# Patient Record
Sex: FEMALE | Race: WHITE | NOT HISPANIC OR LATINO | Employment: UNEMPLOYED
[De-identification: names, ages, dates, MRNs, and addresses within clinical notes are randomized per-mention and may not be internally consistent; named-entity substitution may affect disease eponyms.]

---

## 2017-01-06 ENCOUNTER — RECORDS - HEALTHEAST (OUTPATIENT)
Dept: ADMINISTRATIVE | Facility: OTHER | Age: 42
End: 2017-01-06

## 2017-01-06 ENCOUNTER — OFFICE VISIT - HEALTHEAST (OUTPATIENT)
Dept: VASCULAR SURGERY | Facility: CLINIC | Age: 42
End: 2017-01-06

## 2017-01-06 DIAGNOSIS — E11.8 TYPE 2 DIABETES MELLITUS WITH COMPLICATION, WITHOUT LONG-TERM CURRENT USE OF INSULIN (H): ICD-10-CM

## 2017-01-06 DIAGNOSIS — E66.9 OBESITY (BMI 30-39.9): ICD-10-CM

## 2017-01-06 DIAGNOSIS — G62.9 PERIPHERAL POLYNEUROPATHY: ICD-10-CM

## 2017-01-06 DIAGNOSIS — I87.303 VENOUS HYPERTENSION, CHRONIC, BILATERAL: ICD-10-CM

## 2017-01-06 DIAGNOSIS — L97.911 LEG ULCER, RIGHT, LIMITED TO BREAKDOWN OF SKIN (H): ICD-10-CM

## 2017-01-06 DIAGNOSIS — Z72.0 TOBACCO ABUSE: ICD-10-CM

## 2017-01-13 ENCOUNTER — RECORDS - HEALTHEAST (OUTPATIENT)
Dept: ADMINISTRATIVE | Facility: OTHER | Age: 42
End: 2017-01-13

## 2017-01-13 ENCOUNTER — OFFICE VISIT - HEALTHEAST (OUTPATIENT)
Dept: VASCULAR SURGERY | Facility: CLINIC | Age: 42
End: 2017-01-13

## 2017-01-13 DIAGNOSIS — L97.911 LEG ULCER, RIGHT, LIMITED TO BREAKDOWN OF SKIN (H): ICD-10-CM

## 2017-01-13 DIAGNOSIS — G62.9 PERIPHERAL POLYNEUROPATHY: ICD-10-CM

## 2017-01-13 DIAGNOSIS — E66.9 OBESITY (BMI 30-39.9): ICD-10-CM

## 2017-01-13 DIAGNOSIS — E11.8 TYPE 2 DIABETES MELLITUS WITH COMPLICATION, WITHOUT LONG-TERM CURRENT USE OF INSULIN (H): ICD-10-CM

## 2017-01-20 ENCOUNTER — OFFICE VISIT - HEALTHEAST (OUTPATIENT)
Dept: VASCULAR SURGERY | Facility: CLINIC | Age: 42
End: 2017-01-20

## 2017-01-20 DIAGNOSIS — I87.303 VENOUS HYPERTENSION, CHRONIC, BILATERAL: ICD-10-CM

## 2017-01-20 DIAGNOSIS — L08.9 LOCAL INFECTION OF WOUND: ICD-10-CM

## 2017-01-20 DIAGNOSIS — E66.9 OBESITY (BMI 30-39.9): ICD-10-CM

## 2017-01-20 DIAGNOSIS — L97.911 LEG ULCER, RIGHT, LIMITED TO BREAKDOWN OF SKIN (H): ICD-10-CM

## 2017-01-20 DIAGNOSIS — T14.8XXA LOCAL INFECTION OF WOUND: ICD-10-CM

## 2017-01-20 DIAGNOSIS — G62.9 PERIPHERAL POLYNEUROPATHY: ICD-10-CM

## 2017-01-20 DIAGNOSIS — E11.8 TYPE 2 DIABETES MELLITUS WITH COMPLICATION, WITHOUT LONG-TERM CURRENT USE OF INSULIN (H): ICD-10-CM

## 2017-01-23 ENCOUNTER — COMMUNICATION - HEALTHEAST (OUTPATIENT)
Dept: VASCULAR SURGERY | Facility: CLINIC | Age: 42
End: 2017-01-23

## 2017-01-31 ENCOUNTER — OFFICE VISIT - HEALTHEAST (OUTPATIENT)
Dept: VASCULAR SURGERY | Facility: CLINIC | Age: 42
End: 2017-01-31

## 2017-01-31 DIAGNOSIS — E66.9 OBESITY (BMI 30-39.9): ICD-10-CM

## 2017-01-31 DIAGNOSIS — L97.911 LEG ULCER, RIGHT, LIMITED TO BREAKDOWN OF SKIN (H): ICD-10-CM

## 2017-01-31 DIAGNOSIS — I87.303 VENOUS HYPERTENSION, CHRONIC, BILATERAL: ICD-10-CM

## 2017-01-31 DIAGNOSIS — G62.9 PERIPHERAL POLYNEUROPATHY: ICD-10-CM

## 2017-01-31 DIAGNOSIS — T14.8XXA LOCAL INFECTION OF WOUND: ICD-10-CM

## 2017-01-31 DIAGNOSIS — L08.9 LOCAL INFECTION OF WOUND: ICD-10-CM

## 2017-01-31 DIAGNOSIS — E11.8 TYPE 2 DIABETES MELLITUS WITH COMPLICATION, WITHOUT LONG-TERM CURRENT USE OF INSULIN (H): ICD-10-CM

## 2017-02-09 ENCOUNTER — OFFICE VISIT - HEALTHEAST (OUTPATIENT)
Dept: VASCULAR SURGERY | Facility: CLINIC | Age: 42
End: 2017-02-09

## 2017-02-09 DIAGNOSIS — I87.303 VENOUS HYPERTENSION, CHRONIC, BILATERAL: ICD-10-CM

## 2017-02-09 DIAGNOSIS — Z72.0 TOBACCO ABUSE: ICD-10-CM

## 2017-02-09 DIAGNOSIS — L97.911 LEG ULCER, RIGHT, LIMITED TO BREAKDOWN OF SKIN (H): ICD-10-CM

## 2017-02-09 DIAGNOSIS — G62.9 PERIPHERAL POLYNEUROPATHY: ICD-10-CM

## 2017-02-09 DIAGNOSIS — E66.9 OBESITY (BMI 30-39.9): ICD-10-CM

## 2017-02-09 DIAGNOSIS — E11.8 TYPE 2 DIABETES MELLITUS WITH COMPLICATION, WITHOUT LONG-TERM CURRENT USE OF INSULIN (H): ICD-10-CM

## 2017-03-01 ENCOUNTER — OFFICE VISIT - HEALTHEAST (OUTPATIENT)
Dept: VASCULAR SURGERY | Facility: CLINIC | Age: 42
End: 2017-03-01

## 2017-03-01 DIAGNOSIS — G62.9 PERIPHERAL POLYNEUROPATHY: ICD-10-CM

## 2017-03-01 DIAGNOSIS — L97.911 LEG ULCER, RIGHT, LIMITED TO BREAKDOWN OF SKIN (H): ICD-10-CM

## 2017-03-01 DIAGNOSIS — Z72.0 TOBACCO ABUSE: ICD-10-CM

## 2017-03-01 DIAGNOSIS — I87.303 VENOUS HYPERTENSION, CHRONIC, BILATERAL: ICD-10-CM

## 2017-03-01 DIAGNOSIS — E11.8 TYPE 2 DIABETES MELLITUS WITH COMPLICATION, WITHOUT LONG-TERM CURRENT USE OF INSULIN (H): ICD-10-CM

## 2017-03-01 DIAGNOSIS — E66.9 OBESITY (BMI 30-39.9): ICD-10-CM

## 2017-03-06 ENCOUNTER — COMMUNICATION - HEALTHEAST (OUTPATIENT)
Dept: FAMILY MEDICINE | Facility: CLINIC | Age: 42
End: 2017-03-06

## 2017-03-06 ENCOUNTER — OFFICE VISIT - HEALTHEAST (OUTPATIENT)
Dept: FAMILY MEDICINE | Facility: CLINIC | Age: 42
End: 2017-03-06

## 2017-03-06 DIAGNOSIS — I10 ESSENTIAL HYPERTENSION WITH GOAL BLOOD PRESSURE LESS THAN 130/80: ICD-10-CM

## 2017-03-06 DIAGNOSIS — J45.20 INTERMITTENT ASTHMA: ICD-10-CM

## 2017-03-06 DIAGNOSIS — G62.9 PERIPHERAL POLYNEUROPATHY: ICD-10-CM

## 2017-03-06 DIAGNOSIS — F32.9 MAJOR DEPRESSION: ICD-10-CM

## 2017-03-06 DIAGNOSIS — R05.9 COUGH: ICD-10-CM

## 2017-03-06 DIAGNOSIS — F41.9 ANXIETY: ICD-10-CM

## 2017-03-06 DIAGNOSIS — E87.6 HYPOKALEMIA: ICD-10-CM

## 2017-03-06 DIAGNOSIS — E11.8 TYPE 2 DIABETES MELLITUS WITH COMPLICATION, WITHOUT LONG-TERM CURRENT USE OF INSULIN (H): ICD-10-CM

## 2017-03-06 LAB
HBA1C MFR BLD: 13.1 % (ref 3.5–6)
LDLC SERPL CALC-MCNC: 41 MG/DL

## 2017-03-08 ENCOUNTER — COMMUNICATION - HEALTHEAST (OUTPATIENT)
Dept: FAMILY MEDICINE | Facility: CLINIC | Age: 42
End: 2017-03-08

## 2017-03-09 ENCOUNTER — COMMUNICATION - HEALTHEAST (OUTPATIENT)
Dept: MEDSURG UNIT | Facility: HOSPITAL | Age: 42
End: 2017-03-09

## 2017-03-09 DIAGNOSIS — I10 ESSENTIAL HYPERTENSION WITH GOAL BLOOD PRESSURE LESS THAN 130/80: ICD-10-CM

## 2017-03-15 ENCOUNTER — OFFICE VISIT - HEALTHEAST (OUTPATIENT)
Dept: VASCULAR SURGERY | Facility: CLINIC | Age: 42
End: 2017-03-15

## 2017-03-15 DIAGNOSIS — L97.911 LEG ULCER, RIGHT, LIMITED TO BREAKDOWN OF SKIN (H): ICD-10-CM

## 2017-03-15 DIAGNOSIS — G62.9 PERIPHERAL POLYNEUROPATHY: ICD-10-CM

## 2017-03-15 DIAGNOSIS — E11.8 TYPE 2 DIABETES MELLITUS WITH COMPLICATION, WITHOUT LONG-TERM CURRENT USE OF INSULIN (H): ICD-10-CM

## 2017-04-28 ENCOUNTER — OFFICE VISIT - HEALTHEAST (OUTPATIENT)
Dept: VASCULAR SURGERY | Facility: CLINIC | Age: 42
End: 2017-04-28

## 2017-04-28 DIAGNOSIS — E66.9 OBESITY (BMI 30-39.9): ICD-10-CM

## 2017-04-28 DIAGNOSIS — Z72.0 TOBACCO ABUSE: ICD-10-CM

## 2017-04-28 DIAGNOSIS — I89.0 ACQUIRED LYMPHEDEMA OF LOWER EXTREMITY: ICD-10-CM

## 2017-04-28 DIAGNOSIS — L97.911 LEG ULCER, RIGHT, LIMITED TO BREAKDOWN OF SKIN (H): ICD-10-CM

## 2017-04-28 DIAGNOSIS — I87.303 VENOUS HYPERTENSION, CHRONIC, BILATERAL: ICD-10-CM

## 2017-04-28 DIAGNOSIS — E11.8 TYPE 2 DIABETES MELLITUS WITH COMPLICATION, WITHOUT LONG-TERM CURRENT USE OF INSULIN (H): ICD-10-CM

## 2017-04-28 DIAGNOSIS — L97.921 LEG ULCER, LEFT, LIMITED TO BREAKDOWN OF SKIN (H): ICD-10-CM

## 2017-04-28 DIAGNOSIS — G62.9 PERIPHERAL POLYNEUROPATHY: ICD-10-CM

## 2017-05-17 ENCOUNTER — OFFICE VISIT - HEALTHEAST (OUTPATIENT)
Dept: VASCULAR SURGERY | Facility: CLINIC | Age: 42
End: 2017-05-17

## 2017-05-17 DIAGNOSIS — E11.8 TYPE 2 DIABETES MELLITUS WITH COMPLICATION, WITHOUT LONG-TERM CURRENT USE OF INSULIN (H): ICD-10-CM

## 2017-05-17 DIAGNOSIS — E66.9 OBESITY: ICD-10-CM

## 2017-05-17 DIAGNOSIS — Z72.0 TOBACCO ABUSE: ICD-10-CM

## 2017-05-17 DIAGNOSIS — G62.9 PERIPHERAL POLYNEUROPATHY: ICD-10-CM

## 2017-05-17 DIAGNOSIS — E66.9 OBESITY (BMI 30-39.9): ICD-10-CM

## 2017-05-17 DIAGNOSIS — I89.0 ACQUIRED LYMPHEDEMA OF LOWER EXTREMITY: ICD-10-CM

## 2017-05-17 DIAGNOSIS — L97.911 LEG ULCER, RIGHT, LIMITED TO BREAKDOWN OF SKIN (H): ICD-10-CM

## 2017-05-18 ENCOUNTER — COMMUNICATION - HEALTHEAST (OUTPATIENT)
Dept: SURGERY | Facility: CLINIC | Age: 42
End: 2017-05-18

## 2017-05-24 ENCOUNTER — OFFICE VISIT - HEALTHEAST (OUTPATIENT)
Dept: VASCULAR SURGERY | Facility: CLINIC | Age: 42
End: 2017-05-24

## 2017-05-24 DIAGNOSIS — E11.8 TYPE 2 DIABETES MELLITUS WITH COMPLICATION, WITHOUT LONG-TERM CURRENT USE OF INSULIN (H): ICD-10-CM

## 2017-05-24 DIAGNOSIS — G62.9 PERIPHERAL POLYNEUROPATHY: ICD-10-CM

## 2017-05-24 DIAGNOSIS — L97.911 LEG ULCER, RIGHT, LIMITED TO BREAKDOWN OF SKIN (H): ICD-10-CM

## 2017-05-24 DIAGNOSIS — I89.0 ACQUIRED LYMPHEDEMA OF LOWER EXTREMITY: ICD-10-CM

## 2017-05-24 DIAGNOSIS — Z72.0 TOBACCO ABUSE: ICD-10-CM

## 2017-05-24 DIAGNOSIS — E66.9 OBESITY (BMI 30-39.9): ICD-10-CM

## 2017-06-15 ENCOUNTER — OFFICE VISIT - HEALTHEAST (OUTPATIENT)
Dept: VASCULAR SURGERY | Facility: CLINIC | Age: 42
End: 2017-06-15

## 2017-06-15 DIAGNOSIS — E11.8 TYPE 2 DIABETES MELLITUS WITH COMPLICATION, WITHOUT LONG-TERM CURRENT USE OF INSULIN (H): ICD-10-CM

## 2017-06-15 DIAGNOSIS — Z72.0 TOBACCO ABUSE: ICD-10-CM

## 2017-06-15 DIAGNOSIS — E66.9 OBESITY (BMI 30-39.9): ICD-10-CM

## 2017-06-15 DIAGNOSIS — L97.912 LEG ULCER, RIGHT, WITH FAT LAYER EXPOSED (H): ICD-10-CM

## 2017-06-15 DIAGNOSIS — G62.9 PERIPHERAL POLYNEUROPATHY: ICD-10-CM

## 2017-06-15 DIAGNOSIS — I89.0 ACQUIRED LYMPHEDEMA OF LOWER EXTREMITY: ICD-10-CM

## 2017-06-22 ENCOUNTER — OFFICE VISIT - HEALTHEAST (OUTPATIENT)
Dept: VASCULAR SURGERY | Facility: CLINIC | Age: 42
End: 2017-06-22

## 2017-06-22 DIAGNOSIS — Z72.0 TOBACCO ABUSE: ICD-10-CM

## 2017-06-22 DIAGNOSIS — I89.0 ACQUIRED LYMPHEDEMA OF LOWER EXTREMITY: ICD-10-CM

## 2017-06-22 DIAGNOSIS — E66.9 OBESITY (BMI 30-39.9): ICD-10-CM

## 2017-06-22 DIAGNOSIS — G62.9 PERIPHERAL POLYNEUROPATHY: ICD-10-CM

## 2017-06-22 DIAGNOSIS — E11.8 TYPE 2 DIABETES MELLITUS WITH COMPLICATION, WITHOUT LONG-TERM CURRENT USE OF INSULIN (H): ICD-10-CM

## 2017-06-22 DIAGNOSIS — L97.911 LEG ULCER, RIGHT, LIMITED TO BREAKDOWN OF SKIN (H): ICD-10-CM

## 2017-06-29 ENCOUNTER — OFFICE VISIT - HEALTHEAST (OUTPATIENT)
Dept: VASCULAR SURGERY | Facility: CLINIC | Age: 42
End: 2017-06-29

## 2017-06-29 DIAGNOSIS — G62.9 PERIPHERAL POLYNEUROPATHY: ICD-10-CM

## 2017-06-29 DIAGNOSIS — L97.911 LEG ULCER, RIGHT, LIMITED TO BREAKDOWN OF SKIN (H): ICD-10-CM

## 2017-06-29 DIAGNOSIS — I89.0 ACQUIRED LYMPHEDEMA OF LOWER EXTREMITY: ICD-10-CM

## 2017-06-29 DIAGNOSIS — Z72.0 TOBACCO ABUSE: ICD-10-CM

## 2017-06-29 DIAGNOSIS — E11.8 TYPE 2 DIABETES MELLITUS WITH COMPLICATION, WITHOUT LONG-TERM CURRENT USE OF INSULIN (H): ICD-10-CM

## 2017-07-05 ENCOUNTER — COMMUNICATION - HEALTHEAST (OUTPATIENT)
Dept: SURGERY | Facility: CLINIC | Age: 42
End: 2017-07-05

## 2017-07-05 ENCOUNTER — COMMUNICATION - HEALTHEAST (OUTPATIENT)
Dept: FAMILY MEDICINE | Facility: CLINIC | Age: 42
End: 2017-07-05

## 2017-07-07 ENCOUNTER — AMBULATORY - HEALTHEAST (OUTPATIENT)
Dept: VASCULAR SURGERY | Facility: CLINIC | Age: 42
End: 2017-07-07

## 2017-08-03 ENCOUNTER — OFFICE VISIT - HEALTHEAST (OUTPATIENT)
Dept: VASCULAR SURGERY | Facility: CLINIC | Age: 42
End: 2017-08-03

## 2017-08-03 DIAGNOSIS — E66.9 OBESITY (BMI 30-39.9): ICD-10-CM

## 2017-08-03 DIAGNOSIS — F41.9 ANXIETY: ICD-10-CM

## 2017-08-03 DIAGNOSIS — I87.303 VENOUS HYPERTENSION, CHRONIC, BILATERAL: ICD-10-CM

## 2017-08-03 DIAGNOSIS — L97.912 LEG ULCER, RIGHT, WITH FAT LAYER EXPOSED (H): ICD-10-CM

## 2017-08-03 DIAGNOSIS — E11.8 TYPE 2 DIABETES MELLITUS WITH COMPLICATION, WITHOUT LONG-TERM CURRENT USE OF INSULIN (H): ICD-10-CM

## 2017-08-03 DIAGNOSIS — I89.0 ACQUIRED LYMPHEDEMA OF LOWER EXTREMITY: ICD-10-CM

## 2017-08-03 DIAGNOSIS — Z72.0 TOBACCO ABUSE: ICD-10-CM

## 2017-08-03 DIAGNOSIS — G62.9 PERIPHERAL POLYNEUROPATHY: ICD-10-CM

## 2017-08-11 ENCOUNTER — OFFICE VISIT - HEALTHEAST (OUTPATIENT)
Dept: VASCULAR SURGERY | Facility: CLINIC | Age: 42
End: 2017-08-11

## 2017-08-11 DIAGNOSIS — I89.0 ACQUIRED LYMPHEDEMA OF LOWER EXTREMITY: ICD-10-CM

## 2017-08-11 DIAGNOSIS — L97.911 LEG ULCER, RIGHT, LIMITED TO BREAKDOWN OF SKIN (H): ICD-10-CM

## 2017-08-11 DIAGNOSIS — G62.9 PERIPHERAL POLYNEUROPATHY: ICD-10-CM

## 2017-08-11 DIAGNOSIS — E66.9 OBESITY (BMI 30-39.9): ICD-10-CM

## 2017-08-11 DIAGNOSIS — Z72.0 TOBACCO ABUSE: ICD-10-CM

## 2017-08-11 DIAGNOSIS — E11.8 TYPE 2 DIABETES MELLITUS WITH COMPLICATION, WITHOUT LONG-TERM CURRENT USE OF INSULIN (H): ICD-10-CM

## 2017-08-14 ENCOUNTER — COMMUNICATION - HEALTHEAST (OUTPATIENT)
Dept: FAMILY MEDICINE | Facility: CLINIC | Age: 42
End: 2017-08-14

## 2017-08-14 DIAGNOSIS — F41.9 ANXIETY: ICD-10-CM

## 2017-08-14 DIAGNOSIS — F32.9 MAJOR DEPRESSION: ICD-10-CM

## 2017-08-15 ENCOUNTER — AMBULATORY - HEALTHEAST (OUTPATIENT)
Dept: VASCULAR SURGERY | Facility: CLINIC | Age: 42
End: 2017-08-15

## 2017-08-15 DIAGNOSIS — L97.911 LEG ULCER, RIGHT, LIMITED TO BREAKDOWN OF SKIN (H): ICD-10-CM

## 2017-08-24 ENCOUNTER — OFFICE VISIT - HEALTHEAST (OUTPATIENT)
Dept: VASCULAR SURGERY | Facility: CLINIC | Age: 42
End: 2017-08-24

## 2017-08-24 DIAGNOSIS — L97.911 LEG ULCER, RIGHT, LIMITED TO BREAKDOWN OF SKIN (H): ICD-10-CM

## 2017-08-24 DIAGNOSIS — G62.9 PERIPHERAL POLYNEUROPATHY: ICD-10-CM

## 2017-08-24 DIAGNOSIS — L97.921 LEG ULCER, LEFT, LIMITED TO BREAKDOWN OF SKIN (H): ICD-10-CM

## 2017-08-24 DIAGNOSIS — E66.9 OBESITY (BMI 30-39.9): ICD-10-CM

## 2017-08-24 DIAGNOSIS — Z72.0 TOBACCO ABUSE: ICD-10-CM

## 2017-08-24 DIAGNOSIS — E11.8 TYPE 2 DIABETES MELLITUS WITH COMPLICATION, WITHOUT LONG-TERM CURRENT USE OF INSULIN (H): ICD-10-CM

## 2017-08-24 DIAGNOSIS — I89.0 ACQUIRED LYMPHEDEMA OF LOWER EXTREMITY: ICD-10-CM

## 2017-09-14 ENCOUNTER — COMMUNICATION - HEALTHEAST (OUTPATIENT)
Dept: FAMILY MEDICINE | Facility: CLINIC | Age: 42
End: 2017-09-14

## 2017-09-14 ENCOUNTER — OFFICE VISIT - HEALTHEAST (OUTPATIENT)
Dept: VASCULAR SURGERY | Facility: CLINIC | Age: 42
End: 2017-09-14

## 2017-09-14 DIAGNOSIS — G62.9 PERIPHERAL POLYNEUROPATHY: ICD-10-CM

## 2017-09-14 DIAGNOSIS — I89.0 ACQUIRED LYMPHEDEMA OF LOWER EXTREMITY: ICD-10-CM

## 2017-09-14 DIAGNOSIS — E11.8 TYPE 2 DIABETES MELLITUS WITH COMPLICATION, WITHOUT LONG-TERM CURRENT USE OF INSULIN (H): ICD-10-CM

## 2017-09-14 DIAGNOSIS — E66.9 OBESITY (BMI 30-39.9): ICD-10-CM

## 2017-09-14 DIAGNOSIS — L97.912 LEG ULCER, RIGHT, WITH FAT LAYER EXPOSED (H): ICD-10-CM

## 2017-09-14 DIAGNOSIS — L97.921 LEG ULCER, LEFT, LIMITED TO BREAKDOWN OF SKIN (H): ICD-10-CM

## 2017-09-14 DIAGNOSIS — Z72.0 TOBACCO ABUSE: ICD-10-CM

## 2017-09-16 ENCOUNTER — COMMUNICATION - HEALTHEAST (OUTPATIENT)
Dept: FAMILY MEDICINE | Facility: CLINIC | Age: 42
End: 2017-09-16

## 2017-09-21 ENCOUNTER — COMMUNICATION - HEALTHEAST (OUTPATIENT)
Dept: VASCULAR SURGERY | Facility: CLINIC | Age: 42
End: 2017-09-21

## 2017-09-28 ENCOUNTER — OFFICE VISIT - HEALTHEAST (OUTPATIENT)
Dept: VASCULAR SURGERY | Facility: CLINIC | Age: 42
End: 2017-09-28

## 2017-09-28 DIAGNOSIS — G62.9 PERIPHERAL POLYNEUROPATHY: ICD-10-CM

## 2017-09-28 DIAGNOSIS — L97.921 LEG ULCER, LEFT, LIMITED TO BREAKDOWN OF SKIN (H): ICD-10-CM

## 2017-09-28 DIAGNOSIS — E66.9 OBESITY (BMI 30-39.9): ICD-10-CM

## 2017-09-28 DIAGNOSIS — E11.8 TYPE 2 DIABETES MELLITUS WITH COMPLICATION, WITHOUT LONG-TERM CURRENT USE OF INSULIN (H): ICD-10-CM

## 2017-09-28 DIAGNOSIS — L97.911 LEG ULCER, RIGHT, LIMITED TO BREAKDOWN OF SKIN (H): ICD-10-CM

## 2017-09-28 DIAGNOSIS — I87.2 CHRONIC VENOUS INSUFFICIENCY: ICD-10-CM

## 2017-09-28 DIAGNOSIS — I89.0 ACQUIRED LYMPHEDEMA OF LOWER EXTREMITY: ICD-10-CM

## 2017-09-28 DIAGNOSIS — Z72.0 TOBACCO ABUSE: ICD-10-CM

## 2017-10-12 ENCOUNTER — COMMUNICATION - HEALTHEAST (OUTPATIENT)
Dept: FAMILY MEDICINE | Facility: CLINIC | Age: 42
End: 2017-10-12

## 2017-11-09 ENCOUNTER — COMMUNICATION - HEALTHEAST (OUTPATIENT)
Dept: FAMILY MEDICINE | Facility: CLINIC | Age: 42
End: 2017-11-09

## 2017-11-20 ENCOUNTER — COMMUNICATION - HEALTHEAST (OUTPATIENT)
Dept: FAMILY MEDICINE | Facility: CLINIC | Age: 42
End: 2017-11-20

## 2017-12-05 ENCOUNTER — RECORDS - HEALTHEAST (OUTPATIENT)
Dept: ADMINISTRATIVE | Facility: OTHER | Age: 42
End: 2017-12-05

## 2017-12-09 ENCOUNTER — COMMUNICATION - HEALTHEAST (OUTPATIENT)
Dept: FAMILY MEDICINE | Facility: CLINIC | Age: 42
End: 2017-12-09

## 2017-12-09 DIAGNOSIS — E11.8 TYPE 2 DIABETES MELLITUS WITH COMPLICATION, WITHOUT LONG-TERM CURRENT USE OF INSULIN (H): ICD-10-CM

## 2017-12-12 ENCOUNTER — COMMUNICATION - HEALTHEAST (OUTPATIENT)
Dept: FAMILY MEDICINE | Facility: CLINIC | Age: 42
End: 2017-12-12

## 2017-12-21 ENCOUNTER — COMMUNICATION - HEALTHEAST (OUTPATIENT)
Dept: FAMILY MEDICINE | Facility: CLINIC | Age: 42
End: 2017-12-21

## 2017-12-21 ENCOUNTER — RECORDS - HEALTHEAST (OUTPATIENT)
Dept: ADMINISTRATIVE | Facility: OTHER | Age: 42
End: 2017-12-21

## 2017-12-24 ENCOUNTER — RECORDS - HEALTHEAST (OUTPATIENT)
Dept: ADMINISTRATIVE | Facility: OTHER | Age: 42
End: 2017-12-24

## 2017-12-26 ENCOUNTER — COMMUNICATION - HEALTHEAST (OUTPATIENT)
Dept: FAMILY MEDICINE | Facility: CLINIC | Age: 42
End: 2017-12-26

## 2018-01-25 ENCOUNTER — COMMUNICATION - HEALTHEAST (OUTPATIENT)
Dept: FAMILY MEDICINE | Facility: CLINIC | Age: 43
End: 2018-01-25

## 2018-01-25 DIAGNOSIS — E11.8 TYPE 2 DIABETES MELLITUS WITH COMPLICATION, WITHOUT LONG-TERM CURRENT USE OF INSULIN (H): ICD-10-CM

## 2018-02-28 ENCOUNTER — COMMUNICATION - HEALTHEAST (OUTPATIENT)
Dept: FAMILY MEDICINE | Facility: CLINIC | Age: 43
End: 2018-02-28

## 2018-12-21 ENCOUNTER — COMMUNICATION - HEALTHEAST (OUTPATIENT)
Dept: MEDSURG UNIT | Facility: HOSPITAL | Age: 43
End: 2018-12-21

## 2018-12-21 ENCOUNTER — COMMUNICATION - HEALTHEAST (OUTPATIENT)
Dept: FAMILY MEDICINE | Facility: CLINIC | Age: 43
End: 2018-12-21

## 2018-12-21 DIAGNOSIS — E11.8 TYPE 2 DIABETES MELLITUS WITH COMPLICATION, WITHOUT LONG-TERM CURRENT USE OF INSULIN (H): ICD-10-CM

## 2018-12-21 DIAGNOSIS — F32.9 MAJOR DEPRESSION: ICD-10-CM

## 2018-12-21 DIAGNOSIS — F41.9 ANXIETY: ICD-10-CM

## 2018-12-21 DIAGNOSIS — I10 ESSENTIAL HYPERTENSION WITH GOAL BLOOD PRESSURE LESS THAN 130/80: ICD-10-CM

## 2018-12-24 ENCOUNTER — COMMUNICATION - HEALTHEAST (OUTPATIENT)
Dept: FAMILY MEDICINE | Facility: CLINIC | Age: 43
End: 2018-12-24

## 2018-12-24 DIAGNOSIS — G62.9 PERIPHERAL POLYNEUROPATHY: ICD-10-CM

## 2018-12-28 ENCOUNTER — COMMUNICATION - HEALTHEAST (OUTPATIENT)
Dept: FAMILY MEDICINE | Facility: CLINIC | Age: 43
End: 2018-12-28

## 2019-04-04 ENCOUNTER — COMMUNICATION - HEALTHEAST (OUTPATIENT)
Dept: FAMILY MEDICINE | Facility: CLINIC | Age: 44
End: 2019-04-04

## 2019-04-04 DIAGNOSIS — E11.65 UNCONTROLLED TYPE 2 DIABETES MELLITUS WITH HYPERGLYCEMIA (H): ICD-10-CM

## 2019-04-04 DIAGNOSIS — E11.8 TYPE 2 DIABETES MELLITUS WITH COMPLICATION, WITHOUT LONG-TERM CURRENT USE OF INSULIN (H): ICD-10-CM

## 2019-04-26 ENCOUNTER — COMMUNICATION - HEALTHEAST (OUTPATIENT)
Dept: SCHEDULING | Facility: CLINIC | Age: 44
End: 2019-04-26

## 2019-04-29 ENCOUNTER — COMMUNICATION - HEALTHEAST (OUTPATIENT)
Dept: FAMILY MEDICINE | Facility: CLINIC | Age: 44
End: 2019-04-29

## 2019-04-29 DIAGNOSIS — E11.65 UNCONTROLLED TYPE 2 DIABETES MELLITUS WITH HYPERGLYCEMIA (H): ICD-10-CM

## 2019-06-14 ENCOUNTER — AMBULATORY - HEALTHEAST (OUTPATIENT)
Dept: FAMILY MEDICINE | Facility: CLINIC | Age: 44
End: 2019-06-14

## 2019-06-14 DIAGNOSIS — E11.65 UNCONTROLLED TYPE 2 DIABETES MELLITUS WITH HYPERGLYCEMIA (H): ICD-10-CM

## 2019-06-18 ENCOUNTER — COMMUNICATION - HEALTHEAST (OUTPATIENT)
Dept: NURSING | Facility: CLINIC | Age: 44
End: 2019-06-18

## 2019-06-20 ENCOUNTER — COMMUNICATION - HEALTHEAST (OUTPATIENT)
Dept: FAMILY MEDICINE | Facility: CLINIC | Age: 44
End: 2019-06-20

## 2019-06-28 ENCOUNTER — COMMUNICATION - HEALTHEAST (OUTPATIENT)
Dept: NURSING | Facility: CLINIC | Age: 44
End: 2019-06-28

## 2019-07-02 ENCOUNTER — COMMUNICATION - HEALTHEAST (OUTPATIENT)
Dept: NURSING | Facility: CLINIC | Age: 44
End: 2019-07-02

## 2019-07-18 ENCOUNTER — COMMUNICATION - HEALTHEAST (OUTPATIENT)
Dept: FAMILY MEDICINE | Facility: CLINIC | Age: 44
End: 2019-07-18

## 2019-07-18 DIAGNOSIS — E11.8 TYPE 2 DIABETES MELLITUS WITH COMPLICATION, WITHOUT LONG-TERM CURRENT USE OF INSULIN (H): ICD-10-CM

## 2019-07-26 ENCOUNTER — COMMUNICATION - HEALTHEAST (OUTPATIENT)
Dept: NURSING | Facility: CLINIC | Age: 44
End: 2019-07-26

## 2019-07-29 ENCOUNTER — COMMUNICATION - HEALTHEAST (OUTPATIENT)
Dept: NURSING | Facility: CLINIC | Age: 44
End: 2019-07-29

## 2019-11-23 ENCOUNTER — HOSPITAL ENCOUNTER (INPATIENT)
Facility: CLINIC | Age: 44
LOS: 12 days | Discharge: NURSING, REHAB OR LONG-TERM CARE | End: 2019-12-05
Attending: INTERNAL MEDICINE | Admitting: INTERNAL MEDICINE
Payer: COMMERCIAL

## 2019-11-23 ENCOUNTER — APPOINTMENT (OUTPATIENT)
Dept: GENERAL RADIOLOGY | Facility: CLINIC | Age: 44
End: 2019-11-23
Attending: INTERNAL MEDICINE
Payer: COMMERCIAL

## 2019-11-23 DIAGNOSIS — E11.65 UNCONTROLLED TYPE 2 DIABETES MELLITUS WITH HYPERGLYCEMIA (H): ICD-10-CM

## 2019-11-23 DIAGNOSIS — N83.8 OVARIAN MASS: Primary | ICD-10-CM

## 2019-11-23 PROBLEM — A41.9 SEPSIS (H): Status: ACTIVE | Noted: 2019-11-23

## 2019-11-23 PROBLEM — R65.20 SEVERE SEPSIS WITH ACUTE ORGAN DYSFUNCTION (H): Status: ACTIVE | Noted: 2019-11-23

## 2019-11-23 LAB
ALBUMIN SERPL-MCNC: 1.8 G/DL (ref 3.4–5)
ALP SERPL-CCNC: 64 U/L (ref 40–150)
ALT SERPL W P-5'-P-CCNC: 11 U/L (ref 0–50)
AMMONIA PLAS-SCNC: 28 UMOL/L (ref 10–50)
ANION GAP SERPL CALCULATED.3IONS-SCNC: 7 MMOL/L (ref 3–14)
ANION GAP SERPL CALCULATED.3IONS-SCNC: 8 MMOL/L (ref 3–14)
APTT PPP: 34 SEC (ref 22–37)
AST SERPL W P-5'-P-CCNC: 15 U/L (ref 0–45)
B-HCG SERPL-ACNC: <1 IU/L (ref 0–5)
BASOPHILS # BLD AUTO: 0 10E9/L (ref 0–0.2)
BASOPHILS NFR BLD AUTO: 0.2 %
BILIRUB SERPL-MCNC: 0.7 MG/DL (ref 0.2–1.3)
BUN SERPL-MCNC: 24 MG/DL (ref 7–30)
BUN SERPL-MCNC: 28 MG/DL (ref 7–30)
CALCIUM SERPL-MCNC: 7.6 MG/DL (ref 8.5–10.1)
CALCIUM SERPL-MCNC: 7.6 MG/DL (ref 8.5–10.1)
CANCER AG125 SERPL-ACNC: 850 U/ML (ref 0–30)
CEA SERPL-MCNC: 4 UG/L (ref 0–2.5)
CHLORIDE SERPL-SCNC: 96 MMOL/L (ref 94–109)
CHLORIDE SERPL-SCNC: 99 MMOL/L (ref 94–109)
CO2 SERPL-SCNC: 25 MMOL/L (ref 20–32)
CO2 SERPL-SCNC: 25 MMOL/L (ref 20–32)
CREAT SERPL-MCNC: 1.18 MG/DL (ref 0.52–1.04)
CREAT SERPL-MCNC: 1.26 MG/DL (ref 0.52–1.04)
CRP SERPL-MCNC: 180 MG/L (ref 0–8)
DIFFERENTIAL METHOD BLD: ABNORMAL
EOSINOPHIL # BLD AUTO: 0 10E9/L (ref 0–0.7)
EOSINOPHIL NFR BLD AUTO: 0.1 %
ERYTHROCYTE [DISTWIDTH] IN BLOOD BY AUTOMATED COUNT: 14.4 % (ref 10–15)
GFR SERPL CREATININE-BSD FRML MDRD: 52 ML/MIN/{1.73_M2}
GFR SERPL CREATININE-BSD FRML MDRD: 56 ML/MIN/{1.73_M2}
GLUCOSE BLDC GLUCOMTR-MCNC: 109 MG/DL (ref 70–99)
GLUCOSE BLDC GLUCOMTR-MCNC: 123 MG/DL (ref 70–99)
GLUCOSE BLDC GLUCOMTR-MCNC: 164 MG/DL (ref 70–99)
GLUCOSE BLDC GLUCOMTR-MCNC: 175 MG/DL (ref 70–99)
GLUCOSE BLDC GLUCOMTR-MCNC: 343 MG/DL (ref 70–99)
GLUCOSE BLDC GLUCOMTR-MCNC: 418 MG/DL (ref 70–99)
GLUCOSE BLDC GLUCOMTR-MCNC: 447 MG/DL (ref 70–99)
GLUCOSE SERPL-MCNC: 174 MG/DL (ref 70–99)
GLUCOSE SERPL-MCNC: 476 MG/DL (ref 70–99)
HBA1C MFR BLD: 15 % (ref 0–5.6)
HCT VFR BLD AUTO: 32.3 % (ref 35–47)
HGB BLD-MCNC: 9.9 G/DL (ref 11.7–15.7)
IMM GRANULOCYTES # BLD: 0.1 10E9/L (ref 0–0.4)
IMM GRANULOCYTES NFR BLD: 0.6 %
INR PPP: 1.59 (ref 0.86–1.14)
INTERPRETATION ECG - MUSE: NORMAL
LACTATE BLD-SCNC: 1.5 MMOL/L (ref 0.7–2)
LACTATE BLD-SCNC: 1.8 MMOL/L (ref 0.7–2)
LACTATE BLD-SCNC: 2.3 MMOL/L (ref 0.7–2)
LYMPHOCYTES # BLD AUTO: 1.5 10E9/L (ref 0.8–5.3)
LYMPHOCYTES NFR BLD AUTO: 8.3 %
MAGNESIUM SERPL-MCNC: 1.7 MG/DL (ref 1.6–2.3)
MCH RBC QN AUTO: 26.5 PG (ref 26.5–33)
MCHC RBC AUTO-ENTMCNC: 30.7 G/DL (ref 31.5–36.5)
MCV RBC AUTO: 87 FL (ref 78–100)
MONOCYTES # BLD AUTO: 0.8 10E9/L (ref 0–1.3)
MONOCYTES NFR BLD AUTO: 4.5 %
MRSA DNA SPEC QL NAA+PROBE: NEGATIVE
NEUTROPHILS # BLD AUTO: 15.7 10E9/L (ref 1.6–8.3)
NEUTROPHILS NFR BLD AUTO: 86.3 %
NRBC # BLD AUTO: 0 10*3/UL
NRBC BLD AUTO-RTO: 0 /100
PLATELET # BLD AUTO: 325 10E9/L (ref 150–450)
POTASSIUM SERPL-SCNC: 3.9 MMOL/L (ref 3.4–5.3)
POTASSIUM SERPL-SCNC: 3.9 MMOL/L (ref 3.4–5.3)
PROCALCITONIN SERPL-MCNC: 127.51 NG/ML
PROT SERPL-MCNC: 6 G/DL (ref 6.8–8.8)
RBC # BLD AUTO: 3.73 10E12/L (ref 3.8–5.2)
SODIUM SERPL-SCNC: 128 MMOL/L (ref 133–144)
SODIUM SERPL-SCNC: 132 MMOL/L (ref 133–144)
SPECIMEN SOURCE: NORMAL
T PALLIDUM AB SER QL: NONREACTIVE
TROPONIN I SERPL-MCNC: <0.015 UG/L (ref 0–0.04)
TSH SERPL DL<=0.005 MIU/L-ACNC: 1.8 MU/L (ref 0.4–4)
WBC # BLD AUTO: 18.2 10E9/L (ref 4–11)

## 2019-11-23 PROCEDURE — 84484 ASSAY OF TROPONIN QUANT: CPT | Performed by: STUDENT IN AN ORGANIZED HEALTH CARE EDUCATION/TRAINING PROGRAM

## 2019-11-23 PROCEDURE — 80048 BASIC METABOLIC PNL TOTAL CA: CPT | Performed by: STUDENT IN AN ORGANIZED HEALTH CARE EDUCATION/TRAINING PROGRAM

## 2019-11-23 PROCEDURE — 93010 ELECTROCARDIOGRAM REPORT: CPT | Performed by: INTERNAL MEDICINE

## 2019-11-23 PROCEDURE — 25000128 H RX IP 250 OP 636: Performed by: INTERNAL MEDICINE

## 2019-11-23 PROCEDURE — 85025 COMPLETE CBC W/AUTO DIFF WBC: CPT | Performed by: STUDENT IN AN ORGANIZED HEALTH CARE EDUCATION/TRAINING PROGRAM

## 2019-11-23 PROCEDURE — 87640 STAPH A DNA AMP PROBE: CPT | Performed by: STUDENT IN AN ORGANIZED HEALTH CARE EDUCATION/TRAINING PROGRAM

## 2019-11-23 PROCEDURE — 36415 COLL VENOUS BLD VENIPUNCTURE: CPT | Performed by: STUDENT IN AN ORGANIZED HEALTH CARE EDUCATION/TRAINING PROGRAM

## 2019-11-23 PROCEDURE — 25800030 ZZH RX IP 258 OP 636: Performed by: STUDENT IN AN ORGANIZED HEALTH CARE EDUCATION/TRAINING PROGRAM

## 2019-11-23 PROCEDURE — 82140 ASSAY OF AMMONIA: CPT | Performed by: STUDENT IN AN ORGANIZED HEALTH CARE EDUCATION/TRAINING PROGRAM

## 2019-11-23 PROCEDURE — 25000128 H RX IP 250 OP 636: Performed by: STUDENT IN AN ORGANIZED HEALTH CARE EDUCATION/TRAINING PROGRAM

## 2019-11-23 PROCEDURE — 36592 COLLECT BLOOD FROM PICC: CPT | Performed by: STUDENT IN AN ORGANIZED HEALTH CARE EDUCATION/TRAINING PROGRAM

## 2019-11-23 PROCEDURE — 86780 TREPONEMA PALLIDUM: CPT | Performed by: STUDENT IN AN ORGANIZED HEALTH CARE EDUCATION/TRAINING PROGRAM

## 2019-11-23 PROCEDURE — 86140 C-REACTIVE PROTEIN: CPT | Performed by: STUDENT IN AN ORGANIZED HEALTH CARE EDUCATION/TRAINING PROGRAM

## 2019-11-23 PROCEDURE — 87641 MR-STAPH DNA AMP PROBE: CPT | Performed by: STUDENT IN AN ORGANIZED HEALTH CARE EDUCATION/TRAINING PROGRAM

## 2019-11-23 PROCEDURE — 83735 ASSAY OF MAGNESIUM: CPT | Performed by: STUDENT IN AN ORGANIZED HEALTH CARE EDUCATION/TRAINING PROGRAM

## 2019-11-23 PROCEDURE — 71045 X-RAY EXAM CHEST 1 VIEW: CPT

## 2019-11-23 PROCEDURE — 86304 IMMUNOASSAY TUMOR CA 125: CPT | Performed by: STUDENT IN AN ORGANIZED HEALTH CARE EDUCATION/TRAINING PROGRAM

## 2019-11-23 PROCEDURE — 25800030 ZZH RX IP 258 OP 636: Performed by: INTERNAL MEDICINE

## 2019-11-23 PROCEDURE — 25000131 ZZH RX MED GY IP 250 OP 636 PS 637: Performed by: STUDENT IN AN ORGANIZED HEALTH CARE EDUCATION/TRAINING PROGRAM

## 2019-11-23 PROCEDURE — 83605 ASSAY OF LACTIC ACID: CPT | Performed by: STUDENT IN AN ORGANIZED HEALTH CARE EDUCATION/TRAINING PROGRAM

## 2019-11-23 PROCEDURE — 87389 HIV-1 AG W/HIV-1&-2 AB AG IA: CPT | Performed by: STUDENT IN AN ORGANIZED HEALTH CARE EDUCATION/TRAINING PROGRAM

## 2019-11-23 PROCEDURE — 84145 PROCALCITONIN (PCT): CPT | Performed by: STUDENT IN AN ORGANIZED HEALTH CARE EDUCATION/TRAINING PROGRAM

## 2019-11-23 PROCEDURE — 83605 ASSAY OF LACTIC ACID: CPT

## 2019-11-23 PROCEDURE — 25000132 ZZH RX MED GY IP 250 OP 250 PS 637: Performed by: STUDENT IN AN ORGANIZED HEALTH CARE EDUCATION/TRAINING PROGRAM

## 2019-11-23 PROCEDURE — 83036 HEMOGLOBIN GLYCOSYLATED A1C: CPT | Performed by: STUDENT IN AN ORGANIZED HEALTH CARE EDUCATION/TRAINING PROGRAM

## 2019-11-23 PROCEDURE — 82010 KETONE BODYS QUAN: CPT | Performed by: STUDENT IN AN ORGANIZED HEALTH CARE EDUCATION/TRAINING PROGRAM

## 2019-11-23 PROCEDURE — 00000146 ZZHCL STATISTIC GLUCOSE BY METER IP

## 2019-11-23 PROCEDURE — 84443 ASSAY THYROID STIM HORMONE: CPT | Performed by: STUDENT IN AN ORGANIZED HEALTH CARE EDUCATION/TRAINING PROGRAM

## 2019-11-23 PROCEDURE — 84702 CHORIONIC GONADOTROPIN TEST: CPT | Performed by: STUDENT IN AN ORGANIZED HEALTH CARE EDUCATION/TRAINING PROGRAM

## 2019-11-23 PROCEDURE — 85610 PROTHROMBIN TIME: CPT | Performed by: STUDENT IN AN ORGANIZED HEALTH CARE EDUCATION/TRAINING PROGRAM

## 2019-11-23 PROCEDURE — 87040 BLOOD CULTURE FOR BACTERIA: CPT | Performed by: STUDENT IN AN ORGANIZED HEALTH CARE EDUCATION/TRAINING PROGRAM

## 2019-11-23 PROCEDURE — 80053 COMPREHEN METABOLIC PANEL: CPT | Performed by: STUDENT IN AN ORGANIZED HEALTH CARE EDUCATION/TRAINING PROGRAM

## 2019-11-23 PROCEDURE — 12000004 ZZH R&B IMCU UMMC

## 2019-11-23 PROCEDURE — 99223 1ST HOSP IP/OBS HIGH 75: CPT | Mod: AI | Performed by: INTERNAL MEDICINE

## 2019-11-23 PROCEDURE — 99221 1ST HOSP IP/OBS SF/LOW 40: CPT | Mod: GC | Performed by: OBSTETRICS & GYNECOLOGY

## 2019-11-23 PROCEDURE — 85730 THROMBOPLASTIN TIME PARTIAL: CPT | Performed by: STUDENT IN AN ORGANIZED HEALTH CARE EDUCATION/TRAINING PROGRAM

## 2019-11-23 PROCEDURE — 82378 CARCINOEMBRYONIC ANTIGEN: CPT | Performed by: STUDENT IN AN ORGANIZED HEALTH CARE EDUCATION/TRAINING PROGRAM

## 2019-11-23 RX ORDER — HEPARIN SODIUM,PORCINE 10 UNIT/ML
5-10 VIAL (ML) INTRAVENOUS EVERY 24 HOURS
Status: DISCONTINUED | OUTPATIENT
Start: 2019-11-23 | End: 2019-12-02

## 2019-11-23 RX ORDER — ONDANSETRON 2 MG/ML
4 INJECTION INTRAMUSCULAR; INTRAVENOUS EVERY 6 HOURS PRN
Status: DISCONTINUED | OUTPATIENT
Start: 2019-11-23 | End: 2019-12-05 | Stop reason: HOSPADM

## 2019-11-23 RX ORDER — NALOXONE HYDROCHLORIDE 0.4 MG/ML
.1-.4 INJECTION, SOLUTION INTRAMUSCULAR; INTRAVENOUS; SUBCUTANEOUS
Status: DISCONTINUED | OUTPATIENT
Start: 2019-11-23 | End: 2019-11-27

## 2019-11-23 RX ORDER — VENLAFAXINE HYDROCHLORIDE 75 MG/1
225 CAPSULE, EXTENDED RELEASE ORAL DAILY
Status: ON HOLD | COMMUNITY
Start: 2013-02-11 | End: 2019-11-25

## 2019-11-23 RX ORDER — ALBUTEROL SULFATE 0.83 MG/ML
2.5 SOLUTION RESPIRATORY (INHALATION) EVERY 4 HOURS PRN
Status: DISCONTINUED | OUTPATIENT
Start: 2019-11-23 | End: 2019-12-05 | Stop reason: HOSPADM

## 2019-11-23 RX ORDER — LIDOCAINE 40 MG/G
CREAM TOPICAL
Status: DISCONTINUED | OUTPATIENT
Start: 2019-11-23 | End: 2019-12-05 | Stop reason: HOSPADM

## 2019-11-23 RX ORDER — ONDANSETRON 4 MG/1
4 TABLET, ORALLY DISINTEGRATING ORAL EVERY 6 HOURS PRN
Status: DISCONTINUED | OUTPATIENT
Start: 2019-11-23 | End: 2019-12-05 | Stop reason: HOSPADM

## 2019-11-23 RX ORDER — SODIUM CHLORIDE 9 MG/ML
INJECTION, SOLUTION INTRAVENOUS CONTINUOUS
Status: DISCONTINUED | OUTPATIENT
Start: 2019-11-23 | End: 2019-11-23

## 2019-11-23 RX ORDER — OXYCODONE AND ACETAMINOPHEN 5; 325 MG/1; MG/1
1-2 TABLET ORAL EVERY 4 HOURS PRN
Status: ON HOLD | COMMUNITY
Start: 2016-03-06 | End: 2019-11-25

## 2019-11-23 RX ORDER — ALBUTEROL SULFATE 90 UG/1
2 AEROSOL, METERED RESPIRATORY (INHALATION) PRN
COMMUNITY
Start: 2012-02-06

## 2019-11-23 RX ORDER — MEROPENEM 1 G/1
1 INJECTION, POWDER, FOR SOLUTION INTRAVENOUS EVERY 8 HOURS
Status: DISCONTINUED | OUTPATIENT
Start: 2019-11-23 | End: 2019-11-23 | Stop reason: ALTCHOICE

## 2019-11-23 RX ORDER — ACETAMINOPHEN 325 MG/1
650 TABLET ORAL EVERY 4 HOURS PRN
Status: DISCONTINUED | OUTPATIENT
Start: 2019-11-23 | End: 2019-12-05 | Stop reason: HOSPADM

## 2019-11-23 RX ORDER — DEXTROSE MONOHYDRATE 25 G/50ML
25-50 INJECTION, SOLUTION INTRAVENOUS
Status: DISCONTINUED | OUTPATIENT
Start: 2019-11-23 | End: 2019-12-05 | Stop reason: HOSPADM

## 2019-11-23 RX ORDER — AMOXICILLIN 250 MG
2 CAPSULE ORAL 2 TIMES DAILY
Status: DISCONTINUED | OUTPATIENT
Start: 2019-11-23 | End: 2019-12-05 | Stop reason: HOSPADM

## 2019-11-23 RX ORDER — HYDROMORPHONE HYDROCHLORIDE 1 MG/ML
.3-.5 INJECTION, SOLUTION INTRAMUSCULAR; INTRAVENOUS; SUBCUTANEOUS
Status: DISCONTINUED | OUTPATIENT
Start: 2019-11-23 | End: 2019-11-24

## 2019-11-23 RX ORDER — SUMATRIPTAN 25 MG/1
25-75 TABLET, FILM COATED ORAL EVERY 24 HOURS
Status: ON HOLD | COMMUNITY
Start: 2012-06-07 | End: 2019-11-25

## 2019-11-23 RX ORDER — NAPROXEN 500 MG/1
500 TABLET ORAL 2 TIMES DAILY PRN
Status: ON HOLD | COMMUNITY
Start: 2013-07-25 | End: 2019-11-25

## 2019-11-23 RX ORDER — GABAPENTIN 100 MG/1
300 CAPSULE ORAL 3 TIMES DAILY
Status: ON HOLD | COMMUNITY
Start: 2012-05-23 | End: 2019-12-04

## 2019-11-23 RX ORDER — ONDANSETRON 8 MG/1
8 TABLET, ORALLY DISINTEGRATING ORAL EVERY 8 HOURS PRN
Status: ON HOLD | COMMUNITY
Start: 2012-05-23 | End: 2019-11-25

## 2019-11-23 RX ORDER — HEPARIN SODIUM,PORCINE 10 UNIT/ML
5-10 VIAL (ML) INTRAVENOUS
Status: DISCONTINUED | OUTPATIENT
Start: 2019-11-23 | End: 2019-12-05 | Stop reason: HOSPADM

## 2019-11-23 RX ORDER — PIPERACILLIN SODIUM, TAZOBACTAM SODIUM 4; .5 G/20ML; G/20ML
4.5 INJECTION, POWDER, LYOPHILIZED, FOR SOLUTION INTRAVENOUS EVERY 6 HOURS
Status: DISCONTINUED | OUTPATIENT
Start: 2019-11-23 | End: 2019-11-24

## 2019-11-23 RX ORDER — AMOXICILLIN 250 MG
1 CAPSULE ORAL PRN
Status: ON HOLD | COMMUNITY
Start: 2012-02-06 | End: 2019-11-25

## 2019-11-23 RX ORDER — HYDROMORPHONE HCL/0.9% NACL/PF 0.2MG/0.2
0.2 SYRINGE (ML) INTRAVENOUS
Status: DISCONTINUED | OUTPATIENT
Start: 2019-11-23 | End: 2019-11-23

## 2019-11-23 RX ORDER — OXYCODONE HYDROCHLORIDE 5 MG/1
5-10 TABLET ORAL
Status: DISCONTINUED | OUTPATIENT
Start: 2019-11-23 | End: 2019-11-26

## 2019-11-23 RX ORDER — HYDROXYCHLOROQUINE SULFATE 200 MG/1
200 TABLET, FILM COATED ORAL 2 TIMES DAILY
Status: ON HOLD | COMMUNITY
Start: 2012-04-03 | End: 2019-11-25

## 2019-11-23 RX ORDER — AMOXICILLIN 250 MG
1 CAPSULE ORAL 2 TIMES DAILY
Status: DISCONTINUED | OUTPATIENT
Start: 2019-11-23 | End: 2019-12-05 | Stop reason: HOSPADM

## 2019-11-23 RX ORDER — NICOTINE POLACRILEX 4 MG
15-30 LOZENGE BUCCAL
Status: DISCONTINUED | OUTPATIENT
Start: 2019-11-23 | End: 2019-12-05 | Stop reason: HOSPADM

## 2019-11-23 RX ADMIN — PIPERACILLIN SODIUM AND TAZOBACTAM SODIUM 4.5 G: 4; .5 INJECTION, POWDER, LYOPHILIZED, FOR SOLUTION INTRAVENOUS at 15:17

## 2019-11-23 RX ADMIN — INSULIN ASPART 21 UNITS: 100 INJECTION, SOLUTION INTRAVENOUS; SUBCUTANEOUS at 08:45

## 2019-11-23 RX ADMIN — ACETAMINOPHEN 650 MG: 325 TABLET, FILM COATED ORAL at 11:22

## 2019-11-23 RX ADMIN — VANCOMYCIN HYDROCHLORIDE 2000 MG: 10 INJECTION, POWDER, LYOPHILIZED, FOR SOLUTION INTRAVENOUS at 09:16

## 2019-11-23 RX ADMIN — Medication 5 ML: at 23:18

## 2019-11-23 RX ADMIN — INSULIN ASPART 22 UNITS: 100 INJECTION, SOLUTION INTRAVENOUS; SUBCUTANEOUS at 06:54

## 2019-11-23 RX ADMIN — PIPERACILLIN SODIUM AND TAZOBACTAM SODIUM 4.5 G: 4; .5 INJECTION, POWDER, LYOPHILIZED, FOR SOLUTION INTRAVENOUS at 22:06

## 2019-11-23 RX ADMIN — INSULIN ASPART 4 UNITS: 100 INJECTION, SOLUTION INTRAVENOUS; SUBCUTANEOUS at 11:39

## 2019-11-23 RX ADMIN — OXYCODONE HYDROCHLORIDE 5 MG: 5 TABLET ORAL at 11:22

## 2019-11-23 RX ADMIN — OXYCODONE HYDROCHLORIDE 10 MG: 5 TABLET ORAL at 17:12

## 2019-11-23 RX ADMIN — SODIUM CHLORIDE: 9 INJECTION, SOLUTION INTRAVENOUS at 03:30

## 2019-11-23 RX ADMIN — HYDROMORPHONE HYDROCHLORIDE 0.3 MG: 1 INJECTION, SOLUTION INTRAMUSCULAR; INTRAVENOUS; SUBCUTANEOUS at 22:20

## 2019-11-23 RX ADMIN — SENNOSIDES AND DOCUSATE SODIUM 2 TABLET: 8.6; 5 TABLET ORAL at 22:07

## 2019-11-23 RX ADMIN — SODIUM CHLORIDE: 9 INJECTION, SOLUTION INTRAVENOUS at 11:39

## 2019-11-23 RX ADMIN — VANCOMYCIN HYDROCHLORIDE 2000 MG: 10 INJECTION, POWDER, LYOPHILIZED, FOR SOLUTION INTRAVENOUS at 22:42

## 2019-11-23 RX ADMIN — PIPERACILLIN SODIUM AND TAZOBACTAM SODIUM 4.5 G: 4; .5 INJECTION, POWDER, LYOPHILIZED, FOR SOLUTION INTRAVENOUS at 08:36

## 2019-11-23 RX ADMIN — INSULIN GLARGINE 25 UNITS: 100 INJECTION, SOLUTION SUBCUTANEOUS at 22:13

## 2019-11-23 ASSESSMENT — ACTIVITIES OF DAILY LIVING (ADL)
WHICH_OF_THE_ABOVE_FUNCTIONAL_RISKS_HAD_A_RECENT_ONSET_OR_CHANGE?: COGNITION;FALL HISTORY
BATHING: 3-->ASSISTIVE EQUIPMENT AND PERSON
COGNITION: 2 - DIFFICULTY WITH ORGANIZING THOUGHTS
RETIRED_COMMUNICATION: 2-->DIFFICULTY UNDERSTANDING (NOT RELATED TO LANGUAGE BARRIER)
ADLS_ACUITY_SCORE: 33
DRESS: 2-->ASSISTIVE PERSON
FALL_HISTORY_WITHIN_LAST_SIX_MONTHS: YES
TRANSFERRING: 4-->COMPLETELY DEPENDENT
ADLS_ACUITY_SCORE: 33
AMBULATION: 4-->COMPLETELY DEPENDENT
TOILETING: 3-->ASSISTIVE EQUIPMENT AND PERSON
ADLS_ACUITY_SCORE: 33
RETIRED_EATING: 2-->ASSISTIVE PERSON
ADLS_ACUITY_SCORE: 19
ADLS_ACUITY_SCORE: 23
NUMBER_OF_TIMES_PATIENT_HAS_FALLEN_WITHIN_LAST_SIX_MONTHS: 1
SWALLOWING: 2-->DIFFICULTY SWALLOWING FOODS

## 2019-11-23 ASSESSMENT — PAIN DESCRIPTION - DESCRIPTORS
DESCRIPTORS: ACHING
DESCRIPTORS: SHARP
DESCRIPTORS: ACHING;SHARP
DESCRIPTORS: ACHING

## 2019-11-23 ASSESSMENT — MIFFLIN-ST. JEOR
SCORE: 1694.75
SCORE: 1694.75

## 2019-11-23 NOTE — PROGRESS NOTES
Gynecology Oncology Progress Note  11/24/19    HD# 2 sepsis, leukocytosis    Disease: Right adnexal mass      24 hour events:   - Improved hyperglycemia  - Lactic acidosis resolved  - Continued altered mental status  - Required straight cath x1  - Febrile 100.8     Subjective: Patient unable to undergo interview this morning. Reports abdominal pain before going back to sleep mid-sentence.    Objective:   Vitals:    11/23/19 0559 11/23/19 0809 11/23/19 1103 11/23/19 1527   BP:  114/77 132/76 107/59   BP Location:  Left arm Left arm Left arm   Pulse:       Resp:  22 22 20   Temp:  98.8  F (37.1  C) 99.9  F (37.7  C) 98.9  F (37.2  C)   TempSrc:  Oral Oral Axillary   SpO2:  96% 99% 97%   Weight: 102.3 kg (225 lb 8.5 oz)      Height:           General: Snoring, NAD  CV: RRR, no m/r/g  Resp: CTAB, no wheezes  Abdomen:  firm, distended, palpable mass filling the abdomen  Extremities: Brown skin discoloration bilateral ankle and distal legs, 2+ edema bilaterally    I/Os  (Yesterday // Since Midnight)  PO 1440 cc // 474 cc  IVF 3039cc // 130cc  Urine 1450cc // 200cc    Lines/Drains:     New labs/imaging-  : 850  CEA 4.0  UDS pending      Assessment: 44 year old admitted to the medicine team with sepsis and a leukocytosis. Gyn oncology was consulted due to vaginal bleeding and imaging concerning for ovarian malignancy    Active Problem list:  Right adnexal mass  Altered mental status  Sepsis  Leukocytosis   Hyperglycemia   Hyponatremia  Acute kidney injury  Polysubstance abuse   Vaginal bleeding    Plan:      #Right adnexal mass:  - CT imaging at OSH showed showed [id number] cm right adnexal mass with ascites and omental caking present which is concerning for ovarian malignancy.  - Unfortunately, the patient is a very poor surgical candidate at the present time. In addition to her current acute condition, she also has extremely poorly controlled diabetes and an albumin of 1.8 putting her at a very high risk for surgical  complications. Pending the patient's clinical status, consider discussion with IR regarding core needle biopsy of extra-ovarian metastases (if confirmed to be present on [facility] overread. I would consider contacting IR as soon as the patient is medically stable for sedated biopsy as their schedule can be quite tight. We are happy to discuss biopsy with the IR team directly if desired, please let our team know if that is preferred.    - Given the patient's age consider obtaining additional tumor markers for non-epithelial ovarian malignancies including  and CEA, , AFP, LDH, Inhibin-B  - When able, we will obtain a more thorough history and discuss options.   - Would also consider SBP on the differential given patient's abdominal tenderness, altered mental status, and history of PSA        #Vaginal bleeding: differential includes menstrual bleeding, benign, pre-malignant, and malignant conditions.  - CT AP at OSH showed fluid in endometrial canal. Will obtain a more thorough menstrual history from the patient when able.   - Hgb is normal and reassuring.   - Amount of vaginal bleeding is similar to menses, no acute intervention necessary.   - endometrial pathology could be related to or separate from her adnexal mass. It is less likely to be related based upon imaging; however, transvaginal ultrasound to better evaluate the endometrium with endometrial sampling may be indicated.     Gyn Oncology will follow along.      Discussed with Sara Acevedo MD Fellow       Komal Schumacher MD  Obstetrics & Gynecology, PGY-2  11/24/2019 7:23 AM    Pager [phone]      Gyn Onc Attending Note  I had a lengthy discussion with the patient's mother, sister, and brother about her current situation. They are very well informed at this point and able to teach back all key points of her current clinical situation. She has been changed to Rocephin, Flagyl to improve coverage in the event of intra-abdominal source of  infection. We reviewed her imaging and concern for malignancy. She is tentatively planned for IR biopsy tomorrow to attempt to obtain a tissue diagnosis. We discussed role of biopsy versus surgery and reviewed her current contra-indications to surgery (hgbA1c 15, active infection, albumin 1.5); however, we did discuss that pending clinical course sometimes surgical intervention may be warranted if infected mass is suspected. At this point we will continue plan as above.     I also discussed with her family her baseline functional level. They state she has a lifelong struggle with anxiety and depression which contribute to her lack of medical care and drug use. At baseline she does live independently and is able to perform essential ADLs independently; however, they say due to a combination of motivational effort she does need a lot of assistance with instrumental ADLs. Her mother drives for her and aids in grocery shopping and other care. The family does feel that she is currently close to her baseline mental status.     At this point we will try to obtain tissue diagnosis which will help determine a plan of care. Of note, the patient and her family live in [place] and would like to follow-up in [facility] if possible for their Gyn Onc care.    I spent >35 minutes at the bedside in conversation with the patient and her family regarding her current clinical status and plan of care.     David Mayorga MD    Gynecologic Oncology

## 2019-11-23 NOTE — PROGRESS NOTES
Brief Progress Note    Pt admitted this morning, please see H&P for full details. Purpose of this note is to document changes that occurred in the interval.     -Pt seen by OBGYN, will obtain imaging records from OSH   -CEA and  ordered (both are elevated)   -lactic acid now normal 1.8   -Crt is elevated at 1.18, stop vancomycin if crt increases in the AM   -MRSA swab ordered   -glucose was elevated at ~400 on admission. Latest glucose check was 123. Continue pt on lantus 25 units at bedtime. Sliding scale switched from very high to medium.   -of note, pt has been altered and/or hysterical since admission. Several attempts from several staff members have been made to elicit more information from patient, but have been unsuccessful. Will try again in the AM. Per OB/GYN notes, they will try again as well and continue to follow.     Pt discussed with Dr. Lara. Please see his addendum to this note and/or the H&P for further changes to the plan.     Ambar Martinez MD   Internal Medicine, PGY-1   P:[phone]

## 2019-11-23 NOTE — PROGRESS NOTES
Admission          11/23/2019  2:11 AM  -----------------------------------------------------------  Reason for admission:  Spectic Shock/abdomenial mass  Primary team notified of pt arrival.  Admitted from:[facility]   Via: stretcher  Accompanied by: EMS  Belongings: Placed in closet; valuables sent home with family  Admission Profile: complete  Teaching: orientation to unit and call light- call light within reach, call don't fall, use of console, meal times, when to call for the RN, and enforced importance of safety   Access: PIV  Telemetry:Placed on pt  Ht./Wt.: complete  2 RN Skin Assessment Completed By: Hector MEJIA RNs  Pt status:  Pt is vitals stable.    Temp:  [98.8  F (37.1  C)-99  F (37.2  C)] 98.8  F (37.1  C)  Pulse:  [91-93] 93  Heart Rate:  [98] 98  Resp:  [22-26] 22  BP: ()/(52-77) 114/77  SpO2:  [93 %-96 %] 96 %     Neuro: A&Ox1 to self only.  Cardiac: SR90's. VSS.   Respiratory: Sating 94%>  on 2 LPM via NC.  GI/: Adequate urine output into nelson. Abdo sounds hypoactive.  Abdo very tender.  Diet/appetite: NPO diet  Activity:  Pt is on bedrest with 2 assist to Turn and reposition.  Pain: At acceptable level on current regimen.   Skin: No new deficits noted.  LDA's: Nelson in place from OSH, and PICC line also OSH--PICC line still needs to be verified.    Plan: Continue with POC. Notify primary team with changes.

## 2019-11-23 NOTE — PROGRESS NOTES
[facility]  Transfer Triage Note    Date of call: 11/23/19  Time of call: 2:12 AM    Reason for Transfer:Further diagnostic work up, management, and consultation for specialized care  Diagnosis: Sepsis, confusion, noted to have abdominal mass on CT concerning for ovarian ca.    Outside Records: Not available  Additional records requested to be faxed to [phone].    Stability of Patient: Patient is at risk for instability, however patient requires urgent transfer and does not meet ICU criteria     Expected Time of Arrival for Transfer: 0-8 hours    Recommendations for Management and Stabilization: Not needed    Additional Comments 44F in [facility] ED. Presented with confusion, sepsis-like picture w WBC 17K, BP 70s w LA 4 initially. After 3L bolus, vanc/zosyn BP 98/68 and LA downtrended to 2.4. [facility] ED had contacted our MICU and MICU attending felt she did not meet ICU criteria but best served in IMC. Hx of HTN and hyperlipidemia. Abdominal CT w new mass concerning for ovarian ca. Glucose in 500s but not in DKA  IMC requested.    Bettye Phillips MD

## 2019-11-23 NOTE — PROGRESS NOTES
-------------------CRITICAL LAB VALUE-------------------    Lab Value:Procalcitonin 127.51  Time of notification: 5:58 AM  MD notified:Paged Maroon Cross cover with result.  Patient status:Pt is sleepy right now.  Temp:  [98.9  F (37.2  C)-99  F (37.2  C)] 98.9  F (37.2  C)  Pulse:  [91-93] 93  Resp:  [22-26] 22  BP: (98-99)/(52-57) 99/57  SpO2:  [93 %] 93 %

## 2019-11-23 NOTE — CONSULTS
Gynecology Oncology Consult    Chela Love MRN# [id number]   Age: 44 year old YOB: 1975     Date of Admission:  11/23/2019             Chief Complaint:   Abdominal pain, vaginal bleeding, imaging concerning for ovarian malignancy          History of Present Illness:   This patient is a 44 year old female who is transfer from [facility] here with sepsis, leukocytosis, abdominal pain, vaginal bleeding and imaging concerning for ovarian malignancy. Her medical history is pertinent for T2DM, HTN, HLD. Per the patient's family, she also has a history of IV methamphetamine use, THC use, tobacco use, alcohol use and possible PCOS; however, at this time we don't have records to verify this history. The patient was not cooperative with the interview and kept stating she wanted something to drink and she wanted to go to sleep.     Per chart review she presented to the [facility] ED with abdominal pain and confusion. She was found to be in septic shock with a leukocytosis. WBC 17.3, LA 4.0. Afebrile. She was given Zosyn and Vancomycin.     Arrangements were made to transfer her due to lack of ICU beds at [facility]. She was transferred to [facility] due to availability of Gyn Oncology services.     She was agreeable to a physical exam and a pelvic exam. JER Brown was present.             Cancer Treatment History:   None         OBGYN History:   Unable to obtain because patient would not cooperate with the interview         Past Medical History:   HTN, HLD, T2DM, Obesity          Past Surgical History:   [place] teeth         Social History:   Per primary team after interview with family: Tobacco use, methamphetamine use, THC, alcohol  Social History     Tobacco Use     Smoking status: Not on file   Substance Use Topics     Alcohol use: Not on file            Family History:     Unable to obtain          Allergies:     Allergies not on file         Medications:     Current Facility-Administered  Medications   Medication     acetaminophen (TYLENOL) tablet 650 mg     glucose gel 15-30 g    Or     dextrose 50 % injection 25-50 mL    Or     glucagon injection 1 mg     heparin lock flush 10 UNIT/ML injection 5-10 mL     heparin lock flush 10 UNIT/ML injection 5-10 mL     HYDROmorphone (DILAUDID) injection 0.2 mg     insulin aspart (NovoLOG) inj (RAPID ACTING)     lidocaine (LMX4) cream     lidocaine (LMX4) cream     lidocaine 1 % 0.1-1 mL     lidocaine 1 % 0.1-1 mL     melatonin tablet 1 mg     meropenem (MERREM) 1 g vial to attach to  mL bag     naloxone (NARCAN) injection 0.1-0.4 mg     ondansetron (ZOFRAN-ODT) ODT tab 4 mg    Or     ondansetron (ZOFRAN) injection 4 mg     oxyCODONE (ROXICODONE) tablet 5-10 mg     senna-docusate (SENOKOT-S/PERICOLACE) 8.6-50 MG per tablet 1 tablet    Or     senna-docusate (SENOKOT-S/PERICOLACE) 8.6-50 MG per tablet 2 tablet     sodium chloride (PF) 0.9% PF flush 10-20 mL     sodium chloride (PF) 0.9% PF flush 3 mL     sodium chloride (PF) 0.9% PF flush 3 mL     sodium chloride 0.9% infusion            Review of Systems:   Unable to obtaine         Physical Exam:     Vitals:    11/23/19 0224   BP: 98/52   BP Location: Left arm   Pulse: 91   Resp: 26   Temp: 99  F (37.2  C)   TempSrc: Oral   SpO2: 93%     General: Resting comfortably, no acute distress  CV: RRR, well perfused  Resp: Clear to ascultation  Abdomen: firm, distended, palpable mass filling the abdomen,   : Patient consented to the exam and RN Kevin was present. Exam was performed at admission due to concerns from primary team about vaginal bleeding. Normal external female genitalia. Small amount of blood on the perineum and vulva. Unable to appreciate adnexal mass from pelvic portion of exam. Small amount of blood on the glove after the exam  Extremities: Brown skin discoloration bilateral ankle and distal legs, 2+ edema bilaterally         Labs and Imaging:     Results for orders placed or performed during  the hospital encounter of 11/23/19 (from the past 24 hour(s))   Glucose by meter   Result Value Ref Range    Glucose 447 (H) 70 - 99 mg/dL   EKG 12-lead, tracing only   Result Value Ref Range    Interpretation ECG Click View Image link to view waveform and result    Blood culture   Result Value Ref Range    Specimen Description Blood Left Arm     Special Requests Aerobic and anaerobic bottles received     Culture Micro PENDING    Comprehensive metabolic panel   Result Value Ref Range    Sodium 128 (L) 133 - 144 mmol/L    Potassium 3.9 3.4 - 5.3 mmol/L    Chloride 96 94 - 109 mmol/L    Carbon Dioxide 25 20 - 32 mmol/L    Anion Gap 7 3 - 14 mmol/L    Glucose 476 (H) 70 - 99 mg/dL    Urea Nitrogen 24 7 - 30 mg/dL    Creatinine 1.18 (H) 0.52 - 1.04 mg/dL    GFR Estimate 56 (L) >60 mL/min/[1.73_m2]    GFR Estimate If Black 65 >60 mL/min/[1.73_m2]    Calcium 7.6 (L) 8.5 - 10.1 mg/dL    Bilirubin Total 0.7 0.2 - 1.3 mg/dL    Albumin 1.8 (L) 3.4 - 5.0 g/dL    Protein Total 6.0 (L) 6.8 - 8.8 g/dL    Alkaline Phosphatase 64 40 - 150 U/L    ALT 11 0 - 50 U/L    AST 15 0 - 45 U/L   CBC with platelets differential   Result Value Ref Range    WBC 18.2 (H) 4.0 - 11.0 10e9/L    RBC Count 3.73 (L) 3.8 - 5.2 10e12/L    Hemoglobin 9.9 (L) 11.7 - 15.7 g/dL    Hematocrit 32.3 (L) 35.0 - 47.0 %    MCV 87 78 - 100 fl    MCH 26.5 26.5 - 33.0 pg    MCHC 30.7 (L) 31.5 - 36.5 g/dL    RDW 14.4 10.0 - 15.0 %    Platelet Count 325 150 - 450 10e9/L    Diff Method Automated Method     % Neutrophils 86.3 %    % Lymphocytes 8.3 %    % Monocytes 4.5 %    % Eosinophils 0.1 %    % Basophils 0.2 %    % Immature Granulocytes 0.6 %    Nucleated RBCs 0 0 /100    Absolute Neutrophil 15.7 (H) 1.6 - 8.3 10e9/L    Absolute Lymphocytes 1.5 0.8 - 5.3 10e9/L    Absolute Monocytes 0.8 0.0 - 1.3 10e9/L    Absolute Eosinophils 0.0 0.0 - 0.7 10e9/L    Absolute Basophils 0.0 0.0 - 0.2 10e9/L    Abs Immature Granulocytes 0.1 0 - 0.4 10e9/L    Absolute Nucleated RBC 0.0     CRP inflammation   Result Value Ref Range    CRP Inflammation 180.0 (H) 0.0 - 8.0 mg/L   Lactic acid whole blood   Result Value Ref Range    Lactic Acid 2.3 (H) 0.7 - 2.0 mmol/L   HCG quantitative pregnancy   Result Value Ref Range    HCG Quantitative Serum <1 0 - 5 IU/L   Troponin I   Result Value Ref Range    Troponin I ES <0.015 0.000 - 0.045 ug/L             Assessment and Plan:     Assessment: Chela Love is a 44 year old admitted to the medicine team with sepsis and a leukocytosis. Gyn oncology was consulted due to vaginal bleeding and imaging concerning for ovarian malignancy.     Active Problem List:  Right adnexal mass  Altered mental status  Sepsis  Leukocytosis   Hyperglycemia   Hyponatremia  Acute kidney injury  Polysubstance abuse   Vaginal bleeding      Recommendations:  #Right adnexal mass:  - CT imaging at OSH showed showed [id number] cm right adnexal mass with ascites and omental caking present which is concerning for ovarian malignancy.  - Unfortunately, the patient is a very poor surgical candidate at the present time. In addition to her current acute condition, she also has extremely poorly controlled diabetes and an albumin of 1.8 putting her at a very high risk for surgical complications. Pending the patient's clinical status, consider discussion with IR regarding core needle biopsy of extra-ovarian metastases (if confirmed to be present on [facility] overread. I would consider contacting IR as soon as the patient is medically stable for sedated biopsy as their schedule can be quite tight. We are happy to discuss biopsy with the IR team directly if desired, please let our team know if that is preferred.    - Given the patient's age consider obtaining additional tumor markers for non-epithelial ovarian malignancies including  and CEA, , AFP, LDH, Inhibin-B  - When able, we will obtain a more thorough history and discuss options.       #Vaginal bleeding: differential includes menstrual  "bleeding, benign, pre-malignant, and malignant conditions.  - CT AP at OSH showed fluid in endometrial canal. Will obtain a more thorough menstrual history from the patient when able.   - Hgb is normal and reassuring.   - Amount of vaginal bleeding is similar to menses, no acute intervention necessary.   - endometrial pathology could be related to or separate from her adnexal mass. It is less likely to be related based upon imaging; however, transvaginal ultrasound to better evaluate the endometrium with endometrial sampling may be indicated.    Gyn Oncology will follow along.     Discussed with Sara Acevedo MD Fellow and MD Aida Velázquez MD PhD  Ob/Gyn PGY-4  11/23/2019 4:52 AM     I saw the patient the morning of 11/23/19 on day of admission. The patient continues to fall asleep during interview and is only intermittently able to provide a history. She is able to say she has noted decreased appetite the last month as well as diffuse abdominal discomfort. Reports normal bowel function. She cannot provide any further history at this time. She is very tearful and perseverates on an experience from last night whereby she says individuals were making multiple comments about her bleeding and having to clean it up which she perceived as them saying she was \"filthy and nasty\". She is unable to further answer questions. Thus, we will defer further discussion until a later time when her medical condition has improved. Her imaging is concerning for an ovarian malignancy. I have edited the note above to reflect our recommendations. We will follow along.    David Mayorga MD    Gynecologic Oncology   "

## 2019-11-23 NOTE — LETTER
Transition Communication Hand-off for Care Transitions to Next Level of Care Provider    Name: Chela Love  : 1975  MRN #: [id number]  Primary Care Provider: [facility]     Primary Clinic: [address] [place] [id number]     Reason for Hospitalization:  SEPSIS  Sepsis (H)  Adnexal mass  Admit Date/Time: 2019  2:11 AM  Discharge Date: 19  Payor Source: Payor: BLUE PLUS / Plan: BLUE PLUS ADVANTAGE MA / Product Type: HMO          Reason for Communication Hand-off Referral: Difficulty understanding plan of care  Avoidable readmission within 30 days    Discharge Plan: [facility]  [address] [place]  [address]  P: [phone]  F: [phone]     Discharge Needs Assessment:  Needs      Most Recent Value   Anticipated Changes Related to Illness  none   Equipment Currently Used at Home  none        Future Appointments   Date Time Provider Department Center   2019 12:45 PM Jan Concepcion MD [facility]     Any outstanding tests or procedures:        Referrals     Future Labs/Procedures    ENDOCRINOLOGY ADULT REFERRAL     Comments:    Your provider has referred you to: [facility]: Endocrinology and Diabetes Clinic [facility] [phone]   [email or web address]      Please be aware that coverage of these services is subject to the terms and limitations of your health insurance plan.  Call member services at your health plan with any benefit or coverage questions.      Please bring the following to your appointment:    >>   Any x-rays, CTs or MRIs which have been performed.  Contact the facility where they were done to arrange for  prior to your scheduled appointment.    >>   List of current medications   >>   This referral request   >>   Any documents/labs given to you for this referral    Occupational Therapy Adult Consult     Comments:    Evaluate and treat as clinically  indicated.    Reason:  weakness    Physical Therapy Adult Consult     Comments:    Evaluate and treat as clinically indicated.    Reason:  weakness          HAIDER Floyd  7D Hematology/Oncology Social Worker  Phone: [phone]  Pager: [phone]  [email or web address]

## 2019-11-23 NOTE — H&P
[facility], [place]    History and Physical - [facility] Night [facility]        Date of Admission:  11/23/2019    Assessment & Plan   Chela Love is a 44 year old female with a history of HTN, HLD, drug abuse (methamphetamine, cannabis), and abnormal menses, who presents as a transfer from [facility] for management of septic shock and large ovarian mass, 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     #Septic shock  #Acute encephalopathy   #Acute kidney injury   #Lactic acidosis  Presented to OSH with malaise, stomach pain, nausea/vomiting and concern for hyperglycemia. Exam notable for tachycardia, tachypnea, and hypotension (sBPs in the 70s). Labs otable for leukocytosis (WBC 18.2/ANC 15.7), anemia (Hgb 9.9) elevated lactate (LA ~4), Glu >600. Creatinine elevated to 1.71 from ~1.0.  Trop 0.03. Bili 1.4/0.6; LFTs otherwise normal. CXR w/o ischemic changes. CXR clear. Urine preg negative. UA w/o signs of infection. VBG without acidosis/retention. CT head negative for bleed. Encephalopathy could be 2/2 ketoacidosis, toxic metabolic, septic shock. Concern for infection of unclear etiology: source could be blood, infected mass, biliary. Has a hx of recurrent LE cellulitis though no evidence of infection on skin exam. Lactic acidosis likely 2/2 ketoacidosis/starvation ketosis.   - Fluids: intravascular depletion 2/2 emesis, decreased PO.   > S/p 4 NS boluses, with good effect, BP ~110/50s.    > NS IVMF @125 ml/hr   - Antibiotics:    > S/p vanc and zosyn at OSH   > Continue zosyn Q6H    > Vancomycin (pharmacy to co-manage dosing)  - Abdominal Pain/Nausea   > Tylenol 650mg Q4H PRN   > Oxycodone 5-10mg Q3H PRN    > Dilaudid 0.2mg IV Q2H PRN   > Zofran 4mg Q6H PRN   > PTA: Percocet 5-325mg, 1-2 tabs Q4H PRN, naproxen 500mg BID  - Additional workup:    > Lactate > 1.8 on recheck    > , procal 127    > ECG to assess QTC and rhythm > NSR, QTc 477   >  Troponin > normal   > TSH> normal    > Ammonia level 28    > CXR > PICC line in good position   > Repeat HCG (negative at OSH) > negative   > Check A1C and beta-hydroxybuterate level > see below   > Repeat blood cultures > NGTD   > UA and urine culture    > Drugs of abuse screen plus alcohol    > HIV, GC/Chlamydia, and treponema screen  - Nursing/monitoring:   > Gluc Q4H   > Trend Lactate until normal   > PICC line cares   > Contact Isolation (hx of MRSA in 2012)   > Fall precautions    > Low threshold to repeat CT Abd/Pelvis    > Low threshold to transfer to MICU if hypotensive/worsening abdominal pain  - Gynecology/Oncology Consult, appreciate recs   - Consider ID consult  - Need Care Everywhere access to [facility] records   - Hold PTA venlafaxine 225mg daily and Gabapentin 200mg BID in setting of encephalopathy (pt may not be taking, unable to do med rec)    #Vaginal bleeding  #Large right adnexal mass associated with ascites and mesenteric omental caking, concern for malignancy  #Hx of abnormal periods  CT imaging at OSH showed showed [id number] cm right adnexal mass with ascites and omental caking present which is concerning for ovarian malignancy. CT also showed fluid in endometrial canal. Bleeding could be menses. Hgb is 9.9, stable, and reassuring. Amount of vaginal bleeding is similar to menses.  - Gyn Oncology Consult, appreciate recs  - Obtain a more thorough history from the patient when able.   - Trend CBC  - Please push images to PACS so they can be reviewed by gyn/onc  - CEA and  added per d/w Gyn Onc     #CINDY  #Hyponatremia  Creatinine was 1.7 at OSH, improved 1.18. Likely 2/2 intravascular depletion.   - NS IVMF as above    #Polysubstance abuse  - Obtain UDS and ethanol level     #HTN  - Hold home antihypertensives in setting of septic shock  - Hydrochlorothiazide 25mg daily - HOLD    #Uncontrolled T2DM with hyperglycemia  Glucose >600 and beta-hydroxybutyrate 0.39 at OSH. UA >1000 glucose.  Gluc now improved.   - Hgb A1C 15 (very high)   - Beta-hydroxybutyrate level pending  - Gluc Q4H  - High dose insulin sliding scale  - Lantus 25 units QPM   - Hold metformin 1000mg BID in the setting of lactic acidosis     #Constipation  - Pericolase 2 tabs daily BID    #Deconditioning  - PT, eval and treat  - OT, eval and treat       Diet: NPO for Medical/Clinical Reasons Except for: Meds    Fluids: IVMF, see above   DVT Prophylaxis: Pneumatic Compression Devices and Ambulate every shift  Zamora Catheter: in place, indication:    Code Status: Full Code      Disposition Plan   Expected discharge: 2 - 3 days, recommended to prior living arrangement once adequate pain management/ tolerating PO medications, antibiotic plan established and SIRS/Sepsis treated.  Entered: Romulo Segal MD 11/23/2019, 6:07 AM       Patient will be formally staffed in the morning.     Romulo Segal MD  Morning Night Service  [facility], [place]  Pager: [phone]  Please see sticky note for cross cover information  ______________________________________________________________________    Chief Complaint   Septic shock, abdominal pain    Unable to obtain a history from the patient due to confusion  History obtained from chart review and patient's sister Ann Marie and brother Pipe.     History of Present Illness   Presented with confusion, sepsis-like picture w WBC 17K, BP 70s w LA 4 initially. After 3L bolus, vanc/zosyn BP 98/68 and LA downtrended to 2.4. [facility] ED had contacted our MICU and MICU attending felt she did not meet ICU criteria but best served in [facility]. Hx of HTN and hyperlipidemia. Abdominal CT w new mass concerning for ovarian ca. Glucose in 500s but not in DKA. Patient transferred here for further workup.     Pt c/o diffuse abdominal pain; pt lethargic and intermittently responsive on exam. Able to make needs known. Responds to pain when abdomen palpated.     Social/family history below obtained from  sister and brother who were at bedside.     Review of Systems    The 10 point Review of Systems is negative other than noted in the HPI or here.     Past Medical History      Patient Active Problem List   Diagnosis     Sepsis (H)     Arteritis (H)     Asthma     Depression, recurrent (H)     Diabetes mellitus type II, uncontrolled (H)     Encounter for long-term (current) use of insulin (H)     Hypertension     Insomnia     Lower extremity ulceration (H)     Methicillin resistant Staphylococcus aureus infection     Migraine headache     Morbid obesity (H)     Vasculopathy       Past Surgical History   I have reviewed this patient's surgical history and updated it with pertinent information if needed.  No past surgical history on file.     Social History   I have reviewed this patient's social history and updated it with pertinent information if needed. Chela Love      Single  Has no kids  Lives by herself  Mom, sister Ann Marie and brother Pipe are her primary support system   Hx of methamphetamines and cannabis, active user per sister  Hx of alcohol and cigarette use, per sister; unclear if using currently     Family History   I have reviewed this patient's family history and updated it with pertinent information if needed.   No family history on file.     No family history of cancer.   Mom and brother have T2DM  Grandmother has HD. Father  of heart disease at age 52.    Prior to Admission Medications   Prior to Admission Medications   Prescriptions Last Dose Informant Patient Reported? Taking?   SUMAtriptan (IMITREX) 25 MG tablet   Yes No   Sig: Take 25-75 mg by mouth every 24 hours   albuterol (PROAIR HFA/PROVENTIL HFA/VENTOLIN HFA) 108 (90 Base) MCG/ACT inhaler   Yes No   Sig: Inhale 2 puffs into the lungs as needed   gabapentin (NEURONTIN) 100 MG capsule   Yes No   Sig: Take 200 mg by mouth   hydroxychloroquine (PLAQUENIL) 200 MG tablet   Yes No   Sig: Take 200 mg by mouth 2 times daily   insulin  aspart (NOVOLOG VIAL) 100 UNITS/ML vial   Yes No   Sig: Inject 30 Units Subcutaneous 3 times daily (before meals)   insulin glargine (LANTUS SOLOSTAR) 100 UNIT/ML pen   Yes No   Sig: Inject 25 Units Subcutaneous every evening   metFORMIN (GLUCOPHAGE) 1000 MG tablet   Yes No   Sig: Take 1,000 mg by mouth 2 times daily (with meals)   naproxen (NAPROSYN) 500 MG tablet   Yes No   Sig: Take 500 mg by mouth 2 times daily as needed   ondansetron (ZOFRAN-ODT) 8 MG ODT tab   Yes No   Sig: Take 8 mg by mouth every 8 hours as needed   oxyCODONE-acetaminophen (PERCOCET) 5-325 MG tablet   Yes No   Sig: Take 1-2 tablets by mouth every 4 hours as needed   senna-docusate (SENOKOT-S/PERICOLACE) 8.6-50 MG tablet   Yes No   Sig: Take 1 tablet by mouth as needed   venlafaxine (EFFEXOR-XR) 75 MG 24 hr capsule   Yes No   Sig: Take 225 mg by mouth daily      Facility-Administered Medications: None     Allergies   Allergies   Allergen Reactions     Bee Venom Other (See Comments)     swelling     Selenicereus Grandiflorus Rash       Physical Exam   Vital Signs: Temp: 98.9  F (37.2  C) Temp src: Oral BP: 99/57 Pulse: 93   Resp: 22 SpO2: 93 % O2 Device: Nasal cannula Oxygen Delivery: 2 LPM  Weight: 225 lbs 8.49 oz    General Appearance: WDWN, NAD, ill appearing, uncomfortable, moaning  Eyes: sclera clear, EOMI, PERRL  HEENT: NCAT, nose w/o discharge, nose with burn, ears nl, MMM, neck supple, unable to assess for JVD d/t obesity   Respiratory: nl effort, no s/w/c/r/r  Cardiovascular: RRR, nl s1+s2, no m/r/g, distal pulses intact, 2+ cap refill, 3+ pitting edema  GI: +BS, obese, soft, diffuse tenderness to light palpation in all four quadrants, unable to assess for masses d/t pain, +  bulging flanks, + flank dullness, no fluid wave, did not assess for shifting dullness.  Lymph/Hematologic: no adenopathy  Genitourinary: indwelling nelson in place with cloudy/purulent/bloody discharge, blood on thighs and on vaginal labia, unable to palpate  adnexa d/t obesity   Skin: lower extremity skin hyperpigmentation/changes consistent with chronic venous stasis/past infection, no evidence of erythema/active infection   Musculoskeletal: nl bulk/tone, no joint/muscle pain  Neurologic: AOx3, no focal deficits  Psychiatric: unable to assess d/t to pain/lethargy, able to make needs known     Data   Data reviewed today: I reviewed all medications, new labs and imaging results over the last 24 hours. I personally reviewed the EKG tracing showing NSR, QTc 477. .    Recent Labs   Lab 11/23/19  0525 11/23/19  0352   WBC  --  18.2*   HGB  --  9.9*   MCV  --  87   PLT  --  325   INR 1.59*  --    NA  --  128*   POTASSIUM  --  3.9   CHLORIDE  --  96   CO2  --  25   BUN  --  24   CR  --  1.18*   ANIONGAP  --  7   MARILIN  --  7.6*   GLC  --  476*   ALBUMIN  --  1.8*   PROTTOTAL  --  6.0*   BILITOTAL  --  0.7   ALKPHOS  --  64   ALT  --  11   AST  --  15   TROPI  --  <0.015     No results found for this or any previous visit (from the past 24 hour(s)).

## 2019-11-23 NOTE — PHARMACY-VANCOMYCIN DOSING SERVICE
Pharmacy Vancomycin Initial Note  Date of Service 2019  Patient's  1975  44 year old, female    Indication: Sepsis    Current estimated CrCl = Estimated Creatinine Clearance: 74.2 mL/min (A) (based on SCr of 1.18 mg/dL (H)).    Creatinine for last 3 days  2019:  3:52 AM Creatinine 1.18 mg/dL    Recent Vancomycin Level(s) for last 3 days  No results found for requested labs within last 72 hours.      Vancomycin IV Administrations (past 72 hours)      No vancomycin orders with administrations in past 72 hours.                Nephrotoxins and other renal medications (From now, onward)    Start     Dose/Rate Route Frequency Ordered Stop    19 0630  piperacillin-tazobactam (ZOSYN) 4.5 g vial to attach to  mL bag      4.5 g  over 30 Minutes Intravenous EVERY 6 HOURS 19 0627            Contrast Orders - past 72 hours (72h ago, onward)    None        Zosyn 3.375 g  given x1 at OSH @ 2038 on   Vanco  1750 mg given x1 at OSH @ 2152 on       Plan:  1.  Continue vancomycin  2000 mg IV q12h. Starting a little early to keep the trough a bit higher.   2.  Goal Trough Level: 15-20 mg/L   3.  Pharmacy will check trough levels as appropriate in 1-3 Days.    4. Serum creatinine levels will be ordered daily for the first week of therapy and at least twice weekly for subsequent weeks.    5. [place] method utilized to dose vancomycin therapy: Method 2    Uriel Albrecht [facility]

## 2019-11-24 ENCOUNTER — APPOINTMENT (OUTPATIENT)
Dept: CARDIOLOGY | Facility: CLINIC | Age: 44
End: 2019-11-24
Attending: STUDENT IN AN ORGANIZED HEALTH CARE EDUCATION/TRAINING PROGRAM
Payer: COMMERCIAL

## 2019-11-24 LAB
ALBUMIN FLD-MCNC: 1.5 G/DL
ALBUMIN SERPL-MCNC: 1.5 G/DL (ref 3.4–5)
ALBUMIN SERPL-MCNC: 1.6 G/DL (ref 3.4–5)
ALBUMIN UR-MCNC: 30 MG/DL
ALP SERPL-CCNC: 99 U/L (ref 40–150)
ALT SERPL W P-5'-P-CCNC: 17 U/L (ref 0–50)
AMORPH CRY #/AREA URNS HPF: ABNORMAL /HPF
ANION GAP SERPL CALCULATED.3IONS-SCNC: 10 MMOL/L (ref 3–14)
APPEARANCE FLD: NORMAL
APPEARANCE UR: ABNORMAL
AST SERPL W P-5'-P-CCNC: 60 U/L (ref 0–45)
B-OH-BUTYR SERPL-MCNC: 0.5 MG/DL (ref 0–3)
BASE DEFICIT BLDV-SCNC: 1.7 MMOL/L
BILIRUB DIRECT SERPL-MCNC: 0.5 MG/DL (ref 0–0.2)
BILIRUB SERPL-MCNC: 1.1 MG/DL (ref 0.2–1.3)
BILIRUB UR QL STRIP: NEGATIVE
BUN SERPL-MCNC: 32 MG/DL (ref 7–30)
CA-I BLD-MCNC: 4.1 MG/DL (ref 4.4–5.2)
CALCIUM SERPL-MCNC: 7.7 MG/DL (ref 8.5–10.1)
CHLORIDE SERPL-SCNC: 98 MMOL/L (ref 94–109)
CO2 SERPL-SCNC: 23 MMOL/L (ref 20–32)
COLOR FLD: YELLOW
COLOR UR AUTO: YELLOW
CREAT SERPL-MCNC: 1.6 MG/DL (ref 0.52–1.04)
ERYTHROCYTE [DISTWIDTH] IN BLOOD BY AUTOMATED COUNT: 14.6 % (ref 10–15)
GFR SERPL CREATININE-BSD FRML MDRD: 39 ML/MIN/{1.73_M2}
GLUCOSE BLDC GLUCOMTR-MCNC: 191 MG/DL (ref 70–99)
GLUCOSE BLDC GLUCOMTR-MCNC: 218 MG/DL (ref 70–99)
GLUCOSE BLDC GLUCOMTR-MCNC: 232 MG/DL (ref 70–99)
GLUCOSE BLDC GLUCOMTR-MCNC: 236 MG/DL (ref 70–99)
GLUCOSE BLDC GLUCOMTR-MCNC: 282 MG/DL (ref 70–99)
GLUCOSE SERPL-MCNC: 226 MG/DL (ref 70–99)
GLUCOSE UR STRIP-MCNC: 30 MG/DL
HCO3 BLDV-SCNC: 24 MMOL/L (ref 21–28)
HCT VFR BLD AUTO: 30.8 % (ref 35–47)
HGB BLD-MCNC: 9.2 G/DL (ref 11.7–15.7)
HGB UR QL STRIP: ABNORMAL
HIV 1+2 AB+HIV1 P24 AG SERPL QL IA: NONREACTIVE
KETONES UR STRIP-MCNC: 5 MG/DL
LACTATE BLD-SCNC: 1.2 MMOL/L (ref 0.7–2)
LDH FLD L TO P-CCNC: 2216 U/L
LEUKOCYTE ESTERASE UR QL STRIP: NEGATIVE
LYMPHOCYTES NFR FLD MANUAL: 2 %
MAGNESIUM SERPL-MCNC: 2.2 MG/DL (ref 1.6–2.3)
MCH RBC QN AUTO: 26.4 PG (ref 26.5–33)
MCHC RBC AUTO-ENTMCNC: 29.9 G/DL (ref 31.5–36.5)
MCV RBC AUTO: 88 FL (ref 78–100)
MONOS+MACROS NFR FLD MANUAL: 2 %
NEUTS BAND NFR FLD MANUAL: 96 %
NITRATE UR QL: NEGATIVE
O2/TOTAL GAS SETTING VFR VENT: ABNORMAL %
PCO2 BLDV: 47 MM HG (ref 40–50)
PH BLDV: 7.33 PH (ref 7.32–7.43)
PH UR STRIP: 5.5 PH (ref 5–7)
PLATELET # BLD AUTO: 285 10E9/L (ref 150–450)
PLATELET # BLD AUTO: 290 10E9/L (ref 150–450)
PO2 BLDV: 67 MM HG (ref 25–47)
POTASSIUM SERPL-SCNC: 4.1 MMOL/L (ref 3.4–5.3)
POTASSIUM SERPL-SCNC: 4.2 MMOL/L (ref 3.4–5.3)
PROT FLD-MCNC: 4.4 G/DL
PROT SERPL-MCNC: 5.9 G/DL (ref 6.8–8.8)
RBC # BLD AUTO: 3.49 10E12/L (ref 3.8–5.2)
RBC #/AREA URNS AUTO: <1 /HPF (ref 0–2)
SODIUM SERPL-SCNC: 131 MMOL/L (ref 133–144)
SOURCE: ABNORMAL
SP GR UR STRIP: 1.02 (ref 1–1.03)
SPECIMEN SOURCE FLD: NORMAL
SQUAMOUS #/AREA URNS AUTO: 1 /HPF (ref 0–1)
URATE CRY #/AREA URNS HPF: ABNORMAL /HPF
UROBILINOGEN UR STRIP-MCNC: NORMAL MG/DL (ref 0–2)
VANCOMYCIN SERPL-MCNC: 36 MG/L
WBC # BLD AUTO: 29.2 10E9/L (ref 4–11)
WBC # FLD AUTO: 5275 /UL
WBC #/AREA URNS AUTO: 3 /HPF (ref 0–5)

## 2019-11-24 PROCEDURE — 88112 CYTOPATH CELL ENHANCE TECH: CPT | Performed by: STUDENT IN AN ORGANIZED HEALTH CARE EDUCATION/TRAINING PROGRAM

## 2019-11-24 PROCEDURE — 80076 HEPATIC FUNCTION PANEL: CPT | Performed by: INTERNAL MEDICINE

## 2019-11-24 PROCEDURE — 36592 COLLECT BLOOD FROM PICC: CPT | Performed by: STUDENT IN AN ORGANIZED HEALTH CARE EDUCATION/TRAINING PROGRAM

## 2019-11-24 PROCEDURE — 82330 ASSAY OF CALCIUM: CPT | Performed by: INTERNAL MEDICINE

## 2019-11-24 PROCEDURE — 36415 COLL VENOUS BLD VENIPUNCTURE: CPT | Performed by: STUDENT IN AN ORGANIZED HEALTH CARE EDUCATION/TRAINING PROGRAM

## 2019-11-24 PROCEDURE — 00000155 ZZHCL STATISTIC H-CELL BLOCK W/STAIN: Performed by: STUDENT IN AN ORGANIZED HEALTH CARE EDUCATION/TRAINING PROGRAM

## 2019-11-24 PROCEDURE — 87075 CULTR BACTERIA EXCEPT BLOOD: CPT | Performed by: STUDENT IN AN ORGANIZED HEALTH CARE EDUCATION/TRAINING PROGRAM

## 2019-11-24 PROCEDURE — 93308 TTE F-UP OR LMTD: CPT

## 2019-11-24 PROCEDURE — 87591 N.GONORRHOEAE DNA AMP PROB: CPT | Performed by: STUDENT IN AN ORGANIZED HEALTH CARE EDUCATION/TRAINING PROGRAM

## 2019-11-24 PROCEDURE — 80202 ASSAY OF VANCOMYCIN: CPT | Performed by: INTERNAL MEDICINE

## 2019-11-24 PROCEDURE — 83615 LACTATE (LD) (LDH) ENZYME: CPT | Performed by: STUDENT IN AN ORGANIZED HEALTH CARE EDUCATION/TRAINING PROGRAM

## 2019-11-24 PROCEDURE — 99232 SBSQ HOSP IP/OBS MODERATE 35: CPT | Mod: GC | Performed by: OBSTETRICS & GYNECOLOGY

## 2019-11-24 PROCEDURE — 36592 COLLECT BLOOD FROM PICC: CPT | Performed by: INTERNAL MEDICINE

## 2019-11-24 PROCEDURE — 87040 BLOOD CULTURE FOR BACTERIA: CPT | Performed by: STUDENT IN AN ORGANIZED HEALTH CARE EDUCATION/TRAINING PROGRAM

## 2019-11-24 PROCEDURE — 80307 DRUG TEST PRSMV CHEM ANLYZR: CPT | Performed by: INTERNAL MEDICINE

## 2019-11-24 PROCEDURE — 25000128 H RX IP 250 OP 636: Performed by: INTERNAL MEDICINE

## 2019-11-24 PROCEDURE — 25000128 H RX IP 250 OP 636: Performed by: STUDENT IN AN ORGANIZED HEALTH CARE EDUCATION/TRAINING PROGRAM

## 2019-11-24 PROCEDURE — 85027 COMPLETE CBC AUTOMATED: CPT | Performed by: INTERNAL MEDICINE

## 2019-11-24 PROCEDURE — 25000132 ZZH RX MED GY IP 250 OP 250 PS 637: Performed by: STUDENT IN AN ORGANIZED HEALTH CARE EDUCATION/TRAINING PROGRAM

## 2019-11-24 PROCEDURE — 99233 SBSQ HOSP IP/OBS HIGH 50: CPT | Mod: GC | Performed by: INTERNAL MEDICINE

## 2019-11-24 PROCEDURE — 87491 CHLMYD TRACH DNA AMP PROBE: CPT | Performed by: STUDENT IN AN ORGANIZED HEALTH CARE EDUCATION/TRAINING PROGRAM

## 2019-11-24 PROCEDURE — 82042 OTHER SOURCE ALBUMIN QUAN EA: CPT | Performed by: STUDENT IN AN ORGANIZED HEALTH CARE EDUCATION/TRAINING PROGRAM

## 2019-11-24 PROCEDURE — 87070 CULTURE OTHR SPECIMN AEROBIC: CPT | Performed by: STUDENT IN AN ORGANIZED HEALTH CARE EDUCATION/TRAINING PROGRAM

## 2019-11-24 PROCEDURE — 89051 BODY FLUID CELL COUNT: CPT | Performed by: STUDENT IN AN ORGANIZED HEALTH CARE EDUCATION/TRAINING PROGRAM

## 2019-11-24 PROCEDURE — 00000102 ZZHCL STATISTIC CYTO WRIGHT STAIN TC: Performed by: STUDENT IN AN ORGANIZED HEALTH CARE EDUCATION/TRAINING PROGRAM

## 2019-11-24 PROCEDURE — 0W9G3ZZ DRAINAGE OF PERITONEAL CAVITY, PERCUTANEOUS APPROACH: ICD-10-PCS | Performed by: PEDIATRICS

## 2019-11-24 PROCEDURE — 49083 ABD PARACENTESIS W/IMAGING: CPT | Performed by: PEDIATRICS

## 2019-11-24 PROCEDURE — 93325 DOPPLER ECHO COLOR FLOW MAPG: CPT | Mod: 26 | Performed by: INTERNAL MEDICINE

## 2019-11-24 PROCEDURE — 25000125 ZZHC RX 250: Performed by: STUDENT IN AN ORGANIZED HEALTH CARE EDUCATION/TRAINING PROGRAM

## 2019-11-24 PROCEDURE — 82040 ASSAY OF SERUM ALBUMIN: CPT | Performed by: STUDENT IN AN ORGANIZED HEALTH CARE EDUCATION/TRAINING PROGRAM

## 2019-11-24 PROCEDURE — 84132 ASSAY OF SERUM POTASSIUM: CPT | Performed by: STUDENT IN AN ORGANIZED HEALTH CARE EDUCATION/TRAINING PROGRAM

## 2019-11-24 PROCEDURE — 80365 DRUG SCREENING OXYCODONE: CPT | Performed by: INTERNAL MEDICINE

## 2019-11-24 PROCEDURE — 80048 BASIC METABOLIC PNL TOTAL CA: CPT | Performed by: INTERNAL MEDICINE

## 2019-11-24 PROCEDURE — 87086 URINE CULTURE/COLONY COUNT: CPT | Performed by: STUDENT IN AN ORGANIZED HEALTH CARE EDUCATION/TRAINING PROGRAM

## 2019-11-24 PROCEDURE — 83605 ASSAY OF LACTIC ACID: CPT | Performed by: INTERNAL MEDICINE

## 2019-11-24 PROCEDURE — 87205 SMEAR GRAM STAIN: CPT | Performed by: STUDENT IN AN ORGANIZED HEALTH CARE EDUCATION/TRAINING PROGRAM

## 2019-11-24 PROCEDURE — 82803 BLOOD GASES ANY COMBINATION: CPT | Performed by: INTERNAL MEDICINE

## 2019-11-24 PROCEDURE — 12000004 ZZH R&B IMCU UMMC

## 2019-11-24 PROCEDURE — 85049 AUTOMATED PLATELET COUNT: CPT | Performed by: STUDENT IN AN ORGANIZED HEALTH CARE EDUCATION/TRAINING PROGRAM

## 2019-11-24 PROCEDURE — 87015 SPECIMEN INFECT AGNT CONCNTJ: CPT | Performed by: STUDENT IN AN ORGANIZED HEALTH CARE EDUCATION/TRAINING PROGRAM

## 2019-11-24 PROCEDURE — 81001 URINALYSIS AUTO W/SCOPE: CPT | Performed by: STUDENT IN AN ORGANIZED HEALTH CARE EDUCATION/TRAINING PROGRAM

## 2019-11-24 PROCEDURE — 80361 OPIATES 1 OR MORE: CPT | Performed by: INTERNAL MEDICINE

## 2019-11-24 PROCEDURE — 84157 ASSAY OF PROTEIN OTHER: CPT | Performed by: STUDENT IN AN ORGANIZED HEALTH CARE EDUCATION/TRAINING PROGRAM

## 2019-11-24 PROCEDURE — 87106 FUNGI IDENTIFICATION YEAST: CPT | Performed by: STUDENT IN AN ORGANIZED HEALTH CARE EDUCATION/TRAINING PROGRAM

## 2019-11-24 PROCEDURE — 40000802 ZZH SITE CHECK

## 2019-11-24 PROCEDURE — 83735 ASSAY OF MAGNESIUM: CPT | Performed by: INTERNAL MEDICINE

## 2019-11-24 PROCEDURE — 93308 TTE F-UP OR LMTD: CPT | Mod: 26 | Performed by: INTERNAL MEDICINE

## 2019-11-24 PROCEDURE — 93321 DOPPLER ECHO F-UP/LMTD STD: CPT | Mod: 26 | Performed by: INTERNAL MEDICINE

## 2019-11-24 PROCEDURE — 93010 ELECTROCARDIOGRAM REPORT: CPT | Performed by: INTERNAL MEDICINE

## 2019-11-24 PROCEDURE — 00000146 ZZHCL STATISTIC GLUCOSE BY METER IP

## 2019-11-24 PROCEDURE — 88305 TISSUE EXAM BY PATHOLOGIST: CPT | Performed by: STUDENT IN AN ORGANIZED HEALTH CARE EDUCATION/TRAINING PROGRAM

## 2019-11-24 RX ORDER — MAGNESIUM SULFATE HEPTAHYDRATE 40 MG/ML
4 INJECTION, SOLUTION INTRAVENOUS EVERY 4 HOURS PRN
Status: DISCONTINUED | OUTPATIENT
Start: 2019-11-23 | End: 2019-11-24

## 2019-11-24 RX ORDER — HEPARIN SODIUM 5000 [USP'U]/.5ML
5000 INJECTION, SOLUTION INTRAVENOUS; SUBCUTANEOUS EVERY 12 HOURS
Status: DISCONTINUED | OUTPATIENT
Start: 2019-11-24 | End: 2019-11-25

## 2019-11-24 RX ORDER — FLUCONAZOLE 50 MG/1
50 TABLET ORAL DAILY
Status: DISCONTINUED | OUTPATIENT
Start: 2019-11-24 | End: 2019-11-24

## 2019-11-24 RX ORDER — GABAPENTIN 300 MG/1
300 CAPSULE ORAL 2 TIMES DAILY PRN
Status: DISCONTINUED | OUTPATIENT
Start: 2019-11-24 | End: 2019-11-24

## 2019-11-24 RX ORDER — HYDROXYZINE HYDROCHLORIDE 25 MG/1
50 TABLET, FILM COATED ORAL EVERY 6 HOURS PRN
Status: DISCONTINUED | OUTPATIENT
Start: 2019-11-24 | End: 2019-11-26

## 2019-11-24 RX ORDER — CEFTRIAXONE 2 G/1
2 INJECTION, POWDER, FOR SOLUTION INTRAMUSCULAR; INTRAVENOUS EVERY 24 HOURS
Status: DISCONTINUED | OUTPATIENT
Start: 2019-11-24 | End: 2019-11-27

## 2019-11-24 RX ORDER — POTASSIUM CHLORIDE 1.5 G/1.58G
20 POWDER, FOR SOLUTION ORAL ONCE
Status: COMPLETED | OUTPATIENT
Start: 2019-11-24 | End: 2019-11-24

## 2019-11-24 RX ORDER — GABAPENTIN 300 MG/1
300 CAPSULE ORAL 2 TIMES DAILY
Status: DISCONTINUED | OUTPATIENT
Start: 2019-11-24 | End: 2019-11-26

## 2019-11-24 RX ORDER — FLUCONAZOLE 150 MG/1
150 TABLET ORAL ONCE
Status: COMPLETED | OUTPATIENT
Start: 2019-11-24 | End: 2019-11-24

## 2019-11-24 RX ORDER — CALCIUM CARBONATE 500 MG/1
1000 TABLET, CHEWABLE ORAL ONCE
Status: COMPLETED | OUTPATIENT
Start: 2019-11-24 | End: 2019-11-24

## 2019-11-24 RX ORDER — BISACODYL 5 MG
5 TABLET, DELAYED RELEASE (ENTERIC COATED) ORAL DAILY PRN
Status: DISCONTINUED | OUTPATIENT
Start: 2019-11-24 | End: 2019-12-05 | Stop reason: HOSPADM

## 2019-11-24 RX ORDER — HYDROXYZINE HYDROCHLORIDE 25 MG/1
25 TABLET, FILM COATED ORAL EVERY 6 HOURS PRN
Status: DISCONTINUED | OUTPATIENT
Start: 2019-11-24 | End: 2019-11-26

## 2019-11-24 RX ORDER — BISACODYL 10 MG
10 SUPPOSITORY, RECTAL RECTAL DAILY PRN
Status: DISCONTINUED | OUTPATIENT
Start: 2019-11-24 | End: 2019-12-05 | Stop reason: HOSPADM

## 2019-11-24 RX ADMIN — OXYCODONE HYDROCHLORIDE 5 MG: 5 TABLET ORAL at 05:35

## 2019-11-24 RX ADMIN — PIPERACILLIN SODIUM AND TAZOBACTAM SODIUM 4.5 G: 4; .5 INJECTION, POWDER, LYOPHILIZED, FOR SOLUTION INTRAVENOUS at 05:42

## 2019-11-24 RX ADMIN — POTASSIUM CHLORIDE 20 MEQ: 1.5 POWDER, FOR SOLUTION ORAL at 01:28

## 2019-11-24 RX ADMIN — FLUCONAZOLE 150 MG: 150 TABLET ORAL at 05:35

## 2019-11-24 RX ADMIN — METRONIDAZOLE 500 MG: 500 INJECTION, SOLUTION INTRAVENOUS at 13:05

## 2019-11-24 RX ADMIN — ACETAMINOPHEN 650 MG: 325 TABLET, FILM COATED ORAL at 08:31

## 2019-11-24 RX ADMIN — ONDANSETRON 4 MG: 4 TABLET, ORALLY DISINTEGRATING ORAL at 22:17

## 2019-11-24 RX ADMIN — HEPARIN SODIUM 5000 UNITS: 5000 INJECTION, SOLUTION INTRAVENOUS; SUBCUTANEOUS at 13:32

## 2019-11-24 RX ADMIN — METRONIDAZOLE 500 MG: 500 INJECTION, SOLUTION INTRAVENOUS at 21:49

## 2019-11-24 RX ADMIN — GABAPENTIN 300 MG: 300 CAPSULE ORAL at 22:26

## 2019-11-24 RX ADMIN — CALCIUM CARBONATE (ANTACID) CHEW TAB 500 MG 1000 MG: 500 CHEW TAB at 01:29

## 2019-11-24 RX ADMIN — HYDROMORPHONE HYDROCHLORIDE 0.5 MG: 1 INJECTION, SOLUTION INTRAMUSCULAR; INTRAVENOUS; SUBCUTANEOUS at 06:15

## 2019-11-24 RX ADMIN — BISACODYL 5 MG: 5 TABLET, COATED ORAL at 11:46

## 2019-11-24 RX ADMIN — CEFTRIAXONE SODIUM 2 G: 2 INJECTION, POWDER, FOR SOLUTION INTRAMUSCULAR; INTRAVENOUS at 13:26

## 2019-11-24 RX ADMIN — OXYCODONE HYDROCHLORIDE 5 MG: 5 TABLET ORAL at 01:29

## 2019-11-24 RX ADMIN — PIPERACILLIN SODIUM AND TAZOBACTAM SODIUM 4.5 G: 4; .5 INJECTION, POWDER, LYOPHILIZED, FOR SOLUTION INTRAVENOUS at 10:03

## 2019-11-24 RX ADMIN — Medication 2 G: at 02:10

## 2019-11-24 RX ADMIN — INSULIN GLARGINE 25 UNITS: 100 INJECTION, SOLUTION SUBCUTANEOUS at 21:50

## 2019-11-24 RX ADMIN — SENNOSIDES AND DOCUSATE SODIUM 2 TABLET: 8.6; 5 TABLET ORAL at 08:23

## 2019-11-24 RX ADMIN — SENNOSIDES AND DOCUSATE SODIUM 2 TABLET: 8.6; 5 TABLET ORAL at 21:49

## 2019-11-24 RX ADMIN — OXYCODONE HYDROCHLORIDE 5 MG: 5 TABLET ORAL at 13:36

## 2019-11-24 RX ADMIN — OXYCODONE HYDROCHLORIDE 5 MG: 5 TABLET ORAL at 21:49

## 2019-11-24 RX ADMIN — ACETAMINOPHEN 650 MG: 325 TABLET, FILM COATED ORAL at 21:49

## 2019-11-24 ASSESSMENT — ACTIVITIES OF DAILY LIVING (ADL)
ADLS_ACUITY_SCORE: 31
ADLS_ACUITY_SCORE: 30
ADLS_ACUITY_SCORE: 33
ADLS_ACUITY_SCORE: 30
ADLS_ACUITY_SCORE: 31
ADLS_ACUITY_SCORE: 31

## 2019-11-24 ASSESSMENT — MIFFLIN-ST. JEOR: SCORE: 1748.75

## 2019-11-24 ASSESSMENT — PAIN DESCRIPTION - DESCRIPTORS
DESCRIPTORS: DISCOMFORT;PRESSURE
DESCRIPTORS: ACHING;DISCOMFORT
DESCRIPTORS: ACHING;SHARP
DESCRIPTORS: PRESSURE

## 2019-11-24 NOTE — PLAN OF CARE
"Neuro: Lethargic, will fall asleep mid sentence. Disoriented to time and situation. Intermittently tearful.   Cardiac: SR-Stach, HR 's. -130's. Tmax 99.9, PRN tylenol given x1.   Respiratory: Sating >92% 1-3 lpm via NC. Clear/diminished.   GI/: Nelson removed. Due to void post nelson removal. Bloody vaginal discharge.   Diet/appetite: Regular diet, poor appetite.   Activity:  Reposition q2hrs.   Pain: PRN oxycodone for abd pain.   Skin: Sacral mepilex in place for prevention. Scabbing/bruising.    LDA's: R PICC, TKO. R PIV, SL.     Plan: Urine still needs to be collected. Continue q4hr BG checks. Continue with POC. Notify primary team with changes.  /59 (BP Location: Left arm)   Pulse 93   Temp 98.9  F (37.2  C) (Axillary)   Resp 20   Ht 1.676 m (5' 6\")   Wt 102.3 kg (225 lb 8.5 oz)   SpO2 97%   BMI 36.40 kg/m        "

## 2019-11-24 NOTE — PROGRESS NOTES
General acute hospital, [place]    Progress Note - Kristel 1 Service        Date of Admission:  11/23/2019    Assessment & Plan   Chela Love is a 44 year old female with a history of HTN, HLD, drug abuse (methamphetamine, cannabis), and abnormal menses, who presents as a transfer from [facility] for management of septic shock and large ovarian mass, 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    Changes today:  - gynecology following, recommended cefotaxime for improved peritoneal penetration, per pharmacy not available in [place] any more, so switched to ceftriaxone  - discontinued zosyn, added flagyl  - discontinued vancomycin  - diagnostic paracentesis with cytology pending, 500 ml yellow cloudy fluid removed  - TTE to evaluate endocarditis  - discontinued dilaudid IV given somnolence, switched to gabapentin 300 mg BID PRN  - awaiting CT A/P read from OSH  - heparin subcutaneous for DVT ppx      # SIRS, possible severe sepsis  # H/o IVDU  Presented to OSH with malaise, stomach pain, nausea/vomiting and concern for hyperglycemia. Exam notable for tachycardia, tachypnea, and hypotension (sBPs in the 70s). Labs otable for leukocytosis (WBC 18.2/ANC 15.7), anemia (Hgb 9.9) elevated lactate (LA ~4), Glu >600. Creatinine elevated to 1.71 from ~1.0.  Trop 0.03. Bili 1.4/0.6; LFTs otherwise normal. CXR w/o ischemic changes. CXR clear. Urine preg negative. UA w/o signs of infection. VBG without acidosis/retention. CT head negative for bleed. Encephalopathy could be 2/2 ketoacidosis, toxic metabolic, septic shock. Concern for infection of unclear etiology: source could be blood, infected mass, biliary. Has a hx of recurrent LE cellulitis though no evidence of infection on skin exam. Lactic acidosis likely 2/2 ketoacidosis/starvation ketosis. Blood culture NGTD, leukocytosis up to 29 on 11/24  - S/p 4 NS boluses, with good effect, BP ~110/50s.   - S/p vanc and zosyn at  OSH (11/23-24)  - gynecology-oncology following, recommended cefotaxime for improved peritoneal penetration, per pharmacy not available in [place] any more, so switched to ceftriaxone  - discontinued zosyn, added flagyl  - discontinued vancomycin  - TTE to evaluate endocarditis  - diagnostic paracentesis with cytology pending, 500 ml yellow cloudy fluid removed  - pain: Tylenol 650mg Q4H PRN, Oxycodone 5-10mg Q3H PRN  - discontinued dilaudid IV given somnolence, switched to gabapentin 300 mg BID PRN    # Large right adnexal mass associated with ascites and mesenteric omental caking, concern for malignancy  # Vaginal bleeding, resolved  # Hx of abnormal periods  CT imaging at OSH showed showed [id number] cm right adnexal mass with ascites and omental caking present which is concerning for ovarian malignancy. CT also showed fluid in endometrial canal. Bleeding though to be due to menses per gyn/onc. Hgb is 9.9, stable, and reassuring. Amount of vaginal bleeding is similar to menses. CEA and CA elevated consistent with primary ovarian cancer  - awaiting CT A/P read from OSH, already requested push of images through PACS from [facility]    # Toxic/metabolic encephalopathy   In setting of severe sepsis, gets worse with opioids. CT head at OSH was negative.  - monitor mentation closely     # CINDY  # Lactic acidosis  # Hyponatremia  Creatinine was 1.7 at OSH, improved 1.18. Likely 2/2 intravascular depletion.   - trend     # Polysubstance abuse  - Obtain UDS and ethanol level      #HTN  - Hold home antihypertensives in setting of septic shock  - Hydrochlorothiazide 25mg daily - HOLD     # Uncontrolled T2DM with hyperglycemia  Glucose >600 and beta-hydroxybutyrate 0.39 at OSH. UA >1000 glucose. Gluc now improved.   - Hgb A1C 15 (very high)   - Beta-hydroxybutyrate level pending  - Gluc Q4H  - medium dose insulin sliding scale  - Lantus 25 units QPM   - Hold metformin 1000mg BID in the setting of lactic acidosis       #Constipation  - Pericolase 2 tabs daily BID     #Deconditioning  - PT, eval and treat  - OT, eval and treat     Diet: Regular Diet Adult    Fluids: PO intake  Lines: PIV  DVT Prophylaxis: Heparin SQ  Zamora Catheter: not present  Code Status: Full Code      Disposition Plan   Expected discharge: 4 - 7 days, recommended to prior living arrangement once renal function improved and SIRS/Sepsis treated.  Entered: Reji Wilson MD 11/24/2019, 7:09 AM       The patient's care was discussed with the Attending Physician, Dr. Farhan Lara.    Reji Wilson MD  [facility] 1 Service  [facility] [place]  Pager: [phone]  Please see sticky note for cross cover information  ______________________________________________________________________    Interval History   RN notes reviewed, pt is quite tearful this morning doesn't know what's going on. Tmax of up to 100.8 overnight. Explained to her again that she may have ovarian cancer and we are trying to figure out if she has an infection. She notes ongoing abdominal pain but better controlled with IV medication. She has been tolerating PO. Mother will be by later today.    Data reviewed today: I reviewed all medications, new labs and imaging results over the last 24 hours. I personally reviewed no images or EKG's today.    Physical Exam   Vital Signs: Temp: 98.4  F (36.9  C) Temp src: Oral BP: 119/58 Pulse: 111 Heart Rate: 98 Resp: 26 SpO2: 99 % O2 Device: Nasal cannula Oxygen Delivery: 1 LPM  Weight: 237 lbs 6.97 oz  General Appearance: Somnolent, but arousable to voice  Respiratory: CTAB, no accessory muscle use.  Cardiovascular: RRR, S1 S2+. No MGR.  GI: BS+, soft, tender throughout all quadrants, ND.  Skin: No jaundice or bruises. B/l LE with dusky appearance otherwise WWP.  Other: Labile affect.    Data   Recent Labs   Lab 11/24/19  0727 11/24/19  0253 11/23/19  2321 11/23/19  0508 11/23/19  0352   WBC 29.2*  --   --   --  18.2*   HGB  9.2*  --   --   --  9.9*   MCV 88  --   --   --  87     --   --   --  325   INR  --   --   --  1.59*  --    *  --  132*  --  128*   POTASSIUM 4.2 4.1 3.9  --  3.9   CHLORIDE 98  --  99  --  96   CO2 23  --  25  --  25   BUN 32*  --  28  --  24   CR 1.60*  --  1.26*  --  1.18*   ANIONGAP 10  --  8  --  7   MARILIN 7.7*  --  7.6*  --  7.6*   *  --  174*  --  476*   ALBUMIN 1.6*  --   --   --  1.8*   PROTTOTAL 5.9*  --   --   --  6.0*   BILITOTAL 1.1  --   --   --  0.7   ALKPHOS 99  --   --   --  64   ALT 17  --   --   --  11   AST 60*  --   --   --  15   TROPI  --   --   --   --  <0.015

## 2019-11-24 NOTE — PLAN OF CARE
PT / 6B - PT orders received. Per chart review and discussion with RN - pt not medically appropriate for therapy this date. PT eval rescheduled.

## 2019-11-24 NOTE — PROVIDER NOTIFICATION
Kristel Pham updated Pt still has peaked T waves, K= 3.9. Pt also quite anxious times crying out and moaning she cant breath when ekg tech walked into room. Oxygen at 1L NC and 98% spo2.  Labile and drifts off to sleep, able to redirect anxiousness. Has not urinated since 04pm after nelson taken out. Bladder scanned for 75ml. Straight cathed using sterile procedure and received 200ml. Pure wick placed. Sample sent to lab. Pt has a lot of labial yeast, white spots. Cleaned with wipes and dried. Urine samples sent to lab. Also text paged md that pt has new numbness in left fingers only, not in hands or arm that she has had since beginning of shift, continue with plan of care.

## 2019-11-24 NOTE — PROCEDURES
[facility], [place]    Paracentesis  Date/Time: 11/24/2019 11:49 AM  Performed by: Arthur Antonio MD  Authorized by: Arthur Antonio MD     PRE-PROCEDURE DETAILS  Initial or subsequent exam: initial  Procedure purpose: diagnostic and therapeutic  Indications: abdominal discomfort secondary to ascites and new onset ascites      UNIVERSAL PROTOCOL   Site Marked: Yes  Prior Images Obtained and Reviewed:  Yes  Required items: Required blood products, implants, devices and special equipment available    Patient identity confirmed:  Verbally with patient, arm band, provided demographic data and hospital-assigned identification number  NA - No sedation, light sedation, or local anesthesia  Confirmation Checklist:  Patient's identity using two indicators, relevant allergies, procedure was appropriate and matched the consent or emergent situation and correct equipment/implants were available  Time out: Immediately prior to the procedure a time out was called    Preparation: Patient was prepped and draped in usual sterile fashion       ANESTHESIA    Anesthesia: Local infiltration  Local Anesthetic:  Lidocaine 1% without epinephrine  Anesthetic Total (mL):  9      SEDATION    Patient Sedated: No    POST PROCEDURE DETAILS  Needle gauge: 22  Ultrasound guidance: yes  Puncture site: left lower quadrant  Fluid removed: 500(ml)  Fluid appearance: cloudy and serous  Dressing: glue, bandage.      PROCEDURE   Patient Tolerance:  Patient tolerated the procedure well with no immediate complications  Describe Procedure: 5fr straight catheter used  Length of time physician/provider present for 1:1 monitoring during sedation: 0      Arthur Antonio MD

## 2019-11-24 NOTE — PROVIDER NOTIFICATION
Dr. Cruz notified pt having increased abdominal pressure and belching, also c/o bilateral hand and finger numbness and left leg numbness; cold, 1+pulse. Warm blankets placed. Md called to come to bedside. Oxycodone was given po. Pt still complaining of discomfort; and moaning. MD helped reposition. Abdominal area is distended and taut more than previous, bilateral legs appear slightly more jean this am. Pt given dilaudid when MD present due to pressure, last known bm not known. Suppository ordered. Labs ordered. Gyn/Onc MD also updated and wants to be called when pt is more lucid to give consent for tissue sampling. PT is more somnolent know after dilaudid. Bed alarm on.

## 2019-11-24 NOTE — PLAN OF CARE
S/B: Intermittent lethargy and confusion but awakening more this am with increased abdominal pressure and bilateral hand and left foot numbness which is new. See previous notes. Pt given oxycodone and dilaudid with relief. No nausea, belching present. Pt drinking soda's to help her burp. BG still elevated this am in high 190's. Stool softeners given and suppository ordered.     A: A&Ox3-4. Lethergy but more awake toward morning. VSS. SR-ST with runs of Vtach while turning on left side, electrolytes drawn and replacements given of potassium, mag and calcium, recheck this am. Tmax 100.8. Tolerating liquids. EKG done and wnl. Increased anxiety with the more staff in her room, needs lots of reassurance and emotional. Dr. Cruz did ask pt that if mother questions why she is still needing to be here and what is going on that it is OKAY to discuss with her mom, plan of care. Call GYN/ONC when pt is more awake for consent.     I/O this shift:  In: 20 [I.V.:20]  Out: -     Temp:  [98.4  F (36.9  C)-100.8  F (38.2  C)] 98.4  F (36.9  C)  Pulse:  [103-111] 111  Heart Rate:  [] 98  Resp:  [18-26] 26  BP: ()/(58-78) 119/58  SpO2:  [96 %-99 %] 99 %     R: Pain reevaluation, stool softeners, suppository. Continue with POC. Notify primary team with changes.

## 2019-11-24 NOTE — PHARMACY-VANCOMYCIN DOSING SERVICE
Pharmacy Vancomycin Note  Date of Service 2019  Patient's  1975   44 year old, female,  kg    Indication: Sepsis  Goal Trough Level: 15-20 mg/L  Day of Therapy: 3  Current Vancomycin regimen:  2000 mg IV q12h (18.5 mg/kg)    Current estimated CrCl = Estimated Creatinine Clearance: 56.3 mL/min (A) (based on SCr of 1.6 mg/dL (H)).    Creatinine for last 3 days  2019:  3:52 AM Creatinine 1.18 mg/dL; 11:21 PM Creatinine 1.26 mg/dL  2019:  7:27 AM Creatinine 1.60 mg/dL    Recent Vancomycin Levels (past 3 days)  2019:  7:27 AM Vancomycin Level 36.0 mg/L - 8.75 hr trough    Vancomycin IV Administrations (past 72 hours)                   vancomycin (VANCOCIN) 2,000 mg in sodium chloride 0.9 % 500 mL intermittent infusion (mg) 2,000 mg New Bag 19 2242     2,000 mg New Bag  0916                Nephrotoxins and other renal medications (From now, onward)    Start     Dose/Rate Route Frequency Ordered Stop    19 0846  vancomycin place hayes - receiving intermittent dosing      1 each Intravenous SEE ADMIN INSTRUCTIONS 19 0846      19 0630  piperacillin-tazobactam (ZOSYN) 4.5 g vial to attach to  mL bag      4.5 g  over 30 Minutes Intravenous EVERY 6 HOURS 19 0627               Contrast Orders - past 72 hours (72h ago, onward)    None          Interpretation of levels and current regimen:  Trough level is  Supratherapeutic. Timing of level relative to last dose is less than ideal, but pt is clearly not clearing drug.    Has serum creatinine changed > 50% in last 72 hours: Yes - SCr 1.2 -> 1.3 -> 1.6 over past 48 hrs    Urine output:  diminished urine output - Zamora removed  @1600, only 200 cc UOP thru 0200=0.19 mL/kg/hr    Renal Function: Worsening    Plan:  1.  Change to intermittent vancomycin dosing based on drug levels.  2.  Pharmacy will check trough levels as appropriate in 1-3 Days.    3.  Serum creatinine levels will be ordered daily  for the first week of therapy and at least twice weekly for subsequent weeks.      Neal Howard, PharmD, BCPS      ADDENDUM  11/24/19  9:14 AM  Vancomycin discontinued by primary team. A vancomycin level will still be evaluated 11/25 with AM labs to assess drug clearance. Pharmacy will continue to follow, but no further vancomycin dosing unless re-consulted.    Neal Howard, PioD, BCPS

## 2019-11-24 NOTE — PROVIDER NOTIFICATION
Nicole CUADRA [phone] notified that pt turned onto left side having some pain, dilaudid IV given and moaning in pain, telemetry looked like ST with pvc's and runs of Vtach bursts up to 8 beats at a time over a couple minutes, resolved. Difficult to assess if due to turning or actually Vtach. BMP ordered, pt drinking a lot and has not urinated since nelson out at 430pm. Pt intermittently alert, drinking 4 pops now since 7pm and eating a sandwhich., continue with plan of care.

## 2019-11-25 ENCOUNTER — APPOINTMENT (OUTPATIENT)
Dept: ULTRASOUND IMAGING | Facility: CLINIC | Age: 44
End: 2019-11-25
Attending: INTERNAL MEDICINE
Payer: COMMERCIAL

## 2019-11-25 ENCOUNTER — APPOINTMENT (OUTPATIENT)
Dept: GENERAL RADIOLOGY | Facility: CLINIC | Age: 44
End: 2019-11-25
Attending: INTERNAL MEDICINE
Payer: COMMERCIAL

## 2019-11-25 ENCOUNTER — APPOINTMENT (OUTPATIENT)
Dept: ULTRASOUND IMAGING | Facility: CLINIC | Age: 44
End: 2019-11-25
Attending: STUDENT IN AN ORGANIZED HEALTH CARE EDUCATION/TRAINING PROGRAM
Payer: COMMERCIAL

## 2019-11-25 LAB
AFP SERPL-MCNC: <1.5 UG/L (ref 0–8)
ALBUMIN SERPL-MCNC: 1.5 G/DL (ref 3.4–5)
ALP SERPL-CCNC: 136 U/L (ref 40–150)
ALT SERPL W P-5'-P-CCNC: 17 U/L (ref 0–50)
ANION GAP SERPL CALCULATED.3IONS-SCNC: 12 MMOL/L (ref 3–14)
AST SERPL W P-5'-P-CCNC: 38 U/L (ref 0–45)
BACTERIA SPEC CULT: ABNORMAL
BASOPHILS # BLD AUTO: 0 10E9/L (ref 0–0.2)
BASOPHILS NFR BLD AUTO: 0.1 %
BILIRUB SERPL-MCNC: 0.6 MG/DL (ref 0.2–1.3)
BUN SERPL-MCNC: 45 MG/DL (ref 7–30)
C TRACH DNA SPEC QL NAA+PROBE: NEGATIVE
CALCIUM SERPL-MCNC: 8.1 MG/DL (ref 8.5–10.1)
CHLORIDE SERPL-SCNC: 96 MMOL/L (ref 94–109)
CHLORIDE UR-SCNC: 13 MMOL/L
CO2 SERPL-SCNC: 20 MMOL/L (ref 20–32)
CREAT SERPL-MCNC: 2.33 MG/DL (ref 0.52–1.04)
CREAT UR-MCNC: 146 MG/DL
DIFFERENTIAL METHOD BLD: ABNORMAL
EOSINOPHIL # BLD AUTO: 0 10E9/L (ref 0–0.7)
EOSINOPHIL NFR BLD AUTO: 0.2 %
ERYTHROCYTE [DISTWIDTH] IN BLOOD BY AUTOMATED COUNT: 14.6 % (ref 10–15)
FRACT EXCRET NA UR+SERPL-RTO: 0.2 %
GFR SERPL CREATININE-BSD FRML MDRD: 25 ML/MIN/{1.73_M2}
GLUCOSE BLDC GLUCOMTR-MCNC: 145 MG/DL (ref 70–99)
GLUCOSE BLDC GLUCOMTR-MCNC: 164 MG/DL (ref 70–99)
GLUCOSE BLDC GLUCOMTR-MCNC: 181 MG/DL (ref 70–99)
GLUCOSE BLDC GLUCOMTR-MCNC: 182 MG/DL (ref 70–99)
GLUCOSE BLDC GLUCOMTR-MCNC: 187 MG/DL (ref 70–99)
GLUCOSE SERPL-MCNC: 218 MG/DL (ref 70–99)
GRAM STN SPEC: ABNORMAL
HCG-TM SERPL-ACNC: <3 IU/L
HCT VFR BLD AUTO: 27.6 % (ref 35–47)
HGB BLD-MCNC: 8.5 G/DL (ref 11.7–15.7)
IMM GRANULOCYTES # BLD: 0.5 10E9/L (ref 0–0.4)
IMM GRANULOCYTES NFR BLD: 2.1 %
INR PPP: 1.55 (ref 0.86–1.14)
INTERPRETATION ECG - MUSE: NORMAL
LDH SERPL L TO P-CCNC: 520 U/L (ref 81–234)
LYMPHOCYTES # BLD AUTO: 1.1 10E9/L (ref 0.8–5.3)
LYMPHOCYTES NFR BLD AUTO: 4.6 %
Lab: ABNORMAL
MCH RBC QN AUTO: 26.7 PG (ref 26.5–33)
MCHC RBC AUTO-ENTMCNC: 30.8 G/DL (ref 31.5–36.5)
MCV RBC AUTO: 87 FL (ref 78–100)
MONOCYTES # BLD AUTO: 1.1 10E9/L (ref 0–1.3)
MONOCYTES NFR BLD AUTO: 4.4 %
N GONORRHOEA DNA SPEC QL NAA+PROBE: NEGATIVE
NEUTROPHILS # BLD AUTO: 21.8 10E9/L (ref 1.6–8.3)
NEUTROPHILS NFR BLD AUTO: 88.6 %
NRBC # BLD AUTO: 0 10*3/UL
NRBC BLD AUTO-RTO: 0 /100
OSMOLALITY UR: 329 MMOL/KG (ref 100–1200)
PLATELET # BLD AUTO: 262 10E9/L (ref 150–450)
POTASSIUM SERPL-SCNC: 4.3 MMOL/L (ref 3.4–5.3)
PROT SERPL-MCNC: 6.2 G/DL (ref 6.8–8.8)
RBC # BLD AUTO: 3.18 10E12/L (ref 3.8–5.2)
SODIUM SERPL-SCNC: 128 MMOL/L (ref 133–144)
SODIUM UR-SCNC: 13 MMOL/L
SPECIMEN SOURCE: ABNORMAL
SPECIMEN SOURCE: ABNORMAL
SPECIMEN SOURCE: NORMAL
SPECIMEN SOURCE: NORMAL
VANCOMYCIN SERPL-MCNC: 23.8 MG/L
WBC # BLD AUTO: 24.6 10E9/L (ref 4–11)

## 2019-11-25 PROCEDURE — 12000004 ZZH R&B IMCU UMMC

## 2019-11-25 PROCEDURE — 80202 ASSAY OF VANCOMYCIN: CPT | Performed by: INTERNAL MEDICINE

## 2019-11-25 PROCEDURE — 40000135 CT OUTSIDE READ

## 2019-11-25 PROCEDURE — 25000128 H RX IP 250 OP 636: Performed by: STUDENT IN AN ORGANIZED HEALTH CARE EDUCATION/TRAINING PROGRAM

## 2019-11-25 PROCEDURE — P9047 ALBUMIN (HUMAN), 25%, 50ML: HCPCS | Performed by: STUDENT IN AN ORGANIZED HEALTH CARE EDUCATION/TRAINING PROGRAM

## 2019-11-25 PROCEDURE — 82436 ASSAY OF URINE CHLORIDE: CPT | Performed by: STUDENT IN AN ORGANIZED HEALTH CARE EDUCATION/TRAINING PROGRAM

## 2019-11-25 PROCEDURE — 40000135 CT MHEALTH OVERREAD

## 2019-11-25 PROCEDURE — 82570 ASSAY OF URINE CREATININE: CPT | Performed by: STUDENT IN AN ORGANIZED HEALTH CARE EDUCATION/TRAINING PROGRAM

## 2019-11-25 PROCEDURE — 86301 IMMUNOASSAY TUMOR CA 19-9: CPT | Performed by: NURSE PRACTITIONER

## 2019-11-25 PROCEDURE — 83935 ASSAY OF URINE OSMOLALITY: CPT | Performed by: STUDENT IN AN ORGANIZED HEALTH CARE EDUCATION/TRAINING PROGRAM

## 2019-11-25 PROCEDURE — 25800030 ZZH RX IP 258 OP 636: Performed by: STUDENT IN AN ORGANIZED HEALTH CARE EDUCATION/TRAINING PROGRAM

## 2019-11-25 PROCEDURE — 99232 SBSQ HOSP IP/OBS MODERATE 35: CPT | Mod: GC | Performed by: OBSTETRICS & GYNECOLOGY

## 2019-11-25 PROCEDURE — 83615 LACTATE (LD) (LDH) ENZYME: CPT | Performed by: INTERNAL MEDICINE

## 2019-11-25 PROCEDURE — 99233 SBSQ HOSP IP/OBS HIGH 50: CPT | Mod: GC | Performed by: INTERNAL MEDICINE

## 2019-11-25 PROCEDURE — 25000132 ZZH RX MED GY IP 250 OP 250 PS 637: Performed by: STUDENT IN AN ORGANIZED HEALTH CARE EDUCATION/TRAINING PROGRAM

## 2019-11-25 PROCEDURE — 76770 US EXAM ABDO BACK WALL COMP: CPT

## 2019-11-25 PROCEDURE — 36592 COLLECT BLOOD FROM PICC: CPT | Performed by: INTERNAL MEDICINE

## 2019-11-25 PROCEDURE — 00000146 ZZHCL STATISTIC GLUCOSE BY METER IP

## 2019-11-25 PROCEDURE — 84300 ASSAY OF URINE SODIUM: CPT | Performed by: STUDENT IN AN ORGANIZED HEALTH CARE EDUCATION/TRAINING PROGRAM

## 2019-11-25 PROCEDURE — 84702 CHORIONIC GONADOTROPIN TEST: CPT | Performed by: NURSE PRACTITIONER

## 2019-11-25 PROCEDURE — 25000128 H RX IP 250 OP 636: Performed by: INTERNAL MEDICINE

## 2019-11-25 PROCEDURE — 83520 IMMUNOASSAY QUANT NOS NONAB: CPT | Performed by: NURSE PRACTITIONER

## 2019-11-25 PROCEDURE — 93970 EXTREMITY STUDY: CPT

## 2019-11-25 PROCEDURE — 83615 LACTATE (LD) (LDH) ENZYME: CPT | Performed by: NURSE PRACTITIONER

## 2019-11-25 PROCEDURE — 36592 COLLECT BLOOD FROM PICC: CPT | Performed by: NURSE PRACTITIONER

## 2019-11-25 PROCEDURE — 85025 COMPLETE CBC W/AUTO DIFF WBC: CPT | Performed by: INTERNAL MEDICINE

## 2019-11-25 PROCEDURE — 80053 COMPREHEN METABOLIC PANEL: CPT | Performed by: INTERNAL MEDICINE

## 2019-11-25 PROCEDURE — 85610 PROTHROMBIN TIME: CPT | Performed by: INTERNAL MEDICINE

## 2019-11-25 PROCEDURE — 82105 ALPHA-FETOPROTEIN SERUM: CPT | Performed by: NURSE PRACTITIONER

## 2019-11-25 RX ORDER — SODIUM CHLORIDE, SODIUM LACTATE, POTASSIUM CHLORIDE, CALCIUM CHLORIDE 600; 310; 30; 20 MG/100ML; MG/100ML; MG/100ML; MG/100ML
INJECTION, SOLUTION INTRAVENOUS CONTINUOUS
Status: ACTIVE | OUTPATIENT
Start: 2019-11-25 | End: 2019-11-25

## 2019-11-25 RX ORDER — ALBUMIN (HUMAN) 12.5 G/50ML
12.5 SOLUTION INTRAVENOUS ONCE
Status: COMPLETED | OUTPATIENT
Start: 2019-11-25 | End: 2019-11-25

## 2019-11-25 RX ORDER — ALBUMIN (HUMAN) 12.5 G/50ML
25 SOLUTION INTRAVENOUS ONCE
Status: COMPLETED | OUTPATIENT
Start: 2019-11-25 | End: 2019-11-25

## 2019-11-25 RX ORDER — HEPARIN SODIUM 5000 [USP'U]/.5ML
5000 INJECTION, SOLUTION INTRAVENOUS; SUBCUTANEOUS EVERY 12 HOURS
Status: DISCONTINUED | OUTPATIENT
Start: 2019-11-25 | End: 2019-11-30

## 2019-11-25 RX ADMIN — INSULIN GLARGINE 25 UNITS: 100 INJECTION, SOLUTION SUBCUTANEOUS at 19:50

## 2019-11-25 RX ADMIN — GABAPENTIN 300 MG: 300 CAPSULE ORAL at 19:50

## 2019-11-25 RX ADMIN — METRONIDAZOLE 500 MG: 500 INJECTION, SOLUTION INTRAVENOUS at 08:19

## 2019-11-25 RX ADMIN — HYDROXYZINE HYDROCHLORIDE 25 MG: 25 TABLET, FILM COATED ORAL at 05:22

## 2019-11-25 RX ADMIN — METRONIDAZOLE 500 MG: 500 INJECTION, SOLUTION INTRAVENOUS at 16:21

## 2019-11-25 RX ADMIN — METRONIDAZOLE 500 MG: 500 INJECTION, SOLUTION INTRAVENOUS at 04:39

## 2019-11-25 RX ADMIN — Medication 5 ML: at 06:11

## 2019-11-25 RX ADMIN — OXYCODONE HYDROCHLORIDE 5 MG: 5 TABLET ORAL at 08:19

## 2019-11-25 RX ADMIN — METRONIDAZOLE 500 MG: 500 INJECTION, SOLUTION INTRAVENOUS at 21:46

## 2019-11-25 RX ADMIN — OXYCODONE HYDROCHLORIDE 5 MG: 5 TABLET ORAL at 05:22

## 2019-11-25 RX ADMIN — ACETAMINOPHEN 650 MG: 325 TABLET, FILM COATED ORAL at 20:01

## 2019-11-25 RX ADMIN — ALBUMIN HUMAN 12.5 G: 0.25 SOLUTION INTRAVENOUS at 17:44

## 2019-11-25 RX ADMIN — OXYCODONE HYDROCHLORIDE 5 MG: 5 TABLET ORAL at 10:22

## 2019-11-25 RX ADMIN — ACETAMINOPHEN 650 MG: 325 TABLET, FILM COATED ORAL at 08:19

## 2019-11-25 RX ADMIN — SENNOSIDES AND DOCUSATE SODIUM 2 TABLET: 8.6; 5 TABLET ORAL at 08:19

## 2019-11-25 RX ADMIN — SODIUM CHLORIDE, POTASSIUM CHLORIDE, SODIUM LACTATE AND CALCIUM CHLORIDE: 600; 310; 30; 20 INJECTION, SOLUTION INTRAVENOUS at 12:08

## 2019-11-25 RX ADMIN — BISACODYL 10 MG: 10 SUPPOSITORY RECTAL at 04:41

## 2019-11-25 RX ADMIN — ALBUMIN HUMAN 25 G: 0.25 SOLUTION INTRAVENOUS at 20:37

## 2019-11-25 RX ADMIN — CEFTRIAXONE SODIUM 2 G: 2 INJECTION, POWDER, FOR SOLUTION INTRAMUSCULAR; INTRAVENOUS at 12:08

## 2019-11-25 RX ADMIN — SENNOSIDES AND DOCUSATE SODIUM 2 TABLET: 8.6; 5 TABLET ORAL at 19:50

## 2019-11-25 RX ADMIN — HEPARIN SODIUM 5000 UNITS: 5000 INJECTION, SOLUTION INTRAVENOUS; SUBCUTANEOUS at 16:27

## 2019-11-25 RX ADMIN — OXYCODONE HYDROCHLORIDE 10 MG: 5 TABLET ORAL at 23:37

## 2019-11-25 RX ADMIN — GABAPENTIN 300 MG: 300 CAPSULE ORAL at 08:19

## 2019-11-25 ASSESSMENT — ACTIVITIES OF DAILY LIVING (ADL)
ADLS_ACUITY_SCORE: 34
ADLS_ACUITY_SCORE: 31
ADLS_ACUITY_SCORE: 34
ADLS_ACUITY_SCORE: 31
ADLS_ACUITY_SCORE: 34
ADLS_ACUITY_SCORE: 31

## 2019-11-25 ASSESSMENT — PAIN DESCRIPTION - DESCRIPTORS: DESCRIPTORS: DISCOMFORT;CRUSHING

## 2019-11-25 NOTE — PROVIDER NOTIFICATION
Notified Dr. Martinez:    Pt's UO was 40cc over 7hours.  -renal ultrasound ordered and LR MIV @125.

## 2019-11-25 NOTE — PROGRESS NOTES
"CLINICAL NUTRITION SERVICES - ASSESSMENT NOTE     Nutrition Prescription    RECOMMENDATIONS FOR MDs/PROVIDERS TO ORDER:  Advance diet order as medically able, recommend consistent CHO     Malnutrition Status:    Non-severe malnutrition in the context of acute on chronic illness     Recommendations already ordered by Registered Dietitian (RD):  None at this time    Future/Additional Recommendations:  When diet advances, recommend starting Boost Glucose Control supplement    Ability to provide further education on diabetes management when more medically appropriate   Strongly recommend patient follow up with outpatient dietitian/diabetes educator      REASON FOR ASSESSMENT  Chela Love is a/an 44 year old female assessed by the dietitian for Admission Nutrition Risk Screen for new/uncontrolled diabetes    CLINICAL HISTORY   HTN, HLD, drug abuse (methamphetamine, cannabis), and abnormal menses, who presents as a transfer from [facility] for management of septic shock and large ovarian mass, 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     NUTRITION HISTORY  Patient, mother and family in room. Reported that her appetite has been poor for the past week due to malaise but when she feels better she usually eats well. Mother ensures she has groceries at home and reported when she discharges from this admission she will be staying with her. Patient stated she went to [facility] clinic for diabetes but did not follow up- asked what hemoglobin A1C was today. She has no food allergies, no intolerances. Denied chewing/swallowing difficulties and no issues with constipation/diarrhea.     CURRENT NUTRITION ORDERS  Diet: NPO  Intake/Tolerance: N/A    LABS  Hemoglobin A1C 15 (11/23/19)    MEDICATIONS  Calcium Carbonate  Novolog   Lantus   Dulcolax     ANTHROPOMETRICS  Height: 167.6 cm (5' 6\")  Most Recent Weight: 107.7 kg (237 lb 7 oz)    IBW: 59.1 kg  BMI: Obesity Grade II BMI 35-39.9  Weight " History:   Wt Readings from Last 15 Encounters:   11/24/19 107.7 kg (237 lb 7 oz)       Dosing Weight: 71 kg (ajdusted based on weight of 107.7 kg and IBW)    ASSESSED NUTRITION NEEDS  Estimated Energy Needs: 7335-6764 kcals/day (20 - 25 kcals/kg)  Justification: Obese  Estimated Protein Needs:  grams protein/day (1.2 - 1.5 grams of pro/kg)  Justification: Obesity guidelines  Estimated Fluid Needs: 4233-9717 mL/day (25 - 30 mL/kg)   Justification: Maintenance    PHYSICAL FINDINGS  See malnutrition section below    MALNUTRITION  % Intake: < 75% for > 7 days (non-severe)  % Weight Loss: None noted  Subcutaneous Fat Loss: None observed  Muscle Loss: None observed, assessment limited to position and legthargy  Fluid Accumulation/Edema: Mild  Malnutrition Diagnosis: Non-severe malnutrition in the context of acute on chronic illness     NUTRITION DIAGNOSIS  Inadequate oral intake related to decreased appetite and limited ability to consume adequate nutrition as evidenced by report of poor appetite and NPO diet order     INTERVENTIONS  Implementation  Nutrition Education: RD role in care   Collaboration with other providers     Goals  Diet adv v nutrition support within 2-3 days.     Monitoring/Evaluation  Progress toward goals will be monitored and evaluated per protocol.    Lissa Vega RD, LD  6B pager: [phone]

## 2019-11-25 NOTE — PROVIDER NOTIFICATION
Pt repositioned having increased abdominal pressure when turning to right side, pt c/o shortness of breath again, oxygen sop2 remains at 98%. Pt very anxious and upset. Nauseated, spitting in emesis bag. Oxycodone was given 10min prior for increased abdominal pain with tylenol. Pt comforted, turned, repositioned with pillows, hot pad ordered. Zofran given. Then pt asking for cheeseburger 2 min later. Labile emotions. MD will order atarax, asked to have gabapentin changed to scheduled and now as writer writing pt is falling asleep with menu in had, even though RN explained cafeteria is closed. Offered sandwhich but asked to wait until stomach feels better and to slow down on liquids pt had 900cc in 45min of fluids, wanting to continual drink. Will given meds and monitor.

## 2019-11-25 NOTE — PLAN OF CARE
"Neuro: Continued to be lethargic this am, but became more awake and alert this afternoon and conversed with family and nursing staff.   Cardiac: SR-Stach, HR 's. SBP 's.   Respiratory: Sating >96% on 1-2 lpm via NC. Denies SOB.   GI/: No UO-straight cath for 325 ml. No BM, team aware.   Diet/appetite: Regular diet, ate 75% of dinner. NPO at midnight.   Activity:  Assist of 2 with repositioning.   Pain: Oxycodone x1 for abd. Pain.   Skin: No new deficits noted.  LDA's: R PICC, TKO. R PIV.     Plan: Abd US, paracentesis, and echo done today. Blood cultures drawn. Plan for IR tomorrow for biopsy.  Family at bedside this afternoon and was supportive/helping with cares. Continue with POC. Notify primary team with changes.  BP 93/53 (BP Location: Left arm)   Pulse 111   Temp 98.9  F (37.2  C) (Axillary)   Resp 18   Ht 1.676 m (5' 6\")   Wt 107.7 kg (237 lb 7 oz)   SpO2 98%   BMI 38.32 kg/m        "

## 2019-11-25 NOTE — PROVIDER NOTIFICATION
Notified Dr. Martinez:    1. Pt is intermittently lethargic, then startles awake and is crying out in uncontrolled pain while awake. Center/epigastric region of abdomen is very firm, all quadrants tender.  -no additional pain medications ordered.   2.UO was 300cc over 12hrs last night, this morning barely any UO in nelson collection   -continue to monitor  3.Unable to complete med rec at this time, please enter pharmacy consult.  4.Sodium on labs 128, should we trend this lab today  - will do daily cmp's at this time

## 2019-11-25 NOTE — CONSULTS
Health Psychology                  Clinic    Department of Medicine  Debby Mesa, PhD, LP [phone]                          [facility]  [facility] Monster Alvarez, PhD, LP [phone]                  3rd Floor  [place] Mail [address]   Eric Merino, PhD, ABPP, LP [phone]     [facility], [address],  Raquel Randhawa,  PhD, LP [phone]            [place] [address] Dominga Olivares, PhD, LP [phone]     Lissa Miles, PhD [phone]     Inpatient Health Psychology Consultation      Date of service:  11/25/19    Ms. Love was referred for Health Psychology services by [place] [address] Medicine Team to support patient with coping with health issues and emotion regulation.  Attempted to meet with Ms. Love x2 but she was out of her room undergoing a procedure.    Plan: Health Psychology to follow-up by Wednesday, 11/27/19

## 2019-11-25 NOTE — PROGRESS NOTES
SPIRITUAL HEALTH SERVICES  [facility] 6B  ON-CALL VISIT     REFERRAL SOURCE: I did visit the patient this morning per Epic consult order. Pt has been visited this morning by belinda Del Rosario.     I tried to inform the pt about the unit Female  are sick today but will come and visit her tomorrow. However, pt was so busy with the unit staff and my second visit attempts she was sleepy. I didn't disturb her sleep but I let her sleep.      PLAN: The unit  will follow up the pt tomorrow.     Israel Mg M.Div. (Alem), M.Th., D.Min., [facility]  Staff   Pager [phone]

## 2019-11-25 NOTE — PHARMACY-VANCOMYCIN DOSING SERVICE
Pharmacy Vancomycin Note  Date of Service 2019  Patient's  1975   44 year old, female    Indication: Sepsis  Goal Trough Level: 15-20 mg/L  Day of Therapy: 4  Current Vancomycin regimen:  None, previous dosing 2000mg every 12 hours    Current estimated CrCl = Estimated Creatinine Clearance: 38.7 mL/min (A) (based on SCr of 2.33 mg/dL (H)).    Creatinine for last 3 days  2019:  3:52 AM Creatinine 1.18 mg/dL; 11:21 PM Creatinine 1.26 mg/dL  2019:  7:27 AM Creatinine 1.60 mg/dL  2019:  6:14 AM Creatinine 2.33 mg/dL    Recent Vancomycin Levels (past 3 days)  2019:  7:27 AM Vancomycin Level 36.0 mg/L  2019:  6:14 AM Vancomycin Level 23.8 mg/L    Vancomycin IV Administrations (past 72 hours)                   vancomycin (VANCOCIN) 2,000 mg in sodium chloride 0.9 % 500 mL intermittent infusion (mg) 2,000 mg New Bag 19 2242     2,000 mg New Bag  0916                Nephrotoxins and other renal medications (From now, onward)    None             Contrast Orders - past 72 hours (72h ago, onward)    None          Interpretation of levels and current regimen:  Trough level is  Supratherapeutic    Has serum creatinine changed > 50% in last 72 hours: Yes    Urine output:  diminished urine output    Renal Function: Worsening     Vd=75.4 L  Charles=0.019/hr  T1/2= 36.5hr    Plan:  1.  Vancomycin discontinued , level drawn to assess clearance of vancomycin given CINDY.  2.  Pharmacy will check trough levels as appropriate in N/A.    3. Serum creatinine levels will be ordered N/A.      Hill Gamble RPH on 2019 at 8:28 AM.

## 2019-11-25 NOTE — PLAN OF CARE
Temp:  [98  F (36.7  C)-99.3  F (37.4  C)] 98.8  F (37.1  C)  Heart Rate:  [78-98] 96  Resp:  [16-18] 18  BP: ()/(53-84) 110/72  SpO2:  [96 %-98 %] 98 %  Shift 1823-7688  Pt's mother visited at bedside throughout the afternoon.     Neuro: Varies between lethargic and anxious/frustrated. When pt is lethargic, she arouses to voice. When awake, she is either tearful and endorsing hopelessness, or is anxious and frustrated with staff. Unchanged numbness to BLE.  Cardiac: SR. /72.  Respiratory:  2L NC. Some dyspnea when repositioning in bed.   GI/: oliguric, 40cc UO over 7hrs per nelson. Suppository given early in the morning, pt used bedpan with no results. Scheduled senna given. Small amount of bloody drainage per rectum (Md notified).  Diet/appetite: NPO pending potential IR procedure, regular diet resumed in afternoon.   MIV LR @125.  Activity: repositioned Q2 in bed. PT deferred to afternoon due to pain this morning.   Pain: pain to mid/epigastric abdomen. Oxycodone given Q3 and tylenol Q4. Pt states her pain is not under control and frequently called out in pain, pt was able to sleep intermittently between cares.   Skin: BLE jean with +2 edema. Elevated on pillows, doppler positive for pulses.     Pt will have renal U/S and BLE U/S  Continue with POC. Notify primary team with changes.

## 2019-11-25 NOTE — PLAN OF CARE
PT 6B: cancel - pt not medically appropriate for therapy this date per report in AM. Will reschedule.

## 2019-11-25 NOTE — PROGRESS NOTES
[facility] [place]    Progress Note - Kristel 1 Service        Date of Admission:  11/23/2019    Assessment & Plan   Chela Love is a 44 year old female with a history of HTN, HLD, drug abuse (methamphetamine, cannabis), and abnormal menses, who presents as a transfer from [facility] for management of septic shock and large ovarian mass, 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 Pt remains admitted for further workup for possible ovarian cancer.     Changes today:  -pt's ascitic fluid grew few gram variable bacilli and several WBCs. Pt on CTX 2g IV Q24H and metronidazole 500mg IV q6H   -pt was scheduled to receive biopsy of omentum today, but per IR, omentum is too vascular and biopsy is not ideal   -TTE shows no endocarditis and EF 60-65%   -pt having low urine output; renal ultrasound ordered, pt started on continuous LR   -pt has nelson in place   -health psychology consult placed as pt often has difficulty coping emotionally        # SIRS, possible severe sepsis  # Spontaneous Bacterial Peritonitis   # Malignant Ascites   # H/o IVDU  Presented to OSH with malaise, stomach pain, nausea/vomiting and concern for hyperglycemia. Exam notable for tachycardia, tachypnea, and hypotension (sBPs in the 70s). Labs notable for leukocytosis (WBC 18.2/ANC 15.7), anemia (Hgb 9.9) elevated lactate (LA ~4), Glu >600. Creatinine elevated to 1.71 from ~1.0.  Trop 0.03. Bili 1.4/0.6; LFTs otherwise normal. CXR w/o ischemic changes. CXR clear. Urine preg negative. UA w/o signs of infection. VBG without acidosis/retention. CT head negative for bleed. Encephalopathy could be 2/2 ketoacidosis, toxic metabolic, septic shock. Concern for infection of unclear etiology: source could be blood, infected mass, biliary. Has a hx of recurrent LE cellulitis though no evidence of infection on skin exam. Lactic acidosis likely 2/2 ketoacidosis/starvation ketosis. Blood  culture NGTD, leukocytosis up to 29 on 11/24. S/p vanc and zosyn at OSH (11/23-24)  11/25: ascitic fluid grew gram variable bacilli, pt on CTX and metronidazole. TTE negative for vegetations. EF 60-65%   - diagnostic paracentesis with cytology pending  -Pain management:   Tylenol 650 mg PO Q4H PRN  Oxycodone 5-10mg Q3H PRN  Gabapentin 300 mg PO BID PRN    # Large right adnexal mass associated with ascites and mesenteric omental caking, concern for malignancy  # Vaginal bleeding, resolved  # Hx of abnormal periods  CT imaging at OSH showed [id number] cm right adnexal mass with ascites and omental caking present which is concerning for ovarian malignancy. CT also showed fluid in endometrial canal. Bleeding thought to be due to menses per gyn/onc. Hgb is 9.9, stable, and reassuring. Amount of vaginal bleeding is similar to menses. CEA and CA elevated consistent with primary ovarian cancer.   11/25: CT AP from OSH read, significant for very vascular omentum. Per IR, not ideal for biopsy. Per Gyn/onc pt is also a poor surgical candidate   - touch bases with Gyn/onc about plan since pt can not get biopsy     # Toxic/metabolic encephalopathy   In setting of severe sepsis, gets worse with opioids. CT head at OSH was negative.  - monitor mentation closely  - psych consult placed      # CINDY  # Hyponatremia  Creatinine was 1.7 at OSH, improved 1.18. Likely 2/2 intravascular depletion. Now crt is 2.33, concern for some form of obstruction, possibly from ovarian mass. Renal ultrasound ordered to evaluate for hydronephrosis. Pt started on continuous lactated ringers. Pt remains hyponatremic. There is some literature that associates hyponatremia with ovarian malignancies. Will touch bases with OBGYN to see if this is a possible explanation. Continue further workup.   -CTM   - f/u renal ultrasound      # Polysubstance abuse  11/25: pt denies IVDU   - Obtain UDS and ethanol level      #HTN  - Hold home antihypertensives in setting of  septic shock  - Hydrochlorothiazide 25mg daily - HOLD     # Uncontrolled T2DM with hyperglycemia  Glucose >600 and beta-hydroxybutyrate 0.39 at OSH. UA >1000 glucose. Gluc now improved.   - Hgb A1C 15 (very high)   - Beta-hydroxybutyrate level normal   - Gluc Q4H  - medium dose insulin sliding scale  - Lantus 25 units QPM   - Hold metformin 1000mg BID in the setting of lactic acidosis      #Constipation  - Pericolase 2 tabs daily BID     #Deconditioning  - PT, eval and treat  - OT, eval and treat     Diet: regular diet   Fluids: PO intake  Lines: PIV  DVT Prophylaxis: Heparin subcutaneous, 5000 units Q12H   Zamora Catheter: in place, indication: Retention, /GI/GYN Pelvic Procedure;Retention;Strict 1-2 Hour I&O  Code Status: Full Code      Disposition Plan   Expected discharge: 4 - 7 days, recommended to prior living arrangement once renal function improved and SIRS/Sepsis treated. And plan for possible cervical cancer in place.   Entered: Ambar Martinez MD 11/25/2019, 12:33 PM       The patient's care was discussed with the Attending Physician, Dr. Farhan Lara. Please see his addendum for any changes to the plan.     Ambar Martinez MD   Internal Medicine, PGY - 1  P: [phone]  ______________________________________________________________________    Interval History   Nurses notes reviewed. Pt was resting peacefully at first. During discussion she states she can't breathe and thinks she is going to die. O2 at 98% on 2L NC.       Physical Exam   Vital Signs: Temp: 98.8  F (37.1  C) Temp src: Oral BP: 110/72   Heart Rate: 96 Resp: 18 SpO2: 98 % O2 Device: Nasal cannula Oxygen Delivery: 2 LPM  Weight: 237 lbs 6.97 oz  General Appearance: Hysterical, well appearing, in no acute distress   Respiratory: CTAB, no accessory muscle use.  Cardiovascular: RRR, S1 S2+. No MGR.  GI: BS+, soft, tender throughout all quadrants, ND, no rebound tenderness, no guarding   Skin: No jaundice or bruises. B/l LE with  dusky appearance consistent with stasis dermatitis   Other: Labile affect.    Data   Recent Labs   Lab 11/25/19  0614 11/24/19  1331 11/24/19  1324 11/24/19  0727 11/24/19  0253 11/23/19  2321 11/23/19  0525 11/23/19  0352   WBC 24.6*  --   --  29.2*  --   --   --  18.2*   HGB 8.5*  --   --  9.2*  --   --   --  9.9*   MCV 87  --   --  88  --   --   --  87    290  --  285  --   --   --  325   INR 1.55*  --   --   --   --   --  1.59*  --    *  --   --  131*  --  132*  --  128*   POTASSIUM 4.3  --   --  4.2 4.1 3.9  --  3.9   CHLORIDE 96  --   --  98  --  99  --  96   CO2 20  --   --  23  --  25  --  25   BUN 45*  --   --  32*  --  28  --  24   CR 2.33*  --   --  1.60*  --  1.26*  --  1.18*   ANIONGAP 12  --   --  10  --  8  --  7   MARILIN 8.1*  --   --  7.7*  --  7.6*  --  7.6*   *  --   --  226*  --  174*  --  476*   ALBUMIN 1.5*  --  1.5* 1.6*  --   --   --  1.8*   PROTTOTAL 6.2*  --   --  5.9*  --   --   --  6.0*   BILITOTAL 0.6  --   --  1.1  --   --   --  0.7   ALKPHOS 136  --   --  99  --   --   --  64   ALT 17  --   --  17  --   --   --  11   AST 38  --   --  60*  --   --   --  15   TROPI  --   --   --   --   --   --   --  <0.015

## 2019-11-25 NOTE — PLAN OF CARE
"Neuro: A&Ox4. Lethergic intermittently, but had 2 episodes of sobbing and crying stating \" Im not doing this for attention\", C/o increased pain and nausea, zofran for nausea with relief. Oxycodone and vistaril given this am. \"Pt wanting some social support, Dog therapy,  ordered female only. Pt admittedly upset that she feels she is going to Die and that she has been a burden on her family.\"  Cardiac: SR-ST, VSS, Afebrile    Respiratory: Sating 98% on RA.  GI/: Unable to void since nelson removed on Friday, straight cathing with minimal uop; cloudy evan. No bm suppository placed.   Diet/appetite: Tolerating regular, now npo did have sips with pills at 0140 and 05am. Eating well during the day ate 100%, after nausea asked for a cheeseburger  Activity:  Assist of 2  Pain: Oxycodone given x 2 and Vistaril for anxiety. .   Skin: Irvin BLE 1+ bilateral numbness in all extremities intermittently.   LDA's:Nelson placed this am at 0530 for retention. Ran water, placed on bed pan and given time pt still unable to void. Called md for nelson order.     Plan: Tissue biopsy of tumor in IR today. Continue with POC. Notify primary team with changes.    "

## 2019-11-25 NOTE — CONSULTS
Interventional  Radiology consulted  for a Omental caking biopsy.  Patient presented with right ovarian mass and omental caking   Reviewed out side imaging 11/22/2019 CT demonstrated hypervascular omental caking.  Very high risk biopsy and low yeild due to the hypervascularity through out omental caking.   Consider surgical biopsy of mass  IR defers biopsy at this time  Discussed with Dr. Ricardo and Dr. Tino Tee IR [facility]  [phone] Call pager  [phone] pager

## 2019-11-25 NOTE — PROGRESS NOTES
Gynecology Oncology Progress Note  11/24/19    HD#3 sepsis, leukocytosis    Disease: Right adnexal mass    24 hour events:   - Paracentesis- removed 500 ml cloudy yellow fluid  - TTE: No significant valvular abnormalities, no vegetations, EF 60-65%  - D/C'd Zosyn, Transitioned to Ceftriaxone + Flagyl  - Febrile, T last 11/24 0916 100.4 F  - Urinary retention- nelson replaced this AM  - Low urine output- 20 ml/hr over last 24 horus    Subjective: Patient intermittently sleeping during interview. Reports abdominal pain, just received oxycodone and vistaril. Has been NPO since midnight, previously tolerating regular diet and is currently hungry. Patient unsure of vaginal bleeding, RN reports light red/brown discharge. Patients states menses are very irregular and reports heavy bleeding when she does have menses.     Objective:   Vitals:    11/24/19 0916 11/24/19 1133 11/24/19 1528 11/24/19 1918   BP:  101/63 118/69 93/53   BP Location:  Left arm Left arm Left arm   Pulse:       Resp:  18 18 18   Temp: 100.4  F (38  C) 99.8  F (37.7  C) 98.2  F (36.8  C) 98.9  F (37.2  C)   TempSrc: Axillary Oral Oral Axillary   SpO2:  99% 96% 98%   Weight:       Height:         General: Snoring, NAD  CV: RRR, no m/r/g  Resp: CTAB, no wheezes  Abdomen:  firm, distended, palpable mass filling the abdomen  Extremities: Brown skin discoloration bilateral ankle and distal legs, 2+ edema bilaterally    I/Os  (Yesterday // Since Midnight)  PO 2496 cc // 50 cc  IVF 350cc // 0cc  Urine 525cc // 300cc    Assessment: 44 year old admitted to the medicine team with sepsis and a leukocytosis. Gyn oncology was consulted due to vaginal bleeding and imaging concerning for ovarian malignancy    Active Problem list:  Right adnexal mass  Altered mental status  Sepsis  Leukocytosis   Hyperglycemia   Hyponatremia  Acute kidney injury  Polysubstance abuse   Vaginal bleeding    Plan:      #Right adnexal mass  - CT imaging at OSH showed showed [id number] cm right  adnexal mass with ascites and omental caking present which is concerning for ovarian malignancy.  - Currently poor surgical candidate (Hgb A1C 15%, active infection, albumin 1.5).   - Recommend IR biopsy of extra-ovarian metastases for possible tissue diagnosis as soon as patient is medically stable for sedated biopsy. We are happy to discuss biopsy with the IR team directly if desired, please let our team know if that is preferred. Tentative plan for IR biopsy today.    - Elevated tumor markers-  850. CEA 4.0. Given the patient's age for non-epithelial ovarian malignancies including , AFP, LDH, and Inhibin-B, we will place orders.  - Consider spontaneous bacterial peritonitis on the differential given patient's abdominal tenderness, altered mental status, and history of PSA. S/p paracentesis on 11/24, cytology pending.     #Vaginal bleeding  - differential includes menstrual bleeding, benign, pre-malignant, and malignant conditions.  - CT AP at OSH showed fluid in endometrial canal. Will obtain a more thorough menstrual history from the patient when able.   - Hgb is normal and reassuring.   - Amount of vaginal bleeding is similar to menses, no acute intervention necessary.   - endometrial pathology could be related to or separate from her adnexal mass. It is less likely to be related based upon imaging; however, transvaginal ultrasound to better evaluate the endometrium with endometrial sampling may be indicated.    Dispo: Pending clinical course. Patient and family live in [place] and desire to follow-up there for Gyn/Onc care if possible.     Gyn Oncology will follow along.      Annabelle Campbell MD  OB/GYN PGY-2  11/25/19 6:09 AM   Pager [phone]       ICristóbal MD personally examined and evaluated this patient on 11/25/19.  I discussed the patient with the resident and care team, and agree with the assessment and plan of care as documented in the residents note above.    I  personally reviewed vital signs, laboratory values and imaging results.      Rosana James MD  Gynecologic Oncology  [facility]

## 2019-11-25 NOTE — PROVIDER NOTIFICATION
Kristel 1 Md called due to pt needing to be bladder scanned and unable to visualize anything in her bladder. Pt due to straight cath, called Md for nelson. Pt still unable to void after getting on bedpan. 16FR nelson placed with sterile procedure.

## 2019-11-26 ENCOUNTER — APPOINTMENT (OUTPATIENT)
Dept: NUCLEAR MEDICINE | Facility: CLINIC | Age: 44
End: 2019-11-26
Attending: INTERNAL MEDICINE
Payer: COMMERCIAL

## 2019-11-26 ENCOUNTER — APPOINTMENT (OUTPATIENT)
Dept: GENERAL RADIOLOGY | Facility: CLINIC | Age: 44
End: 2019-11-26
Attending: STUDENT IN AN ORGANIZED HEALTH CARE EDUCATION/TRAINING PROGRAM
Payer: COMMERCIAL

## 2019-11-26 ENCOUNTER — APPOINTMENT (OUTPATIENT)
Dept: CT IMAGING | Facility: CLINIC | Age: 44
End: 2019-11-26
Attending: STUDENT IN AN ORGANIZED HEALTH CARE EDUCATION/TRAINING PROGRAM
Payer: COMMERCIAL

## 2019-11-26 LAB
ALBUMIN SERPL-MCNC: 1.8 G/DL (ref 3.4–5)
ALBUMIN UR-MCNC: 100 MG/DL
ALP SERPL-CCNC: 325 U/L (ref 40–150)
ALT SERPL W P-5'-P-CCNC: 15 U/L (ref 0–50)
ANION GAP SERPL CALCULATED.3IONS-SCNC: 10 MMOL/L (ref 3–14)
ANION GAP SERPL CALCULATED.3IONS-SCNC: 13 MMOL/L (ref 3–14)
ANION GAP SERPL CALCULATED.3IONS-SCNC: 13 MMOL/L (ref 3–14)
APPEARANCE UR: ABNORMAL
AST SERPL W P-5'-P-CCNC: 31 U/L (ref 0–45)
BASE DEFICIT BLDA-SCNC: 5.4 MMOL/L
BASOPHILS # BLD AUTO: 0.1 10E9/L (ref 0–0.2)
BASOPHILS NFR BLD AUTO: 0.3 %
BILIRUB SERPL-MCNC: 0.4 MG/DL (ref 0.2–1.3)
BILIRUB UR QL STRIP: NEGATIVE
BUN SERPL-MCNC: 56 MG/DL (ref 7–30)
BUN SERPL-MCNC: 60 MG/DL (ref 7–30)
BUN SERPL-MCNC: 64 MG/DL (ref 7–30)
CALCIUM SERPL-MCNC: 8 MG/DL (ref 8.5–10.1)
CALCIUM SERPL-MCNC: 8.1 MG/DL (ref 8.5–10.1)
CALCIUM SERPL-MCNC: 8.1 MG/DL (ref 8.5–10.1)
CANCER AG19-9 SERPL-ACNC: 203 U/ML (ref 0–37)
CHLORIDE SERPL-SCNC: 97 MMOL/L (ref 94–109)
CHLORIDE SERPL-SCNC: 97 MMOL/L (ref 94–109)
CHLORIDE SERPL-SCNC: 99 MMOL/L (ref 94–109)
CO2 SERPL-SCNC: 20 MMOL/L (ref 20–32)
CO2 SERPL-SCNC: 20 MMOL/L (ref 20–32)
CO2 SERPL-SCNC: 22 MMOL/L (ref 20–32)
COLOR UR AUTO: YELLOW
COPATH REPORT: NORMAL
CREAT SERPL-MCNC: 3.22 MG/DL (ref 0.52–1.04)
CREAT SERPL-MCNC: 3.58 MG/DL (ref 0.52–1.04)
CREAT SERPL-MCNC: 3.76 MG/DL (ref 0.52–1.04)
DIFFERENTIAL METHOD BLD: ABNORMAL
EOSINOPHIL # BLD AUTO: 0.1 10E9/L (ref 0–0.7)
EOSINOPHIL NFR BLD AUTO: 0.3 %
EOSINOPHIL SPEC QL WRIGHT STN: NORMAL
ERYTHROCYTE [DISTWIDTH] IN BLOOD BY AUTOMATED COUNT: 14.8 % (ref 10–15)
ERYTHROCYTE [DISTWIDTH] IN BLOOD BY AUTOMATED COUNT: 14.9 % (ref 10–15)
GFR SERPL CREATININE-BSD FRML MDRD: 14 ML/MIN/{1.73_M2}
GFR SERPL CREATININE-BSD FRML MDRD: 15 ML/MIN/{1.73_M2}
GFR SERPL CREATININE-BSD FRML MDRD: 17 ML/MIN/{1.73_M2}
GLUCOSE BLDC GLUCOMTR-MCNC: 225 MG/DL (ref 70–99)
GLUCOSE BLDC GLUCOMTR-MCNC: 247 MG/DL (ref 70–99)
GLUCOSE BLDC GLUCOMTR-MCNC: 267 MG/DL (ref 70–99)
GLUCOSE BLDC GLUCOMTR-MCNC: 270 MG/DL (ref 70–99)
GLUCOSE BLDC GLUCOMTR-MCNC: 281 MG/DL (ref 70–99)
GLUCOSE BLDC GLUCOMTR-MCNC: 336 MG/DL (ref 70–99)
GLUCOSE SERPL-MCNC: 247 MG/DL (ref 70–99)
GLUCOSE SERPL-MCNC: 258 MG/DL (ref 70–99)
GLUCOSE SERPL-MCNC: 287 MG/DL (ref 70–99)
GLUCOSE UR STRIP-MCNC: 300 MG/DL
HCO3 BLD-SCNC: 20 MMOL/L (ref 21–28)
HCT VFR BLD AUTO: 27 % (ref 35–47)
HCT VFR BLD AUTO: 28.2 % (ref 35–47)
HGB BLD-MCNC: 8 G/DL (ref 11.7–15.7)
HGB BLD-MCNC: 8.4 G/DL (ref 11.7–15.7)
HGB UR QL STRIP: ABNORMAL
IMM GRANULOCYTES # BLD: 0.3 10E9/L (ref 0–0.4)
IMM GRANULOCYTES NFR BLD: 1.4 %
KETONES UR STRIP-MCNC: NEGATIVE MG/DL
LACTATE BLD-SCNC: 1.8 MMOL/L (ref 0.7–2)
LACTATE BLD-SCNC: 2.3 MMOL/L (ref 0.7–2)
LACTATE BLD-SCNC: 2.4 MMOL/L (ref 0.7–2)
LEUKOCYTE ESTERASE UR QL STRIP: ABNORMAL
LYMPHOCYTES # BLD AUTO: 1.3 10E9/L (ref 0.8–5.3)
LYMPHOCYTES NFR BLD AUTO: 6 %
MCH RBC QN AUTO: 26.4 PG (ref 26.5–33)
MCH RBC QN AUTO: 26.6 PG (ref 26.5–33)
MCHC RBC AUTO-ENTMCNC: 29.6 G/DL (ref 31.5–36.5)
MCHC RBC AUTO-ENTMCNC: 29.8 G/DL (ref 31.5–36.5)
MCV RBC AUTO: 89 FL (ref 78–100)
MCV RBC AUTO: 89 FL (ref 78–100)
MONOCYTES # BLD AUTO: 1.2 10E9/L (ref 0–1.3)
MONOCYTES NFR BLD AUTO: 5.7 %
MUCOUS THREADS #/AREA URNS LPF: PRESENT /LPF
NEUTROPHILS # BLD AUTO: 18.3 10E9/L (ref 1.6–8.3)
NEUTROPHILS NFR BLD AUTO: 86.3 %
NITRATE UR QL: NEGATIVE
NRBC # BLD AUTO: 0 10*3/UL
NRBC BLD AUTO-RTO: 0 /100
NT-PROBNP SERPL-MCNC: 483 PG/ML (ref 0–450)
O2/TOTAL GAS SETTING VFR VENT: ABNORMAL %
PCO2 BLD: 38 MM HG (ref 35–45)
PH BLD: 7.33 PH (ref 7.35–7.45)
PH UR STRIP: 5.5 PH (ref 5–7)
PHOSPHATE SERPL-MCNC: 5.6 MG/DL (ref 2.5–4.5)
PLATELET # BLD AUTO: 222 10E9/L (ref 150–450)
PLATELET # BLD AUTO: 251 10E9/L (ref 150–450)
PO2 BLD: 96 MM HG (ref 80–105)
POTASSIUM SERPL-SCNC: 4.2 MMOL/L (ref 3.4–5.3)
POTASSIUM SERPL-SCNC: 4.2 MMOL/L (ref 3.4–5.3)
POTASSIUM SERPL-SCNC: 4.3 MMOL/L (ref 3.4–5.3)
PROCALCITONIN SERPL-MCNC: 83.67 NG/ML
PROT SERPL-MCNC: 6.4 G/DL (ref 6.8–8.8)
RBC # BLD AUTO: 3.03 10E12/L (ref 3.8–5.2)
RBC # BLD AUTO: 3.16 10E12/L (ref 3.8–5.2)
RBC #/AREA URNS AUTO: 24 /HPF (ref 0–2)
SODIUM SERPL-SCNC: 130 MMOL/L (ref 133–144)
SODIUM SERPL-SCNC: 130 MMOL/L (ref 133–144)
SODIUM SERPL-SCNC: 131 MMOL/L (ref 133–144)
SOURCE: ABNORMAL
SP GR UR STRIP: 1.02 (ref 1–1.03)
SPECIMEN SOURCE: NORMAL
SQUAMOUS #/AREA URNS AUTO: 2 /HPF (ref 0–1)
TRANS CELLS #/AREA URNS HPF: <1 /HPF (ref 0–1)
UROBILINOGEN UR STRIP-MCNC: NORMAL MG/DL (ref 0–2)
WBC # BLD AUTO: 19.5 10E9/L (ref 4–11)
WBC # BLD AUTO: 21.2 10E9/L (ref 4–11)
WBC #/AREA URNS AUTO: 32 /HPF (ref 0–5)
YEAST #/AREA URNS HPF: ABNORMAL /HPF

## 2019-11-26 PROCEDURE — 94640 AIRWAY INHALATION TREATMENT: CPT

## 2019-11-26 PROCEDURE — 84100 ASSAY OF PHOSPHORUS: CPT | Performed by: NURSE PRACTITIONER

## 2019-11-26 PROCEDURE — 83880 ASSAY OF NATRIURETIC PEPTIDE: CPT | Performed by: STUDENT IN AN ORGANIZED HEALTH CARE EDUCATION/TRAINING PROGRAM

## 2019-11-26 PROCEDURE — 25000132 ZZH RX MED GY IP 250 OP 250 PS 637: Performed by: STUDENT IN AN ORGANIZED HEALTH CARE EDUCATION/TRAINING PROGRAM

## 2019-11-26 PROCEDURE — 12000004 ZZH R&B IMCU UMMC

## 2019-11-26 PROCEDURE — 81001 URINALYSIS AUTO W/SCOPE: CPT | Performed by: STUDENT IN AN ORGANIZED HEALTH CARE EDUCATION/TRAINING PROGRAM

## 2019-11-26 PROCEDURE — 25000128 H RX IP 250 OP 636: Performed by: STUDENT IN AN ORGANIZED HEALTH CARE EDUCATION/TRAINING PROGRAM

## 2019-11-26 PROCEDURE — 94640 AIRWAY INHALATION TREATMENT: CPT | Mod: 76

## 2019-11-26 PROCEDURE — 36415 COLL VENOUS BLD VENIPUNCTURE: CPT | Performed by: STUDENT IN AN ORGANIZED HEALTH CARE EDUCATION/TRAINING PROGRAM

## 2019-11-26 PROCEDURE — 80048 BASIC METABOLIC PNL TOTAL CA: CPT

## 2019-11-26 PROCEDURE — 99233 SBSQ HOSP IP/OBS HIGH 50: CPT | Mod: GC | Performed by: OBSTETRICS & GYNECOLOGY

## 2019-11-26 PROCEDURE — 40000558 ZZH STATISTIC CVC DRESSING CHANGE

## 2019-11-26 PROCEDURE — 83605 ASSAY OF LACTIC ACID: CPT | Performed by: INTERNAL MEDICINE

## 2019-11-26 PROCEDURE — 25000128 H RX IP 250 OP 636: Performed by: INTERNAL MEDICINE

## 2019-11-26 PROCEDURE — 99233 SBSQ HOSP IP/OBS HIGH 50: CPT | Mod: GC | Performed by: INTERNAL MEDICINE

## 2019-11-26 PROCEDURE — 82803 BLOOD GASES ANY COMBINATION: CPT | Performed by: OBSTETRICS & GYNECOLOGY

## 2019-11-26 PROCEDURE — 25000128 H RX IP 250 OP 636

## 2019-11-26 PROCEDURE — 74176 CT ABD & PELVIS W/O CONTRAST: CPT

## 2019-11-26 PROCEDURE — 36600 WITHDRAWAL OF ARTERIAL BLOOD: CPT

## 2019-11-26 PROCEDURE — 36592 COLLECT BLOOD FROM PICC: CPT

## 2019-11-26 PROCEDURE — 27210210 NM LUNG SCAN VENTILATION AND PERFUSION

## 2019-11-26 PROCEDURE — 83605 ASSAY OF LACTIC ACID: CPT | Performed by: OBSTETRICS & GYNECOLOGY

## 2019-11-26 PROCEDURE — 85025 COMPLETE CBC W/AUTO DIFF WBC: CPT | Performed by: NURSE PRACTITIONER

## 2019-11-26 PROCEDURE — 83605 ASSAY OF LACTIC ACID: CPT

## 2019-11-26 PROCEDURE — 80053 COMPREHEN METABOLIC PANEL: CPT | Performed by: NURSE PRACTITIONER

## 2019-11-26 PROCEDURE — 25000125 ZZHC RX 250: Performed by: STUDENT IN AN ORGANIZED HEALTH CARE EDUCATION/TRAINING PROGRAM

## 2019-11-26 PROCEDURE — 84145 PROCALCITONIN (PCT): CPT | Performed by: STUDENT IN AN ORGANIZED HEALTH CARE EDUCATION/TRAINING PROGRAM

## 2019-11-26 PROCEDURE — 89190 NASAL SMEAR FOR EOSINOPHILS: CPT | Performed by: STUDENT IN AN ORGANIZED HEALTH CARE EDUCATION/TRAINING PROGRAM

## 2019-11-26 PROCEDURE — 71045 X-RAY EXAM CHEST 1 VIEW: CPT

## 2019-11-26 PROCEDURE — 87086 URINE CULTURE/COLONY COUNT: CPT | Performed by: INTERNAL MEDICINE

## 2019-11-26 PROCEDURE — 85027 COMPLETE CBC AUTOMATED: CPT | Performed by: STUDENT IN AN ORGANIZED HEALTH CARE EDUCATION/TRAINING PROGRAM

## 2019-11-26 PROCEDURE — 87040 BLOOD CULTURE FOR BACTERIA: CPT | Performed by: STUDENT IN AN ORGANIZED HEALTH CARE EDUCATION/TRAINING PROGRAM

## 2019-11-26 PROCEDURE — 80048 BASIC METABOLIC PNL TOTAL CA: CPT | Performed by: STUDENT IN AN ORGANIZED HEALTH CARE EDUCATION/TRAINING PROGRAM

## 2019-11-26 PROCEDURE — 40000802 ZZH SITE CHECK

## 2019-11-26 PROCEDURE — 36592 COLLECT BLOOD FROM PICC: CPT | Performed by: STUDENT IN AN ORGANIZED HEALTH CARE EDUCATION/TRAINING PROGRAM

## 2019-11-26 PROCEDURE — 00000146 ZZHCL STATISTIC GLUCOSE BY METER IP

## 2019-11-26 RX ORDER — HYDROMORPHONE HCL/0.9% NACL/PF 0.2MG/0.2
0.2 SYRINGE (ML) INTRAVENOUS
Status: DISCONTINUED | OUTPATIENT
Start: 2019-11-26 | End: 2019-11-28

## 2019-11-26 RX ORDER — CALCIUM CARBONATE 500 MG/1
500 TABLET, CHEWABLE ORAL DAILY PRN
Status: DISCONTINUED | OUTPATIENT
Start: 2019-11-26 | End: 2019-11-28

## 2019-11-26 RX ORDER — GABAPENTIN 300 MG/1
300 CAPSULE ORAL DAILY
Status: DISCONTINUED | OUTPATIENT
Start: 2019-11-27 | End: 2019-11-26

## 2019-11-26 RX ORDER — CEFAZOLIN SODIUM 1 G/3ML
1 INJECTION, POWDER, FOR SOLUTION INTRAMUSCULAR; INTRAVENOUS SEE ADMIN INSTRUCTIONS
Status: CANCELLED | OUTPATIENT
Start: 2019-11-26

## 2019-11-26 RX ORDER — CEFAZOLIN SODIUM 2 G/100ML
2 INJECTION, SOLUTION INTRAVENOUS
Status: CANCELLED | OUTPATIENT
Start: 2019-11-26

## 2019-11-26 RX ORDER — FUROSEMIDE 10 MG/ML
40 INJECTION INTRAMUSCULAR; INTRAVENOUS ONCE
Status: COMPLETED | OUTPATIENT
Start: 2019-11-26 | End: 2019-11-26

## 2019-11-26 RX ADMIN — ALBUTEROL SULFATE 2.5 MG: 2.5 SOLUTION RESPIRATORY (INHALATION) at 08:06

## 2019-11-26 RX ADMIN — METRONIDAZOLE 500 MG: 500 INJECTION, SOLUTION INTRAVENOUS at 20:42

## 2019-11-26 RX ADMIN — GABAPENTIN 300 MG: 300 CAPSULE ORAL at 07:52

## 2019-11-26 RX ADMIN — SODIUM CHLORIDE, PRESERVATIVE FREE 5 ML: 5 INJECTION INTRAVENOUS at 03:49

## 2019-11-26 RX ADMIN — ACETAMINOPHEN 650 MG: 325 TABLET, FILM COATED ORAL at 16:41

## 2019-11-26 RX ADMIN — ACETAMINOPHEN 650 MG: 325 TABLET, FILM COATED ORAL at 20:40

## 2019-11-26 RX ADMIN — CEFTRIAXONE SODIUM 2 G: 2 INJECTION, POWDER, FOR SOLUTION INTRAMUSCULAR; INTRAVENOUS at 11:43

## 2019-11-26 RX ADMIN — METRONIDAZOLE 500 MG: 500 INJECTION, SOLUTION INTRAVENOUS at 16:21

## 2019-11-26 RX ADMIN — SENNOSIDES AND DOCUSATE SODIUM 1 TABLET: 8.6; 5 TABLET ORAL at 20:41

## 2019-11-26 RX ADMIN — OXYCODONE HYDROCHLORIDE 10 MG: 5 TABLET ORAL at 07:06

## 2019-11-26 RX ADMIN — METRONIDAZOLE 500 MG: 500 INJECTION, SOLUTION INTRAVENOUS at 08:00

## 2019-11-26 RX ADMIN — INSULIN GLARGINE 25 UNITS: 100 INJECTION, SOLUTION SUBCUTANEOUS at 20:48

## 2019-11-26 RX ADMIN — ALBUTEROL SULFATE 2.5 MG: 2.5 SOLUTION RESPIRATORY (INHALATION) at 12:07

## 2019-11-26 RX ADMIN — SENNOSIDES AND DOCUSATE SODIUM 2 TABLET: 8.6; 5 TABLET ORAL at 07:52

## 2019-11-26 RX ADMIN — FUROSEMIDE 40 MG: 10 INJECTION, SOLUTION INTRAVENOUS at 16:41

## 2019-11-26 RX ADMIN — Medication 1 MG: at 20:41

## 2019-11-26 RX ADMIN — METRONIDAZOLE 500 MG: 500 INJECTION, SOLUTION INTRAVENOUS at 02:48

## 2019-11-26 RX ADMIN — HEPARIN SODIUM 5000 UNITS: 5000 INJECTION, SOLUTION INTRAVENOUS; SUBCUTANEOUS at 03:49

## 2019-11-26 RX ADMIN — HEPARIN SODIUM 5000 UNITS: 5000 INJECTION, SOLUTION INTRAVENOUS; SUBCUTANEOUS at 16:21

## 2019-11-26 RX ADMIN — Medication 0.2 MG: at 21:41

## 2019-11-26 RX ADMIN — HYDROXYZINE HYDROCHLORIDE 50 MG: 25 TABLET, FILM COATED ORAL at 08:47

## 2019-11-26 RX ADMIN — ONDANSETRON 4 MG: 2 INJECTION INTRAMUSCULAR; INTRAVENOUS at 13:01

## 2019-11-26 RX ADMIN — ONDANSETRON 4 MG: 2 INJECTION INTRAMUSCULAR; INTRAVENOUS at 07:06

## 2019-11-26 ASSESSMENT — ACTIVITIES OF DAILY LIVING (ADL)
ADLS_ACUITY_SCORE: 32
ADLS_ACUITY_SCORE: 35
ADLS_ACUITY_SCORE: 34
ADLS_ACUITY_SCORE: 35
ADLS_ACUITY_SCORE: 36
ADLS_ACUITY_SCORE: 34

## 2019-11-26 ASSESSMENT — PAIN DESCRIPTION - DESCRIPTORS
DESCRIPTORS: ACHING;CONSTANT;PRESSURE
DESCRIPTORS: ACHING;SHARP
DESCRIPTORS: ACHING;CONSTANT

## 2019-11-26 ASSESSMENT — MIFFLIN-ST. JEOR: SCORE: 1740.75

## 2019-11-26 NOTE — PROGRESS NOTES
Gynecology Oncology Progress Note  11/24/19    HD#4 sepsis, leukocytosis    Disease: Right adnexal mass    24 hour events:   - Worsening Cr to 2.33  - Renal US  - Psychology Consult    Subjective: Patient again intermittently sleeping during interview. Awakes to reports abdominal pain and returns to sleeping.     Objective:   Vitals:    11/25/19 2336 11/26/19 0226 11/26/19 0251 11/26/19 0300   BP: 101/67   138/81   BP Location: Left arm   Left arm   Pulse:       Resp: 16 22   Temp: 98.4  F (36.9  C)   99  F (37.2  C)   TempSrc: Oral   Axillary   SpO2: 96%  99% 98%   Weight:  106.9 kg (235 lb 10.8 oz)     Height:         General: Snoring, NAD  CV: RRR, no m/r/g  Resp: CTAB, no wheezes  Abdomen:  firm, distended, palpable mass filling the abdomen  Extremities: Brown skin discoloration bilateral ankle and distal legs, 2+ edema bilaterally    I/Os  (Yesterday // Since Midnight)   cc // 0cc  IVF 1423cc // 0cc  Urine 430cc // 125cc    Assessment: 44 year old admitted to the medicine team with sepsis and a leukocytosis. Gyn oncology was consulted due to vaginal bleeding and imaging concerning for ovarian malignancy    Active Problem list:  Right adnexal mass  Altered mental status  Sepsis  Leukocytosis   Hyperglycemia   Hyponatremia  Acute kidney injury  Polysubstance abuse   Vaginal bleeding    Plan:    #Right adnexal mass  - CT imaging at OSH showed showed [id number] cm right adnexal mass with ascites and omental caking present which is concerning for ovarian malignancy.  - Currently poor surgical candidate (Hgb A1C 15%, active infection, albumin 1.5).   - Recommend IR biopsy of extra-ovarian metastases for possible tissue however IR consulted and reports biopsy would be high risk due to hypervascularity through omental caking. Will have further discussion regarding possible biopsy today.  - Elevated tumor markers-  850. CEA 4.0,  203, , AFP and Hcg wnl. Inhibin B-pending  - S/p paracentesis on  11/24, cytology pending.     #Vaginal bleeding  - differential includes menstrual bleeding, benign, pre-malignant, and malignant conditions.  - CT AP at OSH showed fluid in endometrial canal. Will obtain a more thorough menstrual history from the patient when able.   - Hgb is normal and reassuring.   - Amount of vaginal bleeding is similar to menses, no acute intervention necessary.   - endometrial pathology could be related to or separate from her adnexal mass. It is less likely to be related based upon imaging; however, transvaginal ultrasound to better evaluate the endometrium with endometrial sampling may be indicated.    Dispo: Pending clinical course. Patient and family live in [place] and desire to follow-up there for Gyn/Onc care if possible.     Gyn Oncology will follow along.      Annabelle Campbell MD  OB/GYN PGY-2  11/26/19 5:54 AM    I, Cristóbal James MD personally examined and evaluated this patient on 11/26/19.  I discussed the patient with the resident and care team, and agree with the assessment and plan of care as documented in the residents note above.    I personally reviewed vital signs, laboratory values and imaging results.    Patient continues to have worsening renal function and increasing BG.  Concern for possible intra-abdominal source, however on previous CT no obvious abscess.  Will plan repeat CT A/P today to assess for abscess.  Await cytology results and would strongly recommend IR consider biopsy to further characterize mass.    Rosana James MD  Gynecologic Oncology  [facility]

## 2019-11-26 NOTE — PROVIDER NOTIFICATION
-------------------CRITICAL LAB VALUE-------------------    Lab Value: 83.67 procal  Time of notification: 2:46 PM  MD notified: Dr. Vergara  Patient status:  Temp:  [97.8  F (36.6  C)-99.9  F (37.7  C)] 99.5  F (37.5  C)  Pulse:  [105-110] 105  Heart Rate:  [] 100  Resp:  [14-22] 15  BP: (101-138)/(55-81) 112/72  SpO2:  [94 %-100 %] 99 %  Orders received: no new orders at this time. procal is lower than previous critical

## 2019-11-26 NOTE — CONSULTS
Nephrology Initial Consult  November 26, 2019      Chela Love MRN:[id number] YOB: 1975  Date of Admission:11/23/2019  Primary care provider: [facility], [facility]  Requesting physician: Christopher Vergara, *    ASSESSMENT AND RECOMMENDATIONS:   Chela Love is a 44 year old female with a history of poor-controlled T2DM, HTN, HLD, substance abuse (methamphetamine, cannabis), who presents as a transfer from [facility] for management of severe sepsis (unknown source) and large ovarian mass with ascites and peritoneal carcinomatosis concerning for ovarian malignancy. Also has worsening CINDY during hospitalization.    Oliguric CINDY (ATN)  Hypervolemic hyponatremia  Based on sharp rising pattern of Cr, her CINDY is most likely secondary to ATN. This could be multifactorial in etiology; systemic infection, past episode of prerenal CINDY at OSH that improved with hydration, exposure to contrast agent. Kidney US (11/25/19) which showed no evidence of obstruction/hydronephrosis goes against post-renal CINDY. Pre-renal CINDY is also unlikely at this time as patient has net positive ~7L total since admission without improvement in Cr. Discussed with family that dialysis could be needed in ~24-48 h depending of further trajectory.  Appears hypervolemic on evaluation. Given deterioration of respiratory status and origuria, would recommend trial of diuretic. Has normal LV and RV function on Echo (11/24/19).  - No acute indication for dialysis today but will monitor BMP closely (q12h)  - Recommend trial of 40 mg IV Lasix, will assess responses with UOP and Cr  - Trend BMP. I/O. Daily weight.  - Adjust dose of medication based on CrCl. Avoid nephrotoxic agents if possible.    Recommendations were communicated to primary team via discussion and note.    Seen and discussed with Dr. Anna Browne.    Brenton Hutton MD   [phone]    REASON FOR CONSULT: CINDY    HISTORY OF PRESENT ILLNESS:  Admitting  provider and nursing notes reviewed  Chela Love is a 44 year old female with a history of poor-controlled T2DM, HTN, HLD, substance abuse (methamphetamine, cannabis), who presents as a transfer from [facility] for management of severe sepsis (unknown source) and large ovarian mass with ascites and peritoneal carcinomatosis concerning for ovarian malignancy. Also has worsening CINDY during hospitalization.    She was admitted with severe sepsis of unclear source, started on Vancomycin+Zosyn on 11/23 which were switched to Ceftriaxone and Metronidazole on 11/24. Also found to have large ovarian mass with peritoneal carcinomatosis on CT abdomen/pelvis (with contrast) on 11/22.  Developed CINDY with rising Cr from baseline at ~1-1.1 (07/2019) to 1.7 at OSH which later improved to 1.18 after IVF hydration, now with Cr at 3.22 and decreased urine output.    Patient was sleep and fell asleep many times during interview. Intermittently forgetful and confused. States that she feels fine but tired. No new complain.    PAST MEDICAL HISTORY:  Reviewed with patient on 11/26/2019     Past Medical History:   Diagnosis Date     Anxiety      Asthma      Bilateral lower extremity edema      Depression      HTN (hypertension)      Insomnia      Migraine      Obesity      Substance abuse (H)     Methamphetamine use     Type 2 diabetes mellitus (H)      Vasculopathy        No past surgical history on file.     MEDICATIONS:  PTA Meds  Prior to Admission medications    Medication Sig Last Dose Taking? Auth Provider   gabapentin (NEURONTIN) 100 MG capsule Take 300 mg by mouth 3 times daily  Past Week at Unknown time Yes Reported, Patient   insulin glargine (LANTUS SOLOSTAR) 100 UNIT/ML pen Inject 25 Units Subcutaneous every evening Past Month at Unknown time Yes Reported, Patient   metFORMIN (GLUCOPHAGE) 1000 MG tablet Take 1,000 mg by mouth 2 times daily (with meals) Past Week at Unknown time Yes Reported, Patient   albuterol (PROAIR  HFA/PROVENTIL HFA/VENTOLIN HFA) 108 (90 Base) MCG/ACT inhaler Inhale 2 puffs into the lungs as needed More than a month at Unknown time  Reported, Patient      Current Meds    cefTRIAXone  2 g Intravenous Q24H     gabapentin  300 mg Oral BID     heparin ANTICOAGULANT  5,000 Units Subcutaneous Q12H     heparin lock flush  5-10 mL Intracatheter Q24H     insulin aspart  1-7 Units Subcutaneous TID AC     insulin aspart  1-5 Units Subcutaneous At Bedtime     insulin glargine  25 Units Subcutaneous QPM     iohexol  500 mL Oral Once     metroNIDAZOLE  500 mg Intravenous Q6H     senna-docusate  1 tablet Oral BID    Or     senna-docusate  2 tablet Oral BID     sodium chloride (PF)  3 mL Intracatheter Q8H     ALLERGIES:    Allergies   Allergen Reactions     Bee Venom Other (See Comments)     swelling     Selenicereus Grandiflorus Rash       REVIEW OF SYSTEMS:  A comprehensive of systems was negative except as noted above.    SOCIAL HISTORY:   Social History     Socioeconomic History     Marital status: Single     Spouse name: Not on file     Number of children: Not on file     Years of education: Not on file     Highest education level: Not on file   Occupational History     Not on file   Social Needs     Financial resource strain: Not on file     Food insecurity:     Worry: Not on file     Inability: Not on file     Transportation needs:     Medical: Not on file     Non-medical: Not on file   Tobacco Use     Smoking status: Not on file   Substance and Sexual Activity     Alcohol use: Not on file     Drug use: Not on file     Sexual activity: Not on file   Lifestyle     Physical activity:     Days per week: Not on file     Minutes per session: Not on file     Stress: Not on file   Relationships     Social connections:     Talks on phone: Not on file     Gets together: Not on file     Attends [employer] service: Not on file     Active member of club or organization: Not on file     Attends meetings of clubs or organizations:  "Not on file     Relationship status: Not on file     Intimate partner violence:     Fear of current or ex partner: Not on file     Emotionally abused: Not on file     Physically abused: Not on file     Forced sexual activity: Not on file   Other Topics Concern     Not on file   Social History Narrative     Not on file     FAMILY MEDICAL HISTORY:   No family history on file.    PHYSICAL EXAM:   Temp  Av.1  F (37.3  C)  Min: 97.8  F (36.6  C)  Max: 100.8  F (38.2  C)      Pulse  Av  Min: 91  Max: 111 Resp  Av.8  Min: 14  Max: 26  SpO2  Av.1 %  Min: 93 %  Max: 100 %       /67 (BP Location: Left arm)   Pulse 110   Temp 99.8  F (37.7  C) (Oral)   Resp 19   Ht 1.676 m (5' 6\")   Wt 106.9 kg (235 lb 10.8 oz)   SpO2 94%   BMI 38.04 kg/m     Date 19 0700 - 19 0659   Shift 4287-1483 6803-2459 9109-9590 24 Hour Total   INTAKE   P.O. 240   240   I.V. 100   100   Shift Total(mL/kg) 340(3.18)   340(3.18)   OUTPUT   Urine 90   90   Shift Total(mL/kg) 90(0.84)   90(0.84)   Weight (kg) 106.9 106.9 106.9 106.9      Admit Weight: 102.3 kg (225 lb 8.5 oz)     Constitutional: drowsy (arousable), cooperative, appears tired  Eyes: pupils equal, round and reactive to light, extra ocular muscles intact, sclera clear, conjunctiva normal  Respiratory: mild increased work of breathing, no crackles, diffuse end-expiratory wheezing  Cardiovascular: Normal apical impulse, regular rate and rhythm, normal S1 and S2, no S3 or S4, and no murmur noted.  GI: normal bowel sounds, soft, generalized mild tender (no guarding or rebound)  Genitounirinary: nelson catheter in place  Skin: normal skin color, texture, turgor  Musculoskeletal: 2+ lower extremity pitting edema present  Neurologic: drowsy, intermittently confused. A&Ox3. No focal neurological deficit.    LABS:   CMP  Recent Labs   Lab 19  0439 19  0614 19  1324 19  0727 19  0253 19  2321 19  0352   * 128*  " --  131*  --  132* 128*   POTASSIUM 4.2 4.3  --  4.2 4.1 3.9 3.9   CHLORIDE 97 96  --  98  --  99 96   CO2 20 20  --  23  --  25 25   ANIONGAP 13 12  --  10  --  8 7   * 218*  --  226*  --  174* 476*   BUN 56* 45*  --  32*  --  28 24   CR 3.22* 2.33*  --  1.60*  --  1.26* 1.18*   GFRESTIMATED 17* 25*  --  39*  --  52* 56*   GFRESTBLACK 19* 29*  --  45*  --  60* 65   MARILIN 8.1* 8.1*  --  7.7*  --  7.6* 7.6*   MAG  --   --   --  2.2  --  1.7  --    PROTTOTAL  --  6.2*  --  5.9*  --   --  6.0*   ALBUMIN  --  1.5* 1.5* 1.6*  --   --  1.8*   BILITOTAL  --  0.6  --  1.1  --   --  0.7   ALKPHOS  --  136  --  99  --   --  64   AST  --  38  --  60*  --   --  15   ALT  --  17  --  17  --   --  11     CBC  Recent Labs   Lab 11/26/19  0439 11/25/19  0614 11/24/19  1331 11/24/19  0727 11/23/19  0352   HGB 8.0* 8.5*  --  9.2* 9.9*   WBC 19.5* 24.6*  --  29.2* 18.2*   RBC 3.03* 3.18*  --  3.49* 3.73*   HCT 27.0* 27.6*  --  30.8* 32.3*   MCV 89 87  --  88 87   MCH 26.4* 26.7  --  26.4* 26.5   MCHC 29.6* 30.8*  --  29.9* 30.7*   RDW 14.8 14.6  --  14.6 14.4    262 290 285 325     INR  Recent Labs   Lab 11/25/19  0614 11/23/19  0525   INR 1.55* 1.59*   PTT  --  34     ABG  Recent Labs   Lab 11/24/19  0727   O2PER 2L      URINE STUDIES  Recent Labs   Lab Test 11/24/19  0200   COLOR Yellow   APPEARANCE Cloudy   URINEGLC 30*   URINEBILI Negative   URINEKETONE 5*   SG 1.020   UBLD Trace*   URINEPH 5.5   PROTEIN 30*   NITRITE Negative   LEUKEST Negative   RBCU <1   WBCU 3     No lab results found.  PTH  No lab results found.  IRON STUDIES  No lab results found.    IMAGING:  CT abdomen/pelvis without contrast (11/26/19);  1. Large mass in the anterior abdomen and pelvis is not significantly changed from the prior exam. Peritoneal carcinomatosis. Favor that mass is ovarian in origin as the right gonadal vessels appear to be  leading to this mass. The mass is inseparable from the uterus, although it does not appear to represent  leiomyosarcoma based on imaging findings and presence of extensive peritoneal carcinomatosis.  2. Somewhat heterogenous appearance of the endometrium and uterus is not as appreciated on this noncontrast exam.  3. Increased anasarca.   4. Mild splenomegaly.  5. Slitlike configuration of the IVC.  6. Sclerotic lesion within the proximal right femur. This could potentially represent bony infarction.    VQ scan (11/26/19);  Pulmonary emboli is not present.    Renal US (11/25/19);  Right kidney: Measures 13.6 cm in length. Parenchyma is of normal thickness and echogenicity. No focal mass. No hydronephrosis.  Left kidney: Measures 12.9 cm in length. Parenchyma is of normal thickness and echogenicity. No focal mass. No hydronephrosis.    Echocardiogram (11/24/19);  No significant valvular abnormalities were noted. No vegetation identified.  Left ventricular function, chamber size, wall motion, and wall thickness are normal.The EF is 60-65%.  Right ventricular function, chamber size, wall motion, and thickness are normal.  IVC diameter <2.1 cm collapsing >50% with sniff suggests a normal RA pressure of 3 mmHg.  No pericardial effusion is present.    Brenton Hutton MD    I was present with the resident during the history and exam.  I discussed the case with the resident and agree with the findings as documented in the assessment and plan.  Anna Browne MD   of Medicine  Department of Nephrology  [facility]

## 2019-11-26 NOTE — PROVIDER NOTIFICATION
-------------------CRITICAL LAB VALUE-------------------    Lab Value: 2.3 lactic  Time of notification: 12:52 PM  MD notified: Dr. Vergara  Patient status:  Temp:  [97.8  F (36.6  C)-99.9  F (37.7  C)] 99.9  F (37.7  C)  Pulse:  [105-110] 105  Heart Rate:  [] 104  Resp:  [14-22] 22  BP: (101-138)/(55-81) 136/72  SpO2:  [94 %-100 %] 96 %  Orders received:     Pt was assessed at bedside due to change in respiratory status.  Pt has accessory muscle use, mouth breathing, stating she can't breathe.   Chest xray, BG, and lactic ordered.

## 2019-11-26 NOTE — PROVIDER NOTIFICATION
-------------------CRITICAL LAB VALUE-------------------    Lab Value: 336 blood sugar  Time of notification: 11:45 am  MD notified: Dr. Wilson  Patient status:  Temp:  [97.8  F (36.6  C)-99.9  F (37.7  C)] 99.9  F (37.7  C)  Pulse:  [105-110] 105  Heart Rate:  [] 104  Resp:  [14-22] 22  BP: (101-138)/(55-81) 136/72  SpO2:  [94 %-100 %] 96 %  Orders received: no new orders at this time, will administer sliding scale insulin per orders

## 2019-11-26 NOTE — PLAN OF CARE
Temp:  [97.8  F (36.6  C)-99.9  F (37.7  C)] 99.9  F (37.7  C)  Pulse:  [105-110] 105  Heart Rate:  [] 104  Resp:  [14-22] 22  BP: (101-138)/(55-81) 136/72  SpO2:  [94 %-100 %] 96 %  Shift [id number]  Pt's mother and family friend at beside throughout the afternoon. Family expressed concerns to physician team about pt's worsening condition and wondering if oncology surgery could happen sooner.   Pt transported to abdominal CT and then VQ scan with nurse transport at end of shift.     Neuro: oriented x4, pt lethargic at end of shift. Baseline numbness in all extremities.   Cardiac: ST -110. /72.  Respiratory:  increased work of breathing at end of shift. Increased from 2L NC to 3L NC, new accessory muscle use, frequently stating she can't breathe. RR18-22.  ABG completed.  GI/: last bm 11/25. Oliguric UO per nelson. 90-100cc Q4Hr. Nephrology consulted.  Diet/appetite: ate 75% of regular diet breakfast, NPO for lunch due to abdominal CT.   BG with sliding scale insulin, sliding scale increased to high coverage starting at 1600 dose. Team will restart lantus 11/27, does not want lantus dose today.   Activity: repositioned Q2. It is more difficult for pt to breathe when she is sitting upright due to pressure from abdomen, held off on getting pt up to chair today. Heavy assist of 2. PT/OT consulted.  Pain: abdominal pain, oxycodone utilized.   Skin: no new deficits noted.       Continue with POC. Notify primary team with changes.

## 2019-11-26 NOTE — PLAN OF CARE
PT 6B: Cancel; per RN and chart review, pt not appropriate for therapy d/t respiratory status and lethargy/fatigue. Will reschedule and continue to follow.

## 2019-11-26 NOTE — PROVIDER NOTIFICATION
1632:  Circulator nurse JER Andres in room as patient was requesting pain medications.  RN stated prior to given medications patient had a 5 sec apenic spell, not responding, and had a blank stare.  Oxygen sats maintained during episode. HR in the 90s as have been baseline.    Oxycodone was not given.    1632:  Paged Ambar Martinez MD to notify of episode.  Ordered for CAPNO and will come examine patient.    1649:  Primary team rounding in room.  Monitor urine output and resp status after lasix is given.  Primary team also assessed and adjusted medications that could affect resp status in addition to decreased kidney function.    MD Jai stated that this time to still give additional 5 units of Novolog that was ordered prior to CT of abdomen    Will continue to monitor closely.

## 2019-11-26 NOTE — PROGRESS NOTES
[facility], [place]    Progress Note - Kristel 1 Service        Date of Admission:  11/23/2019    Assessment & Plan   Chela Love is a 44 year old female with a history of HTN, HLD, drug abuse (methamphetamine, cannabis), and abnormal menses, who presents as a transfer from [facility] for management of septic shock and large ovarian mass concerning for ovarian malignancy also with ascites and peritoneal carcinomatosis. Pt remains admitted for further workup for possible ovarian cancer, declining respiratory status, and CINDY with a creatinine bump from 2.33 to 3.22.     Changes today:  -attempted to touch bases with GynOnc about possible omental biopsy. Per IR, omentum is too vascular for biopsy. Per GynOnc, pt is not a great surgical candidate given current status. GynOnc resident was in a procedure when paged, he will call back and provide an update.   -overnight, pt's crt has increased from 2.33 to 3.22. Her crt was 1.18 on admission. At first, suspected to be pre-renal and pt was provided fluids which did not help resolve her CINDY. Pt's urine output is decreased, so then some form of post-renal obstruction was suspected especially as pt has ovarian mass that could be obstructive. Renal ultrasound ordered yesterday and showed no hydronephrosis bilaterally, as a result post-renal obstruction unlikely. Pt received contrast from OSH for CT AP, now presenting with increasing crt, this could possibly be contrast induced CINDY. Nephrology consulted today to weigh in. Repeat UA ordered.   -Pt's respiratory status worsened today (reports difficulty breathing and is tachycardic). Pt still sats >95% on 4L, but sometimes drops to approx 94%. Chest xray ordered which looks benign, although formal read is pending. VQ scan ordered to rule out PE in this high risk pt with several risk factors (obesity, active infection, active malignancy, sedentary etc).      # SIRS, possible severe sepsis  #  Spontaneous Bacterial Peritonitis   # Malignant Ascites   # H/o IVDU  Presented to OSH with malaise, stomach pain, nausea/vomiting and concern for hyperglycemia. Exam notable for tachycardia, tachypnea, and hypotension (sBPs in the 70s). Labs notable for leukocytosis (WBC 18.2/ANC 15.7), anemia (Hgb 9.9) elevated lactate (LA ~4), Glu >600. Creatinine elevated to 1.71 from ~1.0.  Trop 0.03. Bili 1.4/0.6; LFTs otherwise normal. CXR w/o ischemic changes. CXR clear. Urine preg negative. UA w/o signs of infection. VBG without acidosis/retention. CT head negative for bleed. Encephalopathy could be 2/2 ketoacidosis, toxic metabolic, septic shock. Concern for infection of unclear etiology: source could be blood, infected mass, biliary. Has a hx of recurrent LE cellulitis though no evidence of infection on skin exam. Lactic acidosis likely 2/2 ketoacidosis/starvation ketosis. Blood culture NGTD, leukocytosis up to 29 on 11/24. S/p vanc and zosyn at OSH (11/23-24)  11/25: ascitic fluid grew gram variable bacilli, pt on CTX and metronidazole (started on 11/24, end date: 11/31/19). TTE negative for vegetations. EF 60-65%   -diagnostic paracentesis with cytology pending  -Pain management:   Tylenol 650 mg PO Q4H PRN  Oxycodone 5-10mg Q3H PRN  Gabapentin 300 mg PO BID PRN    #Dypsnea c/f pulmonary embolism   Pt complains of difficulty breathing with some audible wheezing. O2 sats around 95% with 4L NC. Pt a bit tachypneic and is tachycardic. Concern for pulmonary embolus in this pt as she is high risk with several risk factors (obesity, active infection, active malignancy, sedentary etc). Venous bilateral ultrasound ordered yesterday which showed no evidence of DVT.   -chest x-ray ordered   -VQ scan ordered     # CINDY  # Hyponatremia  Creatinine was 1.7 at OSH, improved 1.18. Now crt is 3.22.  At first, suspected to be pre-renal and pt was provided fluids which did not help resolve her CINDY. Pt's urine output is decreased, so then  some form of post-renal obstruction was suspected especially as pt has ovarian mass that could be obstructive. Renal ultrasound ordered yesterday and showed no hydronephrosis bilaterally, as a result post-renal obstruction unlikely. Pt received contrast from OSH for CT AP, now presenting with increasing crt, this could possibly be contrast induced CINDY. In addition, hyponatremia most likely due to paraneoplastic syndrome from ovarian cancer.   -Repeat UA ordered  -Nephrology consulted today to weigh in      # Large right adnexal mass associated with ascites and mesenteric omental caking, concern for malignancy  # Vaginal bleeding, resolved  # Hx of abnormal periods  CT imaging at OSH showed [id number] cm right adnexal mass with ascites and omental caking present which is concerning for ovarian malignancy. CT also showed fluid in endometrial canal. Bleeding thought to be due to menses per gyn/onc. Hgb remains relatively stable. Amount of vaginal bleeding is similar to menses. CEA and CA elevated consistent with primary ovarian cancer.   11/25: CT AP from OSH read significant for very vascular omentum. Per IR, not ideal for biopsy. Per Gyn/onc pt is also a poor surgical candidate   - touch bases with Gyn/onc about plan since pt can not get biopsy   - gyn onc requested CT abd without contrast which was ordered     # Toxic/metabolic encephalopathy   In setting of severe sepsis, gets worse with opioids. CT head at OSH was negative. Pt tends to be hysterical and appears to have poor coping mechanisms. Psych consult placed yesterday, but they were unable to communicate with patient. They will attempt to have a discussion with the patient again today.   - monitor mentation closely  - psych on board      # Polysubstance abuse  11/25: pt denies IVDU; 11/26: correction - pt denies active IVDU, reports that last IVDU was three weeks ago   - Obtain UDS and ethanol level      #HTN  - Hold home antihypertensives in setting of septic  shock  - Hydrochlorothiazide 25mg daily - HOLD     # Uncontrolled T2DM with hyperglycemia  Glucose >600 and beta-hydroxybutyrate 0.39 at OSH. UA >1000 glucose. Gluc now improved. Glucose this morning was 247. Will add on 10 Units lantus in the AM.   - Hgb A1C 15 (very high)   - Beta-hydroxybutyrate level normal   - Gluc Q4H  - medium dose insulin sliding scale  - Lantus 25 units QPM , adding 10 U in the AM   - Hold metformin 1000mg BID in the setting of lactic acidosis      #Constipation  - Pericolase 2 tabs daily BID     #Deconditioning  - PT, eval and treat  - OT, eval and treat     Diet: regular diet   Fluids: PO intake  Lines: PIV  DVT Prophylaxis: Heparin subcutaneous, 5000 units Q12H   Zamora Catheter: in place, indication: Retention, /GI/GYN Pelvic Procedure;Retention;Strict 1-2 Hour I&O  Code Status: Full Code      Disposition Plan   Expected discharge: 4 - 7 days, recommended to prior living arrangement once renal function improved and SIRS/Sepsis treated. And plan for possible ovarian cancer in place.   Entered: Ambar Martinez MD 11/26/2019, 9:23 AM       This patient's care was discussed with Dr. Vergara. Please see his addendum for any changes to the plan.     Ambar Martinez MD   Internal Medicine, PGY - 1  P: [phone]  ______________________________________________________________________    Interval History   Pt and her mom are present. Mom wanted update on plan and was provided with an update. Both mom and daughter state they are grateful for the care they received. Both are anxious about possible ovarian cancer, worsening kidney, and pt's difficulty breathing.       Physical Exam   Vital Signs: Temp: 99.8  F (37.7  C) Temp src: Oral BP: 130/67 Pulse: 110 Heart Rate: 108 Resp: 19 SpO2: 94 % O2 Device: Nasal cannula Oxygen Delivery: 1 LPM  Weight: 235 lbs 10.75 oz  General Appearance: Hysterical, well appearing, in no acute distress   Respiratory: Mostly clear to auscultation. Some  wheezes, but mostly from pt outwardly wheezing (through mouth vs in the lungs themselves)   Cardiovascular: RRR, S1 S2+. No MGR.  GI: BS+, soft, tender throughout all quadrants, ND, no rebound tenderness, no guarding   Skin: No jaundice or bruises. B/l LE with dusky appearance consistent with stasis dermatitis   Other: Labile affect.    Data   Recent Labs   Lab 11/26/19  0439 11/25/19  0614 11/24/19  1331 11/24/19  1324 11/24/19  0727  11/23/19  0525 11/23/19  0352   WBC 19.5* 24.6*  --   --  29.2*  --   --  18.2*   HGB 8.0* 8.5*  --   --  9.2*  --   --  9.9*   MCV 89 87  --   --  88  --   --  87    262 290  --  285  --   --  325   INR  --  1.55*  --   --   --   --  1.59*  --    * 128*  --   --  131*   < >  --  128*   POTASSIUM 4.2 4.3  --   --  4.2   < >  --  3.9   CHLORIDE 97 96  --   --  98   < >  --  96   CO2 20 20  --   --  23   < >  --  25   BUN 56* 45*  --   --  32*   < >  --  24   CR 3.22* 2.33*  --   --  1.60*   < >  --  1.18*   ANIONGAP 13 12  --   --  10   < >  --  7   MARILIN 8.1* 8.1*  --   --  7.7*   < >  --  7.6*   * 218*  --   --  226*   < >  --  476*   ALBUMIN  --  1.5*  --  1.5* 1.6*  --   --  1.8*   PROTTOTAL  --  6.2*  --   --  5.9*  --   --  6.0*   BILITOTAL  --  0.6  --   --  1.1  --   --  0.7   ALKPHOS  --  136  --   --  99  --   --  64   ALT  --  17  --   --  17  --   --  11   AST  --  38  --   --  60*  --   --  15   TROPI  --   --   --   --   --   --   --  <0.015    < > = values in this interval not displayed.

## 2019-11-26 NOTE — PLAN OF CARE
Neuro: A&Ox4. Very lethargic. Arouses to voice and stimuli. Baseline numbness in lower extremities.     Cardiac: SR. VSS. Afebrile.    Respiratory: Sating >95% on 1L NC.  GI/: Minimal urine output via nelson catheter. Urine cloudy and evan in appearance. The service has been made aware. Renal US was completed today. LR maintenance now dc'd. 1 small, loose/mucous, brown BM. Mixed with vaginal discharge, thick and bloody in appearance.   Diet/appetite: Tolerating regular diet. Fair appetite.   Activity:  Assist of 2 with gait belt, pivot to bedside commode. Turn and repo in bed as tolerated.   Pain: Pain in mid-epigastric abdominal region. Tylenol given x1.   Skin: BLE jean/blotchy with +2 edema. Doppler positive for pedal pulses.   LDA's:   PICC triple lumen- int. abx and TKO  PIV x1 SL   Nelson     Plan: Continue with POC. Notify primary team with changes.

## 2019-11-26 NOTE — PHARMACY-ADMISSION MEDICATION HISTORY
Admission medication history interview status for the 11/23/2019 admission is complete. See Epic admission navigator for allergy information, pharmacy, prior to admission medications and immunization status.     Medication history interview sources:  Patient and family    Changes made to PTA medication list (reason)  Added: none  Deleted: none  Changed: none    Additional medication history information (including reliability of information, actions taken by pharmacist):  - Patient and family were able to provide home medications and had verified how she is taking them and when she took the last dose for each medication  - Was unable to verify medication allergies due to patient being in discomfort and unable to tolerate extended conversation.           Prior to Admission medications    Medication Sig Last Dose Taking? Auth Provider   gabapentin (NEURONTIN) 100 MG capsule Take 300 mg by mouth 3 times daily  Past Week at Unknown time Yes Reported, Patient   insulin glargine (LANTUS SOLOSTAR) 100 UNIT/ML pen Inject 25 Units Subcutaneous every evening Past Month at Unknown time Yes Reported, Patient   metFORMIN (GLUCOPHAGE) 1000 MG tablet Take 1,000 mg by mouth 2 times daily (with meals) Past Week at Unknown time Yes Reported, Patient   albuterol (PROAIR HFA/PROVENTIL HFA/VENTOLIN HFA) 108 (90 Base) MCG/ACT inhaler Inhale 2 puffs into the lungs as needed More than a month at Unknown time  Reported, Patient         Medication history completed by:   Christian Nova  PharmD Candidate   Nov 26th 2019

## 2019-11-26 NOTE — PLAN OF CARE
"/81 (BP Location: Left arm)   Pulse 111   Temp 99  F (37.2  C) (Axillary)   Resp 22   Ht 1.676 m (5' 6\")   Wt 106.9 kg (235 lb 10.8 oz)   SpO2 98%   BMI 38.04 kg/m      Neuro: A&Ox4. Increased somnolence.  Cardiac: SR. VSS. Slightly febrile at 99. Sepsis protocol triggered. Lactate pending.    Respiratory: Sating 92+ 3L. Increased to 3L from 1L when patient desatted into 80s. CTA on initial assessment. Increased expiratory wheezes occurring throughout PM.   GI/: Decreased urine output. 125cc throughout shift. Mds aware of decreased urine output. No maintenance fluids at this time.    Diet/appetite: Tolerating reg diet. Eating well. BG checks q4. 180s-225.   Activity:  Assist of 2+ pivot to BSC  Pain: At acceptable level on current regimen. Oxy 10mg given once overnight.  Skin: No new deficits noted. Declined nelson cares overnight.   LDA's: Triple lumen PICC hep locked. PIV saline locked. Nelson with standard drainage bag and leg securement, needed for retention.     Plan: Continue with POC. Notify primary team with changes.Plan for KARLOS sleeve on Friday.     "

## 2019-11-27 ENCOUNTER — ANESTHESIA (OUTPATIENT)
Dept: SURGERY | Facility: CLINIC | Age: 44
End: 2019-11-27
Payer: COMMERCIAL

## 2019-11-27 ENCOUNTER — SURGERY (OUTPATIENT)
Age: 44
End: 2019-11-27
Payer: COMMERCIAL

## 2019-11-27 ENCOUNTER — ANESTHESIA EVENT (OUTPATIENT)
Dept: SURGERY | Facility: CLINIC | Age: 44
End: 2019-11-27
Payer: COMMERCIAL

## 2019-11-27 PROBLEM — N94.89 ADNEXAL MASS: Status: ACTIVE | Noted: 2019-11-27

## 2019-11-27 LAB
ALBUMIN SERPL-MCNC: 1.4 G/DL (ref 3.4–5)
ALP SERPL-CCNC: 271 U/L (ref 40–150)
ALT SERPL W P-5'-P-CCNC: 11 U/L (ref 0–50)
ANION GAP SERPL CALCULATED.3IONS-SCNC: 10 MMOL/L (ref 3–14)
ANION GAP SERPL CALCULATED.3IONS-SCNC: 11 MMOL/L (ref 3–14)
ANION GAP SERPL CALCULATED.3IONS-SCNC: 11 MMOL/L (ref 3–14)
APTT PPP: 35 SEC (ref 22–37)
APTT PPP: 35 SEC (ref 22–37)
APTT PPP: 36 SEC (ref 22–37)
AST SERPL W P-5'-P-CCNC: 23 U/L (ref 0–45)
BACTERIA SPEC CULT: NO GROWTH
BASE DEFICIT BLDA-SCNC: 5.6 MMOL/L
BASE DEFICIT BLDA-SCNC: 6.8 MMOL/L
BASE DEFICIT BLDA-SCNC: 7.1 MMOL/L
BASE DEFICIT BLDA-SCNC: 7.6 MMOL/L
BASOPHILS # BLD AUTO: 0.1 10E9/L (ref 0–0.2)
BASOPHILS NFR BLD AUTO: 0.3 %
BILIRUB DIRECT SERPL-MCNC: 0.4 MG/DL (ref 0–0.2)
BILIRUB SERPL-MCNC: 0.7 MG/DL (ref 0.2–1.3)
BLD PROD TYP BPU: NORMAL
BLD UNIT ID BPU: 0
BLOOD PRODUCT CODE: NORMAL
BPU ID: NORMAL
BUN SERPL-MCNC: 67 MG/DL (ref 7–30)
BUN SERPL-MCNC: 68 MG/DL (ref 7–30)
BUN SERPL-MCNC: 76 MG/DL (ref 7–30)
CA-I BLD-MCNC: 4.3 MG/DL (ref 4.4–5.2)
CA-I BLD-MCNC: 4.6 MG/DL (ref 4.4–5.2)
CALCIUM SERPL-MCNC: 7.6 MG/DL (ref 8.5–10.1)
CALCIUM SERPL-MCNC: 8 MG/DL (ref 8.5–10.1)
CALCIUM SERPL-MCNC: 8.3 MG/DL (ref 8.5–10.1)
CHLORIDE SERPL-SCNC: 100 MMOL/L (ref 94–109)
CHLORIDE SERPL-SCNC: 105 MMOL/L (ref 94–109)
CHLORIDE SERPL-SCNC: 107 MMOL/L (ref 94–109)
CO2 SERPL-SCNC: 19 MMOL/L (ref 20–32)
CO2 SERPL-SCNC: 20 MMOL/L (ref 20–32)
CO2 SERPL-SCNC: 22 MMOL/L (ref 20–32)
CREAT SERPL-MCNC: 3.72 MG/DL (ref 0.52–1.04)
CREAT SERPL-MCNC: 3.77 MG/DL (ref 0.52–1.04)
CREAT SERPL-MCNC: 3.92 MG/DL (ref 0.52–1.04)
DIFFERENTIAL METHOD BLD: ABNORMAL
EOSINOPHIL # BLD AUTO: 0.1 10E9/L (ref 0–0.7)
EOSINOPHIL NFR BLD AUTO: 0.6 %
ERYTHROCYTE [DISTWIDTH] IN BLOOD BY AUTOMATED COUNT: 14.5 % (ref 10–15)
ERYTHROCYTE [DISTWIDTH] IN BLOOD BY AUTOMATED COUNT: 14.7 % (ref 10–15)
ERYTHROCYTE [DISTWIDTH] IN BLOOD BY AUTOMATED COUNT: 15.3 % (ref 10–15)
FIBRINOGEN PPP-MCNC: 607 MG/DL (ref 200–420)
FIBRINOGEN PPP-MCNC: 778 MG/DL (ref 200–420)
GFR SERPL CREATININE-BSD FRML MDRD: 13 ML/MIN/{1.73_M2}
GFR SERPL CREATININE-BSD FRML MDRD: 14 ML/MIN/{1.73_M2}
GFR SERPL CREATININE-BSD FRML MDRD: 14 ML/MIN/{1.73_M2}
GLUCOSE BLD-MCNC: 144 MG/DL (ref 70–99)
GLUCOSE BLD-MCNC: 180 MG/DL (ref 70–99)
GLUCOSE BLDC GLUCOMTR-MCNC: 156 MG/DL (ref 70–99)
GLUCOSE BLDC GLUCOMTR-MCNC: 172 MG/DL (ref 70–99)
GLUCOSE BLDC GLUCOMTR-MCNC: 189 MG/DL (ref 70–99)
GLUCOSE BLDC GLUCOMTR-MCNC: 192 MG/DL (ref 70–99)
GLUCOSE BLDC GLUCOMTR-MCNC: 220 MG/DL (ref 70–99)
GLUCOSE BLDC GLUCOMTR-MCNC: 231 MG/DL (ref 70–99)
GLUCOSE BLDC GLUCOMTR-MCNC: 246 MG/DL (ref 70–99)
GLUCOSE SERPL-MCNC: 188 MG/DL (ref 70–99)
GLUCOSE SERPL-MCNC: 215 MG/DL (ref 70–99)
GLUCOSE SERPL-MCNC: 243 MG/DL (ref 70–99)
HCG SERPL QL: NEGATIVE
HCO3 BLD-SCNC: 19 MMOL/L (ref 21–28)
HCO3 BLD-SCNC: 19 MMOL/L (ref 21–28)
HCO3 BLD-SCNC: 20 MMOL/L (ref 21–28)
HCO3 BLD-SCNC: 20 MMOL/L (ref 21–28)
HCT VFR BLD AUTO: 25.4 % (ref 35–47)
HCT VFR BLD AUTO: 27.3 % (ref 35–47)
HCT VFR BLD AUTO: 28.1 % (ref 35–47)
HGB BLD-MCNC: 8.1 G/DL (ref 11.7–15.7)
HGB BLD-MCNC: 8.3 G/DL (ref 11.7–15.7)
HGB BLD-MCNC: 8.4 G/DL (ref 11.7–15.7)
HGB BLD-MCNC: 9 G/DL (ref 11.7–15.7)
HGB BLD-MCNC: 9.1 G/DL (ref 11.7–15.7)
IMM GRANULOCYTES # BLD: 0.5 10E9/L (ref 0–0.4)
IMM GRANULOCYTES NFR BLD: 2.5 %
INHIBIN B SERPL-MCNC: <10 PG/ML
INR PPP: 1.75 (ref 0.86–1.14)
INR PPP: 1.76 (ref 0.86–1.14)
INR PPP: 1.81 (ref 0.86–1.14)
INR PPP: 1.83 (ref 0.86–1.14)
LACTATE BLD-SCNC: 0.9 MMOL/L (ref 0.7–2)
LACTATE BLD-SCNC: 2 MMOL/L (ref 0.7–2)
LACTATE BLD-SCNC: 2.4 MMOL/L (ref 0.7–2)
LYMPHOCYTES # BLD AUTO: 1.4 10E9/L (ref 0.8–5.3)
LYMPHOCYTES NFR BLD AUTO: 7.4 %
Lab: NORMAL
MCH RBC QN AUTO: 26.8 PG (ref 26.5–33)
MCH RBC QN AUTO: 27.7 PG (ref 26.5–33)
MCH RBC QN AUTO: 27.8 PG (ref 26.5–33)
MCHC RBC AUTO-ENTMCNC: 30.4 G/DL (ref 31.5–36.5)
MCHC RBC AUTO-ENTMCNC: 31.9 G/DL (ref 31.5–36.5)
MCHC RBC AUTO-ENTMCNC: 32 G/DL (ref 31.5–36.5)
MCV RBC AUTO: 87 FL (ref 78–100)
MCV RBC AUTO: 87 FL (ref 78–100)
MCV RBC AUTO: 88 FL (ref 78–100)
MONOCYTES # BLD AUTO: 1.4 10E9/L (ref 0–1.3)
MONOCYTES NFR BLD AUTO: 7 %
MRSA DNA SPEC QL NAA+PROBE: NEGATIVE
NEUTROPHILS # BLD AUTO: 16 10E9/L (ref 1.6–8.3)
NEUTROPHILS NFR BLD AUTO: 82.2 %
NRBC # BLD AUTO: 0 10*3/UL
NRBC BLD AUTO-RTO: 0 /100
NUM BPU REQUESTED: 2
O2/TOTAL GAS SETTING VFR VENT: 100 %
O2/TOTAL GAS SETTING VFR VENT: 64 %
O2/TOTAL GAS SETTING VFR VENT: ABNORMAL %
O2/TOTAL GAS SETTING VFR VENT: ABNORMAL %
OXYHGB MFR BLD: 96 % (ref 92–100)
PCO2 BLD: 38 MM HG (ref 35–45)
PCO2 BLD: 41 MM HG (ref 35–45)
PCO2 BLD: 41 MM HG (ref 35–45)
PCO2 BLD: 42 MM HG (ref 35–45)
PH BLD: 7.27 PH (ref 7.35–7.45)
PH BLD: 7.27 PH (ref 7.35–7.45)
PH BLD: 7.28 PH (ref 7.35–7.45)
PH BLD: 7.33 PH (ref 7.35–7.45)
PLATELET # BLD AUTO: 200 10E9/L (ref 150–450)
PLATELET # BLD AUTO: 241 10E9/L (ref 150–450)
PLATELET # BLD AUTO: 247 10E9/L (ref 150–450)
PO2 BLD: 107 MM HG (ref 80–105)
PO2 BLD: 115 MM HG (ref 80–105)
PO2 BLD: 167 MM HG (ref 80–105)
PO2 BLD: 311 MM HG (ref 80–105)
POTASSIUM BLD-SCNC: 4.1 MMOL/L (ref 3.4–5.3)
POTASSIUM BLD-SCNC: 4.6 MMOL/L (ref 3.4–5.3)
POTASSIUM SERPL-SCNC: 4.2 MMOL/L (ref 3.4–5.3)
POTASSIUM SERPL-SCNC: 4.4 MMOL/L (ref 3.4–5.3)
POTASSIUM SERPL-SCNC: 4.7 MMOL/L (ref 3.4–5.3)
PROT SERPL-MCNC: 5 G/DL (ref 6.8–8.8)
RBC # BLD AUTO: 2.92 10E12/L (ref 3.8–5.2)
RBC # BLD AUTO: 3.1 10E12/L (ref 3.8–5.2)
RBC # BLD AUTO: 3.24 10E12/L (ref 3.8–5.2)
SODIUM BLD-SCNC: 131 MMOL/L (ref 133–144)
SODIUM BLD-SCNC: 132 MMOL/L (ref 133–144)
SODIUM SERPL-SCNC: 133 MMOL/L (ref 133–144)
SODIUM SERPL-SCNC: 135 MMOL/L (ref 133–144)
SODIUM SERPL-SCNC: 137 MMOL/L (ref 133–144)
SPECIMEN SOURCE: NORMAL
SPECIMEN SOURCE: NORMAL
TRANSFUSION STATUS PATIENT QL: NORMAL
WBC # BLD AUTO: 17.5 10E9/L (ref 4–11)
WBC # BLD AUTO: 19.5 10E9/L (ref 4–11)
WBC # BLD AUTO: 24.4 10E9/L (ref 4–11)

## 2019-11-27 PROCEDURE — 40000170 ZZH STATISTIC PRE-PROCEDURE ASSESSMENT II: Performed by: OBSTETRICS & GYNECOLOGY

## 2019-11-27 PROCEDURE — 82803 BLOOD GASES ANY COMBINATION: CPT | Performed by: ANESTHESIOLOGY

## 2019-11-27 PROCEDURE — 85610 PROTHROMBIN TIME: CPT | Performed by: INTERNAL MEDICINE

## 2019-11-27 PROCEDURE — 85730 THROMBOPLASTIN TIME PARTIAL: CPT

## 2019-11-27 PROCEDURE — 88342 IMHCHEM/IMCYTCHM 1ST ANTB: CPT | Performed by: OBSTETRICS & GYNECOLOGY

## 2019-11-27 PROCEDURE — 40000275 ZZH STATISTIC RCP TIME EA 10 MIN

## 2019-11-27 PROCEDURE — 86901 BLOOD TYPING SEROLOGIC RH(D): CPT

## 2019-11-27 PROCEDURE — 25000125 ZZHC RX 250: Performed by: NURSE ANESTHETIST, CERTIFIED REGISTERED

## 2019-11-27 PROCEDURE — 36000064 ZZH SURGERY LEVEL 4 EA 15 ADDTL MIN - UMMC: Performed by: OBSTETRICS & GYNECOLOGY

## 2019-11-27 PROCEDURE — 85730 THROMBOPLASTIN TIME PARTIAL: CPT | Performed by: STUDENT IN AN ORGANIZED HEALTH CARE EDUCATION/TRAINING PROGRAM

## 2019-11-27 PROCEDURE — 25000128 H RX IP 250 OP 636: Performed by: STUDENT IN AN ORGANIZED HEALTH CARE EDUCATION/TRAINING PROGRAM

## 2019-11-27 PROCEDURE — 25800030 ZZH RX IP 258 OP 636: Performed by: NURSE ANESTHETIST, CERTIFIED REGISTERED

## 2019-11-27 PROCEDURE — 85610 PROTHROMBIN TIME: CPT | Performed by: OBSTETRICS & GYNECOLOGY

## 2019-11-27 PROCEDURE — 71000014 ZZH RECOVERY PHASE 1 LEVEL 2 FIRST HR: Performed by: OBSTETRICS & GYNECOLOGY

## 2019-11-27 PROCEDURE — 83605 ASSAY OF LACTIC ACID: CPT | Performed by: OBSTETRICS & GYNECOLOGY

## 2019-11-27 PROCEDURE — 88331 PATH CONSLTJ SURG 1 BLK 1SPC: CPT | Performed by: OBSTETRICS & GYNECOLOGY

## 2019-11-27 PROCEDURE — 82947 ASSAY GLUCOSE BLOOD QUANT: CPT | Performed by: ANESTHESIOLOGY

## 2019-11-27 PROCEDURE — 86923 COMPATIBILITY TEST ELECTRIC: CPT

## 2019-11-27 PROCEDURE — 85027 COMPLETE CBC AUTOMATED: CPT | Performed by: OBSTETRICS & GYNECOLOGY

## 2019-11-27 PROCEDURE — 0UT70ZZ RESECTION OF BILATERAL FALLOPIAN TUBES, OPEN APPROACH: ICD-10-PCS | Performed by: OBSTETRICS & GYNECOLOGY

## 2019-11-27 PROCEDURE — 36592 COLLECT BLOOD FROM PICC: CPT

## 2019-11-27 PROCEDURE — 80076 HEPATIC FUNCTION PANEL: CPT | Performed by: STUDENT IN AN ORGANIZED HEALTH CARE EDUCATION/TRAINING PROGRAM

## 2019-11-27 PROCEDURE — 87640 STAPH A DNA AMP PROBE: CPT | Performed by: STUDENT IN AN ORGANIZED HEALTH CARE EDUCATION/TRAINING PROGRAM

## 2019-11-27 PROCEDURE — 88341 IMHCHEM/IMCYTCHM EA ADD ANTB: CPT | Performed by: OBSTETRICS & GYNECOLOGY

## 2019-11-27 PROCEDURE — 00000146 ZZHCL STATISTIC GLUCOSE BY METER IP

## 2019-11-27 PROCEDURE — 82803 BLOOD GASES ANY COMBINATION: CPT | Performed by: STUDENT IN AN ORGANIZED HEALTH CARE EDUCATION/TRAINING PROGRAM

## 2019-11-27 PROCEDURE — 3E1M38Z IRRIGATION OF PERITONEAL CAVITY USING IRRIGATING SUBSTANCE, PERCUTANEOUS APPROACH: ICD-10-PCS | Performed by: OBSTETRICS & GYNECOLOGY

## 2019-11-27 PROCEDURE — 85610 PROTHROMBIN TIME: CPT

## 2019-11-27 PROCEDURE — P9016 RBC LEUKOCYTES REDUCED: HCPCS

## 2019-11-27 PROCEDURE — 99233 SBSQ HOSP IP/OBS HIGH 50: CPT | Mod: 57 | Performed by: OBSTETRICS & GYNECOLOGY

## 2019-11-27 PROCEDURE — 83605 ASSAY OF LACTIC ACID: CPT | Performed by: ANESTHESIOLOGY

## 2019-11-27 PROCEDURE — 82330 ASSAY OF CALCIUM: CPT | Performed by: ANESTHESIOLOGY

## 2019-11-27 PROCEDURE — 25000128 H RX IP 250 OP 636: Performed by: NURSE ANESTHETIST, CERTIFIED REGISTERED

## 2019-11-27 PROCEDURE — 85025 COMPLETE CBC W/AUTO DIFF WBC: CPT

## 2019-11-27 PROCEDURE — 25000566 ZZH SEVOFLURANE, EA 15 MIN: Performed by: OBSTETRICS & GYNECOLOGY

## 2019-11-27 PROCEDURE — 88311 DECALCIFY TISSUE: CPT | Performed by: OBSTETRICS & GYNECOLOGY

## 2019-11-27 PROCEDURE — 85384 FIBRINOGEN ACTIVITY: CPT | Performed by: INTERNAL MEDICINE

## 2019-11-27 PROCEDURE — 86850 RBC ANTIBODY SCREEN: CPT

## 2019-11-27 PROCEDURE — 0DBU0ZZ EXCISION OF OMENTUM, OPEN APPROACH: ICD-10-PCS | Performed by: OBSTETRICS & GYNECOLOGY

## 2019-11-27 PROCEDURE — 80048 BASIC METABOLIC PNL TOTAL CA: CPT | Performed by: OBSTETRICS & GYNECOLOGY

## 2019-11-27 PROCEDURE — 85610 PROTHROMBIN TIME: CPT | Performed by: STUDENT IN AN ORGANIZED HEALTH CARE EDUCATION/TRAINING PROGRAM

## 2019-11-27 PROCEDURE — 58150 TOTAL HYSTERECTOMY: CPT | Performed by: OBSTETRICS & GYNECOLOGY

## 2019-11-27 PROCEDURE — 84295 ASSAY OF SERUM SODIUM: CPT | Performed by: ANESTHESIOLOGY

## 2019-11-27 PROCEDURE — 85027 COMPLETE CBC AUTOMATED: CPT | Performed by: STUDENT IN AN ORGANIZED HEALTH CARE EDUCATION/TRAINING PROGRAM

## 2019-11-27 PROCEDURE — 40000344 ZZHCL STATISTIC THAWING COMPONENT: Performed by: INTERNAL MEDICINE

## 2019-11-27 PROCEDURE — 88360 TUMOR IMMUNOHISTOCHEM/MANUAL: CPT | Performed by: OBSTETRICS & GYNECOLOGY

## 2019-11-27 PROCEDURE — 82805 BLOOD GASES W/O2 SATURATION: CPT | Performed by: STUDENT IN AN ORGANIZED HEALTH CARE EDUCATION/TRAINING PROGRAM

## 2019-11-27 PROCEDURE — P9059 PLASMA, FRZ BETWEEN 8-24HOUR: HCPCS | Performed by: INTERNAL MEDICINE

## 2019-11-27 PROCEDURE — 20000004 ZZH R&B ICU UMMC

## 2019-11-27 PROCEDURE — 85384 FIBRINOGEN ACTIVITY: CPT

## 2019-11-27 PROCEDURE — P9041 ALBUMIN (HUMAN),5%, 50ML: HCPCS | Performed by: OBSTETRICS & GYNECOLOGY

## 2019-11-27 PROCEDURE — 40000014 ZZH STATISTIC ARTERIAL MONITORING DAILY

## 2019-11-27 PROCEDURE — 25000128 H RX IP 250 OP 636: Performed by: OBSTETRICS & GYNECOLOGY

## 2019-11-27 PROCEDURE — 0UT20ZZ RESECTION OF BILATERAL OVARIES, OPEN APPROACH: ICD-10-PCS | Performed by: OBSTETRICS & GYNECOLOGY

## 2019-11-27 PROCEDURE — 88307 TISSUE EXAM BY PATHOLOGIST: CPT | Performed by: OBSTETRICS & GYNECOLOGY

## 2019-11-27 PROCEDURE — 80048 BASIC METABOLIC PNL TOTAL CA: CPT | Performed by: STUDENT IN AN ORGANIZED HEALTH CARE EDUCATION/TRAINING PROGRAM

## 2019-11-27 PROCEDURE — 27210794 ZZH OR GENERAL SUPPLY STERILE: Performed by: OBSTETRICS & GYNECOLOGY

## 2019-11-27 PROCEDURE — 84132 ASSAY OF SERUM POTASSIUM: CPT | Performed by: ANESTHESIOLOGY

## 2019-11-27 PROCEDURE — 25000125 ZZHC RX 250: Performed by: STUDENT IN AN ORGANIZED HEALTH CARE EDUCATION/TRAINING PROGRAM

## 2019-11-27 PROCEDURE — 25800030 ZZH RX IP 258 OP 636: Performed by: OBSTETRICS & GYNECOLOGY

## 2019-11-27 PROCEDURE — 87641 MR-STAPH DNA AMP PROBE: CPT | Performed by: STUDENT IN AN ORGANIZED HEALTH CARE EDUCATION/TRAINING PROGRAM

## 2019-11-27 PROCEDURE — 25000128 H RX IP 250 OP 636: Performed by: PHYSICIAN ASSISTANT

## 2019-11-27 PROCEDURE — 80048 BASIC METABOLIC PNL TOTAL CA: CPT

## 2019-11-27 PROCEDURE — 3E043XZ INTRODUCTION OF VASOPRESSOR INTO CENTRAL VEIN, PERCUTANEOUS APPROACH: ICD-10-PCS | Performed by: OBSTETRICS & GYNECOLOGY

## 2019-11-27 PROCEDURE — 86900 BLOOD TYPING SEROLOGIC ABO: CPT

## 2019-11-27 PROCEDURE — 37000009 ZZH ANESTHESIA TECHNICAL FEE, EACH ADDTL 15 MIN: Performed by: OBSTETRICS & GYNECOLOGY

## 2019-11-27 PROCEDURE — 36000062 ZZH SURGERY LEVEL 4 1ST 30 MIN - UMMC: Performed by: OBSTETRICS & GYNECOLOGY

## 2019-11-27 PROCEDURE — 84703 CHORIONIC GONADOTROPIN ASSAY: CPT

## 2019-11-27 PROCEDURE — 85730 THROMBOPLASTIN TIME PARTIAL: CPT | Performed by: INTERNAL MEDICINE

## 2019-11-27 PROCEDURE — 99291 CRITICAL CARE FIRST HOUR: CPT | Mod: 24 | Performed by: OBSTETRICS & GYNECOLOGY

## 2019-11-27 PROCEDURE — 0UT90ZZ RESECTION OF UTERUS, OPEN APPROACH: ICD-10-PCS | Performed by: OBSTETRICS & GYNECOLOGY

## 2019-11-27 PROCEDURE — 25000128 H RX IP 250 OP 636: Performed by: ANESTHESIOLOGY

## 2019-11-27 PROCEDURE — 71000015 ZZH RECOVERY PHASE 1 LEVEL 2 EA ADDTL HR: Performed by: OBSTETRICS & GYNECOLOGY

## 2019-11-27 PROCEDURE — 37000008 ZZH ANESTHESIA TECHNICAL FEE, 1ST 30 MIN: Performed by: OBSTETRICS & GYNECOLOGY

## 2019-11-27 PROCEDURE — 88309 TISSUE EXAM BY PATHOLOGIST: CPT | Performed by: OBSTETRICS & GYNECOLOGY

## 2019-11-27 RX ORDER — HYDROMORPHONE HYDROCHLORIDE 1 MG/ML
.3-.5 INJECTION, SOLUTION INTRAMUSCULAR; INTRAVENOUS; SUBCUTANEOUS
Status: DISCONTINUED | OUTPATIENT
Start: 2019-11-27 | End: 2019-11-30

## 2019-11-27 RX ORDER — SODIUM CHLORIDE, SODIUM LACTATE, POTASSIUM CHLORIDE, CALCIUM CHLORIDE 600; 310; 30; 20 MG/100ML; MG/100ML; MG/100ML; MG/100ML
INJECTION, SOLUTION INTRAVENOUS CONTINUOUS PRN
Status: DISCONTINUED | OUTPATIENT
Start: 2019-11-27 | End: 2019-11-27

## 2019-11-27 RX ORDER — NALOXONE HYDROCHLORIDE 0.4 MG/ML
.1-.4 INJECTION, SOLUTION INTRAMUSCULAR; INTRAVENOUS; SUBCUTANEOUS
Status: DISCONTINUED | OUTPATIENT
Start: 2019-11-27 | End: 2019-11-27

## 2019-11-27 RX ORDER — NICOTINE POLACRILEX 4 MG
15-30 LOZENGE BUCCAL
Status: DISCONTINUED | OUTPATIENT
Start: 2019-11-27 | End: 2019-11-27

## 2019-11-27 RX ORDER — NALOXONE HYDROCHLORIDE 0.4 MG/ML
.1-.4 INJECTION, SOLUTION INTRAMUSCULAR; INTRAVENOUS; SUBCUTANEOUS
Status: DISCONTINUED | OUTPATIENT
Start: 2019-11-27 | End: 2019-12-05 | Stop reason: HOSPADM

## 2019-11-27 RX ORDER — ONDANSETRON 2 MG/ML
4 INJECTION INTRAMUSCULAR; INTRAVENOUS EVERY 6 HOURS PRN
Status: DISCONTINUED | OUTPATIENT
Start: 2019-11-27 | End: 2019-11-27

## 2019-11-27 RX ORDER — DEXMEDETOMIDINE HYDROCHLORIDE 4 UG/ML
0.2-1.2 INJECTION, SOLUTION INTRAVENOUS CONTINUOUS
Status: DISCONTINUED | OUTPATIENT
Start: 2019-11-27 | End: 2019-11-27

## 2019-11-27 RX ORDER — ACETAMINOPHEN 325 MG/1
975 TABLET ORAL 3 TIMES DAILY
Status: DISCONTINUED | OUTPATIENT
Start: 2019-11-27 | End: 2019-12-03

## 2019-11-27 RX ORDER — DEXAMETHASONE SODIUM PHOSPHATE 4 MG/ML
INJECTION, SOLUTION INTRA-ARTICULAR; INTRALESIONAL; INTRAMUSCULAR; INTRAVENOUS; SOFT TISSUE PRN
Status: DISCONTINUED | OUTPATIENT
Start: 2019-11-27 | End: 2019-11-27

## 2019-11-27 RX ORDER — CEFAZOLIN SODIUM 1 G/3ML
1 INJECTION, POWDER, FOR SOLUTION INTRAMUSCULAR; INTRAVENOUS SEE ADMIN INSTRUCTIONS
Status: DISCONTINUED | OUTPATIENT
Start: 2019-11-27 | End: 2019-11-27

## 2019-11-27 RX ORDER — SODIUM CHLORIDE 9 MG/ML
INJECTION, SOLUTION INTRAVENOUS CONTINUOUS PRN
Status: DISCONTINUED | OUTPATIENT
Start: 2019-11-27 | End: 2019-11-27

## 2019-11-27 RX ORDER — ALBUMIN, HUMAN INJ 5% 5 %
25 SOLUTION INTRAVENOUS ONCE
Status: COMPLETED | OUTPATIENT
Start: 2019-11-27 | End: 2019-11-27

## 2019-11-27 RX ORDER — CEFAZOLIN SODIUM 2 G/100ML
2 INJECTION, SOLUTION INTRAVENOUS
Status: DISCONTINUED | OUTPATIENT
Start: 2019-11-27 | End: 2019-11-27

## 2019-11-27 RX ORDER — PIPERACILLIN SODIUM, TAZOBACTAM SODIUM 2; .25 G/10ML; G/10ML
2.25 INJECTION, POWDER, LYOPHILIZED, FOR SOLUTION INTRAVENOUS EVERY 6 HOURS SCHEDULED
Status: DISCONTINUED | OUTPATIENT
Start: 2019-11-27 | End: 2019-12-01

## 2019-11-27 RX ORDER — HYDROMORPHONE HYDROCHLORIDE 1 MG/ML
.3-.5 INJECTION, SOLUTION INTRAMUSCULAR; INTRAVENOUS; SUBCUTANEOUS EVERY 5 MIN PRN
Status: DISCONTINUED | OUTPATIENT
Start: 2019-11-27 | End: 2019-11-27 | Stop reason: HOSPADM

## 2019-11-27 RX ORDER — ONDANSETRON 2 MG/ML
4 INJECTION INTRAMUSCULAR; INTRAVENOUS EVERY 30 MIN PRN
Status: DISCONTINUED | OUTPATIENT
Start: 2019-11-27 | End: 2019-11-27 | Stop reason: HOSPADM

## 2019-11-27 RX ORDER — PROCHLORPERAZINE MALEATE 5 MG
10 TABLET ORAL EVERY 6 HOURS PRN
Status: DISCONTINUED | OUTPATIENT
Start: 2019-11-27 | End: 2019-12-05 | Stop reason: HOSPADM

## 2019-11-27 RX ORDER — FENTANYL CITRATE 50 UG/ML
INJECTION, SOLUTION INTRAMUSCULAR; INTRAVENOUS PRN
Status: DISCONTINUED | OUTPATIENT
Start: 2019-11-27 | End: 2019-11-27

## 2019-11-27 RX ORDER — DEXTROSE MONOHYDRATE 25 G/50ML
25-50 INJECTION, SOLUTION INTRAVENOUS
Status: DISCONTINUED | OUTPATIENT
Start: 2019-11-27 | End: 2019-11-27

## 2019-11-27 RX ORDER — CALCIUM CHLORIDE 100 MG/ML
INJECTION INTRAVENOUS; INTRAVENTRICULAR PRN
Status: DISCONTINUED | OUTPATIENT
Start: 2019-11-27 | End: 2019-11-27

## 2019-11-27 RX ORDER — SODIUM CHLORIDE, SODIUM LACTATE, POTASSIUM CHLORIDE, CALCIUM CHLORIDE 600; 310; 30; 20 MG/100ML; MG/100ML; MG/100ML; MG/100ML
INJECTION, SOLUTION INTRAVENOUS CONTINUOUS
Status: DISCONTINUED | OUTPATIENT
Start: 2019-11-27 | End: 2019-11-27 | Stop reason: HOSPADM

## 2019-11-27 RX ORDER — NOREPINEPHRINE BITARTRATE 0.06 MG/ML
0.03-0.4 INJECTION, SOLUTION INTRAVENOUS CONTINUOUS
Status: DISCONTINUED | OUTPATIENT
Start: 2019-11-27 | End: 2019-12-01

## 2019-11-27 RX ORDER — ONDANSETRON 4 MG/1
4 TABLET, ORALLY DISINTEGRATING ORAL EVERY 30 MIN PRN
Status: DISCONTINUED | OUTPATIENT
Start: 2019-11-27 | End: 2019-11-27 | Stop reason: HOSPADM

## 2019-11-27 RX ORDER — PROCHLORPERAZINE 25 MG
25 SUPPOSITORY, RECTAL RECTAL EVERY 12 HOURS PRN
Status: DISCONTINUED | OUTPATIENT
Start: 2019-11-27 | End: 2019-12-05 | Stop reason: HOSPADM

## 2019-11-27 RX ORDER — CEFTRIAXONE 2 G/1
2 INJECTION, POWDER, FOR SOLUTION INTRAMUSCULAR; INTRAVENOUS EVERY 24 HOURS
Status: DISCONTINUED | OUTPATIENT
Start: 2019-11-27 | End: 2019-11-27

## 2019-11-27 RX ORDER — NOREPINEPHRINE BITARTRATE 0.06 MG/ML
.03-.4 INJECTION, SOLUTION INTRAVENOUS CONTINUOUS
Status: DISCONTINUED | OUTPATIENT
Start: 2019-11-27 | End: 2019-11-27 | Stop reason: HOSPADM

## 2019-11-27 RX ORDER — PIPERACILLIN SODIUM, TAZOBACTAM SODIUM 3; .375 G/15ML; G/15ML
3.38 INJECTION, POWDER, LYOPHILIZED, FOR SOLUTION INTRAVENOUS EVERY 8 HOURS SCHEDULED
Status: DISCONTINUED | OUTPATIENT
Start: 2019-11-27 | End: 2019-11-27 | Stop reason: ALTCHOICE

## 2019-11-27 RX ORDER — FENTANYL CITRATE 50 UG/ML
25-50 INJECTION, SOLUTION INTRAMUSCULAR; INTRAVENOUS
Status: DISCONTINUED | OUTPATIENT
Start: 2019-11-27 | End: 2019-11-27 | Stop reason: HOSPADM

## 2019-11-27 RX ORDER — LIDOCAINE HYDROCHLORIDE 20 MG/ML
INJECTION, SOLUTION INFILTRATION; PERINEURAL PRN
Status: DISCONTINUED | OUTPATIENT
Start: 2019-11-27 | End: 2019-11-27

## 2019-11-27 RX ORDER — ONDANSETRON 4 MG/1
4 TABLET, ORALLY DISINTEGRATING ORAL EVERY 6 HOURS PRN
Status: DISCONTINUED | OUTPATIENT
Start: 2019-11-27 | End: 2019-11-27

## 2019-11-27 RX ORDER — PROPOFOL 10 MG/ML
INJECTION, EMULSION INTRAVENOUS PRN
Status: DISCONTINUED | OUTPATIENT
Start: 2019-11-27 | End: 2019-11-27

## 2019-11-27 RX ADMIN — ROCURONIUM BROMIDE 100 MG: 10 INJECTION INTRAVENOUS at 09:23

## 2019-11-27 RX ADMIN — NOREPINEPHRINE BITARTRATE 12.8 MCG: 1 INJECTION INTRAVENOUS at 09:50

## 2019-11-27 RX ADMIN — FENTANYL CITRATE 50 MCG: 50 INJECTION, SOLUTION INTRAMUSCULAR; INTRAVENOUS at 09:21

## 2019-11-27 RX ADMIN — METRONIDAZOLE 500 MG: 500 INJECTION, SOLUTION INTRAVENOUS at 02:49

## 2019-11-27 RX ADMIN — METRONIDAZOLE 500 MG: 500 INJECTION, SOLUTION INTRAVENOUS at 08:41

## 2019-11-27 RX ADMIN — HUMAN INSULIN 2.5 UNITS/HR: 100 INJECTION, SOLUTION SUBCUTANEOUS at 18:08

## 2019-11-27 RX ADMIN — ROCURONIUM BROMIDE 50 MG: 10 INJECTION INTRAVENOUS at 11:19

## 2019-11-27 RX ADMIN — ONDANSETRON 4 MG: 2 INJECTION INTRAMUSCULAR; INTRAVENOUS at 11:56

## 2019-11-27 RX ADMIN — NOREPINEPHRINE BITARTRATE 12.8 MCG: 1 INJECTION INTRAVENOUS at 10:15

## 2019-11-27 RX ADMIN — PHENYLEPHRINE HYDROCHLORIDE 0.5 MCG/KG/MIN: 10 INJECTION INTRAVENOUS at 09:30

## 2019-11-27 RX ADMIN — SODIUM CHLORIDE, POTASSIUM CHLORIDE, SODIUM LACTATE AND CALCIUM CHLORIDE 500 ML: 600; 310; 30; 20 INJECTION, SOLUTION INTRAVENOUS at 15:42

## 2019-11-27 RX ADMIN — NOREPINEPHRINE BITARTRATE 12.8 MCG: 1 INJECTION INTRAVENOUS at 09:31

## 2019-11-27 RX ADMIN — PIPERACILLIN SODIUM AND TAZOBACTAM SODIUM 2.25 G: 2; .25 INJECTION, POWDER, LYOPHILIZED, FOR SOLUTION INTRAVENOUS at 18:27

## 2019-11-27 RX ADMIN — METRONIDAZOLE 500 MG: 500 INJECTION, SOLUTION INTRAVENOUS at 15:01

## 2019-11-27 RX ADMIN — FENTANYL CITRATE 25 MCG: 50 INJECTION, SOLUTION INTRAMUSCULAR; INTRAVENOUS at 12:40

## 2019-11-27 RX ADMIN — CALCIUM GLUCONATE 1 G: 98 INJECTION, SOLUTION INTRAVENOUS at 17:22

## 2019-11-27 RX ADMIN — FENTANYL CITRATE 50 MCG: 50 INJECTION, SOLUTION INTRAMUSCULAR; INTRAVENOUS at 10:08

## 2019-11-27 RX ADMIN — FAMOTIDINE 20 MG: 20 INJECTION, SOLUTION INTRAVENOUS at 20:29

## 2019-11-27 RX ADMIN — MIDAZOLAM 1 MG: 1 INJECTION INTRAMUSCULAR; INTRAVENOUS at 09:30

## 2019-11-27 RX ADMIN — DEXAMETHASONE SODIUM PHOSPHATE 4 MG: 4 INJECTION, SOLUTION INTRA-ARTICULAR; INTRALESIONAL; INTRAMUSCULAR; INTRAVENOUS; SOFT TISSUE at 09:30

## 2019-11-27 RX ADMIN — PROPOFOL 140 MG: 10 INJECTION, EMULSION INTRAVENOUS at 09:22

## 2019-11-27 RX ADMIN — CALCIUM CHLORIDE 500 MG: 100 INJECTION, SOLUTION INTRAVENOUS at 11:25

## 2019-11-27 RX ADMIN — SUGAMMADEX 200 MG: 100 INJECTION, SOLUTION INTRAVENOUS at 11:59

## 2019-11-27 RX ADMIN — NOREPINEPHRINE BITARTRATE 12.8 MCG: 1 INJECTION INTRAVENOUS at 09:24

## 2019-11-27 RX ADMIN — FENTANYL CITRATE 25 MCG: 50 INJECTION, SOLUTION INTRAMUSCULAR; INTRAVENOUS at 12:42

## 2019-11-27 RX ADMIN — PROCHLORPERAZINE EDISYLATE 5 MG: 5 INJECTION INTRAMUSCULAR; INTRAVENOUS at 13:09

## 2019-11-27 RX ADMIN — NOREPINEPHRINE BITARTRATE 12.8 MCG: 1 INJECTION INTRAVENOUS at 11:27

## 2019-11-27 RX ADMIN — PROCHLORPERAZINE EDISYLATE 5 MG: 5 INJECTION INTRAMUSCULAR; INTRAVENOUS at 13:18

## 2019-11-27 RX ADMIN — MIDAZOLAM 1 MG: 1 INJECTION INTRAMUSCULAR; INTRAVENOUS at 09:52

## 2019-11-27 RX ADMIN — Medication 20 MCG: at 10:38

## 2019-11-27 RX ADMIN — FENTANYL CITRATE 25 MCG: 50 INJECTION, SOLUTION INTRAMUSCULAR; INTRAVENOUS at 13:14

## 2019-11-27 RX ADMIN — FENTANYL CITRATE 25 MCG: 50 INJECTION, SOLUTION INTRAMUSCULAR; INTRAVENOUS at 12:54

## 2019-11-27 RX ADMIN — NOREPINEPHRINE BITARTRATE 12.8 MCG: 1 INJECTION INTRAVENOUS at 10:17

## 2019-11-27 RX ADMIN — SODIUM CHLORIDE, POTASSIUM CHLORIDE, SODIUM LACTATE AND CALCIUM CHLORIDE: 600; 310; 30; 20 INJECTION, SOLUTION INTRAVENOUS at 09:06

## 2019-11-27 RX ADMIN — Medication 0.07 MCG/KG/MIN: at 10:29

## 2019-11-27 RX ADMIN — FENTANYL CITRATE 100 MCG: 50 INJECTION, SOLUTION INTRAMUSCULAR; INTRAVENOUS at 10:44

## 2019-11-27 RX ADMIN — SODIUM CHLORIDE: 900 INJECTION INTRAVENOUS at 09:13

## 2019-11-27 RX ADMIN — LIDOCAINE HYDROCHLORIDE 100 MG: 20 INJECTION, SOLUTION INFILTRATION; PERINEURAL at 09:22

## 2019-11-27 RX ADMIN — Medication 0.05 MCG/KG/MIN: at 20:10

## 2019-11-27 RX ADMIN — CEFTRIAXONE SODIUM 2 G: 2 INJECTION, POWDER, FOR SOLUTION INTRAMUSCULAR; INTRAVENOUS at 11:39

## 2019-11-27 RX ADMIN — SODIUM CHLORIDE, PRESERVATIVE FREE 5 ML: 5 INJECTION INTRAVENOUS at 07:04

## 2019-11-27 RX ADMIN — SODIUM CHLORIDE: 9 INJECTION, SOLUTION INTRAVENOUS at 09:09

## 2019-11-27 RX ADMIN — ALBUMIN HUMAN 25 G: 0.05 INJECTION, SOLUTION INTRAVENOUS at 18:27

## 2019-11-27 RX ADMIN — NOREPINEPHRINE BITARTRATE 12.8 MCG: 1 INJECTION INTRAVENOUS at 09:47

## 2019-11-27 ASSESSMENT — ACTIVITIES OF DAILY LIVING (ADL)
ADLS_ACUITY_SCORE: 32
ADLS_ACUITY_SCORE: 34
ADLS_ACUITY_SCORE: 32

## 2019-11-27 NOTE — OP NOTE
Gynecologic Oncology Operative Report    Chela Love  [id number]  11/27/2019    Pre-operative Diagnosis:  Pelvic mass, carcinomatosis, ascites, elevated , sepsis, concern for intra-abdominal souce    Post-operative Diagnosis:  Adenocarcinoma on frozen section, darek purulence of pelvic mass, final pathology pending    Surgeon:  Rosana James MD    Assistants:  Preeti Kruse MD, Fellow    Anesthesia:  General endotracheal    Procedure:  Exploratory laparotomy, total abdominal hysterectomy, bilateral salpingo-oophorectomy, omentectomy, lysis of adhesions, tumor debulking, abdominal washout    EBL:  500 cc    IVF:  1100 cc crystalloid, 2 units PRBC's, 1 pack FFP    UOP:  50 cc clear yellow urine with sediment    Specimens:  1.  Right ovary and fallopian tube  2.  Uterus, cervix, left ovary and fallopian tube  3.  Omentum    Complications:  None apparent    Indications for procedure:  Chela Love is a 44 year old who presented with sepsis, abdominal pain, vaginal bleeding.  She has a complex medical history including poorly controlled DM, HTN, drug abuse and anxiety.  Given her multiple co-morbidities, she was initially managed with antibiotics.  Paracentesis was performed which was negative for malignancy but showed acute inflammation.  IR was consulted for possible biopsy but there was no safe window for biopsy.  Despite antibiotics her clinical condition continued to worsen with worsening renal failure and increasing WBC.  Given an intra-abdominal source was the most likely, the decision was made to proceed with resection of the mass.  Following a thorough discussion of the risks, benefits, indications and alternatives she consented to the above procedure.    Operative Findings:  EUA revealed a large mass palpable above the umbilicus with a distended abdomen of fluid.  On laparotomy the right ovary was completely replaced with a multicystic solid mass with purulent fluid in the cystic areas.  There  was a large amount (approximately 1 liter of bloody/purulent ascites).  The mass was adherent to the anterior abdominal wall, pelvic sidewalls, omentum and small and large bowel.  There was an inflammatory rind covering the peritoneal surfaces including the bilateral pelvic gutters, bladder peritoneum, omentum and rectosigmoid colon as well as several areas along the small bowel and in the small bowel mesentery.  The uterus was enlarged and globular.  The left ovary and fallopian tube were grossly normal.  The omentum was adherent to the mass and contained diffuse inflammatory rind which made it difficult to determine if there was involvement by tumor grossly.  The bilateral diaphragms were smooth and free of tumor.  The entire bowel was run without evidence of tumor but with several areas of inflammatory rind remaining.  Frozen section analysis was consistent with adenocarcinoma, favor an endometrioid histology and the uterus contained a tumor on the anterior and posterior endometrium, making a uterine source most likely.  At the completion of the procedure there was no further purulence and no evidence of residual tumor.     Operative Procedure:  The consent was reviewed with the patient in the preoperative setting. She was continued on her current antibiotic regimen. She was subsequently transferred to the operating room for the above-mentioned procedure. The patient was placed in dorsal lithotomy position. General anesthetic was obtained without noted difficulties. Patient already had a nelson catheter in place. The patient was then prepped and draped for an abdominal procedure. Timeout was called at which point the patient's name, operative procedure and site was confirmed by the operative team. A midline vertical skin incision was then made from the level of pubic symphysis and ultimately extending well above the umbilicus. Incision was performed sharply and carried through the subcutaneous layer with cautery.  Fascia was incised and extended superiorly and inferiorly. Peritoneum was then elevated and entered sharply. Peritoneal incision was extended without any injury to nearby structures. At this point, exploration of the pelvis and upper abdomen was performed. The Bookwalter retractor was positioned and utilized throughout the course of the procedure. The ascites was evacuated.  The mass was elevated out of the abdomen by bluntly taking down its inflammatory adhesions to the sidewalls, anterior abdominal wall and small bowel.  Once the mass was freed the right round ligament was suture ligated, transected with cautery and the right retroperitoneum was entered.  The right ureter was identified deep in the pelvis.  The IP ligament was then isolated and the LigaSure device was utilized to coagulate and transect the IP ligament ensuring we were well above the ureter. The right utero-ovarian ligament was then cauterized and divided with the LigaSure device and the right ovary and fallopian tube were removed and sent for pathology which did return as adenocarcinoma.  Uterus was then elevated with clamps placed on both uterine cornua.  Attention was then turned to the left and the round ligament was isolated, transected with cautery and suture ligated. We extended the peritoneal incision over the left aspect of the psoas muscle which did require some lysis of adhesions given the inflammatory rind covering the peritoneal surfaces. Left retroperitoneal exploration was performed. We identified the ureter deep in the pelvis. Well above that site, a window was made in the medial leaf of the broad ligament and the left ovarian vessels were then cauterized and divided with the LigaSure device. The peritoneal incision over the lower uterine segment was incised which again required lysis of adhesions as there was a thick inflammatory nature to the bladder peritoneum. The vesicouterine peritoneum was then fully developed. The bladder  was retracted to the level of the upper vagina. Bilateral uterine arteries were now isolated and then clamped in a serial manner. We then cut and suture ligated with Vicryl.  The bilateral cardinal ligaments were next clamped and cut and suture ligated with Vicryl. We proceeded until we could isolate out the uterosacral ligaments, which were similarly cut and suture ligated.  The apex of the vagina was then clamped with a clamp and the matias scissors was used to perform the colpotomy and the full cervix was resected along with the uterus and the left ovary and fallopian tube. The vaginal cuff was now grasped and suture ligated with interrupted, figure of eight Vicryl sutures with excellent reapproximation and good hemostasis. At this point, the pelvis was hemostatic.     Attention was then turned to the omentectomy.  We first had to perform lysis of adhesions to free the omentum from its adhesions and inflammatory changes to the pelvic sidewalls.  Once freed, complete omentectomy was performed extending from the hepatic to the splenic flexure and extending to the underside of the greater curvature of the stomach. Total omentectomy was performed by mobilizing the omentum off of the transverse colon up to the gastroepiploic arcade where the feeding vessels were divided and ligated with the LigaSure device. At this point the entire bowel was run, freeing all the areas where there were adhesions due to inflammation.  There were several areas of pockets filled with purulent fluid which was freed.  The entire bowel was then run again without further evidence of purulence or disease.        At this point, we performed irrigation of the pelvis and upper abdomen with 10 liters of warm saline. All laparotomy sponges were next removed. Fascia was closed with 0 looped PDS in 2 segments meeting in the middle. Subcutaneous tissue was reapproximated and skin closed with two running subcuticular sutures meeting in the middle  and Dermabond applied.      Sponge, lap, instrument and needle counts were reported as correct x2. The patient was then repositioned appropriately, recalled from the general anesthetic and was transferred to recovery unit in stable condition.       Rosana James MD  Gynecologic Oncology  [facility]

## 2019-11-27 NOTE — PROGRESS NOTES
Nephrology Progress Note  11/27/2019         Assessment & Recommendations:   Chela Love is a 44 year old female with a history of poor-controlled T2DM, HTN, HLD, substance abuse (methamphetamine, cannabis), who presents as a transfer from [facility] for management of severe sepsis (unknown source) and large ovarian mass with ascites and peritoneal carcinomatosis; adenocarcinoma endometrioid type on pathology. Also has worsening CINDY from ATN, likely multifactorial.    Oliguric CINDY (ATN)  Hypervolemic hyponatremia, improved  Based on sharp rising pattern of Cr, her CINDY is most likely secondary to ATN. This could be multifactorial in etiology; systemic infection, past episode of prerenal CINDY at OSH that improved with hydration, exposure to contrast agent. Kidney US (11/25/19) which showed no evidence of obstruction/hydronephrosis goes against post-renal CINDY. Pre-renal CINDY is also unlikely at this time as patient has net positive ~7L total since admission without improvement in Cr. Discussed with family that dialysis could be needed in ~24-48 h depending on further trajectory.  Appears slightly hypervolemic on evaluation. Her Cr over the past 24 h seems stable and started to produce decent volume of UOP (~150 ml in 1 hour after transferred to SICU), hoping to see stabilization without need for dialysis  - No acute indication for dialysis today but would recommend monitoring BMP closely (q8-12h)  - Would hold-off on IVF or diuretic for now, closely monitor BMP, UOP, and respiratory status.  - Trend BMP q 8. Strict I/O. Daily weight.  - consider albumin for volume given she is hypoalbuminemic  - Adjust dose of medication based on CrCl of 15ml/min. Avoid nephrotoxic agents if possible.    Mixed non-anion gap metabolic acidosis and respiratory acidosis  Likely a combination of renal impairment and mild respiratory depression. Continue to monitor.    Electrolytes- potassium high normal, would monitor q8 hours until  "UOP more robust.    Anemia- acute blood loss- transfusion per primary team    Recommendations were communicated to primary team via note.     Seen and discussed with Dr. Anna Browne.     Brenton Hutton MD   [phone]    Interval History :   Nursing and provider notes from last 24 hours reviewed.  In the last 24 hours Chela Love has some chest pain and worsening abdominal pain which improved with medications. She was transferred to Gyn/Onc Service and underwent total abdominal hysterectomy, Bilateral salpingoophorectomy, Omentectomy, Abdominal Washout, and Tumor debulking this AM.    Transferred to SICU. Stable on 2L NC. No pressor. Has ~150 ml UOP in 1 hour after transferred.    Physical Exam:   I/O last 3 completed shifts:  In: 1050 [P.O.:920; I.V.:130]  Out: 575 [Urine:575]   /69 (BP Location: Right arm)   Pulse 106   Temp 99.8  F (37.7  C) (Axillary)   Resp 20   Ht 1.676 m (5' 6\")   Wt 106.9 kg (235 lb 10.8 oz)   LMP 10/27/2019   SpO2 96%   Breastfeeding No   BMI 38.04 kg/m       Constitutional: drowsy, appears tired  Eyes: pupils equal, round and reactive to light, extra ocular muscles intact, sclera clear, conjunctiva normal  Respiratory: no increased work of breathing, no crackles or wheezing  Cardiovascular: Normal apical impulse, regular rate and rhythm, normal S1 and S2, no S3 or S4. HECTOR grade 2/6 heard at LUPSB.  GI: soft, generalized mild tenderness to palpation (no guarding or rebound). Midline incision closed with sutures with dermabond in place  Genitounirinary: nelson catheter in place  Skin: normal skin color, texture, turgor  Musculoskeletal: 1+ lower extremity pitting edema present  Neurologic: drowsy. Grossly no focal neurological deficit.    Labs:   All labs reviewed by me  Electrolytes/Renal -   Recent Labs   Lab Test 11/27/19  1121 11/27/19  0951 11/27/19  0558 11/26/19  2035 11/26/19  1737  11/24/19  0727  11/23/19  2321   * 132* 133 131* 130*   < > 131*  --  " 132*   POTASSIUM 4.6 4.1 4.2 4.3 4.2   < > 4.2   < > 3.9   CHLORIDE  --   --  100 99 97   < > 98  --  99   CO2  --   --  22 22 20   < > 23  --  25   BUN  --   --  67* 64* 60*   < > 32*  --  28   CR  --   --  3.92* 3.76* 3.58*   < > 1.60*  --  1.26*   * 144* 188* 258* 287*   < > 226*  --  174*   MARILIN  --   --  8.3* 8.0* 8.1*   < > 7.7*  --  7.6*   MAG  --   --   --   --   --   --  2.2  --  1.7   PHOS  --   --   --   --  5.6*  --   --   --   --     < > = values in this interval not displayed.       CBC -   Recent Labs   Lab Test 11/27/19  1121 11/27/19  0951 11/27/19  0558 11/26/19  1737 11/26/19  0439   WBC  --   --  19.5* 21.2* 19.5*   HGB 9.1* 8.4* 8.3* 8.4* 8.0*   PLT  --   --  247 251 222       LFTs -   Recent Labs   Lab Test 11/26/19  1737 11/25/19  0614 11/24/19  1324 11/24/19  0727   ALKPHOS 325* 136  --  99   BILITOTAL 0.4 0.6  --  1.1   ALT 15 17  --  17   AST 31 38  --  60*   PROTTOTAL 6.4* 6.2*  --  5.9*   ALBUMIN 1.8* 1.5* 1.5* 1.6*     Iron Panel - No lab results found.    Imaging:  No new imaging over the past 24h.    Current Medications:    cefTRIAXone  2 g Intravenous Q24H     [Held by provider] heparin ANTICOAGULANT  5,000 Units Subcutaneous Q12H     [Auto Hold] heparin lock flush  5-10 mL Intracatheter Q24H     [Auto Hold] insulin aspart  1-12 Units Subcutaneous Q4H     [Auto Hold] insulin glargine  10 Units Subcutaneous QAM AC     [Auto Hold] insulin glargine  25 Units Subcutaneous QPM     metroNIDAZOLE  500 mg Intravenous Q6H     [Auto Hold] senna-docusate  1 tablet Oral BID    Or     [Auto Hold] senna-docusate  2 tablet Oral BID     [Auto Hold] sodium chloride (PF)  3 mL Intracatheter Q8H       dexmedetomidine       lactated ringers       norepinephrine 0.03 mcg/kg/min (11/27/19 1242)     phenylephrine Stopped (11/27/19 1204)     Brenton Hutton MD    I was present with the resident during the history and exam.  I discussed the case with the resident and agree with the findings as  documented in the assessment and plan.  Still critically ill. Saw patient after transfer from PACU. She seems to be making 50-75 ml/hr of urine and creatinine may be stabilizing (though received transfusion so may be dilutional).  Will monitor closely. Need to be careful giving significant potassium ( blood, LR) to avoid hyperkalemia.  She is significantly hypoalbuminemic. When her renal function stabilizes slightly, would check protein random urine to quantify proteinuria  Anna MD [place]   of Medicine  Department of Nephrology  [facility]

## 2019-11-27 NOTE — PLAN OF CARE
Neuro: A&Ox3.  Intermittently dsioriented to time   Cardiac: Sinus Tach. Bps variable from 150s/80s to 100s/60s.         Respiratory: Sating 97% on 3L NC.  GI/: Low urine output w/nelson catheter r/t to increased cret. IV lasix given 1x. No BM passing flatus  Diet/appetite: Tolerating regular diet. Eating fair.  Q4 glucose checks with apart coverage per MAR.  NPO after midnight for surgery  Activity:  Assist of 3, up to EOB. Multiple anxiety attacks resolved with therapeutic listening, breathing  Pain: C/o constant stabbing and aching epigastric pain.  Dilaudid PRN x1 with some relief.   Skin: Mild-moderate edema noted throughout rosalia BLE. Blanchable erythema on gluteal crease. Serosanguinous drainage noted from vagina  LDA's: LRE PIV saline locked, positional. Triple lumen PICC in RUE Hubbard infusing others saline locked.     Plan: Transfer to Gyn/Onc team, 7C for exploratory laparotomy AM,

## 2019-11-27 NOTE — PLAN OF CARE
OT-6B: Cancel this AM upon attempt, pt just got over anxiety attack and then sleeping. RN request to check back. Will reschedule.

## 2019-11-27 NOTE — PROGRESS NOTES
Patient transferred from , A&O x4, lethargic but answers questions appropriately, capnography in place.  Denies need for pain medication, nelson on place, BLE edema, scant vaginal bleeding. PICC TL HL x 2, infusing TKO x 1. Right piv SL. Mother here. OR checklist initiated, Surgical scrub x1 done. Plan for 0815 OR case Exp ZANDRA Mosher.

## 2019-11-27 NOTE — ANESTHESIA PREPROCEDURE EVALUATION
Anesthesia Pre-Procedure Evaluation    Patient: Chela Love   MRN:     [id number] Gender:   female   Age:    44 year old :      1975        Preoperative Diagnosis: Ovarian cancer (H) [C56.9]   Procedure(s):  TOTAL ABDOMINAL HYSTERECTOMY, Bilateral SALPINGoophorectomy, Tumor DEBULKING     Past Medical History:   Diagnosis Date     Anxiety      Asthma      Bilateral lower extremity edema      Depression      HTN (hypertension)      Insomnia      Migraine      Obesity      Substance abuse (H)     Methamphetamine use     Type 2 diabetes mellitus (H)      Vasculopathy       No past surgical history on file.       Anesthesia Evaluation     .             ROS/MED HX    ENT/Pulmonary:     (+)asthma , . .    Neurologic:     (+)delerium (acute)     Cardiovascular:     (+) hypertension----. : . . . :. .       METS/Exercise Tolerance:     Hematologic:         Musculoskeletal:         GI/Hepatic: Comment: ascites        Renal/Genitourinary: Comment: Large R ovarian mass, abdominal sepsis    (+) chronic renal disease, type: ARF,       Endo:     (+) type II DM Last HgA1c: 15 Using insulin - not using insulin pump Obesity, .      Psychiatric: Comment: Meth, marijuana        Infectious Disease:         Malignancy:   (+) Malignancy (unknown source, ?ovarian)           Other:                         PHYSICAL EXAM:   Mental Status/Neuro: Abnormal Mental Status  Abnormal Mental Status: Somnolent   Airway:    Respiratory: Auscultation: Decreased BS      CV: Rhythm: Regular  Rate: Age appropriate  Heart: Normal Sounds  Edema: None   Comments:                      LABS:  CBC:   Lab Results   Component Value Date    WBC 19.5 (H) 2019    WBC 21.2 (H) 2019    HGB 8.3 (L) 2019    HGB 8.4 (L) 2019    HCT 27.3 (L) 2019    HCT 28.2 (L) 2019     2019     2019     BMP:   Lab Results   Component Value Date     2019     (L) 2019    POTASSIUM 4.2  "11/27/2019    POTASSIUM 4.3 11/26/2019    CHLORIDE 100 11/27/2019    CHLORIDE 99 11/26/2019    CO2 22 11/27/2019    CO2 22 11/26/2019    BUN 67 (H) 11/27/2019    BUN 64 (H) 11/26/2019    CR 3.92 (H) 11/27/2019    CR 3.76 (H) 11/26/2019     (H) 11/27/2019     (H) 11/26/2019     COAGS:   Lab Results   Component Value Date    PTT 35 11/27/2019    INR 1.75 (H) 11/27/2019    FIBR 778 (H) 11/27/2019     POC:   Lab Results   Component Value Date     (H) 11/27/2019    HCGS Negative 11/27/2019     OTHER:   Lab Results   Component Value Date    PH 7.33 (L) 11/26/2019    LACT 2.4 (H) 11/26/2019    A1C 15.0 (H) 11/23/2019    MARILIN 8.3 (L) 11/27/2019    PHOS 5.6 (H) 11/26/2019    MAG 2.2 11/24/2019    ALBUMIN 1.8 (L) 11/26/2019    PROTTOTAL 6.4 (L) 11/26/2019    ALT 15 11/26/2019    AST 31 11/26/2019    ALKPHOS 325 (H) 11/26/2019    BILITOTAL 0.4 11/26/2019    EVON 28 11/23/2019    TSH 1.80 11/23/2019    .0 (H) 11/23/2019        Preop Vitals    BP Readings from Last 3 Encounters:   11/27/19 114/69    Pulse Readings from Last 3 Encounters:   11/26/19 106      Resp Readings from Last 3 Encounters:   11/27/19 20    SpO2 Readings from Last 3 Encounters:   11/27/19 96%      Temp Readings from Last 1 Encounters:   11/27/19 37.7  C (99.8  F) (Axillary)    Ht Readings from Last 1 Encounters:   11/23/19 1.676 m (5' 6\")      Wt Readings from Last 1 Encounters:   11/26/19 106.9 kg (235 lb 10.8 oz)    Estimated body mass index is 38.04 kg/m  as calculated from the following:    Height as of this encounter: 1.676 m (5' 6\").    Weight as of this encounter: 106.9 kg (235 lb 10.8 oz).     LDA:  Peripheral IV 11/22/19 Right Lower forearm (Active)   Site Assessment WDL 11/27/2019  8:02 AM   Line Status Saline locked 11/27/2019  8:02 AM   Phlebitis Scale 0-->no symptoms 11/27/2019  8:02 AM   Infiltration Scale 0 11/27/2019  8:02 AM   Infiltration Site Treatment Method  None 11/27/2019 12:00 AM   Extravasation? No " 11/27/2019 12:00 AM   Dressing Intervention New dressing  11/23/2019  5:00 AM   IV Site Rotation Due Date 11/27/19 11/23/2019  5:00 AM   Number of days: 5       PICC Triple Lumen 11/23/19 Right Basilic Anayeli's (Active)   Site Assessment WDL 11/27/2019  8:02 AM   External Cath Length (cm) 1 cm 11/26/2019  2:00 PM   Extremity Circumference (cm) 33 cm 11/26/2019  2:00 PM   Dressing Intervention Chlorhexidine patch 11/26/2019  4:41 PM   Dressing Change Due 12/03/19 11/26/2019  2:00 PM   Lumen A - Color PURPLE 11/27/2019 12:00 AM   Lumen A - Status saline locked 11/27/2019 12:00 AM   Lumen A - Cap Change Due 11/27/19 11/26/2019  8:49 PM   Lumen B - Color RED 11/27/2019 12:00 AM   Lumen B - Status saline locked 11/27/2019 12:00 AM   Lumen B - Cap Change Due 11/27/19 11/26/2019  8:49 PM   Lumen C - Color GRAY 11/27/2019 12:00 AM   Lumen C - Status saline locked 11/27/2019 12:00 AM   Lumen C - Cap Change Due 11/27/19 11/26/2019  8:49 PM   Number of days: 4       Urethral Catheter (Active)   Tube Description Positional 11/27/2019 12:00 AM   Catheter Care Done;Catheter wipes 11/26/2019  8:45 PM   Collection Container Standard 11/27/2019  8:02 AM   Securement Method Securing device (Describe) 11/27/2019  8:02 AM   Rationale for Continued Use Retention 11/27/2019  8:02 AM   Urine Output 100 mL 11/26/2019  8:45 PM   Number of days: 2        Assessment:   ASA SCORE: 4    H&P: History and physical reviewed and following examination; no interval change.     Smoking Status:  Active Smoker       - patient did not smoke on day of surgery   NPO Status: NPO Appropriate     Plan:   Anes. Type:  General   Pre-Medication: None   Induction:  IV (RSI)   Airway: ETT; Oral   Access/Monitoring: PIV; 2nd PIV; A-Line   Maintenance: Balanced     Postop Plan:   Postop Pain: Opioids  Postop Sedation/Airway: Not planned  Disposition: ICU     PONV Management:   Adult Risk Factors: Female, Postop Opioids   Prevention: Ondansetron     CONSENT: Direct  conversation   Plan and risks discussed with: Patient; Mother   Blood Products: Consented (ALL Blood Products)       Comments for Plan/Consent:  Pt and mother aware of high risk of adverse events including intraop MI, CVA, prolonged intubation, need for ICU, coagulopathy, death; wish to proceed                 Sara Hughes MD

## 2019-11-27 NOTE — PROGRESS NOTES
"Gynecology Oncology Progress Note  11/24/19    HD#5 sepsis, leukocytosis    Disease: Right adnexal mass    24 hour events:   - Transfer to Gyn/Onc Service  - Worsening renal function, creatinine elevated to 3.76  - Nephrology Consultation  - Increasing oxygen requirements (4L)  - Negative V//Q scan  - CXR  - Cytology from paracentesis negative  - Repeat CT Scan    Subjective: Patient resting in bed. States her pain \"doesn't matter.\" Feels ready for surgery today. Has been NPO since midnight.     Objective:   Vitals:    11/26/19 1804 11/26/19 1847 11/26/19 1915 11/26/19 1949   BP: (!) 156/81   103/60   BP Location:    Left arm   Pulse: 106      Resp:    20   Temp:   97.6  F (36.4  C) 98  F (36.7  C)   TempSrc:   Oral Oral   SpO2: 100% 97%     Weight:       Height:         General: Snoring, NAD  CV: RRR, no m/r/g  Resp: CTAB, no wheezes  Abdomen:  firm, distended, palpable mass filling the abdomen  Extremities: Brown skin discoloration bilateral ankle and distal legs, 2+ edema bilaterally    I/Os  (Yesterday // Since Midnight)   cc // 0cc  IVF 200cc // 0cc  Urine 700cc // none documented cc    Assessment: Chela Love is a 44 year old with PMHx signficant for HTN, HLD, T2DM, and Polysubstance use, initially admitted to the medicine team for septic shock and AMS. She was found to have a right adnexal mass measuring [id number] cm. Yesterday, patient was transferred to Gyn/Onc service with plans to proceed to operating room for XL, ZANDRA, BSO, and tumor debulking today.    Active Problem list:  Right adnexal mass  Altered mental status  Sepsis  Leukocytosis   Hyperglycemia, T2DM  Hyponatremia  Acute kidney injury  Polysubstance abuse   Vaginal bleeding    Plan:    #Right adnexal mass  - CT imaging at OSH showed showed [id number] cm right adnexal mass with ascites and omental caking present which is concerning for ovarian malignancy.  - Currently poor surgical candidate (Hgb A1C 15%, active infection, albumin 1.5). "   - Recommend IR biopsy of extra-ovarian metastases for possible tissue however IR consulted and reports biopsy would be high risk due to hypervascularity through omental caking.  - Elevated tumor markers-  850. CEA 4.0,  203, , AFP and Hcg wnl. Inhibin B-pending  - S/p paracentesis on 11/24, cytology negative for malignancy  - Thorough discussion last night between Dr. Mayorga and patient/family regarding surgery. Surgery recommended given patient's lack of improvement in clinical status, and also inability to diagnose the adnexal mass. Discussed risks of surgery including infection, bleeding, injury to surrounding organs, need for ICU admission or ventilator support post-operatively and even death.   - Written consent signed, orders placed. Case will be add-on this AM.  - Patient is T&C for 1 unit given pre-operative hemoglobin of 8.4  - Pre-op labs ordered for this AM including: CBC, BMP, Hcg, PT/INR, PTT, and Fibrinogen.    #SIRS/Sepsis  #Sponatneous Bacterial Peritonitis  #Hx of IV drug Use  #Persistent Leukocytosis  - CXR neg, UA neg, GC/CT neg, BCx with NGTD  - 11/25 Ascitic fluid grew gram variable bacilli  - WBC 18.2>29.2>24.6>21.2  - s/p 1D Vancomycin, 2D Zosyn at OSH (11/23-24)  - D#4 Ceftriaxone + Flagyl (started 11/24)    #Oliguric CINDY (ATN)  #Hypervolemic Hyponatremia  - Kidney US 11/25 showed no evidence of obstruction/hydronephrosis  - Prerenal etiology is unlikely given patient is net positive approximately 7 L this admission without improvement in Cr.  - Nephrology consulted given patient's worsening renal function   No acute indication for dialysis   Trial of 40 mg IV lasix, assess response with UOP and Cr   Trend BMP, I/O and daily weight   Avoid nephrotoxic agents    #Dyspnea  #Increasing Oxygen Requirements  - CXR 11/26 w/ mixed interstitial airspace opacities throughout bilateral lung fields  - V/Q Scan negative for PE    #Uncontrolled T2DM with hyperglycemia  Glucose >600  and beta-hydroxybutyrate 0.39 at OSH. UA >1000 glucose. Gluc now improved.   - Hgb A1C 15 (very high)   - Beta-hydroxybutyrate level normal   - Gluc Q4H  - medium dose insulin sliding scale  - Lantus 25 units QPM, 10 U in the AM   - Hold metformin 1000mg BID in the setting of lactic acidosis    #Polysubstance Use  - Hx of IV drug use  - UDS pending    #HTN  - Hold antihypertensives in setting of septic shock    Dispo: Pending clinical course. Patient and family live in [place] and desire to follow-up there for Gyn/Onc care if possible.     Annabelle Campbell MD  OB/GYN PGY-2  11/26/19 5:54 AM    I, Cristóbal James MD personally examined and evaluated this patient on 11/27/19.  I discussed the patient with the resident and care team, and agree with the assessment and plan of care as documented in the residents note above.    I personally reviewed vital signs, laboratory values and imaging results.    Pt with continued increasing WBC and worsening CINDY.  Only source of infection identified is intra-abdominal source.  Had long discussion with patient and mother (who is her decision maker/POA) regarding poor prognosis.  Discussed that this is likely an ovarian cancer which is also infected and until we control the source of the infection she is unlikely to recover.  I stressed that even with surgery, her prognosis is poor.  Discussed the high risk of complications given her multiple risk factors for poor surgical outcome including poorly controlled DM, poor nutrition, renal failure with rising Cr, drug abuse.  I discussed that even with surgery there is a high risk of death and her mother understands but agrees that she would like to attempt surgical control of infection.  Counseled she will likely require dialysis and will almost surely remain intubated post-op and require a prolonged ICU stay.  Discussed if malignancy is identified we would attempt a debulking but that the risk of prolonged/complicated surgery  would need to be weighed against her other medical co-morbidities.  Mother understands the grim prognosis but is in agreement that an attempt at surgical control is the best option.  Risks, benefits, indications and alternatives discussed.  Consents signed for exploratory laparotomy, removal of uterus, cervix, both ovaries and fallopian tubes, cancer debulking procedure.    Rosana James MD  Gynecologic Oncology  [facility]

## 2019-11-27 NOTE — ANESTHESIA CARE TRANSFER NOTE
Patient: Chela Love    Procedure(s):  Exploratory Laparotomy , TOTAL ABDOMINAL HYSTERECTOMY, Bilateral SALPINGoophorectomy, Omentectomy, Abdominal Washout  and Tumor DEBULKING    Diagnosis: Ovarian cancer (H) [C56.9]  Diagnosis Additional Information: No value filed.    Anesthesia Type:   General     Note:  Airway :Nasal Cannula  Patient transferred to:PACU  Comments: Pt to recovery room.  Spontaneous respirations.   Report given to RN.  Handoff Report: Identifed the Patient, Identified the Reponsible Provider, Reviewed the pertinent medical history, Discussed the surgical course, Reviewed Intra-OP anesthesia mangement and issues during anesthesia, Set expectations for post-procedure period and Allowed opportunity for questions and acknowledgement of understanding      Vitals: (Last set prior to Anesthesia Care Transfer)    CRNA VITALS  11/27/2019 1144 - 11/27/2019 1230      11/27/2019             ART BP:  108/45    Ht Rate:  92                Electronically Signed By: TIA Sim CRNA  November 27, 2019  12:30 PM

## 2019-11-27 NOTE — OR NURSING
Dr. Hughes at bedside in PACU. Keep MAPs at or above 65. Unable to wean off norepi gtt at this time.

## 2019-11-27 NOTE — OR NURSING
Dr. Hughes aware of . No new orders at this time    Paged Dr. Kruse:  Pls place transfer order and any other post op orders needed.     Response: ICU team to input orders.    ICU team at bedside in PACU.

## 2019-11-27 NOTE — ANESTHESIA POSTPROCEDURE EVALUATION
Anesthesia POST Procedure Evaluation    Patient: Chela Love   MRN:     [id number] Gender:   female   Age:    44 year old :      1975        Preoperative Diagnosis: Ovarian cancer (H) [C56.9]   Procedure(s):  Exploratory Laparotomy , TOTAL ABDOMINAL HYSTERECTOMY, Bilateral SALPINGoophorectomy, Omentectomy, Abdominal Washout  and Tumor DEBULKING   Postop Comments: No value filed.       Anesthesia Type:  Not documented  General    Reportable Event: NO     PAIN: Uncomplicated   Sign Out status: Comfortable, Well controlled pain     PONV: No PONV   Sign Out status:  No Nausea or Vomiting     Neuro/Psych: Uneventful perioperative course   Sign Out Status: Preoperative baseline; Age appropriate mentation     Airway/Resp.: Uneventful perioperative course   Sign Out Status: Non labored breathing, age appropriate RR; Resp. Status within EXPECTED Parameters     CV: Uneventful perioperative course   Sign Out status: CV Support within EXPECTED Parameters     Disposition:   Sign Out in:  ICU  Disposition:  ICU  Recovery Course: Uneventful  Follow-Up: Not required           Last Anesthesia Record Vitals:  CRNA VITALS  2019 1144 - 2019 1244      2019             ART BP:  108/45    Ht Rate:  92          Last PACU Vitals:  Vitals Value Taken Time   /62 2019  2:24 PM   Temp 36.8  C (98.3  F) 2019  2:12 PM   Pulse 92 2019  2:24 PM   Resp 18 2019  2:12 PM   SpO2 99 % 2019  2:29 PM   Temp src     NIBP     Pulse     SpO2     Resp     Temp     Ht Rate     Temp 2     Vitals shown include unvalidated device data.      Electronically Signed By: Sara Hughes MD, 2019, 2:31 PM

## 2019-11-27 NOTE — CONSULTS
SURGICAL ICU ADMISSION NOTE  11/27/2019    PRIMARY TEAM: Gyn   PRIMARY PHYSICIAN: Dr. James    REASON FOR CRITICAL CARE ADMISSION: hemodynamic monitoring    ADMITTING PHYSICIAN: Dr. Flores    ASSESSMENT: 44 year old female with hx of uncontrolled DM, HTN, PSA presented with sepsis, abdominal pain and vaginal bleeding. Initially managed with antibiotics with not a significant improvement in her clinical status and in fact resulted in worsening renal failure and leukocytosis. CT abd/pelvis showed large mass in anterior abdomen (ovarian in origin) with peritoneal carcinomatosis. She is now s/p ex lap, ZANDRA BSO, omentectomy, tumor debulking and abdominal washout. intraop findings include bloody purulent ascites and large superinfected right ovarian mass. Post operatively pt remained on pressors to maintain blood pressure MAP >65 and therefore transferred to SICU for hemodynamic monitoring    PLAN:   Neuro/ pain/ sedation:  #hx of polysubstance abuse  #Anxiety  -Monitor neurological status. Notify the MD for any acute changes in exam.  -S. Tylenol and PRN oxycodone/dilaudid for pain.     Pulmonary care:   -Extubated post operatively. On nasal cannula   -Supplemental oxygen to keep saturation above 92 %.  -Incentive spirometer every 15- 30 minutes when awake.     Cardiovascular:    #Hypertension  -Monitor hemodynamic status.   -On pressors (norepi) to maintain MAP >65. Wean pressors as tolerated   -hold off PTA anti-hypertensives for now      GI care:   -NPO except ice chips and medications.  -Diet per primary   -PPI (H2 blocker)      Fluids/ Electrolytes/ Nutrition:   -Hold off fluids for now given net +8L since admission  -ICU electrolyte replacement protocol  -No indication for parenteral nutrition.  -Nutrition consulted. Appreciate recs     Renal/ Fluid Balance:    #obstructive renal injury in the setting of mass effect  -Urine output is marginal so far. Gave 500 ml bolus  -Will continue to monitor intake and  output.  -follow labs     Endocrine:    #Hx of diabetes  -No management indication.   -Started insulin gtt     ID/ Antibiotics:  #intraabdominal infection  -elevated wbc - could be a combination of post op inflammatory response or infection  -started on zosyn and diflucan    -repeat labs     Heme:     -Hemoglobin stable.  -follow repeat cbc     Prophylaxis:    -Mechanical prophylaxis for DVT.  -No chemical DVT prophylaxis due to high risk of bleeding.  -PPI     MSK:    -PT and OT consulted. Appreciate recs.     Lines/ tubes/ drains:  -PIV     Disposition:  -Surgical ICU.    Patient seen, findings and plan discussed with surgical ICU staff.    Kimberly Ba MD  General Surgery PGY-2  [phone]    - - - - - - - - - - - - - - - - - - - - - - - - - - - - - - - - - - - - - - - - - - - - - - - - - - - - - - - - - - - - - - - - - - - - - - - -     HISTORY PRESENTING ILLNESS: This patient is a 44 year old female who is transfer from [facility] here with sepsis, leukocytosis, abdominal pain, vaginal bleeding and imaging concerning for ovarian malignancy. Her medical history is pertinent for T2DM, HTN, HLD. Per the patient's family, she also has a history of IV methamphetamine use, THC use, tobacco use, alcohol use and possible PCOS; however, at this time we don't have records to verify this history. The patient was not cooperative with the interview and kept stating she wanted something to drink and she wanted to go to sleep.      Per chart review she presented to the [facility] ED with abdominal pain and confusion. She was found to be in septic shock with a leukocytosis. WBC 17.3, LA 4.0. Afebrile. She was given Zosyn and Vancomycin. Arrangements were made to transfer her due to lack of ICU beds at [facility]. She was transferred to [facility] due to availability of Gyn Oncology services.     REVIEW OF SYSTEMS: 10 point ROS neg other than the symptoms noted above in the HPI.    PAST MEDICAL HISTORY:     has a past medical history of Anxiety, Asthma, Bilateral lower extremity edema, Depression, HTN (hypertension), Insomnia, Migraine, Obesity, Substance abuse (H), Type 2 diabetes mellitus (H), and Vasculopathy.    SURGICAL HISTORY:    has no past surgical history on file.    SOCIAL HISTORY:    reports that she has been smoking. She does not have any smokeless tobacco history on file.    FAMILY HISTORY: No bleeding/clotting disorders nor problems with anesthesia.     ALLERGIES:      Allergies   Allergen Reactions     Bee Venom Other (See Comments)     swelling     Selenicereus Grandiflorus Rash       MEDICATIONS:  No current facility-administered medications on file prior to encounter.   gabapentin (NEURONTIN) 100 MG capsule, Take 300 mg by mouth 3 times daily   insulin glargine (LANTUS SOLOSTAR) 100 UNIT/ML pen, Inject 25 Units Subcutaneous every evening  metFORMIN (GLUCOPHAGE) 1000 MG tablet, Take 1,000 mg by mouth 2 times daily (with meals)  albuterol (PROAIR HFA/PROVENTIL HFA/VENTOLIN HFA) 108 (90 Base) MCG/ACT inhaler, Inhale 2 puffs into the lungs as needed        PHYSICAL EXAMINATION:  Temp:  [97.6  F (36.4  C)-99.8  F (37.7  C)] 98.3  F (36.8  C)  Pulse:  [] 92  Heart Rate:  [82-98] 93  Resp:  [13-22] 18  BP: ()/(49-83) 98/50  MAP:  [25 mmHg-80 mmHg] 52 mmHg  Arterial Line BP: ()/(30-64) 93/36  SpO2:  [94 %-100 %] 98 %  Sleeping   Tachycardia  Non labored breathing on 3-4L NC  Abdomen soft, non distended, appropriately tender to palpation. Midline incision closed with sutures with dermabond in place  Zamora intact  No bilateral upper/lower extremity edema. Pulses palpable    LABS: Reviewed.   Arterial Blood Gases   Recent Labs   Lab 11/27/19  1121 11/27/19  0951 11/26/19  1234   PH 7.27* 7.27* 7.33*   PCO2 41 42 38   PO2 167* 311* 96   HCO3 19* 20* 20*     Complete Blood Count   Recent Labs   Lab 11/27/19  1300 11/27/19  1121 11/27/19  0951 11/27/19  0558 11/26/19  1737 11/26/19  0439   WBC  24.4*  --   --  19.5* 21.2* 19.5*   HGB 9.0* 9.1* 8.4* 8.3* 8.4* 8.0*     --   --  247 251 222     Basic Metabolic Panel  Recent Labs   Lab 11/27/19  1300 11/27/19  1121 11/27/19  0951 11/27/19  0558 11/26/19  2035 11/26/19  1737    131* 132* 133 131* 130*   POTASSIUM 4.7 4.6 4.1 4.2 4.3 4.2   CHLORIDE 105  --   --  100 99 97   CO2 19*  --   --  22 22 20   BUN 68*  --   --  67* 64* 60*   CR 3.77*  --   --  3.92* 3.76* 3.58*   * 180* 144* 188* 258* 287*     Liver Function Tests  Recent Labs   Lab 11/27/19  1300 11/27/19  1115 11/27/19  0558 11/26/19 1737 11/25/19 0614 11/24/19  1324 11/24/19  0727 11/23/19  0352   AST  --   --   --  31 38  --  60*  --  15   ALT  --   --   --  15 17  --  17  --  11   ALKPHOS  --   --   --  325* 136  --  99  --  64   BILITOTAL  --   --   --  0.4 0.6  --  1.1  --  0.7   ALBUMIN  --   --   --  1.8* 1.5* 1.5* 1.6*  --  1.8*   INR 1.81* 1.76* 1.75*  --  1.55*  --   --    < >  --     < > = values in this interval not displayed.     Pancreatic Enzymes  No lab results found in last 7 days.  Coagulation Profile  Recent Labs   Lab 11/27/19  1300 11/27/19  1115 11/27/19  0558 11/25/19  0614 11/23/19  0525   INR 1.81* 1.76* 1.75* 1.55* 1.59*   PTT 35 36 35  --  34     Lactate  Invalid input(s): LACTATE    IMAGING:  Recent Results (from the past 24 hour(s))   NM Lung Scan Ventilation and Perfusion    Narrative    Examination:NM LUNG SCAN VENTILATION AND PERFUSION, 11/26/2019 3:58 PM      Indication:  PE suspected, high pretest prob     Additional Information: No DVT. Patient has difficulty breathing.    Technique:    The patient received 2 mCi of Tc-99m DTPA aerosol for ventilation and  6 mCi of Tc-99m MAA for perfusion. Multiple images were obtained of  the lungs in Anterior, posterior, BROWN, RPO, LPO, and  [place] projections.    Comparison: None    Findings:    There is some tracer clumping in the central air way, otherwise no  definite segmental ventilation defect. The  perfusion study  demonstrates no perfusion defect.      Impression    Impression:    Pulmonary emboli is not present.    I have personally reviewed the examination and initial interpretation  and I agree with the findings.    ROBERTO SHAFER MD

## 2019-11-27 NOTE — PROGRESS NOTES
I spoke with Kera and her family including her mother, sister, brother, and other family members. We reviewed her current clinical status and concern about minimal improvement in her clinical status. Her family is understandably frustrated with multiple aspects of her care - namely her poor pain control and poor communication as well as the lack of a plan of action. Kera continues to endorse abdominal pain which is poorly controlled at present. Her abdomen continues to be distended and taut, although the upper portion is softer, the large mass is notably present. There is no rebound or guarding. I discussed her case at length with Dr. James and decision was made to take the patient to the OR tomorrow due to continued concern for intra-abdominal source of infection as the cause of her current clinical condition. I discussed this with the family including the riskiness of surgery in someone who is quite ill. She was consented for exploratory laparotomy, Total abdominal hysterectomy, bilateral salpingo-oophorectomy, cancer debulking. She consented to blood transfusions if necessary. Both Kera and her mother signed consent. They understand the implications of ZANDRA-BSO in regards to fertility and causing surgical menopause. We discussed the rationale for performing this procedure. We discussed risks including bleeding, infection, damage to surrounding organs/structures. We discussed in her case the high likelihood of need for post-op ICU level care and prolonged intubation. With her poor nutritional status and diabetes, healing from surgery is also a major concern and she is at a high risk for infectious and healing complications. We reviewed that there is a possibility of death with major surgery in an ill patient; however, our concern is that her condition could continue to deteriorate without intervention. She and her family all agree with proceeding with surgery. Consent was signed by both Kera and her mother and placed  in her chart. We will transfer the patient to our service as primary and plan to proceed to OR tomorrow.    David Mayorga MD    Gynecologic Oncology

## 2019-11-27 NOTE — PROVIDER NOTIFICATION
1804:  Patient called this writer into room r/t to complaining of chest pain.  Patient was tearful and very anxious at this time.  Found patient attempting to make self throw up by placing fingers in her throat.      Patient's sister felt that patient had been eating to fast and aspiration on her food or swallowed lots of air.  Vital signs stable.  Chest pressure does not radiate.     This writer had completed interventions to attempt to expel air.  Patient had belched several times after interventions and did state feeling better.    1808: Cross covered paged to notify of episode and request for PRN heartburn medication.    1831:  Terri Barrett MD rounding in room.  Patient currently declines chest pain at time of MD rounding.  MD stated she will order something PRN for heartburn.    Sister at this time very concerned about patient's extra fluid and kidney function.  Expressed concern with Crossover.

## 2019-11-27 NOTE — PLAN OF CARE
7A PT: Cancel and hold, pt in OR. Will check in post-operatively and initiate PT services as indicated.

## 2019-11-27 NOTE — ANESTHESIA PROCEDURE NOTES
Arterial Line Procedure Note  Staff:     Anesthesiologist:  Sara Hughes MD    Resident/CRNA:  Flakita Sanders APRN CRNA    Arterial line performed by resident/CRNA in presence of a teaching physician    Location: In OR After Induction  Procedure Start/Stop Times:     patient identified, IV checked, risks and benefits discussed, informed consent, monitors and equipment checked, pre-op evaluation and at physician/surgeon's request      Correct Patient: Yes      Correct Position: Yes      Correct Site: Yes      Correct Procedure: Yes      Correct Laterality:  N/A    Site Marked:  N/A  Line Placement:     Procedure:  Arterial Line    Insertion Site:  Radial    Insertion laterality:  Left    Skin Prep: Chloraprep      Patient Prep: patient draped, mask, sterile gloves, hat and hand hygiene      Local skin infiltration:  None    Ultrasound Guided?: No      Catheter size:  20 gauge, Quick cath    Cath secured with: other (comment)      Dressing:  Tegaderm    Complications:  None obvious    Arterial waveform: Yes      IBP within 10% of NIBP: Yes

## 2019-11-28 ENCOUNTER — APPOINTMENT (OUTPATIENT)
Dept: OCCUPATIONAL THERAPY | Facility: CLINIC | Age: 44
End: 2019-11-28
Attending: INTERNAL MEDICINE
Payer: COMMERCIAL

## 2019-11-28 LAB
ABO + RH BLD: NORMAL
ABO + RH BLD: NORMAL
ALBUMIN SERPL-MCNC: 1.8 G/DL (ref 3.4–5)
ALP SERPL-CCNC: 246 U/L (ref 40–150)
ALT SERPL W P-5'-P-CCNC: 10 U/L (ref 0–50)
ANION GAP SERPL CALCULATED.3IONS-SCNC: 6 MMOL/L (ref 3–14)
ANION GAP SERPL CALCULATED.3IONS-SCNC: 9 MMOL/L (ref 3–14)
AST SERPL W P-5'-P-CCNC: 15 U/L (ref 0–45)
BASE DEFICIT BLDA-SCNC: 3.2 MMOL/L
BASE DEFICIT BLDA-SCNC: 4.4 MMOL/L
BILIRUB SERPL-MCNC: 0.7 MG/DL (ref 0.2–1.3)
BLD GP AB SCN SERPL QL: NORMAL
BLD PROD TYP BPU: NORMAL
BLD PROD TYP BPU: NORMAL
BLD UNIT ID BPU: 0
BLOOD BANK CMNT PATIENT-IMP: NORMAL
BLOOD PRODUCT CODE: NORMAL
BPU ID: NORMAL
BUN SERPL-MCNC: 71 MG/DL (ref 7–30)
BUN SERPL-MCNC: 76 MG/DL (ref 7–30)
CA-I BLD-MCNC: 4.7 MG/DL (ref 4.4–5.2)
CALCIUM SERPL-MCNC: 7.6 MG/DL (ref 8.5–10.1)
CALCIUM SERPL-MCNC: 8.1 MG/DL (ref 8.5–10.1)
CHLORIDE SERPL-SCNC: 109 MMOL/L (ref 94–109)
CHLORIDE SERPL-SCNC: 110 MMOL/L (ref 94–109)
CO2 SERPL-SCNC: 22 MMOL/L (ref 20–32)
CO2 SERPL-SCNC: 23 MMOL/L (ref 20–32)
CREAT SERPL-MCNC: 3.22 MG/DL (ref 0.52–1.04)
CREAT SERPL-MCNC: 3.51 MG/DL (ref 0.52–1.04)
ERYTHROCYTE [DISTWIDTH] IN BLOOD BY AUTOMATED COUNT: 14.8 % (ref 10–15)
ERYTHROCYTE [DISTWIDTH] IN BLOOD BY AUTOMATED COUNT: 14.9 % (ref 10–15)
GFR SERPL CREATININE-BSD FRML MDRD: 15 ML/MIN/{1.73_M2}
GFR SERPL CREATININE-BSD FRML MDRD: 17 ML/MIN/{1.73_M2}
GLUCOSE BLDC GLUCOMTR-MCNC: 120 MG/DL (ref 70–99)
GLUCOSE BLDC GLUCOMTR-MCNC: 122 MG/DL (ref 70–99)
GLUCOSE BLDC GLUCOMTR-MCNC: 122 MG/DL (ref 70–99)
GLUCOSE BLDC GLUCOMTR-MCNC: 128 MG/DL (ref 70–99)
GLUCOSE BLDC GLUCOMTR-MCNC: 128 MG/DL (ref 70–99)
GLUCOSE BLDC GLUCOMTR-MCNC: 131 MG/DL (ref 70–99)
GLUCOSE BLDC GLUCOMTR-MCNC: 136 MG/DL (ref 70–99)
GLUCOSE BLDC GLUCOMTR-MCNC: 138 MG/DL (ref 70–99)
GLUCOSE BLDC GLUCOMTR-MCNC: 138 MG/DL (ref 70–99)
GLUCOSE BLDC GLUCOMTR-MCNC: 139 MG/DL (ref 70–99)
GLUCOSE BLDC GLUCOMTR-MCNC: 182 MG/DL (ref 70–99)
GLUCOSE BLDC GLUCOMTR-MCNC: 184 MG/DL (ref 70–99)
GLUCOSE BLDC GLUCOMTR-MCNC: 191 MG/DL (ref 70–99)
GLUCOSE BLDC GLUCOMTR-MCNC: 217 MG/DL (ref 70–99)
GLUCOSE BLDC GLUCOMTR-MCNC: 236 MG/DL (ref 70–99)
GLUCOSE BLDC GLUCOMTR-MCNC: 242 MG/DL (ref 70–99)
GLUCOSE BLDC GLUCOMTR-MCNC: 258 MG/DL (ref 70–99)
GLUCOSE BLDC GLUCOMTR-MCNC: 259 MG/DL (ref 70–99)
GLUCOSE BLDC GLUCOMTR-MCNC: 330 MG/DL (ref 70–99)
GLUCOSE SERPL-MCNC: 135 MG/DL (ref 70–99)
GLUCOSE SERPL-MCNC: 196 MG/DL (ref 70–99)
HCO3 BLD-SCNC: 21 MMOL/L (ref 21–28)
HCO3 BLD-SCNC: 22 MMOL/L (ref 21–28)
HCT VFR BLD AUTO: 20.7 % (ref 35–47)
HCT VFR BLD AUTO: 22.8 % (ref 35–47)
HGB BLD-MCNC: 6.5 G/DL (ref 11.7–15.7)
HGB BLD-MCNC: 6.6 G/DL (ref 11.7–15.7)
HGB BLD-MCNC: 7.2 G/DL (ref 11.7–15.7)
INR PPP: 1.69 (ref 0.86–1.14)
MAGNESIUM SERPL-MCNC: 2.2 MG/DL (ref 1.6–2.3)
MCH RBC QN AUTO: 27.3 PG (ref 26.5–33)
MCH RBC QN AUTO: 27.4 PG (ref 26.5–33)
MCHC RBC AUTO-ENTMCNC: 31.6 G/DL (ref 31.5–36.5)
MCHC RBC AUTO-ENTMCNC: 31.9 G/DL (ref 31.5–36.5)
MCV RBC AUTO: 86 FL (ref 78–100)
MCV RBC AUTO: 86 FL (ref 78–100)
NUM BPU REQUESTED: 3
O2/TOTAL GAS SETTING VFR VENT: ABNORMAL %
O2/TOTAL GAS SETTING VFR VENT: NORMAL %
OXYHGB MFR BLD: 96 % (ref 92–100)
OXYHGB MFR BLD: 96 % (ref 92–100)
PCO2 BLD: 38 MM HG (ref 35–45)
PCO2 BLD: 39 MM HG (ref 35–45)
PH BLD: 7.35 PH (ref 7.35–7.45)
PH BLD: 7.36 PH (ref 7.35–7.45)
PHOSPHATE SERPL-MCNC: 6.4 MG/DL (ref 2.5–4.5)
PLATELET # BLD AUTO: 186 10E9/L (ref 150–450)
PLATELET # BLD AUTO: 252 10E9/L (ref 150–450)
PO2 BLD: 107 MM HG (ref 80–105)
PO2 BLD: 99 MM HG (ref 80–105)
POTASSIUM SERPL-SCNC: 4 MMOL/L (ref 3.4–5.3)
POTASSIUM SERPL-SCNC: 4.3 MMOL/L (ref 3.4–5.3)
PROT SERPL-MCNC: 5.2 G/DL (ref 6.8–8.8)
RBC # BLD AUTO: 2.41 10E12/L (ref 3.8–5.2)
RBC # BLD AUTO: 2.64 10E12/L (ref 3.8–5.2)
SODIUM SERPL-SCNC: 138 MMOL/L (ref 133–144)
SODIUM SERPL-SCNC: 141 MMOL/L (ref 133–144)
SPECIMEN EXP DATE BLD: NORMAL
TRANSFUSION STATUS PATIENT QL: NORMAL
TRANSFUSION STATUS PATIENT QL: NORMAL
WBC # BLD AUTO: 10.8 10E9/L (ref 4–11)
WBC # BLD AUTO: 17.3 10E9/L (ref 4–11)

## 2019-11-28 PROCEDURE — 25000132 ZZH RX MED GY IP 250 OP 250 PS 637: Performed by: STUDENT IN AN ORGANIZED HEALTH CARE EDUCATION/TRAINING PROGRAM

## 2019-11-28 PROCEDURE — 97165 OT EVAL LOW COMPLEX 30 MIN: CPT | Mod: GO

## 2019-11-28 PROCEDURE — 25000125 ZZHC RX 250: Performed by: STUDENT IN AN ORGANIZED HEALTH CARE EDUCATION/TRAINING PROGRAM

## 2019-11-28 PROCEDURE — 25000132 ZZH RX MED GY IP 250 OP 250 PS 637: Performed by: OBSTETRICS & GYNECOLOGY

## 2019-11-28 PROCEDURE — 25000128 H RX IP 250 OP 636: Performed by: STUDENT IN AN ORGANIZED HEALTH CARE EDUCATION/TRAINING PROGRAM

## 2019-11-28 PROCEDURE — 82330 ASSAY OF CALCIUM: CPT | Performed by: OBSTETRICS & GYNECOLOGY

## 2019-11-28 PROCEDURE — 97530 THERAPEUTIC ACTIVITIES: CPT | Mod: GO

## 2019-11-28 PROCEDURE — 25000128 H RX IP 250 OP 636: Performed by: PHYSICIAN ASSISTANT

## 2019-11-28 PROCEDURE — 83735 ASSAY OF MAGNESIUM: CPT | Performed by: STUDENT IN AN ORGANIZED HEALTH CARE EDUCATION/TRAINING PROGRAM

## 2019-11-28 PROCEDURE — 40000556 ZZH STATISTIC PERIPHERAL IV START W US GUIDANCE

## 2019-11-28 PROCEDURE — 85027 COMPLETE CBC AUTOMATED: CPT | Performed by: OBSTETRICS & GYNECOLOGY

## 2019-11-28 PROCEDURE — 20000004 ZZH R&B ICU UMMC

## 2019-11-28 PROCEDURE — P9016 RBC LEUKOCYTES REDUCED: HCPCS

## 2019-11-28 PROCEDURE — 80053 COMPREHEN METABOLIC PANEL: CPT | Performed by: STUDENT IN AN ORGANIZED HEALTH CARE EDUCATION/TRAINING PROGRAM

## 2019-11-28 PROCEDURE — 84100 ASSAY OF PHOSPHORUS: CPT | Performed by: STUDENT IN AN ORGANIZED HEALTH CARE EDUCATION/TRAINING PROGRAM

## 2019-11-28 PROCEDURE — 97535 SELF CARE MNGMENT TRAINING: CPT | Mod: GO

## 2019-11-28 PROCEDURE — 00000146 ZZHCL STATISTIC GLUCOSE BY METER IP

## 2019-11-28 PROCEDURE — 85610 PROTHROMBIN TIME: CPT | Performed by: STUDENT IN AN ORGANIZED HEALTH CARE EDUCATION/TRAINING PROGRAM

## 2019-11-28 PROCEDURE — 85018 HEMOGLOBIN: CPT | Performed by: STUDENT IN AN ORGANIZED HEALTH CARE EDUCATION/TRAINING PROGRAM

## 2019-11-28 PROCEDURE — 82805 BLOOD GASES W/O2 SATURATION: CPT | Performed by: STUDENT IN AN ORGANIZED HEALTH CARE EDUCATION/TRAINING PROGRAM

## 2019-11-28 PROCEDURE — 99024 POSTOP FOLLOW-UP VISIT: CPT | Mod: GC | Performed by: OBSTETRICS & GYNECOLOGY

## 2019-11-28 PROCEDURE — 25000128 H RX IP 250 OP 636: Performed by: OBSTETRICS & GYNECOLOGY

## 2019-11-28 PROCEDURE — 80048 BASIC METABOLIC PNL TOTAL CA: CPT | Performed by: OBSTETRICS & GYNECOLOGY

## 2019-11-28 PROCEDURE — 85027 COMPLETE CBC AUTOMATED: CPT | Performed by: STUDENT IN AN ORGANIZED HEALTH CARE EDUCATION/TRAINING PROGRAM

## 2019-11-28 RX ORDER — FAMOTIDINE 20 MG/1
20 TABLET, FILM COATED ORAL DAILY
Status: DISCONTINUED | OUTPATIENT
Start: 2019-11-28 | End: 2019-12-05 | Stop reason: HOSPADM

## 2019-11-28 RX ORDER — COSYNTROPIN 0.25 MG/ML
250 INJECTION, POWDER, FOR SOLUTION INTRAMUSCULAR; INTRAVENOUS ONCE
Status: COMPLETED | OUTPATIENT
Start: 2019-11-28 | End: 2019-11-28

## 2019-11-28 RX ORDER — CALCIUM CARBONATE 500 MG/1
500 TABLET, CHEWABLE ORAL 2 TIMES DAILY
Status: DISCONTINUED | OUTPATIENT
Start: 2019-11-28 | End: 2019-12-05 | Stop reason: HOSPADM

## 2019-11-28 RX ADMIN — CALCIUM CARBONATE (ANTACID) CHEW TAB 500 MG 500 MG: 500 CHEW TAB at 23:17

## 2019-11-28 RX ADMIN — PIPERACILLIN SODIUM AND TAZOBACTAM SODIUM 2.25 G: 2; .25 INJECTION, POWDER, LYOPHILIZED, FOR SOLUTION INTRAVENOUS at 01:19

## 2019-11-28 RX ADMIN — HUMAN INSULIN 2 UNITS/HR: 100 INJECTION, SOLUTION SUBCUTANEOUS at 01:22

## 2019-11-28 RX ADMIN — ONDANSETRON 4 MG: 2 INJECTION INTRAMUSCULAR; INTRAVENOUS at 18:04

## 2019-11-28 RX ADMIN — SENNOSIDES AND DOCUSATE SODIUM 1 TABLET: 8.6; 5 TABLET ORAL at 07:47

## 2019-11-28 RX ADMIN — PIPERACILLIN SODIUM AND TAZOBACTAM SODIUM 2.25 G: 2; .25 INJECTION, POWDER, LYOPHILIZED, FOR SOLUTION INTRAVENOUS at 14:03

## 2019-11-28 RX ADMIN — HYDROMORPHONE HYDROCHLORIDE 0.5 MG: 1 INJECTION, SOLUTION INTRAMUSCULAR; INTRAVENOUS; SUBCUTANEOUS at 20:32

## 2019-11-28 RX ADMIN — HUMAN INSULIN 12 UNITS/HR: 100 INJECTION, SOLUTION SUBCUTANEOUS at 16:42

## 2019-11-28 RX ADMIN — HUMAN INSULIN 4 UNITS/HR: 100 INJECTION, SOLUTION SUBCUTANEOUS at 18:27

## 2019-11-28 RX ADMIN — HYDROMORPHONE HYDROCHLORIDE 0.5 MG: 1 INJECTION, SOLUTION INTRAMUSCULAR; INTRAVENOUS; SUBCUTANEOUS at 18:49

## 2019-11-28 RX ADMIN — PIPERACILLIN SODIUM AND TAZOBACTAM SODIUM 2.25 G: 2; .25 INJECTION, POWDER, LYOPHILIZED, FOR SOLUTION INTRAVENOUS at 20:32

## 2019-11-28 RX ADMIN — PIPERACILLIN SODIUM AND TAZOBACTAM SODIUM 2.25 G: 2; .25 INJECTION, POWDER, LYOPHILIZED, FOR SOLUTION INTRAVENOUS at 07:51

## 2019-11-28 RX ADMIN — HYDROMORPHONE HYDROCHLORIDE 0.5 MG: 1 INJECTION, SOLUTION INTRAMUSCULAR; INTRAVENOUS; SUBCUTANEOUS at 03:42

## 2019-11-28 RX ADMIN — ACETAMINOPHEN 975 MG: 325 TABLET, FILM COATED ORAL at 20:32

## 2019-11-28 RX ADMIN — HYDROMORPHONE HYDROCHLORIDE 0.5 MG: 1 INJECTION, SOLUTION INTRAMUSCULAR; INTRAVENOUS; SUBCUTANEOUS at 16:00

## 2019-11-28 RX ADMIN — ACETAMINOPHEN 975 MG: 325 TABLET, FILM COATED ORAL at 07:47

## 2019-11-28 RX ADMIN — ONDANSETRON 4 MG: 2 INJECTION INTRAMUSCULAR; INTRAVENOUS at 10:59

## 2019-11-28 RX ADMIN — FAMOTIDINE 20 MG: 20 TABLET ORAL at 17:07

## 2019-11-28 RX ADMIN — HUMAN INSULIN 14 UNITS/HR: 100 INJECTION, SOLUTION SUBCUTANEOUS at 14:52

## 2019-11-28 RX ADMIN — ACETAMINOPHEN 975 MG: 325 TABLET, FILM COATED ORAL at 13:42

## 2019-11-28 RX ADMIN — HUMAN INSULIN 8 UNITS/HR: 100 INJECTION, SOLUTION SUBCUTANEOUS at 22:03

## 2019-11-28 RX ADMIN — COSYNTROPIN 250 MCG: 0.25 INJECTION, POWDER, LYOPHILIZED, FOR SOLUTION INTRAMUSCULAR; INTRAVENOUS at 07:47

## 2019-11-28 ASSESSMENT — ACTIVITIES OF DAILY LIVING (ADL)
ADLS_ACUITY_SCORE: 13
ADLS_ACUITY_SCORE: 13
ADLS_ACUITY_SCORE: 34
ADLS_ACUITY_SCORE: 32
PREVIOUS_RESPONSIBILITIES: MEAL PREP;HOUSEKEEPING;LAUNDRY;SHOPPING;MEDICATION MANAGEMENT;FINANCES;WORK
ADLS_ACUITY_SCORE: 34
ADLS_ACUITY_SCORE: 13

## 2019-11-28 ASSESSMENT — MIFFLIN-ST. JEOR: SCORE: 1716.75

## 2019-11-28 NOTE — PROGRESS NOTES
"   11/28/19 0955   Quick Adds   Type of Visit Initial Occupational Therapy Evaluation   Living Environment   Lives With significant other   Living Arrangements house   Home Accessibility stairs to enter home;stairs within home   Number of Stairs, Main Entrance other (see comments)  (18 stairs per pt)   Number of Stairs, Within Home, Primary other (see comments)  (18 stairs to upstairs per pt)   Transportation Anticipated family or friend will provide   Living Environment Comment Pt lives in multi-story home, reports 18 stairs to enter home itself as well as 18 upstairs to bedroom. Pt does not drive, reports significant other or mother will assist with driving. Pt currently assisting mother due to mother being sick. Pt also works at a group home. Pt lives with significant other who assists with household management and meals as needed. Pt has tub shower, does not use AD at baseline.   Self-Care   Usual Activity Tolerance good   Current Activity Tolerance fair   Regular Exercise No   Equipment Currently Used at Home none   Functional Level   Ambulation 0-->independent   Transferring 0-->independent   Toileting 0-->independent   Bathing 0-->independent   Dressing 0-->independent   Eating 0-->independent   Communication 0-->understands/communicates without difficulty   Swallowing 0-->swallows foods/liquids without difficulty   Cognition 0 - no cognition issues reported   Fall history within last six months yes   Number of times patient has fallen within last six months 1   Which of the above functional risks had a recent onset or change? ambulation;transferring;toileting;bathing;dressing;cognition;fall history   Prior Functional Level Comment Pt previously independent with all ADLs/IADLs aside from driving. Pt reports 1 fall recently due to \"faulty step\" that needs to be fixed.   General Information   Onset of Illness/Injury or Date of Surgery - Date 11/23/19   Referring Physician Kimberly Ba MD   Patient/Family " Goals Statement To return home   Additional Occupational Profile Info/Pertinent History of Current Problem HD#6 sepsis, leukocytosis, AMS   Precautions/Limitations fall precautions;abdominal precautions   Weight-Bearing Status - LUE other (see comments)  (No greater than 10#)   Weight-Bearing Status - RUE other (see comments)  (No greater than 10#)   Weight-Bearing Status - LLE full weight-bearing   Weight-Bearing Status - RLE full weight-bearing   General Observations Pt supine upon arrival, initially lethargic but with OOB activity becoming increasingly alert. Pt with some disorientation.   General Info Comments Up with assist   Cognitive Status Examination   Orientation person;place   Level of Consciousness lethargic/somnolent   Follows Commands (Cognition) follows one step commands;delayed response/completion   Memory impaired   Attention Distractible during evaluation;Alternating attention impaired, difficulty shifting between tasks   Cognitive Comment Pt presenting with some disorientation and confusion during session, improvements with upright activity however very distractible.    Visual Perception   Visual Perception No deficits were identified   Sensory Examination   Sensory Quick Adds Other (describe)   Sensory Comments Pt reporting increased numbness/tingling in toes/fingers   Pain Assessment   Patient Currently in Pain Yes, see Vital Sign flowsheet   Integumentary/Edema   Integumentary/Edema other (describe)   Integumentary/Edema Comments Pt with BLE swelling, reports is chronic at baseline.   Posture   Posture forward head position;protracted shoulders   Range of Motion (ROM)   ROM Quick Adds Other (describe)   ROM Comment BUEs WFL, BLEs WFL   Strength   Manual Muscle Testing Quick Adds Other   Strength Comments Not formally tested due to precautions, pt with general deconditioning and weakness requireing assist for transfers/mobility   Hand Strength   Hand Strength Comments Decreased grasp strength  noted   Muscle Tone Assessment   Muscle Tone Quick Adds No deficits were identified   Coordination   Coordination Comments Pt with difficulty manipulating wrappers, appears to be due to lines/fine motor weakness   Mobility   Bed Mobility Comments Min A   Transfer Skill: Bed to Chair/Chair to Bed   Level of [place]: Bed to Chair minimum assist (75% patients effort)   Physical Assist/Nonphysical Assist: Bed to Chair 1 person + 1 person to manage equipment   Weight-Bearing Restrictions full weight-bearing   Transfer Skill: Sit to Stand   Level of [place]: Sit/Stand minimum assist (75% patients effort)   Transfer Skill: Sit to Stand full weight-bearing   Balance   Balance Comments Impaired   Upper Body Dressing   Level of [place]: Dress Upper Body moderate assist (50% patients effort)   Lower Body Dressing   Level of [place]: Dress Lower Body maximum assist (25% patients effort)   Grooming   Level of [place]: Grooming minimum assist (75% patients effort)   Eating/Self Feeding   Level of [place]: Eating stand-by assist   Instrumental Activities of Daily Living (IADL)   Previous Responsibilities meal prep;housekeeping;laundry;shopping;medication management;finances;work   IADL Comments Pt reports her significant other assists with IADLs as needed.   Activities of Daily Living Analysis   Impairments Contributing to Impaired Activities of Daily Living balance impaired;cognition impaired;pain;post surgical precautions;sensation decreased;strength decreased   General Therapy Interventions   Planned Therapy Interventions ADL retraining;IADL retraining;cognition;fine motor coordination training;ROM;strengthening;transfer training;home program guidelines;progressive activity/exercise;risk factor education   Clinical Impression   Criteria for Skilled Therapeutic Interventions Met yes, treatment indicated   OT Diagnosis decreased independence in ADLs/IADLs   Influenced by the following impairments  "pain, post-surgical precautions, cognition, decreased strength, balance deficits   Assessment of Occupational Performance 3-5 Performance Deficits   Identified Performance Deficits bathing, dressing, work, toileting   Clinical Decision Making (Complexity) Low complexity   Therapy Frequency Daily   Predicted Duration of Therapy Intervention (days/wks) 2 weeks   Anticipated Equipment Needs at Discharge   (TBD)   Anticipated Discharge Disposition Home with Assist;Transitional Care Facility   Risks and Benefits of Treatment have been explained. Yes   Patient, Family & other staff in agreement with plan of care Yes   [facility] TM \"6 Clicks\"   2016, Trustees of [facility], under license to [employer].  All rights reserved.   6 Clicks Short Forms Daily Activity Inpatient Short Form   [facility]  \"6 Clicks\" Daily Activity Inpatient Short Form   1. Putting on and taking off regular lower body clothing? 2 - A Lot   2. Bathing (including washing, rinsing, drying)? 2 - A Lot   3. Toileting, which includes using toilet, bedpan or urinal? 2 - A Lot   4. Putting on and taking off regular upper body clothing? 3 - A Little   5. Taking care of personal grooming such as brushing teeth? 3 - A Little   6. Eating meals? 4 - None   Daily Activity Raw Score (Score out of 24.Lower scores equate to lower levels of function) 16   Total Evaluation Time   Total Evaluation Time (Minutes) 5     "

## 2019-11-28 NOTE — PROGRESS NOTES
Gynecology Oncology Progress Note    HD#6 sepsis, leukocytosis, AMS  POD#1 XL, ZANDRA, BSO, Omentectomy, NATANAEL, Tumor Debulking, Washout    Disease: Pelvic mass, adenocarcinoma (favor endometrioid) on frozen    24 hour events:   - POD#1 XL, ZANDRA, BSO, Omentectomy, NATANAEL, tumor debulking, washout  - Transfused 2u pRBC intraop  - Admission to SICU for hemodynamic monitoring as patient was requiring pressors  - Insulin gtt started  - Started on Zosyn post-operatively  - Creatinine downtrending    Subjective: Patient states she has abdominal pain, unable to obtain additional history.    Objective:   Vitals:    11/27/19 1800 11/27/19 1815 11/27/19 1830 11/27/19 1845   BP: 101/54 119/63 124/67 123/62   BP Location:       Pulse:       Resp:       Temp:       TempSrc:       SpO2: 100% 98% 100% 99%   Weight:       Height:         General: Awake, alert, NAD  CV: RRR, no m/r/g  Resp: anterior lung fields clear  Abdomen:  soft, appropriately tender, VML incision covered with dermabond  Extremities: Brown skin discoloration bilateral ankle and distal legs, 2+ edema bilaterally    I/Os  (Yesterday // Since Midnight)  PO 0cc // 0cc  IVF 2063cc // 150cc  Urine 915cc // 630cc    Assessment: Chela Love is a 44 year old with PMHx signficant for HTN, HLD, T2DM, and Polysubstance use, initially admitted to the medicine team for septic shock and AMS. She was found to have a right adnexal mass measuring [id number] cm. She was transferred to Gyn/Onc Service 11/26/19. She is now POD#1 from a XL, ZANDRA, BSO, and tumor debulking. She is admitted to SICU for hemodynamic monitoring.    Plan:    #Adenocarcinoma of likely endometrial origin  -- S/p XL, ZANDRA, BSO, Omentectomy, NATANAEL, tumor debulking, washout on 11/27/19, will need chemotherapy which will be arranged post-op    #intra-abdominal infection   - Blas purulence intra-abdominally.  On zosyn with improving WBC count      #Respiratory  - Supplemental oxygen to keep saturation above 92%  -  Encourage incentive spirometer use    #Oliguric CINDY (ATN)  -UOP improving and Cr down-trending  -nephrology following for potential dialysis, although this is less likely to be needed now that UOP improving      #Uncontrolled T2DM with hyperglycemia  -on insulin gtt with much improved BG levels    #Polysubstance Use  - Hx of IV drug use  - UDS pending    #Hx of HTN  - Hold antihypertensives given hypotension in the post-operative setting    Dispo: Pending clinical course. Patient and family live in [place] and desire to follow-up there for Gyn/Onc care if possible.     Annabelle Campbell MD  OB/GYN PGY-2  11/28/19 6:40 AM    I, Cristóbal James MD personally examined and evaluated this patient on 11/28/19.  I discussed the patient with the resident and care team, and agree with the assessment and plan of care as documented in the residents note above.    I personally reviewed vital signs, laboratory values and imaging results.    Pt is s/p debulking for likely stage IV endometrial cancer with secondary infected ovarian mass, will cont empiric zosyn.  Oliguric renal failure improving and UOP improving, Cr improving as well.  Continue to monitor.  Poorly controlled DM, on insulin gtt.  Will advance diet    Rosana James MD  Gynecologic Oncology  [facility]

## 2019-11-28 NOTE — PLAN OF CARE
Major Shift Events:  Admitted from PACU at 1700.  Insulin Gtt 5 (algo 2), Levo at 0.3, 1G Calcium, 500 Albumin.    Plan: Keep MAP > 65, monitor lactate.    For vital signs and complete assessments, please see documentation flowsheets.     Pt neuro intact but lethargic.  Follows commands, oriented.      Pt unable to wean off Levo as of yet.  Still on 0.3.

## 2019-11-28 NOTE — PLAN OF CARE
Major Shift Events: Remains lethargic, does not cooperate with all parts of neuro exam. BP 90s/50s with MAP >65 on Levo, titrated throughout night. Insulin drip - Alg 2. Blood sugars 120s-130s. Hgb 7.2. 2L NC. Zamora with good output. Surgical incision WDL.  Plan: Maintain MAP >65, monitor neuro status.   For vital signs and complete assessments, please see documentation flowsheets.

## 2019-11-28 NOTE — PROGRESS NOTES
SURGICAL ICU PROGRESS NOTE  11/28/2019    PRIMARY TEAM: Gyn   PRIMARY PHYSICIAN: Dr. James    REASON FOR CRITICAL CARE ADMISSION: hemodynamic monitoring    ADMITTING PHYSICIAN: Dr. Flores    ASSESSMENT: 44 year old female with hx of uncontrolled DM, HTN, PSA presented with sepsis, abdominal pain and vaginal bleeding. Initially managed with antibiotics with not a significant improvement in her clinical status and in fact resulted in worsening renal failure and leukocytosis. CT abd/pelvis showed large mass in anterior abdomen (ovarian in origin) with peritoneal carcinomatosis. She is now s/p ex lap, ZANDRA BSO, omentectomy, tumor debulking and abdominal washout. intraop findings include bloody purulent ascites and large superinfected right ovarian mass. Post operatively pt remained on pressors to maintain blood pressure MAP >65 and therefore transferred to SICU for hemodynamic monitoring    Changes for today  - regular diet  - hgb 6.5 --> transfuse 1u  - subcutaneous heparin once hgb more stable.   - keep insulin gtt for now    PLAN:   Neuro/ pain/ sedation:  #hx of polysubstance abuse  #Anxiety  -Monitor neurological status. Notify the MD for any acute changes in exam.  -S. Tylenol and PRN oxycodone/dilaudid for pain.     Pulmonary care:   -Extubated post operatively. On nasal cannula --wean oxygen as tolerated   -Supplemental oxygen to keep saturation above 92 %.  -Incentive spirometer every 15- 30 minutes when awake.     Cardiovascular:    #Hypertension  -Monitor hemodynamic status.   -Off pressors  -hold off PTA anti-hypertensives for now      GI care:   -regular diet  -Diet per primary   -H2 blocker (INR 1.7)     Fluids/ Electrolytes/ Nutrition:   -Hold off fluids for now given net +8L since admission  -ICU electrolyte replacement protocol  -No indication for parenteral nutrition.  -Nutrition consulted. Appreciate recs     Renal/ Fluid Balance:    #CINDY  -Urine output adequate  -Will continue to monitor intake and  output.  -follow labs     Endocrine:    #Hx of diabetes  -Started insulin gtt     ID/ Antibiotics:  #intraabdominal infection  -elevated wbc - could be a combination of post op inflammatory response or infection  -continue zosyn     Heme:     -Hemoglobin 6.5 from 7.2 yesterday     Prophylaxis:    -Mechanical prophylaxis for DVT.  -No chemical DVT prophylaxis due to high risk of bleeding. Heparin subcutaneous once hgb stable  -PPI     MSK:    -PT and OT consulted. Appreciate recs.       Lines/ tubes/ drains:  -PIV     Disposition:  -Surgical ICU.    Patient seen, findings and plan discussed with surgical ICU staff.    Kimberly Ba MD  General Surgery PGY-2  [phone]    - - - - - - - - - - - - - - - - - - - - - - - - - - - - - - - - - - - - - - - - - - - - - - - - - - - - - - - - - - - - - - - - - - - - - - - -     HISTORY PRESENTING ILLNESS: This patient is a 44 year old female who is transfer from [facility] here with sepsis, leukocytosis, abdominal pain, vaginal bleeding and imaging concerning for ovarian malignancy. Her medical history is pertinent for T2DM, HTN, HLD. Per the patient's family, she also has a history of IV methamphetamine use, THC use, tobacco use, alcohol use and possible PCOS; however, at this time we don't have records to verify this history. The patient was not cooperative with the interview and kept stating she wanted something to drink and she wanted to go to sleep.      Per chart review she presented to the [facility] ED with abdominal pain and confusion. She was found to be in septic shock with a leukocytosis. WBC 17.3, LA 4.0. Afebrile. She was given Zosyn and Vancomycin. Arrangements were made to transfer her due to lack of ICU beds at [facility]. She was transferred to [facility] due to availability of Gyn Oncology services.     REVIEW OF SYSTEMS: 10 point ROS neg other than the symptoms noted above in the HPI.    PAST MEDICAL HISTORY:    has a past medical  history of Anxiety, Asthma, Bilateral lower extremity edema, Depression, HTN (hypertension), Insomnia, Migraine, Obesity, Substance abuse (H), Type 2 diabetes mellitus (H), and Vasculopathy.    SURGICAL HISTORY:    has no past surgical history on file.    SOCIAL HISTORY:    reports that she has been smoking. She does not have any smokeless tobacco history on file.    FAMILY HISTORY: No bleeding/clotting disorders nor problems with anesthesia.     ALLERGIES:      Allergies   Allergen Reactions     Bee Venom Other (See Comments)     swelling     Selenicereus Grandiflorus Rash       MEDICATIONS:  No current facility-administered medications on file prior to encounter.   gabapentin (NEURONTIN) 100 MG capsule, Take 300 mg by mouth 3 times daily   insulin glargine (LANTUS SOLOSTAR) 100 UNIT/ML pen, Inject 25 Units Subcutaneous every evening  metFORMIN (GLUCOPHAGE) 1000 MG tablet, Take 1,000 mg by mouth 2 times daily (with meals)  albuterol (PROAIR HFA/PROVENTIL HFA/VENTOLIN HFA) 108 (90 Base) MCG/ACT inhaler, Inhale 2 puffs into the lungs as needed        PHYSICAL EXAMINATION:  Temp:  [96.7  F (35.9  C)-98.3  F (36.8  C)] 96.8  F (36  C)  Pulse:  [90] 90  Heart Rate:  [64-93] 74  Resp:  [14-16] 14  BP: ()/(51-68) 127/68  MAP:  [53 mmHg-83 mmHg] 70 mmHg  Arterial Line BP: ()/(38-62) 114/53  SpO2:  [93 %-100 %] 99 %  Sleeping   Tachycardia  Non labored breathing on 3-4L NC  Abdomen soft, non distended, appropriately tender to palpation. Midline incision closed with sutures with dermabond in place  Zamora intact  No bilateral upper/lower extremity edema. Pulses palpable    LABS: Reviewed.   Arterial Blood Gases   Recent Labs   Lab 11/28/19  0915 11/28/19  0247 11/27/19  2234 11/27/19  1530   PH 7.36 7.35 7.33* 7.28*   PCO2 39 38 38 41   PO2 99 107* 115* 107*   HCO3 22 21 20* 19*     Complete Blood Count   Recent Labs   Lab 11/28/19  1310 11/28/19  0915 11/28/19  0247 11/27/19  1736 11/27/19  1300   WBC 10.8  --   17.3* 17.5* 24.4*   HGB 6.6* 6.5* 7.2* 8.1* 9.0*     --  252 200 241     Basic Metabolic Panel  Recent Labs   Lab 11/28/19 0247 11/27/19 1952 11/27/19  1300 11/27/19  1121  11/27/19  0558    137 135 131*   < > 133   POTASSIUM 4.3 4.4 4.7 4.6   < > 4.2   CHLORIDE 109 107 105  --   --  100   CO2 23 20 19*  --   --  22   BUN 71* 76* 68*  --   --  67*   CR 3.51* 3.72* 3.77*  --   --  3.92*   * 243* 215* 180*   < > 188*    < > = values in this interval not displayed.     Liver Function Tests  Recent Labs   Lab 11/28/19 0915 11/28/19 0247 11/27/19 1736 11/27/19  1300 11/27/19  1115 11/26/19 1737 11/25/19  0614   AST  --  15  --  23  --   --  31 38   ALT  --  10  --  11  --   --  15 17   ALKPHOS  --  246*  --  271*  --   --  325* 136   BILITOTAL  --  0.7  --  0.7  --   --  0.4 0.6   ALBUMIN  --  1.8*  --  1.4*  --   --  1.8* 1.5*   INR 1.69*  --  1.83* 1.81* 1.76*   < >  --  1.55*    < > = values in this interval not displayed.     Pancreatic Enzymes  No lab results found in last 7 days.  Coagulation Profile  Recent Labs   Lab 11/28/19 0915 11/27/19 1736 11/27/19  1300 11/27/19  1115 11/27/19  0558  11/23/19  0525   INR 1.69* 1.83* 1.81* 1.76* 1.75*   < > 1.59*   PTT  --   --  35 36 35  --  34    < > = values in this interval not displayed.     Lactate  Invalid input(s): LACTATE    IMAGING:  No results found for this or any previous visit (from the past 24 hour(s)).

## 2019-11-28 NOTE — PROGRESS NOTES
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 Also has worsening CINDY from ATN, likely multifactorial.    Oliguric CINDY (ATN)  Hypervolemic hyponatremia, improved  Based on sharp rising pattern of Cr, her CINDY is most likely secondary to ATN. This could be multifactorial in etiology; systemic infection, past episode of prerenal CINDY at OSH that improved with hydration, exposure to contrast agent. Kidney US (11/25/19) was normal  -suspect her CINDY was due to infection related cytokine mediated injury and maybe contrast related.   CINDY seems to be stabilizing, likely due to infection and contrast  - Scr stable  - UOP improving nicely   - will re-evaluate her tomorrow    Mixed non-anion gap metabolic acidosis and respiratory acidosis  Improved, bicarb 23  PH 7.36/39/99/22    Electrolytes- potassium improved, now normal  - check daily for now given good UOP.    Anemia- acute blood loss- transfusion per primary team    Recommendations were communicated to primary team via note.         Interval History :   Nursing and provider notes from last 24 hours reviewed.  In the last 24 hours Chela Love had surgery yesterday. She is doing well. She is very appreciative of her care. She is still SICU. Breathing is stable.  UOP is good, 1 liter yesterday and more than that already today    Physical Exam:   I/O last 3 completed shifts:  In: 3510.94 [I.V.:2190.94; IV Piggyback:500]  Out: 2160 [Urine:1660; Blood:500]   /68   Pulse 90   Temp 96.8  F (36  C) (Axillary)   Resp 14   Ht 1.676 m (5' 6\")   Wt 104.5 kg (230 lb 6.1 oz)   LMP 10/27/2019   SpO2 99% "   Breastfeeding No   BMI 37.18 kg/m       Constitutional: sitting in chair, talking and interactive  Eyes: pupils equal, round and reactive to light, extra ocular muscles intact, sclera clear, conjunctiva normal  Respiratory: no increased work of breathing, no crackles or wheezing  Cardiovascular: Normal apical impulse, regular rate and rhythm, normal S1 and S2, no S3 or S4. HECTOR grade 2/6 heard at LUPSB.  GI: soft, generalized mild tenderness to palpation (no guarding or rebound). Midline incision closed with sutures with dermabond in place  Genitounirinary: nelson catheter in place  Skin: normal skin color, texture, turgor  Musculoskeletal: 1+ lower extremity pitting edema present  Neurologic: drowsy. Grossly no focal neurological deficit.    Labs:   All labs reviewed by me  Electrolytes/Renal -   Recent Labs   Lab Test 11/28/19 0247 11/27/19 1952 11/27/19  1300  11/26/19  1737  11/24/19  0727  11/23/19  2321    137 135   < > 130*   < > 131*  --  132*   POTASSIUM 4.3 4.4 4.7   < > 4.2   < > 4.2   < > 3.9   CHLORIDE 109 107 105   < > 97   < > 98  --  99   CO2 23 20 19*   < > 20   < > 23  --  25   BUN 71* 76* 68*   < > 60*   < > 32*  --  28   CR 3.51* 3.72* 3.77*   < > 3.58*   < > 1.60*  --  1.26*   * 243* 215*   < > 287*   < > 226*  --  174*   MARILIN 8.1* 7.6* 8.0*   < > 8.1*   < > 7.7*  --  7.6*   MAG 2.2  --   --   --   --   --  2.2  --  1.7   PHOS 6.4*  --   --   --  5.6*  --   --   --   --     < > = values in this interval not displayed.       CBC -   Recent Labs   Lab Test 11/28/19  1310 11/28/19  0915 11/28/19 0247 11/27/19  1736   WBC 10.8  --  17.3* 17.5*   HGB 6.6* 6.5* 7.2* 8.1*     --  252 200       LFTs -   Recent Labs   Lab Test 11/28/19  0247 11/27/19  1300 11/26/19  1737   ALKPHOS 246* 271* 325*   BILITOTAL 0.7 0.7 0.4   ALT 10 11 15   AST 15 23 31   PROTTOTAL 5.2* 5.0* 6.4*   ALBUMIN 1.8* 1.4* 1.8*     Iron Panel - No lab results found.    Imaging:  No new imaging over the past  24h.    Current Medications:    acetaminophen  975 mg Oral TID     famotidine  20 mg Oral Daily     [Held by provider] heparin ANTICOAGULANT  5,000 Units Subcutaneous Q12H     heparin lock flush  5-10 mL Intracatheter Q24H     piperacillin-tazobactam  2.25 g Intravenous Q6H DONNA     senna-docusate  1 tablet Oral BID    Or     senna-docusate  2 tablet Oral BID     sodium chloride (PF)  3 mL Intracatheter Q8H       IV fluid REPLACEMENT ONLY       insulin (regular) 14 Units/hr (11/28/19 5792)     norepinephrine Stopped (11/28/19 6702)     Anna Browne MD

## 2019-11-28 NOTE — PLAN OF CARE
Discharge Planner OT   Patient plan for discharge: Home  Current status: Pt initially lethargic, improved alertness following completion of basic ADL task but presenting with impaired memory and attention during session. Facilitated bed > chair transfer, min A for supine > sit EOB, CGA/SBA for EOB sitting. Min/mod A for sit <> stand, assist of another for line management. Close CGA/min A due to unsteadiness for steps > chair. Max A LB dressing, mod A UB dressing.   Barriers to return to prior living situation: post-surgical precautions, pain, impaired ADLs, impaired mobility, cognition  Recommendations for discharge: TCU, however pending progress may be appropriate to discharge home with assist.  Rationale for recommendations: Pt is currently below functional baseline for ADLs, transfers, and functional mobility, would benefit from continued therapy to address above deficits and maximize strength/independence in order to return to PLOF. Pending progress in therapy, pt may be able to discharge home with assist from significant other as needed. Will continue to assess and update discharge recommendations as appropriate.       Entered by: Tiffanie Sanderson 11/28/2019 10:09 AM

## 2019-11-29 ENCOUNTER — APPOINTMENT (OUTPATIENT)
Dept: OCCUPATIONAL THERAPY | Facility: CLINIC | Age: 44
End: 2019-11-29
Attending: INTERNAL MEDICINE
Payer: COMMERCIAL

## 2019-11-29 ENCOUNTER — APPOINTMENT (OUTPATIENT)
Dept: PHYSICAL THERAPY | Facility: CLINIC | Age: 44
End: 2019-11-29
Attending: INTERNAL MEDICINE
Payer: COMMERCIAL

## 2019-11-29 PROBLEM — N17.9 ACUTE KIDNEY FAILURE, UNSPECIFIED (H): Status: ACTIVE | Noted: 2019-11-29

## 2019-11-29 LAB
ALBUMIN SERPL-MCNC: 1.6 G/DL (ref 3.4–5)
ALP SERPL-CCNC: 314 U/L (ref 40–150)
ALT SERPL W P-5'-P-CCNC: 13 U/L (ref 0–50)
ANION GAP SERPL CALCULATED.3IONS-SCNC: 8 MMOL/L (ref 3–14)
ANION GAP SERPL CALCULATED.3IONS-SCNC: 9 MMOL/L (ref 3–14)
AST SERPL W P-5'-P-CCNC: 24 U/L (ref 0–45)
BACTERIA SPEC CULT: NO GROWTH
BACTERIA SPEC CULT: NO GROWTH
BASOPHILS # BLD AUTO: 0.1 10E9/L (ref 0–0.2)
BASOPHILS NFR BLD AUTO: 1 %
BILIRUB SERPL-MCNC: 0.3 MG/DL (ref 0.2–1.3)
BUN SERPL-MCNC: 66 MG/DL (ref 7–30)
BUN SERPL-MCNC: 71 MG/DL (ref 7–30)
CALCIUM SERPL-MCNC: 7.7 MG/DL (ref 8.5–10.1)
CALCIUM SERPL-MCNC: 7.7 MG/DL (ref 8.5–10.1)
CHLORIDE SERPL-SCNC: 111 MMOL/L (ref 94–109)
CHLORIDE SERPL-SCNC: 113 MMOL/L (ref 94–109)
CO2 SERPL-SCNC: 21 MMOL/L (ref 20–32)
CO2 SERPL-SCNC: 23 MMOL/L (ref 20–32)
CREAT SERPL-MCNC: 3.03 MG/DL (ref 0.52–1.04)
CREAT SERPL-MCNC: 3.12 MG/DL (ref 0.52–1.04)
DIFFERENTIAL METHOD BLD: ABNORMAL
EOSINOPHIL # BLD AUTO: 0.3 10E9/L (ref 0–0.7)
EOSINOPHIL NFR BLD AUTO: 3 %
ERYTHROCYTE [DISTWIDTH] IN BLOOD BY AUTOMATED COUNT: 14.7 % (ref 10–15)
ERYTHROCYTE [DISTWIDTH] IN BLOOD BY AUTOMATED COUNT: 15 % (ref 10–15)
GFR SERPL CREATININE-BSD FRML MDRD: 17 ML/MIN/{1.73_M2}
GFR SERPL CREATININE-BSD FRML MDRD: 18 ML/MIN/{1.73_M2}
GLUCOSE BLDC GLUCOMTR-MCNC: 111 MG/DL (ref 70–99)
GLUCOSE BLDC GLUCOMTR-MCNC: 114 MG/DL (ref 70–99)
GLUCOSE BLDC GLUCOMTR-MCNC: 124 MG/DL (ref 70–99)
GLUCOSE BLDC GLUCOMTR-MCNC: 126 MG/DL (ref 70–99)
GLUCOSE BLDC GLUCOMTR-MCNC: 137 MG/DL (ref 70–99)
GLUCOSE BLDC GLUCOMTR-MCNC: 140 MG/DL (ref 70–99)
GLUCOSE BLDC GLUCOMTR-MCNC: 143 MG/DL (ref 70–99)
GLUCOSE BLDC GLUCOMTR-MCNC: 146 MG/DL (ref 70–99)
GLUCOSE BLDC GLUCOMTR-MCNC: 146 MG/DL (ref 70–99)
GLUCOSE BLDC GLUCOMTR-MCNC: 147 MG/DL (ref 70–99)
GLUCOSE BLDC GLUCOMTR-MCNC: 155 MG/DL (ref 70–99)
GLUCOSE BLDC GLUCOMTR-MCNC: 163 MG/DL (ref 70–99)
GLUCOSE BLDC GLUCOMTR-MCNC: 167 MG/DL (ref 70–99)
GLUCOSE BLDC GLUCOMTR-MCNC: 175 MG/DL (ref 70–99)
GLUCOSE BLDC GLUCOMTR-MCNC: 185 MG/DL (ref 70–99)
GLUCOSE BLDC GLUCOMTR-MCNC: 193 MG/DL (ref 70–99)
GLUCOSE SERPL-MCNC: 123 MG/DL (ref 70–99)
GLUCOSE SERPL-MCNC: 152 MG/DL (ref 70–99)
HCT VFR BLD AUTO: 24.3 % (ref 35–47)
HCT VFR BLD AUTO: 25 % (ref 35–47)
HGB BLD-MCNC: 7.8 G/DL (ref 11.7–15.7)
HGB BLD-MCNC: 8 G/DL (ref 11.7–15.7)
INR PPP: 1.45 (ref 0.86–1.14)
INR PPP: 1.57 (ref 0.86–1.14)
LYMPHOCYTES # BLD AUTO: 0.8 10E9/L (ref 0.8–5.3)
LYMPHOCYTES NFR BLD AUTO: 8 %
Lab: NORMAL
Lab: NORMAL
MAGNESIUM SERPL-MCNC: 2 MG/DL (ref 1.6–2.3)
MCH RBC QN AUTO: 27.8 PG (ref 26.5–33)
MCH RBC QN AUTO: 28.2 PG (ref 26.5–33)
MCHC RBC AUTO-ENTMCNC: 32 G/DL (ref 31.5–36.5)
MCHC RBC AUTO-ENTMCNC: 32.1 G/DL (ref 31.5–36.5)
MCV RBC AUTO: 87 FL (ref 78–100)
MCV RBC AUTO: 88 FL (ref 78–100)
MONOCYTES # BLD AUTO: 0.4 10E9/L (ref 0–1.3)
MONOCYTES NFR BLD AUTO: 4 %
MYELOCYTES # BLD: 0.3 10E9/L
MYELOCYTES NFR BLD MANUAL: 3 %
NEUTROPHILS # BLD AUTO: 7.6 10E9/L (ref 1.6–8.3)
NEUTROPHILS NFR BLD AUTO: 81 %
PHOSPHATE SERPL-MCNC: 4.8 MG/DL (ref 2.5–4.5)
PLATELET # BLD AUTO: 234 10E9/L (ref 150–450)
PLATELET # BLD AUTO: 264 10E9/L (ref 150–450)
POTASSIUM SERPL-SCNC: 3.8 MMOL/L (ref 3.4–5.3)
POTASSIUM SERPL-SCNC: 4.4 MMOL/L (ref 3.4–5.3)
PROT SERPL-MCNC: 5.2 G/DL (ref 6.8–8.8)
RBC # BLD AUTO: 2.77 10E12/L (ref 3.8–5.2)
RBC # BLD AUTO: 2.88 10E12/L (ref 3.8–5.2)
SODIUM SERPL-SCNC: 141 MMOL/L (ref 133–144)
SODIUM SERPL-SCNC: 144 MMOL/L (ref 133–144)
SPECIMEN SOURCE: NORMAL
SPECIMEN SOURCE: NORMAL
WBC # BLD AUTO: 12.2 10E9/L (ref 4–11)
WBC # BLD AUTO: 9.4 10E9/L (ref 4–11)

## 2019-11-29 PROCEDURE — 97165 OT EVAL LOW COMPLEX 30 MIN: CPT | Mod: GO | Performed by: OCCUPATIONAL THERAPIST

## 2019-11-29 PROCEDURE — 85027 COMPLETE CBC AUTOMATED: CPT | Performed by: STUDENT IN AN ORGANIZED HEALTH CARE EDUCATION/TRAINING PROGRAM

## 2019-11-29 PROCEDURE — 25000132 ZZH RX MED GY IP 250 OP 250 PS 637: Performed by: STUDENT IN AN ORGANIZED HEALTH CARE EDUCATION/TRAINING PROGRAM

## 2019-11-29 PROCEDURE — 80053 COMPREHEN METABOLIC PANEL: CPT | Performed by: STUDENT IN AN ORGANIZED HEALTH CARE EDUCATION/TRAINING PROGRAM

## 2019-11-29 PROCEDURE — 12000001 ZZH R&B MED SURG/OB UMMC

## 2019-11-29 PROCEDURE — 97162 PT EVAL MOD COMPLEX 30 MIN: CPT | Mod: GP

## 2019-11-29 PROCEDURE — 25000132 ZZH RX MED GY IP 250 OP 250 PS 637: Performed by: OBSTETRICS & GYNECOLOGY

## 2019-11-29 PROCEDURE — 85025 COMPLETE CBC W/AUTO DIFF WBC: CPT | Performed by: STUDENT IN AN ORGANIZED HEALTH CARE EDUCATION/TRAINING PROGRAM

## 2019-11-29 PROCEDURE — 97530 THERAPEUTIC ACTIVITIES: CPT | Mod: GP

## 2019-11-29 PROCEDURE — 25000128 H RX IP 250 OP 636: Performed by: STUDENT IN AN ORGANIZED HEALTH CARE EDUCATION/TRAINING PROGRAM

## 2019-11-29 PROCEDURE — 80048 BASIC METABOLIC PNL TOTAL CA: CPT | Performed by: STUDENT IN AN ORGANIZED HEALTH CARE EDUCATION/TRAINING PROGRAM

## 2019-11-29 PROCEDURE — 83735 ASSAY OF MAGNESIUM: CPT | Performed by: STUDENT IN AN ORGANIZED HEALTH CARE EDUCATION/TRAINING PROGRAM

## 2019-11-29 PROCEDURE — 25000128 H RX IP 250 OP 636: Performed by: PHYSICIAN ASSISTANT

## 2019-11-29 PROCEDURE — 85610 PROTHROMBIN TIME: CPT | Performed by: STUDENT IN AN ORGANIZED HEALTH CARE EDUCATION/TRAINING PROGRAM

## 2019-11-29 PROCEDURE — 00000146 ZZHCL STATISTIC GLUCOSE BY METER IP

## 2019-11-29 PROCEDURE — 97535 SELF CARE MNGMENT TRAINING: CPT | Mod: GO | Performed by: OCCUPATIONAL THERAPIST

## 2019-11-29 PROCEDURE — 25800030 ZZH RX IP 258 OP 636: Performed by: STUDENT IN AN ORGANIZED HEALTH CARE EDUCATION/TRAINING PROGRAM

## 2019-11-29 PROCEDURE — 25000125 ZZHC RX 250: Performed by: STUDENT IN AN ORGANIZED HEALTH CARE EDUCATION/TRAINING PROGRAM

## 2019-11-29 PROCEDURE — 97530 THERAPEUTIC ACTIVITIES: CPT | Mod: GO | Performed by: OCCUPATIONAL THERAPIST

## 2019-11-29 PROCEDURE — 84100 ASSAY OF PHOSPHORUS: CPT | Performed by: STUDENT IN AN ORGANIZED HEALTH CARE EDUCATION/TRAINING PROGRAM

## 2019-11-29 PROCEDURE — 99024 POSTOP FOLLOW-UP VISIT: CPT | Mod: GC | Performed by: OBSTETRICS & GYNECOLOGY

## 2019-11-29 RX ORDER — POTASSIUM CHLORIDE 29.8 MG/ML
20 INJECTION INTRAVENOUS
Status: DISCONTINUED | OUTPATIENT
Start: 2019-11-29 | End: 2019-12-05 | Stop reason: HOSPADM

## 2019-11-29 RX ORDER — POTASSIUM CHLORIDE 1.5 G/1.58G
20-40 POWDER, FOR SOLUTION ORAL
Status: DISCONTINUED | OUTPATIENT
Start: 2019-11-29 | End: 2019-12-05 | Stop reason: HOSPADM

## 2019-11-29 RX ORDER — POTASSIUM CL/LIDO/0.9 % NACL 10MEQ/0.1L
10 INTRAVENOUS SOLUTION, PIGGYBACK (ML) INTRAVENOUS
Status: DISCONTINUED | OUTPATIENT
Start: 2019-11-29 | End: 2019-12-05 | Stop reason: HOSPADM

## 2019-11-29 RX ORDER — OXYCODONE HYDROCHLORIDE 5 MG/1
5-10 TABLET ORAL EVERY 4 HOURS PRN
Status: DISCONTINUED | OUTPATIENT
Start: 2019-11-29 | End: 2019-11-30

## 2019-11-29 RX ORDER — POTASSIUM CHLORIDE 7.45 MG/ML
10 INJECTION INTRAVENOUS
Status: DISCONTINUED | OUTPATIENT
Start: 2019-11-29 | End: 2019-12-05 | Stop reason: HOSPADM

## 2019-11-29 RX ORDER — POTASSIUM CHLORIDE 750 MG/1
20-40 TABLET, EXTENDED RELEASE ORAL
Status: DISCONTINUED | OUTPATIENT
Start: 2019-11-29 | End: 2019-12-05 | Stop reason: HOSPADM

## 2019-11-29 RX ADMIN — PIPERACILLIN SODIUM AND TAZOBACTAM SODIUM 2.25 G: 2; .25 INJECTION, POWDER, LYOPHILIZED, FOR SOLUTION INTRAVENOUS at 19:41

## 2019-11-29 RX ADMIN — PIPERACILLIN SODIUM AND TAZOBACTAM SODIUM 2.25 G: 2; .25 INJECTION, POWDER, LYOPHILIZED, FOR SOLUTION INTRAVENOUS at 08:26

## 2019-11-29 RX ADMIN — OXYCODONE HYDROCHLORIDE 10 MG: 5 TABLET ORAL at 19:31

## 2019-11-29 RX ADMIN — OXYCODONE HYDROCHLORIDE 10 MG: 5 TABLET ORAL at 23:51

## 2019-11-29 RX ADMIN — HUMAN INSULIN 7 UNITS/HR: 100 INJECTION, SOLUTION SUBCUTANEOUS at 06:34

## 2019-11-29 RX ADMIN — HYDROMORPHONE HYDROCHLORIDE 0.5 MG: 1 INJECTION, SOLUTION INTRAMUSCULAR; INTRAVENOUS; SUBCUTANEOUS at 00:34

## 2019-11-29 RX ADMIN — OXYCODONE HYDROCHLORIDE 5 MG: 5 TABLET ORAL at 08:24

## 2019-11-29 RX ADMIN — SODIUM CHLORIDE, POTASSIUM CHLORIDE, SODIUM LACTATE AND CALCIUM CHLORIDE 1000 ML: 600; 310; 30; 20 INJECTION, SOLUTION INTRAVENOUS at 05:12

## 2019-11-29 RX ADMIN — HUMAN INSULIN 11 UNITS/HR: 100 INJECTION, SOLUTION SUBCUTANEOUS at 01:35

## 2019-11-29 RX ADMIN — CALCIUM CARBONATE (ANTACID) CHEW TAB 500 MG 500 MG: 500 CHEW TAB at 08:24

## 2019-11-29 RX ADMIN — POTASSIUM CHLORIDE 20 MEQ: 750 TABLET, EXTENDED RELEASE ORAL at 05:03

## 2019-11-29 RX ADMIN — FAMOTIDINE 20 MG: 20 TABLET ORAL at 18:04

## 2019-11-29 RX ADMIN — HUMAN INSULIN 6 UNITS/HR: 100 INJECTION, SOLUTION SUBCUTANEOUS at 12:58

## 2019-11-29 RX ADMIN — PIPERACILLIN SODIUM AND TAZOBACTAM SODIUM 2.25 G: 2; .25 INJECTION, POWDER, LYOPHILIZED, FOR SOLUTION INTRAVENOUS at 14:44

## 2019-11-29 RX ADMIN — HYDROMORPHONE HYDROCHLORIDE 0.3 MG: 1 INJECTION, SOLUTION INTRAMUSCULAR; INTRAVENOUS; SUBCUTANEOUS at 05:41

## 2019-11-29 RX ADMIN — HYDROMORPHONE HYDROCHLORIDE 0.5 MG: 1 INJECTION, SOLUTION INTRAMUSCULAR; INTRAVENOUS; SUBCUTANEOUS at 11:07

## 2019-11-29 RX ADMIN — HYDROMORPHONE HYDROCHLORIDE 0.5 MG: 1 INJECTION, SOLUTION INTRAMUSCULAR; INTRAVENOUS; SUBCUTANEOUS at 20:30

## 2019-11-29 RX ADMIN — ACETAMINOPHEN 975 MG: 325 TABLET, FILM COATED ORAL at 08:24

## 2019-11-29 RX ADMIN — ACETAMINOPHEN 975 MG: 325 TABLET, FILM COATED ORAL at 19:40

## 2019-11-29 RX ADMIN — ACETAMINOPHEN 650 MG: 325 TABLET, FILM COATED ORAL at 23:51

## 2019-11-29 RX ADMIN — OXYCODONE HYDROCHLORIDE 10 MG: 5 TABLET ORAL at 14:44

## 2019-11-29 RX ADMIN — CALCIUM CARBONATE (ANTACID) CHEW TAB 500 MG 500 MG: 500 CHEW TAB at 19:40

## 2019-11-29 RX ADMIN — PIPERACILLIN SODIUM AND TAZOBACTAM SODIUM 2.25 G: 2; .25 INJECTION, POWDER, LYOPHILIZED, FOR SOLUTION INTRAVENOUS at 01:27

## 2019-11-29 RX ADMIN — ACETAMINOPHEN 650 MG: 325 TABLET, FILM COATED ORAL at 05:03

## 2019-11-29 ASSESSMENT — PAIN DESCRIPTION - DESCRIPTORS
DESCRIPTORS: ACHING;SHARP
DESCRIPTORS: ACHING
DESCRIPTORS: ACHING;CONSTANT

## 2019-11-29 ASSESSMENT — ACTIVITIES OF DAILY LIVING (ADL)
ADLS_ACUITY_SCORE: 14
ADLS_ACUITY_SCORE: 14
ADLS_ACUITY_SCORE: 13
ADLS_ACUITY_SCORE: 14

## 2019-11-29 NOTE — CONSULTS
"NEW INPATIENT DIABETES MANAGEMENT CONSULT  Chela Love  Age: 44 year old  MRN # [id number]   YOB: 1975    Chief Complaint: septic shock with ovarian mass.   Reason for Consult: \"DM- glucose control\"  Consulting Provider: Sade Marie PA-C    History of Present Illness:   Chela is a 44 year old female with a history of morbid obesity, TIIDM, HTN, asthma, and polysubstance abuse who presented initially to [facility].  At time of initial assessment, she was having nausea, vomiting, and vague abdominal pain, also found to have a large ovarian mass with ascites and peritoneal carcinomatosis. She was transferred to [facility] 11/23/19 in septic shock, for GYN ONC consult and surgical evaluation.     She was taken to OR on 11/27 for ex-lap, ZANDRA, BSO, omentectomy, tumor debulking and abdominal washout. Blas purulence of the abdominal cavity was noted. She required 2 units PRBC and 1 FFP intraoperatively. She had septic/hypovolemic shock requiring pressors. Initial path is consistent with adenocarcinoma.    Her A1c upon this admission is significantly elevated at 15%.     On admission, BG >600. BhB was 0.39 at OSH. Upon transfer to [facility], , trended down to 120's with two separate doses of Novolog per sliding scale (22 and 25 units, respectively). Her reported PTA Lantus 25 units was ordered and given in the evening. BG persisted in 180-280's range on this regimen. On 11/26, BG to 300's despite this regimen, thus she was started on IV insulin pre-operatively. She received 4mg internal jugular Dexamethasone on 11/27 intraoperatively.      In the past 10 hours, IV insulin rates averaging 5.8 units per hour. She has carb coverage ordered at 1:15g CHO, but does not appear to have received any yet. She has a low consistent carb diet ordered.     Limited history obtained from patient today, as she was initially somnolent and difficult to arouse (has just received dilaudid), and subsequently became tearful and " perseverating on being hungry and wanting to eat, refusing to converse with this provider until she was given food.     Many prior notes state that her mother is primary caregiver/is able to provide history, as the patient is a poor historian. Mom is not present at time of this evaluation.     Of note, recent ED notes from July of this year endorse issues with hyperglycemia related to patient not being able to afford insulin.     Other Active Medical Problems: adenocarcinoma and septic shock.  Diabetes Mellitus Type: II  Duration:     >7 years  Diabetic Complications: recurrent LE ulcerations, per chart review.   Prior to Admission Diabetes Regimen:    Per chart review:    -Metformin 1000mg BID, and Lantus 25 units at bedtime  In 2012, when 20 kg heavier (but also when taking medications consistently):   -A1c was 6.8% on Metformin 1000mg BID, Lantus 80 units at bedtime, and Novolog 30 units TID WM.    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   Usual BG control PTA:  Uncontrolled, a1c 15% on this admission   History of DKA: not evident per chart review  Able to Detect Hypoglycemia: unknown, unlikely to be having hypoglycemia recently.   Usual Diabetes Care Provider: previous  specialty clinic, last seen 6/2012.  Primary Care Provider: [facility]  Factors Impacting IP Glucose Control: intraop steroids, altered PO intake, acute infection/sepsis, malignancy, glucose toxicity   Current Diet: Orders Placed This Encounter      Low Consistent CHO Diet      10 point ROS completed with pertinent positives and negatives noted in the HPI  Past medical, family and social histories are reviewed and updated.    Social History    Tobacco: some day smoker.     Alcohol: unclear current use; history of abuse    Illicit substance: per sister (per H&P) active meth and cannabis use    Marital Status: single     Place of Residence:  "[place], [place]    Family History  Birth mother with TIIDM  MGM with TIIDM     Physical Exam   /59   Pulse 80   Temp 97.7  F (36.5  C) (Oral)   Resp 16   Ht 1.676 m (5' 6\")   Wt 104.5 kg (230 lb 6.1 oz)   LMP 10/27/2019   SpO2 99%   Breastfeeding No   BMI 37.18 kg/m    General: somnolent, falling over in chair initially. When awoken, tearful, mumbling, not oriented to attempts at conversation   HEENT: normocephalic, atraumatic. Oral mucous membranes moist.   Lungs: unlabored respiration, no cough  ABD: rounded, soft, no lipodystrophy noted  Skin: warm and dry, no obvious lesions  MSK:  moves all extremities  Lymp:  1+ LE edema   Mental status:  tearful, not oriented to conversation, perseverative on getting food  Psych:  flat affect, tearful, limited meaningful interaction.     Most Recent Laboratory Tests:  Recent Labs   Lab 11/29/19  0248   HGB 8.0*     Recent Labs   Lab 11/23/19  0352   A1C 15.0*     Recent Labs   Lab 11/29/19  0248   CR 3.12*     Recent Labs   Lab 11/29/19  1305 11/29/19  1102 11/29/19  0939 11/29/19  0821 11/29/19  0631 11/29/19  0545  11/29/19  0248  11/28/19  2049  11/28/19  0247  11/27/19  1952  11/27/19  1300  11/27/19  1121   GLC  --   --   --   --   --   --   --  152*  --  196*  --  135*  --  243*  --  215*  --  180*   * 175* 146* 137* 167* 163*   < >  --    < >  --    < >  --    < >  --    < >  --    < >  --     < > = values in this interval not displayed.     Assessment:   1) Type II Diabetes uncontrolled (A1c 15)    Averaging 5.8 units per hour= ~140 units basal insulin per day. 1:15g CHO ordered, but no carb coverage insulin has been given up to this point.     Plan:    -continue IV insulin- will assess for transition to subQ regimen when tolerating diet (and receiving coverage for PO intake), and IV requirements reduce.   -increase from 1:15g to 1:3g CHO coverage   -BG monitoring q1-2 hours per IV insulin protocol.   -hypoglycemia protocol   -okay to increase to " mod CHO diet with carb counting protocol   -diabetes education prior to discharge for new insulin regimen   -due to renal function, cannot safely resume Metformin at this time, will reassess prior to discharge along with the use of GLP1 agonist, if appropriate.    -on discharge, will recommend she re-establish care with PCP, and possibly endocrinology specialty.    Discussed plan of care with patient, nursing.   Thank you for this consult; Inpatient Diabetes will continue to follow.     Diabetes Management Team job code: [id number]    I spent a total of 80 minutes bedside and on the inpatient unit managing glycemic care. Over 50% of my time on the unit was spent counseling the patient and/or coordinating care regarding acute hyperglycemia management.  See note for details.    Citlalli Price PA-C  Inpatient Diabetes Management Service  Pager [phone]

## 2019-11-29 NOTE — PLAN OF CARE
"Discharge Planner OT   Patient plan for discharge: home  Current status: Pt currently limited by pain and confusion. With cues and min A transfer to chair for ADLs, educated re abdominal precautions and log roll method.  Pt had sudden unsafe sit back to bed during transfer stating \"I felt like I was going to pass out\".  Often weepy during session, crying and reporting pain.  Scored BNL on cognitive screen, see below.    Barriers to return to prior living situation: precautions, fall risk. weakness  Recommendations for discharge: TCU although pending progress might be ok for dc to home w A  Rationale for recommendations: Pt currently unsafe with transfers and limited by pain.  Would benefit from continued skilled rehab for safe standing ADLS, functional transfers and cognition.        Entered by: Rhonda Ko 11/29/2019 1:16 PM             Pt completed the Short Blessed Test of cognitive function (SBT). Pt was Ox self place and date. Stated time as 2.30pm (actual time 11am). Pt accurately counted backwards from 20-0. Unble to state months of the year in reverse. When asked to remember and then recall 5 item name and address pt was able to recall 2/5 after 3'.  Pt scored 13/28 indicating  impairment with memory and concentration.    Interpretation: 0-8 = normal to mild impairment, 9-19 = moderate impairment, 20-28 = severe impairment.      "

## 2019-11-29 NOTE — PLAN OF CARE
Major Shift Events:  Up in chair for 8hrs with occasional standing.  Tearful, emotional intermittently.  Levo off.  Room air.  One very large loose/soft BM.  Intermittent dry heaving d/t excessive PO intake.  Pt educated on proper nutritional intake.  Plan: Floor in AM.  Recheck Hgb.    For vital signs and complete assessments, please see documentation flowsheets.

## 2019-11-29 NOTE — PROGRESS NOTES
Nephrology Progress Note  11/29/2019         Assessment & Recommendations:   Chela Love is a 44 year old female with a history of poor-controlled T2DM, HTN, HLD, substance abuse (methamphetamine, cannabis), who presents as a transfer from [facility] for management of severe sepsis (unknown source) and large ovarian mass with ascites and peritoneal carcinomatosis; adenocarcinoma endometrioid type on pathology. Also has worsening CINDY from ATN, likely multifactorial.    Oliguric CINDY (ATN)  Hypervolemic hyponatremia, improved  Based on sharp rising pattern of Cr, her CINDY is most likely secondary to ATN. This could be multifactorial in etiology; systemic infection, past episode of prerenal CINDY at OSH that improved with hydration, exposure to contrast agent. Kidney US (11/25/19) was normal  -suspect her CINDY was due to infection related cytokine mediated injury and maybe contrast related.   CINDY seems to be stabilizing, likely due to infection and contrast  - Scr slowly improving  - UOP good, can give lasix if needed to achieve mild negative balance (if BP stays stable today)    Mixed non-anion gap metabolic acidosis and respiratory acidosis  Improved, bicarb low normal    Electrolytes- potassium improved, now normal  - check daily for now given good UOP.    Anemia- acute blood loss- transfusion per primary team    Shortness of breath- multifactorial-   she is still 2-3 liters above admission, gentle diuresis if dyspnea continues/ worsens  - V/Q scan 11/23 negative      Recommendations were communicated to primary team via note.         Interval History :   Nursing and provider notes from last 24 hours reviewed.  In the last 24 hours Chela Love continues to recover from surgery. She is tearful today, has shortness of breath and is in pain.      Physical Exam:   I/O last 3 completed shifts:  In: 3041.17 [P.O.:1580; I.V.:461.17; IV Piggyback:1000]  Out: 1845 [Urine:1845]   /55   Pulse 80   Temp 98.8  F  "(37.1  C) (Axillary)   Resp 16   Ht 1.676 m (5' 6\")   Wt 104.5 kg (230 lb 6.1 oz)   LMP 10/27/2019   SpO2 99%   Breastfeeding No   BMI 37.18 kg/m       Constitutional: sitting in chair, talking and interactive, crying, distressed  Eyes: pupils equal, round and reactive to light, extra ocular muscles intact, sclera clear, conjunctiva normal  Respiratory: no increased work of breathing, no crackles or wheezing  Cardiovascular: Normal apical impulse, regular rate and rhythm, normal S1 and S2, no S3 or S4. HECTOR grade 2/6 heard at LUPSB.  GI: soft, generalized mild tenderness to palpation (no guarding or rebound). Midline incision closed with sutures with dermabond in place  Genitounirinary: nelson catheter in place  Skin: normal skin color, texture, turgor  Musculoskeletal: 1+ lower extremity pitting edema present  Neurologic: drowsy. Grossly no focal neurological deficit.    Labs:   All labs reviewed by me  Electrolytes/Renal -   Recent Labs   Lab Test 11/29/19 0248 11/28/19 2049 11/28/19 0247 11/26/19  1737  11/24/19  0727    141 138   < > 130*   < > 131*   POTASSIUM 3.8 4.0 4.3   < > 4.2   < > 4.2   CHLORIDE 111* 110* 109   < > 97   < > 98   CO2 21 22 23   < > 20   < > 23   BUN 71* 76* 71*   < > 60*   < > 32*   CR 3.12* 3.22* 3.51*   < > 3.58*   < > 1.60*   * 196* 135*   < > 287*   < > 226*   MARILIN 7.7* 7.6* 8.1*   < > 8.1*   < > 7.7*   MAG 2.0  --  2.2  --   --   --  2.2   PHOS 4.8*  --  6.4*  --  5.6*  --   --     < > = values in this interval not displayed.       CBC -   Recent Labs   Lab Test 11/29/19 0248 11/28/19  1310 11/28/19  0915 11/28/19 0247   WBC 12.2* 10.8  --  17.3*   HGB 8.0* 6.6* 6.5* 7.2*    186  --  252       LFTs -   Recent Labs   Lab Test 11/29/19  0248 11/28/19  0247 11/27/19  1300   ALKPHOS 314* 246* 271*   BILITOTAL 0.3 0.7 0.7   ALT 13 10 11   AST 24 15 23   PROTTOTAL 5.2* 5.2* 5.0*   ALBUMIN 1.6* 1.8* 1.4*     Iron Panel - No lab results found.    Imaging:  No " new imaging over the past 24h.    Current Medications:    acetaminophen  975 mg Oral TID     calcium carbonate  500 mg Oral BID     famotidine  20 mg Oral Daily     [Held by provider] heparin ANTICOAGULANT  5,000 Units Subcutaneous Q12H     heparin lock flush  5-10 mL Intracatheter Q24H     insulin aspart   Subcutaneous TID w/meals     piperacillin-tazobactam  2.25 g Intravenous Q6H DONNA     senna-docusate  1 tablet Oral BID    Or     senna-docusate  2 tablet Oral BID     sodium chloride (PF)  3 mL Intracatheter Q8H       IV fluid REPLACEMENT ONLY       insulin (regular) 6 Units/hr (11/29/19 1258)     norepinephrine Stopped (11/29/19 1731)     Anna Browne MD

## 2019-11-29 NOTE — PROGRESS NOTES
SURGICAL ICU PROGRESS NOTE  11/29/2019    PRIMARY TEAM: Gyn   PRIMARY PHYSICIAN: Dr. James    REASON FOR CRITICAL CARE ADMISSION: hemodynamic monitoring    ADMITTING PHYSICIAN: Dr. Flores    ASSESSMENT: 44 year old female with hx of uncontrolled DM, HTN, PSA presented with sepsis, abdominal pain and vaginal bleeding. Initially managed with antibiotics with not a significant improvement in her clinical status and in fact resulted in worsening renal failure and leukocytosis. CT abd/pelvis showed large mass in anterior abdomen (ovarian in origin) with peritoneal carcinomatosis. She is now s/p ex lap, ZANDRA BSO, omentectomy, tumor debulking and abdominal washout. intraop findings include bloody purulent ascites and large superinfected right ovarian mass. Post operatively pt remained on pressors to maintain blood pressure MAP >65 and therefore transferred to SICU for hemodynamic monitoring    Changes for today  -discontinue A line  - discontinue Zamora  - Add subcutaneous insulin for carb coverage     PLAN:   Neuro/ pain/ sedation:  #hx of polysubstance abuse  #Anxiety  -Monitor neurological status. Notify the MD for any acute changes in exam.  -S. Tylenol and PRN oxycodone/dilaudid for pain.     Pulmonary care:   -Extubated post operatively. On nasal cannula --wean oxygen as tolerated   -Supplemental oxygen to keep saturation above 92 %.  -Incentive spirometer every 15- 30 minutes when awake.     Cardiovascular:    #Hypertension  -Monitor hemodynamic status.   -Off pressors  -hold off PTA anti-hypertensives for now      GI care:   -regular diet   - PTA pepcid      Fluids/ Electrolytes/ Nutrition:   -Hold off fluids for now given net +9L since admission  -ICU electrolyte replacement protocol  -No indication for parenteral nutrition.  -Nutrition consulted. Appreciate recs     Renal/ Fluid Balance:    #CINDY  -Urine output adequate  - creatinine down trending   -Will continue to monitor intake and output.  -follow labs      Endocrine:    #Hx of diabetes  -Started insulin gtt and sliding scale insulin for carb coverage      ID/ Antibiotics:  #intraabdominal infection  -elevated wbc - could be a combination of post op inflammatory response or infection  -continue zosyn for total of 4 days post op     Heme:     -Hemoglobin 6.6 and received a unit of blood. Repeat hgb 8.0     Prophylaxis:    -Mechanical prophylaxis for DVT.  -No chemical DVT prophylaxis due to high risk of bleeding. Heparin subcutaneous once hgb stable  -PPI     MSK:    -PT and OT consulted. Appreciate recs.       Lines/ tubes/ drains:  -PIV     Disposition:  -transfer to floor today    Patient seen, findings and plan discussed with surgical ICU staff.    Kimberly Ba MD  General Surgery PGY-2  [phone]    - - - - - - - - - - - - - - - - - - - - - - - - - - - - - - - - - - - - - - - - - - - - - - - - - - - - - - - - - - - - - - - - - - - - - - - -     Subjective- no acute events overnight. Tolerating diet. No n/v. Had BM.     REVIEW OF SYSTEMS: 10 point ROS neg other than the symptoms noted above in the HPI.    PAST MEDICAL HISTORY:    has a past medical history of Anxiety, Asthma, Bilateral lower extremity edema, Depression, HTN (hypertension), Insomnia, Migraine, Obesity, Substance abuse (H), Type 2 diabetes mellitus (H), and Vasculopathy.    SURGICAL HISTORY:    has a past surgical history that includes Laparoscopic hysterectomy total, nicole salpingo-oophorectomy, node dissection, tumor staging, combined (N/A, 11/27/2019).    SOCIAL HISTORY:    reports that she has been smoking. She does not have any smokeless tobacco history on file.    FAMILY HISTORY: No bleeding/clotting disorders nor problems with anesthesia.     ALLERGIES:      Allergies   Allergen Reactions     Bee Venom Other (See Comments)     swelling     Selenicereus Grandiflorus Rash       MEDICATIONS:  No current facility-administered medications on file prior to encounter.   gabapentin (NEURONTIN) 100  MG capsule, Take 300 mg by mouth 3 times daily   insulin glargine (LANTUS SOLOSTAR) 100 UNIT/ML pen, Inject 25 Units Subcutaneous every evening  metFORMIN (GLUCOPHAGE) 1000 MG tablet, Take 1,000 mg by mouth 2 times daily (with meals)  albuterol (PROAIR HFA/PROVENTIL HFA/VENTOLIN HFA) 108 (90 Base) MCG/ACT inhaler, Inhale 2 puffs into the lungs as needed        PHYSICAL EXAMINATION:  Temp:  [96.1  F (35.6  C)-98.8  F (37.1  C)] 97.7  F (36.5  C)  Pulse:  [80-86] 80  Heart Rate:  [71-90] 78  Resp:  [14-22] 18  BP: (100-145)/(49-72) 107/59  MAP:  [55 mmHg-90 mmHg] 63 mmHg  Arterial Line BP: ()/(35-69) 96/49  SpO2:  [88 %-100 %] 94 %  Sleeping   Tachycardia  Non labored breathing on 3-4L NC  Abdomen soft, non distended, appropriately tender to palpation. Midline incision closed with sutures with dermabond in place  Zamora intact  No bilateral upper/lower extremity edema. Pulses palpable    LABS: Reviewed.   Arterial Blood Gases   Recent Labs   Lab 11/28/19  0915 11/28/19 0247 11/27/19  2234 11/27/19  1530   PH 7.36 7.35 7.33* 7.28*   PCO2 39 38 38 41   PO2 99 107* 115* 107*   HCO3 22 21 20* 19*     Complete Blood Count   Recent Labs   Lab 11/29/19  1514 11/29/19 0248 11/28/19  1310 11/28/19  0915 11/28/19 0247   WBC 9.4 12.2* 10.8  --  17.3*   HGB 7.8* 8.0* 6.6* 6.5* 7.2*    264 186  --  252     Basic Metabolic Panel  Recent Labs   Lab 11/29/19 0248 11/28/19 2049 11/28/19 0247 11/27/19 1952    141 138 137   POTASSIUM 3.8 4.0 4.3 4.4   CHLORIDE 111* 110* 109 107   CO2 21 22 23 20   BUN 71* 76* 71* 76*   CR 3.12* 3.22* 3.51* 3.72*   * 196* 135* 243*     Liver Function Tests  Recent Labs   Lab 11/29/19  0633 11/29/19  0248 11/28/19  0915 11/28/19 0247 11/27/19  1736 11/27/19  1300  11/26/19 1737   AST  --  24  --  15  --  23  --  31   ALT  --  13  --  10  --  11  --  15   ALKPHOS  --  314*  --  246*  --  271*  --  325*   BILITOTAL  --  0.3  --  0.7  --  0.7  --  0.4   ALBUMIN  --  1.6*  --   1.8*  --  1.4*  --  1.8*   INR 1.57*  --  1.69*  --  1.83* 1.81*   < >  --     < > = values in this interval not displayed.     Pancreatic Enzymes  No lab results found in last 7 days.  Coagulation Profile  Recent Labs   Lab 11/29/19  0633 11/28/19  0915 11/27/19  1736 11/27/19  1300 11/27/19  1115 11/27/19  0558  11/23/19  0525   INR 1.57* 1.69* 1.83* 1.81* 1.76* 1.75*   < > 1.59*   PTT  --   --   --  35 36 35  --  34    < > = values in this interval not displayed.     Lactate  Invalid input(s): LACTATE    IMAGING:  No results found for this or any previous visit (from the past 24 hour(s)).

## 2019-11-29 NOTE — PLAN OF CARE
PT -   Discharge Planner PT   Patient plan for discharge: Not discussed   Current status: Eval complete, tx indicated. Pt completed 3x STS from various heights min-mod Ax2. Pt demonstrates good LE strength but is limited by pain. Pt completed standing pivot transfer with FWW min Ax2. Pt completed lateral steps x4 with min A. Pt weepy and emotional 2/2 pain throughout session. Pt very lethargic during beggining of session. Pt c/o increased abdominal throughout session.  Barriers to return to prior living situation: Medical status, strength, endurance, pain   Recommendations for discharge: TCU   Rationale for recommendations: Pt is below baseline and would benefit from skilled therapy to improve functional mobility deficits.   Entered by: REECE VELAZQUEZ 11/29/2019 2:57 PM

## 2019-11-29 NOTE — PLAN OF CARE
Major Shift Events: Abdominal pain controlled with darien tylenol/PRN dilaudid. MAPs dropped to 50s occasionally, intermittently needed Levo needed to maintain MAP >65. 1L bolus LR given. Art line and cuff BP not correlating at times. 1 loose stool overnight. Blood sugars 140s-180s. Ca 7.6 - calcium carb chew given. Potassium replaced. Patient tearful/demanding and rude to staff at times.    Plan: Maintain MAP > 65. Control blood sugars - spoke with SICU about adding PRN SQ insulin to carb cover while she eats throughout day.   For vital signs and complete assessments, please see documentation flowsheets.

## 2019-11-29 NOTE — PROGRESS NOTES
Gynecology Oncology Progress Note    HD#7 sepsis, leukocytosis, AMS  POD#2 XL, ZANDRA, BSO, Omentectomy, NATANAEL, Tumor Debulking, Washout    Disease: Pelvic mass, adenocarcinoma (favor endometrioid) on frozen    24 hour events:   - Remains in SICU on insulin gtt  - Pressors discontinued, and re-initiated   - No longer required supplemental oxygen  - Transfused 1u pRBC  - Creatinine down trending  - Tolerating regular diet    Subjective: Patient states she does get some relief with pain medications, but states she thinks she needs more. Passing flatus, large BM yesterday. Tolerating PO. Up in chair yesterday, with occasional standing. Zamora catheter in place.     Objective:   Vitals:    11/29/19 0430 11/29/19 0445 11/29/19 0458 11/29/19 0500   BP:   136/68 109/62   BP Location:       Pulse:   82 81   Resp:       Temp:       TempSrc:       SpO2: 100% 96%  95%   Weight:       Height:         General: Awake, alert, NAD  CV: RRR, no m/r/g  Resp: anterior lung fields clear  Abdomen:  soft, appropriately tender, VML incision covered with dermabond  Extremities: Brown skin discoloration bilateral ankle and distal legs, 2+ edema bilaterally    I/Os  (Yesterday // Since Midnight)  PO 1520cc // 60cc  IVF 532cc // 1000cc  Urine 2025cc // 565cc    Assessment: Chela Love is a 44 year old with PMHx signficant for HTN, HLD, T2DM, and Polysubstance use, initially admitted to the medicine team for septic shock and AMS. She was found to have a right adnexal mass measuring [id number] cm. She was transferred to Gyn/Onc Service 11/26/19. She is now POD#2 from a XL, ZANDRA, BSO, and tumor debulking. She was admitted to SICU for hemodynamic monitoring.    Plan:    #Adenocarcinoma of likely endometrial origin  -- S/p XL, ZANDRA, BSO, Omentectomy, NATANAEL, tumor debulking, washout on 11/27/19, will need chemotherapy which will be arranged post-op.    #intra-abdominal infection   - Blas purulence intra-abdominally.  On zosyn with improving WBC count.   Plan 7 day course    #Respiratory  - Supplemental oxygen to keep saturation above 92%  - Encourage incentive spirometer use    #Oliguric CINDY (ATN)  - UOP improving and Cr down-trending  - Nephrology following  - Daily BMPs    #Uncontrolled T2DM with hyperglycemia  - On insulin gtt with much improved BG levels, currently at 6 units/hr    #Polysubstance Use  - Hx of IV drug use  - UDS pending    #Hx of HTN  - Hold antihypertensives given hypotension in the post-operative setting    Dispo: Transfer to floor when off pressors. Patient and family live in [place] and desire to follow-up there for Gyn/Onc care if possible.     Annabelle Campbell MD  OB/GYN PGY-2  11/29/19 5:37 AM       I, Cristóbal James MD personally examined and evaluated this patient on 11/29/19.  I discussed the patient with the resident and care team, and agree with the assessment and plan of care as documented in the residents note above.    I personally reviewed vital signs, laboratory values and imaging results.    Pt improving.  WBC slightly increased today, will continue zosyn and repeat this PM.  Cr downtrending and UOP increasing.  BG under better control on insulin gtt.      Rosana James MD  Gynecologic Oncology  [facility]

## 2019-11-29 NOTE — PROGRESS NOTES
"   11/29/19 1400   Quick Adds   Type of Visit Initial PT Evaluation       Present no   Living Environment   Lives With significant other   Living Arrangements house   Home Accessibility stairs to enter home;stairs within home   Number of Stairs, Main Entrance other (see comments)  (18)   Number of Stairs, Within Home, Primary other (see comments)  (18)   Transportation Anticipated family or friend will provide   Living Environment Comment Pt lives in multi-story home, reports 18 stairs to enter home itself as well as 18 upstairs to bedroom. Pt does not drive, reports significant other or mother will assist with driving. Pt currently assisting mother due to mother being sick. Pt also works at a group home. Pt lives with significant other who assists with household management and meals as needed. Pt has tub shower, does not use AD at baseline.   Self-Care   Usual Activity Tolerance fair   Current Activity Tolerance fair   Regular Exercise No   Equipment Currently Used at Home none   Functional Level Prior   Ambulation 0-->independent   Transferring 0-->independent   Toileting 0-->independent   Bathing 0-->independent   Communication 0-->understands/communicates without difficulty   Swallowing 0-->swallows foods/liquids without difficulty   Cognition 0 - no cognition issues reported   Fall history within last six months yes   Number of times patient has fallen within last six months 1   Which of the above functional risks had a recent onset or change? ambulation;transferring;toileting;bathing;dressing;cognition;fall history   Prior Functional Level Comment Pt previously independent with all ADLs/IADLs aside from driving. Pt reports 1 fall recently due to \"faulty step\" that needs to be fixed.   General Information   Onset of Illness/Injury or Date of Surgery - Date 11/23/19   Referring Physician Kimberly Ba MD   Patient/Family Goals Statement Not stated    Pertinent History of Current Problem " (include personal factors and/or comorbidities that impact the POC) 44 year old female with a history of HTN, HLD, drug abuse (methamphetamine, cannabis), and abnormal menses, who presents as a transfer from [facility] for management of septic shock and large ovarian mass, 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    Precautions/Limitations abdominal precautions   General Observations Lethargic and in pain throughout session    Cognitive Status Examination   Orientation orientation to person, place and time   Level of Consciousness lethargic/somnolent   Follows Commands and Answers Questions 75% of the time   Personal Safety and Judgment intact   Memory impaired   Cognitive Comment Lethargic    Pain Assessment   Patient Currently in Pain Yes, see Vital Sign flowsheet   Integumentary/Edema   Integumentary/Edema no deficits were identifed   Posture    Posture Forward head position;Protracted shoulders   Range of Motion (ROM)   ROM Quick Adds No deficits were identified   Strength   Strength Comments At least >3/5 nicole LE based on functional movements    Bed Mobility   Bed Mobility Comments Min A    Transfer Skills   Transfer Comments Mod A    Gait   Gait Comments NT due to fatigue and pain    Balance   Balance Comments Good seated, fair standing    Sensory Examination   Sensory Perception no deficits were identified   Coordination   Coordination no deficits were identified   Muscle Tone   Muscle Tone no deficits were identified   General Therapy Interventions   Planned Therapy Interventions bed mobility training;strengthening;transfer training;home program guidelines;progressive activity/exercise   Clinical Impression   Criteria for Skilled Therapeutic Intervention yes, treatment indicated   PT Diagnosis Impaired functional mobility    Influenced by the following impairments Strength, endurance `   Functional limitations due to impairments Transfers, gait, stairs    Clinical  "Presentation Stable/Uncomplicated   Clinical Presentation Rationale Clinical judgement    Clinical Decision Making (Complexity) Low complexity   Therapy Frequency 5x/week   Predicted Duration of Therapy Intervention (days/wks) 1 week   Anticipated Discharge Disposition Transitional Care Facility;Home with Assist;Home with Home Therapy   Risk & Benefits of therapy have been explained Yes   Patient, Family & other staff in agreement with plan of care Yes   Clinical Impression Comments Eval complete, tx inidcated    [facility] TM \"6 Clicks\"   2016, Trustees of [facility], under license to [employer].  All rights reserved.   6 Clicks Short Forms Basic Mobility Inpatient Short Form   [facility]  \"6 Clicks\" V.2 Basic Mobility Inpatient Short Form   1. Turning from your back to your side while in a flat bed without using bedrails? 3 - A Little   2. Moving from lying on your back to sitting on the side of a flat bed without using bedrails? 3 - A Little   3. Moving to and from a bed to a chair (including a wheelchair)? 3 - A Little   4. Standing up from a chair using your arms (e.g., wheelchair, or bedside chair)? 2 - A Lot   5. To walk in hospital room? 2 - A Lot   6. Climbing 3-5 steps with a railing? 2 - A Lot   Basic Mobility Raw Score (Score out of 24.Lower scores equate to lower levels of function) 15   Total Evaluation Time   Total Evaluation Time (Minutes) 8     "

## 2019-11-30 ENCOUNTER — APPOINTMENT (OUTPATIENT)
Dept: PHYSICAL THERAPY | Facility: CLINIC | Age: 44
End: 2019-11-30
Attending: INTERNAL MEDICINE
Payer: COMMERCIAL

## 2019-11-30 LAB
ALBUMIN SERPL-MCNC: 1.5 G/DL (ref 3.4–5)
ALP SERPL-CCNC: 293 U/L (ref 40–150)
ALT SERPL W P-5'-P-CCNC: 11 U/L (ref 0–50)
ANION GAP SERPL CALCULATED.3IONS-SCNC: 7 MMOL/L (ref 3–14)
AST SERPL W P-5'-P-CCNC: 20 U/L (ref 0–45)
BACTERIA SPEC CULT: NO GROWTH
BILIRUB SERPL-MCNC: 0.4 MG/DL (ref 0.2–1.3)
BUN SERPL-MCNC: 62 MG/DL (ref 7–30)
CALCIUM SERPL-MCNC: 7.8 MG/DL (ref 8.5–10.1)
CHLORIDE SERPL-SCNC: 113 MMOL/L (ref 94–109)
CO2 SERPL-SCNC: 22 MMOL/L (ref 20–32)
CREAT SERPL-MCNC: 2.71 MG/DL (ref 0.52–1.04)
ERYTHROCYTE [DISTWIDTH] IN BLOOD BY AUTOMATED COUNT: 15.4 % (ref 10–15)
GFR SERPL CREATININE-BSD FRML MDRD: 21 ML/MIN/{1.73_M2}
GLUCOSE BLDC GLUCOMTR-MCNC: 100 MG/DL (ref 70–99)
GLUCOSE BLDC GLUCOMTR-MCNC: 107 MG/DL (ref 70–99)
GLUCOSE BLDC GLUCOMTR-MCNC: 109 MG/DL (ref 70–99)
GLUCOSE BLDC GLUCOMTR-MCNC: 110 MG/DL (ref 70–99)
GLUCOSE BLDC GLUCOMTR-MCNC: 114 MG/DL (ref 70–99)
GLUCOSE BLDC GLUCOMTR-MCNC: 114 MG/DL (ref 70–99)
GLUCOSE BLDC GLUCOMTR-MCNC: 115 MG/DL (ref 70–99)
GLUCOSE BLDC GLUCOMTR-MCNC: 115 MG/DL (ref 70–99)
GLUCOSE BLDC GLUCOMTR-MCNC: 118 MG/DL (ref 70–99)
GLUCOSE BLDC GLUCOMTR-MCNC: 123 MG/DL (ref 70–99)
GLUCOSE BLDC GLUCOMTR-MCNC: 124 MG/DL (ref 70–99)
GLUCOSE BLDC GLUCOMTR-MCNC: 139 MG/DL (ref 70–99)
GLUCOSE BLDC GLUCOMTR-MCNC: 142 MG/DL (ref 70–99)
GLUCOSE BLDC GLUCOMTR-MCNC: 144 MG/DL (ref 70–99)
GLUCOSE BLDC GLUCOMTR-MCNC: 159 MG/DL (ref 70–99)
GLUCOSE BLDC GLUCOMTR-MCNC: 170 MG/DL (ref 70–99)
GLUCOSE BLDC GLUCOMTR-MCNC: 192 MG/DL (ref 70–99)
GLUCOSE BLDC GLUCOMTR-MCNC: 205 MG/DL (ref 70–99)
GLUCOSE BLDC GLUCOMTR-MCNC: 96 MG/DL (ref 70–99)
GLUCOSE SERPL-MCNC: 149 MG/DL (ref 70–99)
HCT VFR BLD AUTO: 26.7 % (ref 35–47)
HGB BLD-MCNC: 8.1 G/DL (ref 11.7–15.7)
INR PPP: 1.44 (ref 0.86–1.14)
Lab: NORMAL
MAGNESIUM SERPL-MCNC: 1.8 MG/DL (ref 1.6–2.3)
MCH RBC QN AUTO: 27.6 PG (ref 26.5–33)
MCHC RBC AUTO-ENTMCNC: 30.3 G/DL (ref 31.5–36.5)
MCV RBC AUTO: 91 FL (ref 78–100)
PHOSPHATE SERPL-MCNC: 4.2 MG/DL (ref 2.5–4.5)
PLATELET # BLD AUTO: 232 10E9/L (ref 150–450)
POTASSIUM SERPL-SCNC: 4.5 MMOL/L (ref 3.4–5.3)
PROT SERPL-MCNC: 5.2 G/DL (ref 6.8–8.8)
RBC # BLD AUTO: 2.93 10E12/L (ref 3.8–5.2)
SODIUM SERPL-SCNC: 142 MMOL/L (ref 133–144)
SPECIMEN SOURCE: NORMAL
WBC # BLD AUTO: 9.6 10E9/L (ref 4–11)

## 2019-11-30 PROCEDURE — 25000132 ZZH RX MED GY IP 250 OP 250 PS 637: Performed by: OBSTETRICS & GYNECOLOGY

## 2019-11-30 PROCEDURE — 25000132 ZZH RX MED GY IP 250 OP 250 PS 637: Performed by: STUDENT IN AN ORGANIZED HEALTH CARE EDUCATION/TRAINING PROGRAM

## 2019-11-30 PROCEDURE — 36415 COLL VENOUS BLD VENIPUNCTURE: CPT | Performed by: OBSTETRICS & GYNECOLOGY

## 2019-11-30 PROCEDURE — 99024 POSTOP FOLLOW-UP VISIT: CPT | Mod: GC | Performed by: OBSTETRICS & GYNECOLOGY

## 2019-11-30 PROCEDURE — 80053 COMPREHEN METABOLIC PANEL: CPT | Performed by: STUDENT IN AN ORGANIZED HEALTH CARE EDUCATION/TRAINING PROGRAM

## 2019-11-30 PROCEDURE — 99207 ZZC CDG-HISTORY COMP: MEETS EXP. PROB FOCUSED- DOWN CODED LACK OF PFSH: CPT | Performed by: HOSPITALIST

## 2019-11-30 PROCEDURE — 25000128 H RX IP 250 OP 636: Performed by: STUDENT IN AN ORGANIZED HEALTH CARE EDUCATION/TRAINING PROGRAM

## 2019-11-30 PROCEDURE — 12000001 ZZH R&B MED SURG/OB UMMC

## 2019-11-30 PROCEDURE — 84100 ASSAY OF PHOSPHORUS: CPT | Performed by: STUDENT IN AN ORGANIZED HEALTH CARE EDUCATION/TRAINING PROGRAM

## 2019-11-30 PROCEDURE — 25000125 ZZHC RX 250: Performed by: STUDENT IN AN ORGANIZED HEALTH CARE EDUCATION/TRAINING PROGRAM

## 2019-11-30 PROCEDURE — 25000132 ZZH RX MED GY IP 250 OP 250 PS 637: Performed by: HOSPITALIST

## 2019-11-30 PROCEDURE — 97530 THERAPEUTIC ACTIVITIES: CPT | Mod: GP | Performed by: PHYSICAL THERAPIST

## 2019-11-30 PROCEDURE — 85027 COMPLETE CBC AUTOMATED: CPT | Performed by: OBSTETRICS & GYNECOLOGY

## 2019-11-30 PROCEDURE — 99221 1ST HOSP IP/OBS SF/LOW 40: CPT | Performed by: HOSPITALIST

## 2019-11-30 PROCEDURE — 85610 PROTHROMBIN TIME: CPT | Performed by: OBSTETRICS & GYNECOLOGY

## 2019-11-30 PROCEDURE — 00000146 ZZHCL STATISTIC GLUCOSE BY METER IP

## 2019-11-30 PROCEDURE — 83735 ASSAY OF MAGNESIUM: CPT | Performed by: STUDENT IN AN ORGANIZED HEALTH CARE EDUCATION/TRAINING PROGRAM

## 2019-11-30 RX ORDER — HYDROMORPHONE HYDROCHLORIDE 1 MG/ML
0.3 INJECTION, SOLUTION INTRAMUSCULAR; INTRAVENOUS; SUBCUTANEOUS
Status: DISCONTINUED | OUTPATIENT
Start: 2019-11-30 | End: 2019-12-02

## 2019-11-30 RX ORDER — HYDROXYZINE HYDROCHLORIDE 25 MG/1
50 TABLET, FILM COATED ORAL EVERY 6 HOURS PRN
Status: DISCONTINUED | OUTPATIENT
Start: 2019-11-30 | End: 2019-12-05 | Stop reason: HOSPADM

## 2019-11-30 RX ORDER — HYDROMORPHONE HYDROCHLORIDE 1 MG/ML
.3-.5 INJECTION, SOLUTION INTRAMUSCULAR; INTRAVENOUS; SUBCUTANEOUS ONCE
Status: COMPLETED | OUTPATIENT
Start: 2019-11-30 | End: 2019-11-30

## 2019-11-30 RX ORDER — HYDROMORPHONE HYDROCHLORIDE 2 MG/1
2-4 TABLET ORAL EVERY 4 HOURS PRN
Status: DISCONTINUED | OUTPATIENT
Start: 2019-11-30 | End: 2019-11-30

## 2019-11-30 RX ORDER — HYDROMORPHONE HYDROCHLORIDE 2 MG/1
2-4 TABLET ORAL
Status: DISCONTINUED | OUTPATIENT
Start: 2019-11-30 | End: 2019-12-02

## 2019-11-30 RX ORDER — HEPARIN SODIUM 5000 [USP'U]/.5ML
5000 INJECTION, SOLUTION INTRAVENOUS; SUBCUTANEOUS EVERY 8 HOURS
Status: DISCONTINUED | OUTPATIENT
Start: 2019-11-30 | End: 2019-12-01

## 2019-11-30 RX ORDER — POLYETHYLENE GLYCOL 3350 17 G/17G
17 POWDER, FOR SOLUTION ORAL DAILY
Status: DISCONTINUED | OUTPATIENT
Start: 2019-11-30 | End: 2019-12-05 | Stop reason: HOSPADM

## 2019-11-30 RX ORDER — HYDROXYZINE HYDROCHLORIDE 25 MG/1
25 TABLET, FILM COATED ORAL EVERY 6 HOURS PRN
Status: DISCONTINUED | OUTPATIENT
Start: 2019-11-30 | End: 2019-12-05 | Stop reason: HOSPADM

## 2019-11-30 RX ORDER — MAGNESIUM SULFATE HEPTAHYDRATE 40 MG/ML
4 INJECTION, SOLUTION INTRAVENOUS EVERY 4 HOURS PRN
Status: DISCONTINUED | OUTPATIENT
Start: 2019-11-30 | End: 2019-12-05 | Stop reason: HOSPADM

## 2019-11-30 RX ORDER — GABAPENTIN 100 MG/1
100 CAPSULE ORAL AT BEDTIME
Status: DISCONTINUED | OUTPATIENT
Start: 2019-11-30 | End: 2019-12-05 | Stop reason: HOSPADM

## 2019-11-30 RX ADMIN — OXYCODONE HYDROCHLORIDE 5 MG: 5 TABLET ORAL at 05:09

## 2019-11-30 RX ADMIN — HEPARIN SODIUM 5000 UNITS: 5000 INJECTION, SOLUTION INTRAVENOUS; SUBCUTANEOUS at 08:56

## 2019-11-30 RX ADMIN — HUMAN INSULIN 1 UNITS/HR: 100 INJECTION, SOLUTION SUBCUTANEOUS at 22:42

## 2019-11-30 RX ADMIN — HUMAN INSULIN 2 UNITS/HR: 100 INJECTION, SOLUTION SUBCUTANEOUS at 10:19

## 2019-11-30 RX ADMIN — CALCIUM CARBONATE (ANTACID) CHEW TAB 500 MG 500 MG: 500 CHEW TAB at 08:56

## 2019-11-30 RX ADMIN — PIPERACILLIN SODIUM AND TAZOBACTAM SODIUM 2.25 G: 2; .25 INJECTION, POWDER, LYOPHILIZED, FOR SOLUTION INTRAVENOUS at 08:57

## 2019-11-30 RX ADMIN — ONDANSETRON 4 MG: 2 INJECTION INTRAMUSCULAR; INTRAVENOUS at 20:05

## 2019-11-30 RX ADMIN — GABAPENTIN 100 MG: 100 CAPSULE ORAL at 22:18

## 2019-11-30 RX ADMIN — HYDROMORPHONE HYDROCHLORIDE 0.5 MG: 1 INJECTION, SOLUTION INTRAMUSCULAR; INTRAVENOUS; SUBCUTANEOUS at 10:45

## 2019-11-30 RX ADMIN — Medication 2.5 MG: at 20:01

## 2019-11-30 RX ADMIN — PIPERACILLIN SODIUM AND TAZOBACTAM SODIUM 2.25 G: 2; .25 INJECTION, POWDER, LYOPHILIZED, FOR SOLUTION INTRAVENOUS at 20:00

## 2019-11-30 RX ADMIN — HYDROMORPHONE HYDROCHLORIDE 0.5 MG: 1 INJECTION, SOLUTION INTRAMUSCULAR; INTRAVENOUS; SUBCUTANEOUS at 03:29

## 2019-11-30 RX ADMIN — SODIUM CHLORIDE, PRESERVATIVE FREE 5 ML: 5 INJECTION INTRAVENOUS at 03:17

## 2019-11-30 RX ADMIN — FAMOTIDINE 20 MG: 20 TABLET ORAL at 18:00

## 2019-11-30 RX ADMIN — OXYCODONE HYDROCHLORIDE 5 MG: 5 TABLET ORAL at 09:39

## 2019-11-30 RX ADMIN — HYDROMORPHONE HYDROCHLORIDE 0.3 MG: 1 INJECTION, SOLUTION INTRAMUSCULAR; INTRAVENOUS; SUBCUTANEOUS at 05:09

## 2019-11-30 RX ADMIN — HYDROMORPHONE HYDROCHLORIDE 4 MG: 2 TABLET ORAL at 16:58

## 2019-11-30 RX ADMIN — SENNOSIDES AND DOCUSATE SODIUM 1 TABLET: 8.6; 5 TABLET ORAL at 08:57

## 2019-11-30 RX ADMIN — PIPERACILLIN SODIUM AND TAZOBACTAM SODIUM 2.25 G: 2; .25 INJECTION, POWDER, LYOPHILIZED, FOR SOLUTION INTRAVENOUS at 01:25

## 2019-11-30 RX ADMIN — OXYCODONE HYDROCHLORIDE 5 MG: 5 TABLET ORAL at 09:26

## 2019-11-30 RX ADMIN — ACETAMINOPHEN 975 MG: 325 TABLET, FILM COATED ORAL at 20:01

## 2019-11-30 RX ADMIN — HYDROMORPHONE HYDROCHLORIDE 4 MG: 2 TABLET ORAL at 20:01

## 2019-11-30 RX ADMIN — POLYETHYLENE GLYCOL 3350 17 G: 17 POWDER, FOR SOLUTION ORAL at 16:31

## 2019-11-30 RX ADMIN — HYDROXYZINE HYDROCHLORIDE 25 MG: 25 TABLET, FILM COATED ORAL at 22:18

## 2019-11-30 RX ADMIN — HYDROMORPHONE HYDROCHLORIDE 2 MG: 2 TABLET ORAL at 12:06

## 2019-11-30 RX ADMIN — PIPERACILLIN SODIUM AND TAZOBACTAM SODIUM 2.25 G: 2; .25 INJECTION, POWDER, LYOPHILIZED, FOR SOLUTION INTRAVENOUS at 14:03

## 2019-11-30 RX ADMIN — HEPARIN SODIUM 5000 UNITS: 5000 INJECTION, SOLUTION INTRAVENOUS; SUBCUTANEOUS at 16:31

## 2019-11-30 RX ADMIN — ACETAMINOPHEN 975 MG: 325 TABLET, FILM COATED ORAL at 08:56

## 2019-11-30 ASSESSMENT — PAIN DESCRIPTION - DESCRIPTORS
DESCRIPTORS: THROBBING
DESCRIPTORS: ACHING
DESCRIPTORS: ACHING

## 2019-11-30 ASSESSMENT — ACTIVITIES OF DAILY LIVING (ADL)
ADLS_ACUITY_SCORE: 14
ADLS_ACUITY_SCORE: 15
ADLS_ACUITY_SCORE: 14
ADLS_ACUITY_SCORE: 15

## 2019-11-30 ASSESSMENT — MIFFLIN-ST. JEOR: SCORE: 1746.75

## 2019-11-30 NOTE — PLAN OF CARE
"Discharge Planner PT   Patient plan for discharge: Pt did not state  Current status: Pt emotional and weepy throughout session: \"I don't want to die; am I going to die?\" Pt distractible, easily redirected. Pt states she doesn't know where she is, frequent re-orientation provided. Pt Carlene sit<>stand, ambulated 20' within room with FWW and SBA. Pt on feet ~20 min.  Barriers to return to prior living situation: Current medical status, weakness d/t deconditioning, endurance  Recommendations for discharge: TCU  Rationale for recommendations: Pt is below baseline mobility and would benefit from continued therapy to improve strength, balance, and activity tolerance before discharging home. Anticipate possible discharge home as she approaches baseline mobility.       Entered by: Pasquale Silvestre 11/30/2019 11:30 AM     Interdisciplinary Non-Pharmacological Management of Delirium:     General Supportive Measures: Ensure adequate hydration and nutrition. Schedule toileting. Appropriate assessment and treatment of pain.   Re-[place] Patient: Ensure clock has correct time and white board has correct date. Communicate clearly, providing explanations as appropriate. Encourage presence of family members for reassurance. Have family/caregiver bring in familiar objects/pictures.  Cognition: Engage in appropriate meaningful communication and activities with patient (current events discussion, word games, magazines, newspapers).   Sensory: Use eyeglasses, hearing aids, or voice amplifiers as appropriate.   Avoid Use of Physical Restraints as Appropriate: Indicate need and frequently re-assess nelson catheters and other tethers (cap IVs if medically appropriate).   Maintain Mobility and Self Care Ability: Avoid bedrest. Have patient up in chair for meals if appropriate.   Normalize Sleep-Wake Cycle: Discourage too much daytime napping (less than 30 minutes at a time), aim for uninterrupted periods of sleep at night.   Days: Keep room " well light with lights on and shades open.   Night: Keep room quiet with low level lighting.   For Agitation: Avoid overstimulating environment, try music, massage, appropriate TV stations, and relaxation techniques. Take patient for a walk if appropriate, even if in the middle of the night.   Safety Concerns: Patients with delirium are at high risk for falls. Use bed and chair alarms along with frequent surveillance.

## 2019-11-30 NOTE — PROGRESS NOTES
Nephrology Progress Note  11/30/2019         Assessment & Recommendations:   Chela Love is a 44 year old female with a history of poor-controlled T2DM, HTN, HLD, substance abuse (methamphetamine, cannabis), who presents as a transfer from [facility] for management of severe sepsis (unknown source) and large ovarian mass with ascites and peritoneal carcinomatosis; adenocarcinoma endometrioid type on pathology. Also has worsening CINDY from ATN, likely multifactorial.    Oliguric CINDY (ATN)  Hypervolemic hyponatremia, improved  Based on sharp rising pattern of Cr, her CINDY is most likely secondary to ATN. This could be multifactorial in etiology; systemic infection, past episode of prerenal CINDY at OSH that improved with hydration, exposure to contrast agent. Kidney US (11/25/19) was normal  -suspect her CINDY was due to infection related cytokine mediated injury and maybe contrast related.   CINDY seems to be stabilizing, likely due to infection and contrast  - Scr slowly improving  - UOP good, would given lasix 40 or 60mg IV x 1 to augment UOP to target 500-1000 mL negative    Mixed non-anion gap metabolic acidosis and respiratory acidosis  Improved, bicarb low normal    Electrolytes- potassium improved, now normal  - check daily for now given good UOP.    Anemia- acute blood loss- transfusion per primary team    Shortness of breath- multifactorial-  She is volume up and has pain and anxiety  - would aim for 500-1000 mL negative until at admission weight 102 kg      Recommendations were communicated to primary team via note.         Interval History :   Nursing and provider notes from last 24 hours reviewed.  In the last 24 hours Chela Love continues to recover from surgery. She is tearful today, has shortness of breath and is in pain.      Physical Exam:   I/O last 3 completed shifts:  In: 2688.01 [P.O.:2000; I.V.:688.01]  Out: 1160 [Urine:1160]   /57   Pulse 80   Temp 98.7  F (37.1  C) (Axillary)    "Resp 22   Ht 1.676 m (5' 6\")   Wt 107.5 kg (236 lb 15.9 oz)   LMP 10/27/2019   SpO2 94%   Breastfeeding No   BMI 38.25 kg/m     Wt Readings from Last 5 Encounters:   11/30/19 107.5 kg (236 lb 15.9 oz)     Constitutional: sitting in chair, talking and interactive, crying, distressed  Eyes: pupils equal, round and reactive to light, extra ocular muscles intact, sclera clear, conjunctiva normal  Respiratory: no increased work of breathing, no crackles or wheezing  Cardiovascular: Normal apical impulse, regular rate and rhythm, normal S1 and S2, no S3 or S4. HECTOR grade 2/6 heard at LUPSB.  GI: soft, generalized mild tenderness to palpation (no guarding or rebound). Midline incision closed with sutures with dermabond in place  Genitounirinary: nelson catheter in place  Skin: normal skin color, texture, turgor  Musculoskeletal: 1+ lower extremity pitting edema present  Neurologic: drowsy. Grossly no focal neurological deficit.    Labs:   All labs reviewed by me  Electrolytes/Renal -   Recent Labs   Lab Test 11/30/19 0330 11/29/19 1514 11/29/19 0248 11/28/19 0247    144 141   < > 138   POTASSIUM 4.5 4.4 3.8   < > 4.3   CHLORIDE 113* 113* 111*   < > 109   CO2 22 23 21   < > 23   BUN 62* 66* 71*   < > 71*   CR 2.71* 3.03* 3.12*   < > 3.51*   * 123* 152*   < > 135*   MARILIN 7.8* 7.7* 7.7*   < > 8.1*   MAG 1.8  --  2.0  --  2.2   PHOS 4.2  --  4.8*  --  6.4*    < > = values in this interval not displayed.       CBC -   Recent Labs   Lab Test 11/30/19  0523 11/29/19  1514 11/29/19 0248   WBC 9.6 9.4 12.2*   HGB 8.1* 7.8* 8.0*    234 264       LFTs -   Recent Labs   Lab Test 11/30/19 0330 11/29/19 0248 11/28/19 0247   ALKPHOS 293* 314* 246*   BILITOTAL 0.4 0.3 0.7   ALT 11 13 10   AST 20 24 15   PROTTOTAL 5.2* 5.2* 5.2*   ALBUMIN 1.5* 1.6* 1.8*     Iron Panel - No lab results found.    Imaging:  No new imaging over the past 24h.    Current Medications:    acetaminophen  975 mg Oral TID     calcium " carbonate  500 mg Oral BID     famotidine  20 mg Oral Daily     gabapentin  100 mg Oral At Bedtime     heparin ANTICOAGULANT  5,000 Units Subcutaneous Q8H     heparin lock flush  5-10 mL Intracatheter Q24H     insulin aspart   Subcutaneous TID AC     methadone  2.5 mg Oral Q12H DONNA     piperacillin-tazobactam  2.25 g Intravenous Q6H DONNA     polyethylene glycol  17 g Oral Daily     senna-docusate  1 tablet Oral BID    Or     senna-docusate  2 tablet Oral BID     sodium chloride (PF)  3 mL Intracatheter Q8H       IV fluid REPLACEMENT ONLY       insulin (regular) 10 Units/hr (11/30/19 1300)     norepinephrine Stopped (11/29/19 0751)     Anna Browne MD

## 2019-11-30 NOTE — PROGRESS NOTES
"Gynecology Oncology Progress Note    HD#8 sepsis, leukocytosis, AMS  POD#3 XL, ZANDRA, BSO, Omentectomy, NATANAEL, Tumor Debulking, Washout    Disease: Pelvic mass, adenocarcinoma (favor endometrioid) on frozen    24 hour events:   - No longer on SICU service, but no floor beds available overnight  - INR downtrending, Hgb stable  - Remains on insulin gtt and sliding scale insulin for carb coverage per Endocrine   - Off of pressors     Subjective: Patient moaning intermittently when I walked into room. States she \"feels sore everywhere\" but when pressed states it is her incision that hurts. During visit, patient repeatedly stated \"I'm thirsty, I need juice\" and did not answer other questions. Also states she is \"sick of my life and I just want it all to stop.\" She states she doesn't remember if she is passing flatus, although note from yesterday states she is passing flatus and stool. Zamora out, per RN voiding without issue at the commode. Has been out of bed occasionally but not ambulating much.     Objective:   Vitals:    11/30/19 0300 11/30/19 0400 11/30/19 0500 11/30/19 0600   BP:  97/61 96/63    Pulse:       Resp:  16  14   Temp:       TempSrc:  Axillary     SpO2: 94% 93% 93%    Weight:       Height:         General: Awake, alert, NAD  CV: RRR, no m/r/g  Resp: anterior lung fields clear  Abdomen:  soft, appropriately tender, VML incision covered with dermabond  Extremities: Brown skin discoloration bilateral ankle and distal legs, 2+ edema bilaterally    Lab Results   Component Value Date    WBC 9.6 11/30/2019     Lab Results   Component Value Date    HGB 8.1 11/30/2019     Lab Results   Component Value Date     11/30/2019     Last Comprehensive Metabolic Panel:  Sodium   Date Value Ref Range Status   11/30/2019 142 133 - 144 mmol/L Final     Potassium   Date Value Ref Range Status   11/30/2019 4.5 3.4 - 5.3 mmol/L Final     Chloride   Date Value Ref Range Status   11/30/2019 113 (H) 94 - 109 mmol/L Final "     Carbon Dioxide   Date Value Ref Range Status   11/30/2019 22 20 - 32 mmol/L Final     Anion Gap   Date Value Ref Range Status   11/30/2019 7 3 - 14 mmol/L Final     Glucose   Date Value Ref Range Status   11/30/2019 149 (H) 70 - 99 mg/dL Final     Urea Nitrogen   Date Value Ref Range Status   11/30/2019 62 (H) 7 - 30 mg/dL Final     Creatinine   Date Value Ref Range Status   11/30/2019 2.71 (H) 0.52 - 1.04 mg/dL Final     GFR Estimate   Date Value Ref Range Status   11/30/2019 21 (L) >60 mL/min/[1.73_m2] Final     Comment:     Non  GFR Calc  Starting 12/18/2018, serum creatinine based estimated GFR (eGFR) will be   calculated using the Chronic Kidney Disease Epidemiology Collaboration   (CKD-EPI) equation.       Calcium   Date Value Ref Range Status   11/30/2019 7.8 (L) 8.5 - 10.1 mg/dL Final     INR   Date Value Ref Range Status   11/29/2019 1.45 (H) 0.86 - 1.14 Final       I/Os  (Yesterday // Since Midnight)  PO 1880cc // 60cc  IVF 614cc // 147cc  Urine 1375cc // 350cc    Assessment: Chela Love is a 44 year old with PMHx signficant for HTN, HLD, T2DM, and Polysubstance use, initially admitted to the medicine team for septic shock and AMS. She was found to have a right adnexal mass measuring [id number] cm. She was transferred to Gyn/Onc Service 11/26/19. She is now POD#3 from a XL, ZANDRA, BSO, and tumor debulking. She was admitted to SICU for hemodynamic monitoring postoperatively, transferred to Gyn Oncology Service last night but no floor beds available.     Problem List:  - Adenocarcinoma of likely endometrial origin  - Intra-abdominal infection   - Oliguric CINDY from ATN   - Uncontrolled T2DM with hyperglycemia  - Acute blood loss anemia  - Polysubstance use  - Anxiety, depression    Plan:    Dz: CT w/ 20 cm pelvic mass, ascites, omental caking. Frozen pathology - adenocarcinoma, favor endometrioid  FEN: Tolerating carb-consistent diet. Mag 1.8 today, will replete if <1.6 by standard ERP  protocol.   Pain: Tylenol, Oxycodone, Dilaudid. No NSAIDs.  Will consult palliative for assistance with pain control as well as coping with new diagnosis  Heme:  Acute blood loss anemia, s/p 2u pRBC intraop and 1u pRBC post-op. Appropriate rise in Hgb after transfusions, Hgb now stable. Coagulopathy from infection is improving.  INR down to 1.4 today, will start prophylactic heparin  CV: HTN, HLD. Holding home meds for now  Pulm: NI  GI: NI, bowel regimen, antiemetics PRN  : CINDY likely from intra-abdominal infection, Cr improving and UOP increasing.  ID: Supra-infection ovarian mass consistent with malignancy.  Source control at the time of surgery.  WBC has normalized and she has remained afebrile.  She has completed 4 days of antibiotics s/p source control and will thus discharge all antibiotics  Endo: Poorly controlled T2DM, A1C 15. Currently on insulin drip with carb counting.  Endocrinology following  Psych/Neuro/MSK: AMS, PSA (meth, tobacco, THC, alcohol). UDS pending. SW consult placed. Patient endorsing emotional lability and states she is tired of her life - consider Psych consult.   PPX: SCDs, IS. DONAN restarting today  Drains/Lines: PICC, PIV      Dispo: Pending post-operative goals.      Mary Ellen Charles MD  Obstetrics and Gynecology, PGY2  11/30/2019     I, Cristóbal James MD personally examined and evaluated this patient on 11/30/19.  I discussed the patient with the resident and care team, and agree with the assessment and plan of care as documented in the residents note above.    I personally reviewed vital signs, laboratory values and imaging results.    Pt improving.  Cr and UOP improving.  WBC normal, afebrile, will discontinue antibiotics.  Pt reporting pain not controlled, however when we see pt she is sleeping comfortably.  Will consult palliative for assistance with pain control as well as coping management as there is likely an anxiety component to her pain given new cancer  diagnosis.    Rosana James MD  Gynecologic Oncology  [facility]

## 2019-11-30 NOTE — PLAN OF CARE
D/I;  VSS and on room air, O2 saturating mid to high 90s.. earlier pt was on 2L NC for comfort as pt received PRN oxycodone and dilaudid for abd incisional pain with some relief. Denied nausea. Pt alert to lethargic, tearful/sad/anxious to calm at times.   A: Pt sat up in chair for few hours today. Worked with PT/OT. Sera dc'd at 15:00, no void yet but had large soft BM. Abd incision had scant amt of serosanguinous drainage. Area cleaned and remains DENIA. No further drainage noted.   P: Plan to transfer to the floor when bed available.     Addendum: Carb unit coverage not given for dinner as the writer did not witness pt eating dinner. Blood sugar checked and remains on insulin gtt. Continue to do both meal coverage and insulin gtt per endocrinologist.

## 2019-11-30 NOTE — PLAN OF CARE
Major Shift Events:  Sister (Ann Marie) requested SW & Psych consult and a care conference - Gyn/Onc team aware and will schedule this week. Gyn Onc now primary team - pager # [phone].   Patient tearful/mood labile. Room air, occasional oximask use during sleep due to desatting. BP 100s/50s, MAP >65. Voiding in commode. 1 loose BM. Insulin drip titrated, sugars 100-150s. PICC drawing poorly for labs. Lab collect. Drinking lots of fluids.  Plan: Transfer to  when bed available.   For vital signs and complete assessments, please see documentation flowsheets.

## 2019-11-30 NOTE — PROGRESS NOTES
Diabetes Consult Daily Progress Note          Assessment/Plan:     Summary:  Chela is a 44 year old female with a history of morbid obesity, TIIDM, HTN, asthma, and polysubstance abuse who presented initially to [facility].  At time of initial assessment, she was having nausea, vomiting, and vague abdominal pain, also found to have a large ovarian mass with ascites and peritoneal carcinomatosis. She was transferred to [facility] 11/23/19 in septic shock, for GYN ONC consult and surgical evaluation. She was taken to OR on 11/27 for ex-lap, ZANDRA, BSO, omentectomy, tumor debulking and abdominal washout. Blas purulence of the abdominal cavity was noted. She required 2 units PRBC and 1 FFP intraoperatively. She had septic/hypovolemic shock requiring pressors. Initial path is consistent with adenocarcinoma. She was started preoperatively on insulin drip for hyperglycemia.    Assessment:   1) Type II Diabetes uncontrolled (A1c 15)     She remains on insulin drip with variable requirements. She is eating on the drip and was requesting food when we saw her. She has a lot of snack at her room which she claims are sugar free.    Plan:                  -continue IV insulin- will assess for transition to subQ regimen when insulin rate stabilizes                  - continue 1:3g CHO coverage                 -BG monitoring q1-2 hours per IV insulin protocol.                 -hypoglycemia protocol                 -continue carb counting protocol                 -diabetes education prior to discharge for new insulin regimen                 -due to renal function, cannot safely resume Metformin at this time, will reassess prior to discharge along with the use of GLP1 agonist, if appropriate.                  -on discharge, will recommend she re-establish care with PCP, and possibly endocrinology specialty.     Discussed plan of care with patient, nursing.     Miesha Timmons MD  Endocrinology Fellow.    Plan discussed with   Timur TOPEET, Monika Ding, was present with the fellow who participated in the service and in the documentation of the note.  I have verified the history and personally performed the physical exam and medical decision making.  I agree with the assessment and plan of care as documented in the note.     Monika Ding MD          Interval History:     Patient was sitting in her chair eating snacks and drinking some juice. She said she did not eat anything yesterday. She was complaining of being hungry and requesting to be fed. She also complained of pain and nausea. No other concerns or complaints.    Background:  Diabetes Mellitus Type: II  Duration:     >7 years  Diabetic Complications: recurrent LE ulcerations, per chart review.   Prior to Admission Diabetes Regimen:    Per chart review:                  -Metformin 1000mg BID, and Lantus 25 units at bedtime  In 2012, when 20 kg heavier (but also when taking medications consistently):                 -A1c was 6.8% on Metformin 1000mg BID, Lantus 80 units at bedtime, and Novolog 30 units TID WM.                  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   Usual BG control PTA:  Uncontrolled, a1c 15% on this admission   History of DKA: not evident per chart review  Able to Detect Hypoglycemia: unknown, unlikely to be having hypoglycemia recently.   Usual Diabetes Care Provider: previous  specialty clinic, last seen 6/2012.  Primary Care Provider: [facility]  Factors Impacting IP Glucose Control: intraop steroids, altered PO intake, acute infection/sepsis, malignancy, glucose toxicity     Orders Placed This Encounter      Low Consistent CHO Diet       ROS:    Review of pertinent systems was negative except as noted above.          Exam:      Blood pressure 104/57, pulse 80, temperature 98.7  F (37.1  C), temperature source Axillary, resp. rate 22, height  "1.676 m (5' 6\"), weight 107.5 kg (236 lb 15.9 oz), last menstrual period 10/27/2019, SpO2 99 %, not currently breastfeeding.    General: Alert, resting in bed, NAD.   HEENT: NC/AT, mucous membranes are moist.  Resp: Unlabored breathing.   Neuro: Alert and oriented, communicating clearly.          Data:     Lab Results   Component Value Date    A1C 15.0 11/23/2019       Recent Labs   Lab 11/30/19  1359 11/30/19  1249 11/30/19  1210 11/30/19  1037 11/30/19  0854 11/30/19  0650  11/30/19  0330  11/29/19  1514  11/29/19  0248  11/28/19  2049  11/28/19  0247  11/27/19 1952   GLC  --   --   --   --   --   --   --  149*  --  123*  --  152*  --  196*  --  135*  --  243*   * 205* 192* 144* 115* 142*   < >  --    < >  --    < >  --    < >  --    < >  --    < >  --     < > = values in this interval not displayed.     "

## 2019-11-30 NOTE — PROGRESS NOTES
"Brief Progress Note  2019 11:20 AM      Sister and  in room to discuss patient's care. Sister does not feel as though the patient's pain has been adequately treated. She herself has had three  sections and oxycodone did not work for her. She is wondering if there is another option. She recognizes that her sister will have a higher pain tolerance given her history of methamphetamine use but she says that her sister is worried about seeming \"bad\" by asking for pain meds.  She also says that her sister has very high anxiety levels. She used to be treated with Effexor for Bipolar disorder but has not been treated with that for more than two years. She knows her sister has been self medicating her pain for a long time now and her health has been deteriorating from the last 6-12 months. She would like a psychiatrist consult and possibly restart Effexor.   Discussed optimal pain management with patient and her sister. Would like to find a dose of medication so that patient does not fall immediately asleep after pain medications that make her too drowsy which has happened after the IV dilaudid. Palliative care to help with pain management. Also discussed that Psychiatry not available on weekends but would consider a consult Monday. Furthermore discussed that recovery from surgery and her new cancer diagnosis will be a longstanding effort with our care teams involved. Still awaiting final pathology report but treatment will likely involve chemotherapy.  Due to patient's difficult social situation we will have to have social work involvement so that when patient is medically ready to go home we will have a good support system set up for her so that she can make it to her appointments and be well taken care of day to day.   - palliative care consulted for help with pain management  - Psychiatry consult Monday.     Komal Schumacher MD  Obstetrics & Gynecology, PGY-2  2019 11:29 AM      "

## 2019-11-30 NOTE — CONSULTS
[facility]  Palliative Care Consultation Note    Patient: Chela Love  Date of Admission:  11/23/2019    Requesting Clinician / Team: Gyn/Onc  Reason for consult: Pain management    Recommendations:      Add methadone 2.5mg q12h    Change dilaudid to 4mg po q3h prn    Keep IV dilaudid available for breakthrough pain: 0.3mg IV q2h prn    Will ask the palliative  for support in processing as well as mind-body techniques for pain management      Consider hydroxyzine 25-50mg q6h prn anxiety      Adjust bowel regimen: add miralax daily        Consider adding gabapentin 100mg HS for pain and anxiety. Her clearance is significantly reduced considering her low renal function.      Please consult psychiatry for h/o bipolar disorder and substance use disorder    Please consult SW for clarification of resources, possible help with placement, etc. This can probably wait until Monday    Opioid Safety Discussion: We anticipate this patient will be discharged home with opioids. Their Opioid Risk Tool score is at least 7 which suggests they are at high risk of adverse effects from opioids (due to overuse).     These recommendations have been discussed with primary team        Thank you for the opportunity to participate in the care of this patient and family. Our team: will continue to follow.     During regular M-F work hours -- if you are not sure who specifically to contact -- please contact us by sending a text page to our team consult pager at [phone].    After regular work hours and on weekends/holidays, you can call our answering service at [phone]. Also, who's on call for us is available in [employer].       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 She is now s/p ZNADRA, BSO, omenectomy, NATANAEL, debulking on 11/27.    Pt's brother and sister are at bedside. Chela is in significant pain, sleeps after IV dilaudid. Her sister gives most of the history.     Pain: Chela reports diffuse \"all over\" pain. She gets some relief with dilaudid, but not oxycodone. Her sister states that she hasn't had pain relief with oxycodone in the past either, but tends to respond well to dilaudid. She is very concerned that Chela is unable to request prn medications in a timely fashion and waits until her pain has escalated.     Mental health: Chela has untreated bipolar disorder and suspected anxiety. She used to be on venlafaxine, but hasn't been taking this for a long time. She was admitted for this in the past.   Her sister describes that Chela has also had anxiety, which has now flared again. She identifies that this is interfering with Chela's pain management.   She also has a history of substance use. In hindsight her sister suspects that a recent increase in use was probably due to beginning symptoms of Chela's cancer.       Today, the patient was seen for:  Metastatic adenocarcinoma  Substance use disorder  Uncontrolled diabetes  Bipolar disorder, anxiety    Prognosis, Goals, & Planning:      Functional Status just prior to hospitalization: 2 (Ambulatory and capable of all selfcare but unable to carry out any work activities; may need help with IADLs up and about > 50% of waking hours) Her siblings were not closely involved. She lived independently, but wasn't able to maintain adequate glucose control, had recurrent LE wounds associated with her diabetes, didn't seek appropriate help for medical symptoms until she needed emergent care.      Prognosis, Goals, and/or Advance Care Planning were addressed today: Yes        Summary/Comments: The sister is concerned that Chela doesn't quite grasp the gravity of her situation. At " "the same time Chela has talked to her sister and told her that she wants to live and would be willing to make major changes to her life in order to achieve this.   Ann Marie seems quite overwhelmed with the thought of all the aspects of Chela's life that will/should change. We agree to take it day by day for now.       Patient's decision making preferences: unable to assess          Patient has decision-making capacity today for complex decisions: No She is very focused on her pain. Per family reports some memory and possibly more pervasive cognitive deficits.            I have concerns about the patient/family's health literacy today: No           Patient has a completed Health Care Directive: No.       Code status: full code    Coping, Meaning, & Spirituality:   Mood, coping, and/or meaning in the context of serious illness were addressed today: Yes  Summary/Comments: exacerbated baseline anxiety. Complex psychosocial situation which will influence her future care and coping    Social:     Living situation: lives independently    Key family / caregivers: her mother is in touch more or less daily. Her siblings have visited when she was admitted. Otherwise she has kept them at a distance. ann marie assumes this is due to her substance use and conflicts around that    Substance use history: Ann Marie thinks that Chela has been using on/off since her teens.     Financial concerns: Chela is unemployed. Per Ann Marie on MA.     History of Present Illness:  History gathered today from: patient, family/loved ones, medical chart, medical team members, unit team members    Chela is sitting up in a chair, tells me she has \"bad pain all over\" and asks me to help. She is very distraught and unable to participate in any additional history/conversation. She eventually doses off after receiving IV dilaudid.     Ann Marie and her brother are at bedside. Ann Marie outlines their understanding, questions and concerns as " above.     Key Palliative Symptom Data:  # Pain severity the last 12 hours: severe  # Dyspnea severity the last 12 hours: none  # Nausea severity the last 12 hours: none  # Anxiety severity the last 12 hours: moderate    Patient is on opioids: assessed and I made recommendations about bowel care as above.    ROS:  Unable to participate     Past Medical History:  Past Medical History:   Diagnosis Date     Anxiety      Asthma      Bilateral lower extremity edema      Depression      HTN (hypertension)      Insomnia      Migraine      Obesity      Substance abuse (H)     Methamphetamine use     Type 2 diabetes mellitus (H)      Vasculopathy         Past Surgical History:  Past Surgical History:   Procedure Laterality Date     LAPAROSCOPIC HYSTERECTOMY TOTAL, REMIGIO SALPINGO-OOPHORECTOMY, NODE DISSECTION, TUMOR STAGING, COMBINED N/A 11/27/2019    Procedure: Exploratory Laparotomy , TOTAL ABDOMINAL HYSTERECTOMY, Bilateral SALPINGoophorectomy, Omentectomy, Abdominal Washout  and Tumor DEBULKING;  Surgeon: Rosana Mckeon MD;  Location:  OR         Family History:  No family history on file.      Allergies:  Allergies   Allergen Reactions     Bee Venom Other (See Comments)     swelling     Selenicereus Grandiflorus Rash        Medications:  I have reviewed this patient's medication profile and medications from this hospitalization.   Noted meds are:  Dilaudid 0.3 to 0.5 mg every 2 hours as needed-1.3 mg, 13 OME  Added dilaudid 2-4mg po q4h prn this am  Oxycodone 5 to 10 mg every 4 hours as needed-45 mg, 67 OME  acetaminophen 975mg tid, q4h prn - x1      Physical Exam:  Vital Signs: Temp: 98.1  F (36.7  C) Temp src: Axillary BP: 104/57   Heart Rate: 100 Resp: 20 SpO2: 97 % O2 Device: None (Room air) Oxygen Delivery: 4 LPM  Weight: 236 lbs 15.91 oz    Last BM: Yesterday.  Loose, soft    CONSTIT: awake, appears uncomfortable, eventually falls asleep and looks comfortable  EENT: MMM, EOMI, no icterus  RESP: reg, nl  effort  GI: asks me not to examine her to avoid increase in pain  MSK:moves x4, generalized weakness  NEURO: alert, oriented to self, knows she's in the hospital  PSYCH: anxious affect, memory and thought process intact      Data reviewed:  Recent imaging reviewed, my comments on pertinents:   CT 11/26: Also shows distended transverse colon with significant stool burden  1. Large mass in the anterior abdomen and pelvis is not significantly  changed from the prior exam. Peritoneal carcinomatosis. Favor that  mass is ovarian in origin as the right gonadal vessels appear to be  leading to this mass. The mass is inseparable from the uterus,  although it does not appear to represent leiomyosarcoma based on  imaging findings and presence of extensive peritoneal carcinomatosis.  2. Somewhat heterogenous appearance of the endometrium and uterus is  not as appreciated on this noncontrast exam.  3. Increased anasarca.   4. Mild splenomegaly.  5. Slitlike configuration of the IVC.  6. Sclerotic lesion within the proximal right femur. This could  potentially represent bony infarction.       Recent lab data reviewed, my comments on pertinents:   GFR around 20  Albumin 1.5  Alk phos around 300    Hemoglobin 8      Patient seen for newly found metastatic adenocarcinoma s/p large abd surgery, made adjustments to pain and anxiety management based on history, chart review. Billing based on complexity regarding metastatic cancer in setting of mental health disorders.     Eden Matos MD  Palliative Medicine  Pager [phone]

## 2019-12-01 ENCOUNTER — APPOINTMENT (OUTPATIENT)
Dept: OCCUPATIONAL THERAPY | Facility: CLINIC | Age: 44
End: 2019-12-01
Attending: INTERNAL MEDICINE
Payer: COMMERCIAL

## 2019-12-01 LAB
ANION GAP SERPL CALCULATED.3IONS-SCNC: <1 MMOL/L (ref 3–14)
BACTERIA SPEC CULT: NO GROWTH
BLD PROD TYP BPU: NORMAL
BLD UNIT ID BPU: 0
BLOOD PRODUCT CODE: NORMAL
BPU ID: NORMAL
BUN SERPL-MCNC: 53 MG/DL (ref 7–30)
CALCIUM SERPL-MCNC: 7.6 MG/DL (ref 8.5–10.1)
CHLORIDE SERPL-SCNC: 111 MMOL/L (ref 94–109)
CO2 SERPL-SCNC: 27 MMOL/L (ref 20–32)
CREAT SERPL-MCNC: 2.15 MG/DL (ref 0.52–1.04)
ERYTHROCYTE [DISTWIDTH] IN BLOOD BY AUTOMATED COUNT: 15.3 % (ref 10–15)
ERYTHROCYTE [DISTWIDTH] IN BLOOD BY AUTOMATED COUNT: 15.4 % (ref 10–15)
GFR SERPL CREATININE-BSD FRML MDRD: 27 ML/MIN/{1.73_M2}
GLUCOSE BLDC GLUCOMTR-MCNC: 101 MG/DL (ref 70–99)
GLUCOSE BLDC GLUCOMTR-MCNC: 103 MG/DL (ref 70–99)
GLUCOSE BLDC GLUCOMTR-MCNC: 106 MG/DL (ref 70–99)
GLUCOSE BLDC GLUCOMTR-MCNC: 106 MG/DL (ref 70–99)
GLUCOSE BLDC GLUCOMTR-MCNC: 114 MG/DL (ref 70–99)
GLUCOSE BLDC GLUCOMTR-MCNC: 116 MG/DL (ref 70–99)
GLUCOSE BLDC GLUCOMTR-MCNC: 118 MG/DL (ref 70–99)
GLUCOSE BLDC GLUCOMTR-MCNC: 125 MG/DL (ref 70–99)
GLUCOSE BLDC GLUCOMTR-MCNC: 144 MG/DL (ref 70–99)
GLUCOSE BLDC GLUCOMTR-MCNC: 156 MG/DL (ref 70–99)
GLUCOSE BLDC GLUCOMTR-MCNC: 188 MG/DL (ref 70–99)
GLUCOSE BLDC GLUCOMTR-MCNC: 198 MG/DL (ref 70–99)
GLUCOSE BLDC GLUCOMTR-MCNC: 213 MG/DL (ref 70–99)
GLUCOSE BLDC GLUCOMTR-MCNC: 214 MG/DL (ref 70–99)
GLUCOSE SERPL-MCNC: 115 MG/DL (ref 70–99)
HCT VFR BLD AUTO: 22.7 % (ref 35–47)
HCT VFR BLD AUTO: 23.4 % (ref 35–47)
HGB BLD-MCNC: 6.8 G/DL (ref 11.7–15.7)
HGB BLD-MCNC: 7.1 G/DL (ref 11.7–15.7)
INR PPP: 1.41 (ref 0.86–1.14)
MCH RBC QN AUTO: 27.8 PG (ref 26.5–33)
MCH RBC QN AUTO: 28.3 PG (ref 26.5–33)
MCHC RBC AUTO-ENTMCNC: 30 G/DL (ref 31.5–36.5)
MCHC RBC AUTO-ENTMCNC: 30.3 G/DL (ref 31.5–36.5)
MCV RBC AUTO: 93 FL (ref 78–100)
MCV RBC AUTO: 93 FL (ref 78–100)
PLATELET # BLD AUTO: 266 10E9/L (ref 150–450)
PLATELET # BLD AUTO: 278 10E9/L (ref 150–450)
POTASSIUM SERPL-SCNC: 4.5 MMOL/L (ref 3.4–5.3)
RBC # BLD AUTO: 2.45 10E12/L (ref 3.8–5.2)
RBC # BLD AUTO: 2.51 10E12/L (ref 3.8–5.2)
SODIUM SERPL-SCNC: 139 MMOL/L (ref 133–144)
SPECIMEN SOURCE: NORMAL
TRANSFUSION STATUS PATIENT QL: NORMAL
TRANSFUSION STATUS PATIENT QL: NORMAL
WBC # BLD AUTO: 11 10E9/L (ref 4–11)
WBC # BLD AUTO: 11.6 10E9/L (ref 4–11)

## 2019-12-01 PROCEDURE — 12000001 ZZH R&B MED SURG/OB UMMC

## 2019-12-01 PROCEDURE — 86850 RBC ANTIBODY SCREEN: CPT | Performed by: INTERNAL MEDICINE

## 2019-12-01 PROCEDURE — 25000128 H RX IP 250 OP 636: Performed by: STUDENT IN AN ORGANIZED HEALTH CARE EDUCATION/TRAINING PROGRAM

## 2019-12-01 PROCEDURE — 25000132 ZZH RX MED GY IP 250 OP 250 PS 637: Performed by: STUDENT IN AN ORGANIZED HEALTH CARE EDUCATION/TRAINING PROGRAM

## 2019-12-01 PROCEDURE — 40000802 ZZH SITE CHECK

## 2019-12-01 PROCEDURE — 86901 BLOOD TYPING SEROLOGIC RH(D): CPT | Performed by: INTERNAL MEDICINE

## 2019-12-01 PROCEDURE — 99222 1ST HOSP IP/OBS MODERATE 55: CPT

## 2019-12-01 PROCEDURE — 25000132 ZZH RX MED GY IP 250 OP 250 PS 637: Performed by: OBSTETRICS & GYNECOLOGY

## 2019-12-01 PROCEDURE — 86900 BLOOD TYPING SEROLOGIC ABO: CPT | Performed by: INTERNAL MEDICINE

## 2019-12-01 PROCEDURE — P9016 RBC LEUKOCYTES REDUCED: HCPCS | Performed by: INTERNAL MEDICINE

## 2019-12-01 PROCEDURE — 36592 COLLECT BLOOD FROM PICC: CPT | Performed by: STUDENT IN AN ORGANIZED HEALTH CARE EDUCATION/TRAINING PROGRAM

## 2019-12-01 PROCEDURE — 97110 THERAPEUTIC EXERCISES: CPT | Mod: GO | Performed by: OCCUPATIONAL THERAPIST

## 2019-12-01 PROCEDURE — 97530 THERAPEUTIC ACTIVITIES: CPT | Mod: GO | Performed by: OCCUPATIONAL THERAPIST

## 2019-12-01 PROCEDURE — 00000146 ZZHCL STATISTIC GLUCOSE BY METER IP

## 2019-12-01 PROCEDURE — 25000132 ZZH RX MED GY IP 250 OP 250 PS 637: Performed by: HOSPITALIST

## 2019-12-01 PROCEDURE — 80048 BASIC METABOLIC PNL TOTAL CA: CPT | Performed by: STUDENT IN AN ORGANIZED HEALTH CARE EDUCATION/TRAINING PROGRAM

## 2019-12-01 PROCEDURE — 25000131 ZZH RX MED GY IP 250 OP 636 PS 637: Performed by: INTERNAL MEDICINE

## 2019-12-01 PROCEDURE — 85027 COMPLETE CBC AUTOMATED: CPT | Performed by: STUDENT IN AN ORGANIZED HEALTH CARE EDUCATION/TRAINING PROGRAM

## 2019-12-01 PROCEDURE — 86923 COMPATIBILITY TEST ELECTRIC: CPT | Performed by: INTERNAL MEDICINE

## 2019-12-01 PROCEDURE — 85610 PROTHROMBIN TIME: CPT | Performed by: STUDENT IN AN ORGANIZED HEALTH CARE EDUCATION/TRAINING PROGRAM

## 2019-12-01 PROCEDURE — 40000558 ZZH STATISTIC CVC DRESSING CHANGE

## 2019-12-01 RX ORDER — TRAZODONE HYDROCHLORIDE 50 MG/1
50 TABLET, FILM COATED ORAL AT BEDTIME
Status: DISCONTINUED | OUTPATIENT
Start: 2019-12-01 | End: 2019-12-01

## 2019-12-01 RX ORDER — TRAZODONE HYDROCHLORIDE 50 MG/1
50-100 TABLET, FILM COATED ORAL
Status: DISCONTINUED | OUTPATIENT
Start: 2019-12-01 | End: 2019-12-05 | Stop reason: HOSPADM

## 2019-12-01 RX ADMIN — HYDROMORPHONE HYDROCHLORIDE 2 MG: 2 TABLET ORAL at 00:31

## 2019-12-01 RX ADMIN — Medication 5 ML: at 00:33

## 2019-12-01 RX ADMIN — HEPARIN SODIUM 5000 UNITS: 5000 INJECTION, SOLUTION INTRAVENOUS; SUBCUTANEOUS at 08:42

## 2019-12-01 RX ADMIN — GABAPENTIN 100 MG: 100 CAPSULE ORAL at 21:35

## 2019-12-01 RX ADMIN — HYDROMORPHONE HYDROCHLORIDE 2 MG: 2 TABLET ORAL at 10:48

## 2019-12-01 RX ADMIN — SENNOSIDES AND DOCUSATE SODIUM 2 TABLET: 8.6; 5 TABLET ORAL at 08:33

## 2019-12-01 RX ADMIN — HYDROMORPHONE HYDROCHLORIDE 4 MG: 2 TABLET ORAL at 18:05

## 2019-12-01 RX ADMIN — Medication 2.5 MG: at 08:35

## 2019-12-01 RX ADMIN — HYDROMORPHONE HYDROCHLORIDE 2 MG: 2 TABLET ORAL at 15:32

## 2019-12-01 RX ADMIN — SODIUM CHLORIDE, PRESERVATIVE FREE 5 ML: 5 INJECTION INTRAVENOUS at 08:37

## 2019-12-01 RX ADMIN — ACETAMINOPHEN 975 MG: 325 TABLET, FILM COATED ORAL at 21:33

## 2019-12-01 RX ADMIN — HYDROMORPHONE HYDROCHLORIDE 4 MG: 2 TABLET ORAL at 22:29

## 2019-12-01 RX ADMIN — Medication 5 ML: at 03:23

## 2019-12-01 RX ADMIN — FAMOTIDINE 20 MG: 20 TABLET ORAL at 18:05

## 2019-12-01 RX ADMIN — CALCIUM CARBONATE (ANTACID) CHEW TAB 500 MG 500 MG: 500 CHEW TAB at 08:33

## 2019-12-01 RX ADMIN — HYDROMORPHONE HYDROCHLORIDE 2 MG: 2 TABLET ORAL at 13:29

## 2019-12-01 RX ADMIN — HYDROMORPHONE HYDROCHLORIDE 4 MG: 2 TABLET ORAL at 05:45

## 2019-12-01 RX ADMIN — TRAZODONE HYDROCHLORIDE 50 MG: 50 TABLET ORAL at 21:42

## 2019-12-01 RX ADMIN — Medication 5 ML: at 08:20

## 2019-12-01 RX ADMIN — ACETAMINOPHEN 975 MG: 325 TABLET, FILM COATED ORAL at 14:39

## 2019-12-01 RX ADMIN — SENNOSIDES AND DOCUSATE SODIUM 1 TABLET: 8.6; 5 TABLET ORAL at 21:35

## 2019-12-01 RX ADMIN — POLYETHYLENE GLYCOL 3350 17 G: 17 POWDER, FOR SOLUTION ORAL at 08:34

## 2019-12-01 RX ADMIN — Medication 2.5 MG: at 21:36

## 2019-12-01 RX ADMIN — Medication 5 ML: at 00:32

## 2019-12-01 RX ADMIN — INSULIN GLARGINE 25 UNITS: 100 INJECTION, SOLUTION SUBCUTANEOUS at 10:52

## 2019-12-01 RX ADMIN — PIPERACILLIN SODIUM AND TAZOBACTAM SODIUM 2.25 G: 2; .25 INJECTION, POWDER, LYOPHILIZED, FOR SOLUTION INTRAVENOUS at 02:28

## 2019-12-01 RX ADMIN — HYDROMORPHONE HYDROCHLORIDE 2 MG: 2 TABLET ORAL at 02:19

## 2019-12-01 RX ADMIN — SODIUM CHLORIDE, PRESERVATIVE FREE 5 ML: 5 INJECTION INTRAVENOUS at 08:38

## 2019-12-01 RX ADMIN — SODIUM CHLORIDE, PRESERVATIVE FREE 5 ML: 5 INJECTION INTRAVENOUS at 08:36

## 2019-12-01 RX ADMIN — HEPARIN SODIUM 5000 UNITS: 5000 INJECTION, SOLUTION INTRAVENOUS; SUBCUTANEOUS at 00:18

## 2019-12-01 RX ADMIN — CALCIUM CARBONATE (ANTACID) CHEW TAB 500 MG 500 MG: 500 CHEW TAB at 21:34

## 2019-12-01 RX ADMIN — Medication 1 MG: at 21:42

## 2019-12-01 RX ADMIN — ACETAMINOPHEN 975 MG: 325 TABLET, FILM COATED ORAL at 08:33

## 2019-12-01 RX ADMIN — Medication 5 ML: at 22:32

## 2019-12-01 ASSESSMENT — PAIN DESCRIPTION - DESCRIPTORS
DESCRIPTORS: ACHING
DESCRIPTORS: TENDER;SORE

## 2019-12-01 ASSESSMENT — ACTIVITIES OF DAILY LIVING (ADL)
ADLS_ACUITY_SCORE: 14
ADLS_ACUITY_SCORE: 16
ADLS_ACUITY_SCORE: 14
ADLS_ACUITY_SCORE: 16

## 2019-12-01 ASSESSMENT — MIFFLIN-ST. JEOR: SCORE: 1749.95
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
## 2019-12-01 NOTE — PLAN OF CARE
Pt transferred from . [facility]. Insulin gtt on algorithm #2, currently infusing 1unit/hr. BG checks q1hr. Pt appears to be sleeping comfortably. Continue to monitor.

## 2019-12-01 NOTE — PROGRESS NOTES
Nephrology Progress Note  12/01/2019         Assessment & Recommendations:   Chela Love is a 44 year old female with a history of poor-controlled T2DM, HTN, HLD, substance abuse (methamphetamine, cannabis), who presents as a transfer from [facility] for management of severe sepsis (unknown source) and large ovarian mass with ascites and peritoneal carcinomatosis; adenocarcinoma endometrioid type on pathology. Also has worsening CINDY from ATN, likely multifactorial.    Oliguric CINDY (ATN)  Hypervolemic hyponatremia, improved  Based on sharp rising pattern of Cr, her CINDY is most likely secondary to ATN. This could be multifactorial in etiology; systemic infection, past episode of prerenal CINDY at OSH that improved with hydration, exposure to contrast agent. Kidney US (11/25/19) was normal  -suspect her CINDY was due to infection related cytokine mediated injury and maybe contrast related.   CINDY seems to be stabilizing, likely due to infection and contrast  - Scr nicely improving, Scr down to 2.1 today and great UOP  - UOP good, would given lasix 40 or 60mg IV if her weight does not come down in coming days or edema does not improve; her albumin is low, I would like to check protein/ creat random  - consider albumin if hypotensive/ tachycardic in lieu of crystalloid fluids    Mixed non-anion gap metabolic acidosis and respiratory acidosis  Improved, bicarb low normal    Electrolytes- potassium improved, now normal  - check daily for now given good UOP.    Anemia- acute blood loss- transfusion per primary team    Shortness of breath- multifactorial-  She is volume up and has pain and anxiety  - would aim for 500-1000 mL negative until at admission weight 102 kg- she is eating and drinking more than is recorded    - we will monitor from a distance, call with questions      Recommendations were communicated to primary team via note.         Interval History :   Nursing and provider notes from last 24 hours  "reviewed.  In the last 24 hours Chela Love continues to recover from surgery. She is feeling much better , on dilaudid po for pain control.   Great UOP noted, but still has a lot of LE swelling    Physical Exam:   I/O last 3 completed shifts:  In: 733 [P.O.:610; I.V.:123]  Out: 3625 [Urine:3625]   /62 (BP Location: Left arm)   Pulse 86   Temp 96.9  F (36.1  C) (Oral)   Resp 18   Ht 1.676 m (5' 6\")   Wt 107.8 kg (237 lb 11.2 oz)   LMP 10/27/2019   SpO2 98%   Breastfeeding No   BMI 38.37 kg/m     Wt Readings from Last 5 Encounters:   12/01/19 107.8 kg (237 lb 11.2 oz)     Constitutional: sitting in chair, talking and interactive, crying, distressed  Eyes: pupils equal, round and reactive to light, extra ocular muscles intact, sclera clear, conjunctiva normal  Respiratory: no increased work of breathing, no crackles or wheezing  Cardiovascular: Normal apical impulse, regular rate and rhythm, normal S1 and S2, no S3 or S4. HECTOR grade 2/6 heard at LUPSB.  GI: soft, generalized mild tenderness to palpation (no guarding or rebound). Midline incision closed with sutures with dermabond in place  Genitounirinary: nelson catheter in place  Skin: normal skin color, texture, turgor  Musculoskeletal: 2++ lower extremity pitting edema present  Neurologic: drowsy. Grossly no focal neurological deficit.    Labs:   All labs reviewed by me  Electrolytes/Renal -   Recent Labs   Lab Test 12/01/19  0825 11/30/19  0330 11/29/19  1514 11/29/19  0248  11/28/19  0247    142 144 141   < > 138   POTASSIUM 4.5 4.5 4.4 3.8   < > 4.3   CHLORIDE 111* 113* 113* 111*   < > 109   CO2 27 22 23 21   < > 23   BUN 53* 62* 66* 71*   < > 71*   CR 2.15* 2.71* 3.03* 3.12*   < > 3.51*   * 149* 123* 152*   < > 135*   MARILIN 7.6* 7.8* 7.7* 7.7*   < > 8.1*   MAG  --  1.8  --  2.0  --  2.2   PHOS  --  4.2  --  4.8*  --  6.4*    < > = values in this interval not displayed.       CBC -   Recent Labs   Lab Test 12/01/19  0825 " 11/30/19  0523 11/29/19  1514   WBC 11.6* 9.6 9.4   HGB 7.1* 8.1* 7.8*    232 234       LFTs -   Recent Labs   Lab Test 11/30/19  0330 11/29/19  0248 11/28/19  0247   ALKPHOS 293* 314* 246*   BILITOTAL 0.4 0.3 0.7   ALT 11 13 10   AST 20 24 15   PROTTOTAL 5.2* 5.2* 5.2*   ALBUMIN 1.5* 1.6* 1.8*     Iron Panel - No lab results found.    Imaging:  No new imaging over the past 24h.    Current Medications:    acetaminophen  975 mg Oral TID     calcium carbonate  500 mg Oral BID     famotidine  20 mg Oral Daily     gabapentin  100 mg Oral At Bedtime     heparin lock flush  5-10 mL Intracatheter Q24H     [START ON 12/2/2019] insulin aspart   Subcutaneous QAM AC     insulin aspart   Subcutaneous Daily with lunch     insulin aspart   Subcutaneous Daily with supper     insulin aspart  1-7 Units Subcutaneous TID AC     insulin aspart  1-5 Units Subcutaneous At Bedtime     insulin glargine  25 Units Subcutaneous QAM AC     methadone  2.5 mg Oral Q12H DONNA     polyethylene glycol  17 g Oral Daily     senna-docusate  1 tablet Oral BID    Or     senna-docusate  2 tablet Oral BID     sodium chloride (PF)  3 mL Intracatheter Q8H       IV fluid REPLACEMENT ONLY       insulin (regular) Stopped (12/01/19 1311)     - MEDICATION INSTRUCTIONS -       Anna Browne MD

## 2019-12-01 NOTE — PLAN OF CARE
7C OT    Discharge Planner OT   Patient plan for discharge: TCU  Current status: Pt education in abdominal precautions reinforced, handout provided. Pt verbalized understanding, though anticipate will need to further reinforce. Pt distractible and tangential throughout. Pt transferred supine > seated EOB with SBA an VC for log roll technique, remaining within precautions, and sequencing. Pt educated in good body mechanics and FWW use when standing, within precautions; pt verbalized understanding. Pt seated <> standing with Min A, pt having difficulty remaining within precautions. To improve endurance for ADLs and functional mobility, pt ambulated x50 ft in hallway with FWW and CGA. Pt moving with small steps, in forward leaning posture. Pt distracted by other people in hallway, telling jokes. Pt needing x3 brief standing rest breaks due to fatigue. Pt reporting pain upon sitting, became tearful. Pt educated in breathing technique for pain reduction; pt demonstrate understanding.   Barriers to return to prior living situation: cognition, post-surgical precautions, low activity tolerance  Recommendations for discharge: TCU  Rationale for recommendations: Pt below baseline for ADL performance and safe functional mobility. Pt will benefit from continued skilled services to promote independence.        Entered by: Sheri Bravo 12/01/2019 12:25 PM

## 2019-12-01 NOTE — PROGRESS NOTES
"Gynecology Oncology Progress Note    HD#9 sepsis, leukocytosis, AMS  POD#4 XL, ZANDRA, BSO, Omentectomy, NATANAEL, Tumor Debulking, Washout    Disease: Pelvic mass, adenocarcinoma (favor endometrioid) on frozen    24 hour events:   - Transferred to   - Palliative consult: Started methadone, dilaudid, gabapentin  - Started ppx heparin    Subjective: First reports that she's \"feeling like sh*t.\" Then states she is feeling much better than yesterday. She reports all-over abdominal pain but maybe better than yesterday. Has slept well. Reports not eating much because she \"wasn't allowed to.\"  Ambulated yesterday. Voiding. Has had a BM.     Objective:   Vitals:    11/30/19 2200 12/01/19 0354 12/01/19 0438 12/01/19 0606   BP: 124/62 125/63 114/63 117/66   BP Location: Left arm Left arm Right arm Left arm   Pulse: 84 86     Resp: 20 22 20 20   Temp: 98.9  F (37.2  C) 98.4  F (36.9  C) 98.6  F (37  C) 98  F (36.7  C)   TempSrc: Oral Oral Oral Oral   SpO2: 95% 94% 92% 96%   Weight:       Height:         General: Awake, alert, NAD  CV: RRR, no m/r/g  Resp: anterior lung fields clear  Abdomen:  soft, appropriately tender, VML incision covered with dermabond  Extremities: Brown skin discoloration bilateral ankle and distal legs, 2+ edema bilaterally    I/Os  (Yesterday // Since Midnight)  PO 1090cc //   IVF 306cc // 100cc  Urine 2800cc // 825cc    Labs:  CBC, BMP pending    Assessment: Chela Love is a 44 year old with PMHx signficant for HTN, HLD, T2DM, and Polysubstance use, initially admitted to the medicine team for septic shock and AMS. She was found to have a right adnexal mass measuring [id number] cm. She was transferred to Gyn/Onc Service 11/26/19. She is now POD#4 from a XL, ZANDRA, BSO, and tumor debulking. She was admitted to SICU for hemodynamic monitoring postoperatively now on floor and hemodynamically stable.    Problem List:  - Adenocarcinoma of likely endometrial origin  - Intra-abdominal infection   - Oliguric CINDY " from ATN   - Uncontrolled T2DM with hyperglycemia  - Acute blood loss anemia  - Polysubstance use  - Anxiety, depression    Plan:    Dz: CT w/ 20 cm pelvic mass, ascites, omental caking. Frozen pathology - adenocarcinoma, favor endometrioid. Final pathology pending.  FEN: Tolerating carb-consistent diet. Mag 1.8 today, will replete if <1.6 by standard ERP protocol.   Pain: Palliative consulted. Started methadone yesterday for longer coverage as well as in the setting of poor pain tolerance with history of PSA. PO Dilaudid 4mg q3h prn, IV dilaudid 0.3mg q2h prn.  Gabapentin 100mg at bedtime for pain and anxiety. No NSAIDs until resolved CINDY. Palliative SW to see patient for mind-body techniques for pain management.  Heme:  Acute blood loss anemia, s/p 2u pRBC intraop and 1u pRBC post-op. Appropriate rise in Hgb after transfusions, Hgb now stable. Coagulopathy from infection is improving.  Prophylactic heparin started yesterday.  CV: HTN, HLD. Holding home meds for now  Pulm: NI  GI: NI, bowel regimen, antiemetics PRN  : CINDY likely from intra-abdominal infection, Cr improving and UOP increasing. Nephrology recommended adding lasix to target -1000 ml negative. Patient adequately diuresis without medications at this time.   ID: Supra-infection ovarian mass consistent with malignancy.  Source control at the time of surgery.  WBC has normalized and she has remained afebrile.  She has completed 4 days of Zosyn s/p source control and will thus discontinue all antibiotics  Endo: Poorly controlled T2DM, A1C 15. Currently on insulin drip with carb counting.  Endocrinology following and planning to switch to subcutaneous insulin shortly  Psych/Neuro/MSK: AMS, PSA (meth, tobacco, THC, alcohol). UDS pending. Bipolar disorder previously on Effexor. SW consult placed. Patient and family considering long term goal of rehab. Psych consult for possibly restarting medications for bipolar disorder.   PPX: SCDs, IS. DONNA    Drains/Lines: PICC, PIV      Dispo: Pending post-operative goals.      Koaml Schumacher MD  Obstetrics & Gynecology, PGY-2  12/1/2019 7:00 AM

## 2019-12-01 NOTE — PLAN OF CARE
[id number]: Pt moans and cried out in pain for a short time and then drifts off to sleep, back and forth. Dilaudid PO given w/ adequate response. Zofran given for nausea. Pt drinking pop. Refuses to get OOB but allowed RN to repo w/ pillow. Incision with minimal serosang drainage. Insulin remained at 1 unit for shift, BG at around 115s. Continue with POC.

## 2019-12-01 NOTE — PROGRESS NOTES
Diabetes Consult Daily Progress Note          Assessment/Plan:     Summary:  Chela is a 44 year old female with a history of morbid obesity, TIIDM, HTN, asthma, and polysubstance abuse who presented initially to [facility].  At time of initial assessment, she was having nausea, vomiting, and vague abdominal pain, also found to have a large ovarian mass with ascites and peritoneal carcinomatosis. She was transferred to [facility] 11/23/19 in septic shock, for GYN ONC consult and surgical evaluation. She was taken to OR on 11/27 for ex-lap, ZANDRA, BSO, omentectomy, tumor debulking and abdominal washout. Blas purulence of the abdominal cavity was noted. She had septic/hypovolemic shock requiring pressors. Initial path is consistent with adenocarcinoma. She was started preoperatively on insulin drip for hyperglycemia.    Assessment:   Type II Diabetes, uncontrolled (A1c 15%)    Plan  Transition to subcutaneous insulin:   - 25 U Lantus QAM    - 1 U Novolog per 7 grams CHO for meals and snacks (changed from 1 U per 3 grams CHO)  - medium dose correction scale.   - BG monitoring AC and HS and at 2 AM  - hypoglycemia protocol  - diabetes education prior to discharge for new insulin regimen  - due to renal function, cannot safely resume Metformin at this time, will reassess prior to discharge along with the use of GLP1 agonist, if appropriate.   - on discharge, will recommend she establishes endocrine care (she prefers a clinic closer to her home, as she doesn't drive).      Discussed plan of care with patient, nursing.     Addendum: insulin to carb ratio changed at 3:30 pm to 1 U per 5 grams CHO.         Interval History:     The patient is still complaining of feeling very hungry. The nausea is improving. The kidney function is slightly better.     Background:  Diabetes Mellitus Type: II  Duration:     >7 years  Diabetic Complications: recurrent LE ulcerations, per chart review.   Prior to Admission Diabetes Regimen:    The  "patient admits she hasn't been taking insulin for a while at home, as she was not able to afford the cost of Lantus.   Per chart review:                  -Metformin 1000mg BID, and Lantus 25 units at bedtime  In 2012, when 20 kg heavier (but also when taking medications consistently):                 -A1c was 6.8% on Metformin 1000mg BID, Lantus 80 units at bedtime, and Novolog 30 units TID WM.                  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   Usual BG control PTA:  Uncontrolled, a1c 15% on this admission   History of DKA: not evident per chart review  Able to Detect Hypoglycemia: unknown, unlikely to be having hypoglycemia recently.   Usual Diabetes Care Provider: previous  specialty clinic, last seen 6/2012.  Primary Care Provider: [facility]  Factors Impacting IP Glucose Control: intraop steroids, altered PO intake, acute infection/sepsis, malignancy, glucose toxicity     Orders Placed This Encounter      Low Consistent CHO Diet    ROS:    Review of pertinent systems was negative except as noted above.          Exam:      Blood pressure 103/52, pulse 86, temperature 96.9  F (36.1  C), temperature source Oral, resp. rate 18, height 1.676 m (5' 6\"), weight 107.5 kg (236 lb 15.9 oz), last menstrual period 10/27/2019, SpO2 99 %, not currently breastfeeding.    General: Alert, resting in bed, NAD.   HEENT: NC/AT, mucous membranes are moist.  Resp: Unlabored breathing.   Neuro: Alert and oriented, communicating clearly.          Data:   24 hrs labs reviewed.   Lab Results   Component Value Date    A1C 15.0 11/23/2019       Recent Labs   Lab 12/01/19  0955 12/01/19  0825 12/01/19  0803 12/01/19  0705 12/01/19  0601 12/01/19  0454 12/01/19  0350  11/30/19  0330  11/29/19  1514  11/29/19  0248  11/28/19  2049  11/28/19  0247   GLC  --  115*  --   --   --   --   --   --  149*  --  123*  --  152*  --  196*  -- "  135*   *  --  106* 103* 101* 106* 116*   < >  --    < >  --    < >  --    < >  --    < >  --     < > = values in this interval not displayed.

## 2019-12-01 NOTE — PROVIDER NOTIFICATION
Notified Resident at 5:52 PM    regarding lab results.      Spoke with: Dr. Charles    Orders were not obtained.    Comments: Critical Hgb value 6.8, MD notified.  MD will speak with patient about transfusion.

## 2019-12-01 NOTE — PLAN OF CARE
Pt transferred to  from   via w/c accompanied by RN. Reason for transfer: pt no longer needing ICU care s/p hysterectomy and tumor debulking. On room air, O2 saturating mid 90s. Pt alert/lethargic and oriented x 3. Pt very anxious/crying/sad/depressed.... c/o abd pain t/o day. Medicated with PRN oxycodone and some relief. Pt gets lethargic then when awake, continues to be weepy/sad, etc.. pt's sister was here earlier and talked with MD stating that this medication didn't seem to help the pt. Palliative was consulted and ordered PRN po dilaudid and IV for breakthrough pain. Pt received po dose of dilaudid and it did seem to help her abd pain. Pt up in chair for meals. Pt continues on Insulin gtt along with meal/carb counting coverages. Pt ate 75% of breakfast and 75% of dinner.. pt skipped lunch d/t lethargy. (pt slept for few hours during that time)   Abd incision had small incisional drainage. Gyn/Onc MD aware and stated it was ok.. area was cleaned with NS and DENIA.  VSS. Report given to 7C RN and will resume with cares. Also to order abd binder for the pt.  Pt's sister (Ann Marie) called and is aware of the transfer. All belongings sent.

## 2019-12-01 NOTE — PLAN OF CARE
[id number]:  Patient alternates between sleeping soundly and moaning/grimacing while in bed overnight.  Complains of abdominal pain, reports partial relief with PRN PO Dilaudid.  Anxious and tearful/crying at times; responds well to positive encouragement.  Midline incision with small amount of serosanguinous drainage, ABD pad applied for reinforcement.  Up to bedside commode with assist of 2, mostly stand-by with cueing.  Voiding adequate amounts.  Passing flatus and had a smear of stool overnight.  Tolerating low consistent carbohydrate diet.  PIV with insulin drip (and NS carrier fluid) infusing.  Insulin drip on algorithm #2, required adjustment x2 this shift.  Requires hourly BG checks, but can go to Q2H if 8 am BG unchanged.  Patient also gets Novolog for carb coverage with meals and snacks.  Triple lumen PICC heparin-locked, positional at times.  Repositioned in bed every 2-3 hours with assist of 1.

## 2019-12-02 ENCOUNTER — APPOINTMENT (OUTPATIENT)
Dept: PHYSICAL THERAPY | Facility: CLINIC | Age: 44
End: 2019-12-02
Attending: INTERNAL MEDICINE
Payer: COMMERCIAL

## 2019-12-02 LAB
6MAM SERPL QL CFM: NEGATIVE
AMPHETAMINES UR QL: NEGATIVE NG/ML
ANION GAP SERPL CALCULATED.3IONS-SCNC: 6 MMOL/L (ref 3–14)
BACTERIA SPEC CULT: NO GROWTH
BACTERIA SPEC CULT: NO GROWTH
BACTERIA SPEC CULT: NORMAL
BACTERIA SPEC CULT: NORMAL
BARBITURATES SERPLBLD QL: NEGATIVE UG/ML
BASOPHILS # BLD AUTO: 0 10E9/L (ref 0–0.2)
BASOPHILS NFR BLD AUTO: 0 %
BENZODIAZ SERPL QL: NEGATIVE NG/ML
BUN SERPL-MCNC: 43 MG/DL (ref 7–30)
CALCIUM SERPL-MCNC: 8 MG/DL (ref 8.5–10.1)
CANNABINOIDS SERPL QL: NEGATIVE NG/ML
CHLORIDE SERPL-SCNC: 111 MMOL/L (ref 94–109)
CO2 SERPL-SCNC: 24 MMOL/L (ref 20–32)
COCAINE SERPL QL: NEGATIVE NG/ML
CODEINE SERPL-MCNC: NEGATIVE NG/ML
CREAT SERPL-MCNC: 1.76 MG/DL (ref 0.52–1.04)
CREAT UR-MCNC: 38 MG/DL
DIFFERENTIAL METHOD BLD: ABNORMAL
DIHYDROCODEINE: NEGATIVE NG/ML
EOSINOPHIL # BLD AUTO: 0.2 10E9/L (ref 0–0.7)
EOSINOPHIL NFR BLD AUTO: 1.7 %
ERYTHROCYTE [DISTWIDTH] IN BLOOD BY AUTOMATED COUNT: 15.4 % (ref 10–15)
ETHANOL BLD GC-MCNC: NEGATIVE GM/DL
GFR SERPL CREATININE-BSD FRML MDRD: 35 ML/MIN/{1.73_M2}
GLUCOSE BLDC GLUCOMTR-MCNC: 157 MG/DL (ref 70–99)
GLUCOSE BLDC GLUCOMTR-MCNC: 165 MG/DL (ref 70–99)
GLUCOSE BLDC GLUCOMTR-MCNC: 180 MG/DL (ref 70–99)
GLUCOSE BLDC GLUCOMTR-MCNC: 201 MG/DL (ref 70–99)
GLUCOSE BLDC GLUCOMTR-MCNC: 227 MG/DL (ref 70–99)
GLUCOSE BLDC GLUCOMTR-MCNC: 253 MG/DL (ref 70–99)
GLUCOSE BLDC GLUCOMTR-MCNC: 260 MG/DL (ref 70–99)
GLUCOSE SERPL-MCNC: 196 MG/DL (ref 70–99)
HCT VFR BLD AUTO: 25 % (ref 35–47)
HGB BLD-MCNC: 7.7 G/DL (ref 11.7–15.7)
HYDROCODONE SERPL-MCNC: NEGATIVE NG/ML
HYDROMORPHONE SERPL-MCNC: 1.3 NG/ML
LYMPHOCYTES # BLD AUTO: 0.7 10E9/L (ref 0.8–5.3)
LYMPHOCYTES NFR BLD AUTO: 6 %
Lab: NORMAL
MCH RBC QN AUTO: 28.6 PG (ref 26.5–33)
MCHC RBC AUTO-ENTMCNC: 30.8 G/DL (ref 31.5–36.5)
MCV RBC AUTO: 93 FL (ref 78–100)
METHADONE SERPL QL: NEGATIVE NG/ML
MONOCYTES # BLD AUTO: 0.1 10E9/L (ref 0–1.3)
MONOCYTES NFR BLD AUTO: 0.9 %
MORPHINE SERPL-MCNC: NEGATIVE NG/ML
MYELOCYTES # BLD: 0.3 10E9/L
MYELOCYTES NFR BLD MANUAL: 2.6 %
NEUTROPHILS # BLD AUTO: 10.3 10E9/L (ref 1.6–8.3)
NEUTROPHILS NFR BLD AUTO: 87.9 %
OPIATES SERPL QL CFM: POSITIVE
OPIATES SERPL QL: POSITIVE NG/ML
OXYCODONE SERPL QL: POSITIVE
OXYCODONE SERPL QL: POSITIVE NG/ML
OXYCODONE SERPL-MCNC: 8.2 NG/ML
OXYMORPHONE SERPLBLD CFM-MCNC: NEGATIVE NG/ML
PCP SERPL QL: NEGATIVE NG/ML
PLATELET # BLD AUTO: 273 10E9/L (ref 150–450)
PLATELET # BLD EST: ABNORMAL 10*3/UL
POLYCHROMASIA BLD QL SMEAR: SLIGHT
POTASSIUM SERPL-SCNC: 4.5 MMOL/L (ref 3.4–5.3)
PROMYELOCYTES # BLD MANUAL: 0.1 10E9/L
PROMYELOCYTES NFR BLD MANUAL: 0.9 %
PROPOXYPH SERPL QL: NEGATIVE NG/ML
PROT UR-MCNC: 0.25 G/L
PROT/CREAT 24H UR: 0.68 G/G CR (ref 0–0.2)
RBC # BLD AUTO: 2.69 10E12/L (ref 3.8–5.2)
SODIUM SERPL-SCNC: 141 MMOL/L (ref 133–144)
SPECIMEN SOURCE: NORMAL
WBC # BLD AUTO: 11.7 10E9/L (ref 4–11)

## 2019-12-02 PROCEDURE — 85025 COMPLETE CBC W/AUTO DIFF WBC: CPT

## 2019-12-02 PROCEDURE — 36592 COLLECT BLOOD FROM PICC: CPT

## 2019-12-02 PROCEDURE — 12000001 ZZH R&B MED SURG/OB UMMC

## 2019-12-02 PROCEDURE — 25000132 ZZH RX MED GY IP 250 OP 250 PS 637: Performed by: STUDENT IN AN ORGANIZED HEALTH CARE EDUCATION/TRAINING PROGRAM

## 2019-12-02 PROCEDURE — 25000132 ZZH RX MED GY IP 250 OP 250 PS 637: Performed by: NURSE PRACTITIONER

## 2019-12-02 PROCEDURE — 97530 THERAPEUTIC ACTIVITIES: CPT | Mod: GP | Performed by: PHYSICAL THERAPIST

## 2019-12-02 PROCEDURE — 25000132 ZZH RX MED GY IP 250 OP 250 PS 637: Performed by: HOSPITALIST

## 2019-12-02 PROCEDURE — 99233 SBSQ HOSP IP/OBS HIGH 50: CPT | Performed by: CLINICAL NURSE SPECIALIST

## 2019-12-02 PROCEDURE — 00000146 ZZHCL STATISTIC GLUCOSE BY METER IP

## 2019-12-02 PROCEDURE — 84156 ASSAY OF PROTEIN URINE: CPT | Performed by: STUDENT IN AN ORGANIZED HEALTH CARE EDUCATION/TRAINING PROGRAM

## 2019-12-02 PROCEDURE — 25000128 H RX IP 250 OP 636

## 2019-12-02 PROCEDURE — 97116 GAIT TRAINING THERAPY: CPT | Mod: GP | Performed by: PHYSICAL THERAPIST

## 2019-12-02 PROCEDURE — 25000132 ZZH RX MED GY IP 250 OP 250 PS 637: Performed by: OBSTETRICS & GYNECOLOGY

## 2019-12-02 PROCEDURE — 25000128 H RX IP 250 OP 636: Performed by: STUDENT IN AN ORGANIZED HEALTH CARE EDUCATION/TRAINING PROGRAM

## 2019-12-02 PROCEDURE — 80048 BASIC METABOLIC PNL TOTAL CA: CPT

## 2019-12-02 RX ORDER — HYDROMORPHONE HYDROCHLORIDE 2 MG/1
2-4 TABLET ORAL EVERY 4 HOURS PRN
Status: DISCONTINUED | OUTPATIENT
Start: 2019-12-02 | End: 2019-12-02

## 2019-12-02 RX ORDER — HYDROMORPHONE HYDROCHLORIDE 1 MG/ML
0.5 INJECTION, SOLUTION INTRAMUSCULAR; INTRAVENOUS; SUBCUTANEOUS ONCE
Status: COMPLETED | OUTPATIENT
Start: 2019-12-02 | End: 2019-12-02

## 2019-12-02 RX ORDER — HYDROMORPHONE HYDROCHLORIDE 2 MG/1
2-4 TABLET ORAL
Status: DISCONTINUED | OUTPATIENT
Start: 2019-12-02 | End: 2019-12-05 | Stop reason: HOSPADM

## 2019-12-02 RX ORDER — HYDROMORPHONE HYDROCHLORIDE 1 MG/ML
INJECTION, SOLUTION INTRAMUSCULAR; INTRAVENOUS; SUBCUTANEOUS
Status: COMPLETED
Start: 2019-12-02 | End: 2019-12-02

## 2019-12-02 RX ADMIN — ACETAMINOPHEN 975 MG: 325 TABLET, FILM COATED ORAL at 13:05

## 2019-12-02 RX ADMIN — ACETAMINOPHEN 975 MG: 325 TABLET, FILM COATED ORAL at 08:03

## 2019-12-02 RX ADMIN — HYDROMORPHONE HYDROCHLORIDE 4 MG: 2 TABLET ORAL at 22:10

## 2019-12-02 RX ADMIN — Medication 2.5 MG: at 20:22

## 2019-12-02 RX ADMIN — INSULIN GLARGINE 25 UNITS: 100 INJECTION, SOLUTION SUBCUTANEOUS at 08:06

## 2019-12-02 RX ADMIN — SODIUM CHLORIDE, PRESERVATIVE FREE 5 ML: 5 INJECTION INTRAVENOUS at 08:05

## 2019-12-02 RX ADMIN — ACETAMINOPHEN 975 MG: 325 TABLET, FILM COATED ORAL at 20:22

## 2019-12-02 RX ADMIN — HYDROMORPHONE HYDROCHLORIDE 0.5 MG: 1 INJECTION, SOLUTION INTRAMUSCULAR; INTRAVENOUS; SUBCUTANEOUS at 19:16

## 2019-12-02 RX ADMIN — FAMOTIDINE 20 MG: 20 TABLET ORAL at 18:14

## 2019-12-02 RX ADMIN — HYDROXYZINE HYDROCHLORIDE 50 MG: 25 TABLET, FILM COATED ORAL at 18:14

## 2019-12-02 RX ADMIN — HYDROMORPHONE HYDROCHLORIDE 4 MG: 2 TABLET ORAL at 05:13

## 2019-12-02 RX ADMIN — Medication 2.5 MG: at 08:03

## 2019-12-02 RX ADMIN — HYDROMORPHONE HYDROCHLORIDE 4 MG: 2 TABLET ORAL at 13:05

## 2019-12-02 RX ADMIN — ONDANSETRON 4 MG: 4 TABLET, ORALLY DISINTEGRATING ORAL at 18:29

## 2019-12-02 RX ADMIN — CALCIUM CARBONATE (ANTACID) CHEW TAB 500 MG 500 MG: 500 CHEW TAB at 08:03

## 2019-12-02 RX ADMIN — CALCIUM CARBONATE (ANTACID) CHEW TAB 500 MG 500 MG: 500 CHEW TAB at 20:22

## 2019-12-02 RX ADMIN — HYDROMORPHONE HYDROCHLORIDE 4 MG: 2 TABLET ORAL at 08:30

## 2019-12-02 RX ADMIN — SENNOSIDES AND DOCUSATE SODIUM 1 TABLET: 8.6; 5 TABLET ORAL at 20:22

## 2019-12-02 RX ADMIN — HYDROMORPHONE HYDROCHLORIDE 4 MG: 2 TABLET ORAL at 16:01

## 2019-12-02 RX ADMIN — GABAPENTIN 100 MG: 100 CAPSULE ORAL at 22:10

## 2019-12-02 ASSESSMENT — PAIN DESCRIPTION - DESCRIPTORS
DESCRIPTORS: ACHING

## 2019-12-02 ASSESSMENT — ACTIVITIES OF DAILY LIVING (ADL)
ADLS_ACUITY_SCORE: 12
ADLS_ACUITY_SCORE: 13
ADLS_ACUITY_SCORE: 14
ADLS_ACUITY_SCORE: 13
ADLS_ACUITY_SCORE: 14
ADLS_ACUITY_SCORE: 14

## 2019-12-02 ASSESSMENT — MIFFLIN-ST. JEOR: SCORE: 1766.73

## 2019-12-02 NOTE — PLAN OF CARE
[id number]: Vitals stable. PRN Dilaudid q3 hour for abdominal pain. Pt coloring or on phone much of time. Pre-dinner . Pt given sliding scale and carb coverage insulin. After patient ate her dinner meal, she was reporting more discomfort. PRN atarax given and PRN zofran for some nausea with the pain.     [id number]: Pt c/o severe pain and nursing assistant was going to assist patient to get out of bed to use bathroom. Nursing assistant notified RN that needed more assistance. RN came to room and noted that patient's gown was very wet and dressing mostly off from midline incision. Patient crying and on phone with family. Midline incision appeared to be open several centimeters. Emotional support given to patient and MD was notified and came to bedside to assess. IV dilaudid given push x 1 for pain control. MD packed wound.

## 2019-12-02 NOTE — PLAN OF CARE
Alert & Orientated, forgetful at times but easily redirected. Uses call bell appropriately. Bed alarm on for safety.  Pain managed with scheduled tylenol and prn dilaudid with good pain relief. Tolerating  Low CHO consistent diet. Patient on insulin sliding scale and carb coverage with meals.  Triple lumen PICC heparin locked. PIC S/L. Up x1  and walker assist to bathroom. Midline incision with scant amount of serous drainage, ABD in place. Voiding adequate amount of urine, denies vaginal bleeding. Pt enjoyed going to patient visitor library with sister this afternoon.     Update @ 1432- pt complained of sudden dizziness in bathroom and expressed that she feels like she will faint. She also added that the pain medication is not helping and  Pain is getting worse.      Pt was safely assisted to bed. Vitals were stable. Patient states that pain medication is not helfpul. Gyn/Onc NP Sade notified and will come assess pt @ bedside.

## 2019-12-02 NOTE — PROGRESS NOTES
"Gynecology Oncology Progress Note    HD#10 sepsis, leukocytosis, AMS  POD#5 XL, ZANDRA, BSO, Omentectomy, NATANAEL, Tumor Debulking, Washout    Disease: Pelvic mass, adenocarcinoma (favor endometrioid) on frozen    24 hour events:   - Hgb to 6.9, Heparin HELD  - Transfused 1 unit pRBCs  - Insulin gtt discontinued, transitioned to Subcutaneous Insulin  - S/p Psychiatry consult    Subjective: Patient tearful this morning. Just returned from bathroom and having a lot of pain. Got behind on pain medication during the night. States \"my stomach hurts\" unable to give more description. No nausea/vomiting. Tolerating PO, passing flatus. Had a BM yesterday. Denies chest pain, feeling occasional shortness of breath.     Objective:   Vitals:    12/01/19 2222 12/01/19 2320 12/02/19 0016 12/02/19 0335   BP: 118/60 124/68 134/72 (!) 141/62   BP Location: Left arm Left arm Left arm Left arm   Pulse:       Resp: 18 18 18 18   Temp: 97  F (36.1  C) 97.2  F (36.2  C) 97.8  F (36.6  C) 95.7  F (35.4  C)   TempSrc: Oral Oral Oral Oral   SpO2: 99% 99% 99% 96%   Weight:       Height:         General: Awake, alert, NAD  CV: RRR, no m/r/g  Resp: anterior lung fields clear  Abdomen:  soft, appropriately tender, VML incision covered with dermabond  Extremities: Brown skin discoloration bilateral ankle and distal legs, 2+ edema bilaterally    I/Os  (Yesterday // Since Midnight)  Urine 3425cc // 800cc    Labs:  CBC, BMP pending    Assessment: Chela Love is a 44 year old with PMHx signficant for HTN, HLD, T2DM, and Polysubstance use, initially admitted to the medicine team for septic shock and AMS. She was found to have a right adnexal mass measuring [id number] cm. She was transferred to Gyn/Onc Service 11/26/19. She is now POD#5 from a XL, ZANDRA, BSO, and tumor debulking. She was admitted to SICU for hemodynamic monitoring postoperatively now on floor and hemodynamically stable.    Problem List:  - Adenocarcinoma of likely endometrial origin  - " Intra-abdominal infection   - Oliguric CINDY from ATN   - Uncontrolled T2DM with hyperglycemia  - Acute blood loss anemia  - Polysubstance use  - Anxiety, depression    Plan:    Dz: CT w/ 20 cm pelvic mass, ascites, omental caking. Frozen pathology - adenocarcinoma, favor endometrioid. Final pathology pending.  FEN: Tolerating carb-consistent diet. Mag 1.8 yesterday, repeat BMP this AM. Will replete if <1.6 by standard ERP protocol.   Pain: S/p Palliative consult. Started methadone for longer coverage as well as in the setting of poor pain tolerance with history of PSA. PO Dilaudid 4mg q3h prn, IV dilaudid 0.3mg q2h prn.  Gabapentin 100mg at bedtime for pain and anxiety. No NSAIDs until resolved CINDY. Palliative SW to see patient for mind-body techniques for pain management.  Heme:  Acute blood loss anemia, s/p 2u pRBC intraop and 1u pRBC post-op. Appropriate rise in Hgb after transfusions. Hgb drop to 7.1 yesterday, recheck is 6.8. Transfused 1 unit pRBC on 12/1. Will repeat Hgb this AM. Heparin held.  CV: HTN, HLD. Holding home meds for now  Pulm: NI  GI: NI, bowel regimen, antiemetics PRN  : CINDY likely from intra-abdominal infection, Cr improving and UOP increasing. Nephrology recommended adding lasix if no change in weight/improvement in edema in the next few days. Currently diuresing well without lasix.  ID: Supra-infection ovarian mass consistent with malignancy.  Source control at the time of surgery.  WBC has normalized and she has remained afebrile.  She has completed 4 days of Zosyn s/p source control and all antibiotics have been discontinued.  Endo: Poorly controlled T2DM, A1C 15. Was on insulin gtt post-operatively. S/p Endocrinology consult, now transitioned to subcutaneous insulin.  Psych/Neuro/MSK: AMS, PSA (meth, tobacco, THC, alcohol). UDS pending. Bipolar disorder previously on Effexor. SW consult placed. Patient and family considering long term goal of rehab. S/p Psych consult. Trazodone added for  sleep. Could consider starting Zyprexa as needed for emotional lability. Recommended Psychology consult.  PPX: SCDs, IS. DONNA   Drains/Lines: PICC, PIV    Dispo: Pending post-operative goals.     Annabelle Campbell MD  OB/GYN PGY-2  12/02/19 5:23 AM

## 2019-12-02 NOTE — CONSULTS
"  Health Psychology                  Clinic    Department of Medicine  Debby Mesa, PhD, LP [phone]                          [facility]  [facility] Monster Alvarez, PhD, LP [phone]                  3rd Floor  [place] [address]   Eric Merino, PhD, ABPP, LP [phone]     [address] [facility],   [address] [facility],  Raquel Randhawa,  PhD, LP [phone]            [place]  [address]  [place] [address] Dominga Olivares, PhD, LP [phone]     Lissa Miles, PhD [phone]     Inpatient Health Psychology Consultation    Date of Service:  12/2/19    BACKGROUND:  Chela Love is a 44 year old female with a history of HTN, HLD, drug abuse (methamphetamine, cannabis, and alcohol), and abnormal menses, who presented from [facility] for management of septic shock and large ovarian mass, 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     Referred by Dr. Martinez on 11/25/19, psychiatry evaluated Ms. Love on 12/1/19.    SUBJECTIVE:  Met with Ms. Love to discuss how she is coping with recent changes in health status and offer support and skills in context of emotion dysregulation and pain.    Ms. Love said that she feels \"against a wall\" during this hospitalization, meaning that she is unsure of how to handle all of the news about her health and support from her family.  She mentioned that she struggles with feeling that she deserves help and support and worries about how she can express gratitude, especially to her sister, because she wants her statements to be sincere.  She expressed that going through this experience has led her to have a \"different outlook\" about her health and future.  She said she wants to be healthy and make good choices about her health but struggles with regrets about what she has done to her body via substance use.      Discussed Ms. Love's current emotional experiences.  She " "described anxiety and frequent worries which she described as her baseline.  She said that she fears going to sleep because she worries about not waking up.  She said that when she came to the hospital she was experiencing shortness of breath in response to being anxious which has now subsided.  She mentioned feeling very depressed before coming to the hospital and that the only thing that made her feel \"human\" was sleeping.  She said this is why she is so distressed about being unable to sleep.  She reported low mood periodically and exhibited significant lability.  She also reported that she broke up with her boyfriend last night.    Validated Ms. Love's emotional experiences and discussed what she has done to manage intense emotions in the past.  She said she uses humor to help cope but was interested in learning other strategies.  Discussed a few cognitive and behavioral skills.  Discussed concept of gratitude and how there are many ways to express gratitude to others for their care such as telling them, writing them letters, or even taking care of oneself and maintaining sobriety.  Discussed concept of self-compassion and provided a four-line self-compassion exercise for Ms. Love to practice.  She liked the line about wishing oneself safety and said \"I always say that, that I want to be safe\".  Lastly, discussed concept of diaphragmatic breathing as a skill to manage intense pain, emotions, or help with sleep.  Guided Ms. Love briefly through a practice.      OBJECTIVE:  Ms. Love was seated upright in her hospital bed.  She was eating breakfast during the visit.  She said her mood was stressed and anxious and her affect was labile.  Thought processes tangential at times, easily redirected. Thought content appropriate to discussion. Speech of normal rate, rhythm, and volume.  Denied suicidal and homicidal ideation.       ASSESSMENT:  Ms. Love reported appropriate emotional reactions given " recent cancer diagnosis.  She did not have a clear view of severity of her symptoms and reported motivation to make lifestyle/health changes in response to diagnosis.  It is still difficult to specify diagnostically if her depressive symptoms are attributed to a mood disorder versus emotion dysregulation in context of personality disorder. Given presentation of emotional dysregulation, interpersonal ineffectiveness, and poor distress tolerance she would likely benefit from interventions that target skill building if she is willing to participate.        DIAGNOSIS:  Anxiety disorder, unspecified (F41.9)  Depressive disorder, unspecified (F32.9)  Marijuana use disorder, active (F12.1)  Methamphetamine use disorder, active (F15.10)    PLAN:  Plan for health psychology to follow this patient approximately once per week for the duration of their hospitalization.  Ms. Love agreed with this plan.    Please feel free to call in urgent concerns arise prior to the next follow-up session.     Lissa Miles, PhD  Health Psychology Fellow  Phone: [phone]    Time in: 9:15  Time out: 10:05

## 2019-12-02 NOTE — PLAN OF CARE
Discharge Planner PT   Patient plan for discharge: TCU  Current status: min/mod assist for supine to left sidelying to sitting; SBA for sit to/from stand from EOB, armchair, toilet; CGA and verbal instruction to amb on level with  ft with frequent stopping as pt unable to multi-task walking & talking  Barriers to return to prior living situation: medical status; assist needed for mobility; weakness; deconditioning  Recommendations for discharge: TCU  Rationale for recommendations: will benefit from continued skilled PT intervention to address mobility deficits, improve strength & activity tolerance       Entered by: Paty Caro 12/02/2019 10:54 AM

## 2019-12-02 NOTE — PLAN OF CARE
D AVSS with sat's 98% on room. Heart regular and lungs clear. Voiced c/o abdominal pain which was treated with PO Dilaudid. Did have increase pain during the night d/t extending the time between PRN doses. Will pass on in report not to wait too long between PRN doses of po dilaudid. MD to also order abdominal binder for abdominal support. Up to bathroom x 1 to void good amounts of evan/cloudy urine. Dressing changed on abdomin x 1 d/t drainage.   I Vital's, assessment and med's per order.   A Resting in bed with call light in reach.   P Continue to monitor and update MD with changes.

## 2019-12-02 NOTE — PROGRESS NOTES
[facility]  Palliative Care Daily Progress Note       Recommendations & Counseling       Stop IV hydromorphone    Continue methadone 2.5 mg BID    Appreciate and agree psychiatry recommendations    Appreciate health psychology and/or palliative care  assistance with coping, anxiety, non-pharmaological pain management, and adjustment to illness    Palliative care clinic after discharge to follow up on symptom management    Opioid Safety Discussion: We discussed opioid safety with them, including side effects and potential harms of opioids, taking opioids exactly as prescribed, watching for mood-altering effects of opioids and letting a provider know if they notice any, driving safety, safe opioid storage and disposal.       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 She is now s/p ZANDRA, BSO, omenectomy, NATANAEL, debulking on 11/27.    Today, the patient was seen for:  Metastatic adenocarcinoma  Substance use disorder  Uncontrolled diabetes  Bipolar disorder, anxiety    Pain: reviewed that pain seems to be worse at night, main source of pain is her wounds. Also painful lower extremities from edema. Somewhat sleepy during this visit but did not sleep well, felt that pain was not being controlled. Hydromorphone is helpful for pain but uncertain how long it is helpful for. Discussed methadone today and action being slower onset and time needed to build up in system. Discussed the hope that in a few days the pain will be better controlled. Discussed non-pharmacological tools to help with pain management at night, and she is open to trying anything.    Anxiety: lots of difficulty with anxiety, likely contributing to not sleeping  well at night. Saw health psychology today and encouraged to keep following for support.    Prognosis, Goals, or Advance Care Planning was addressed today with: No.    awaiting more testing back to determine next steps.     Mood, coping, and/or meaning in the context of serious illness were addressed today: Yes.  Summary/Comments: lots of anxiety, tearful, feels great support from her sister-in-law visiting today.            Interval History:     Chart review/discussion with unit or clinical team members:   Notes reviewed, no acute events    Per patient or family/caregivers today:  Poor sleep last night, did not feel pain was well controlled    Key Palliative Symptoms:  # Pain severity the last 12 hours: severe  # Dyspnea severity the last 12 hours: none  # Nausea severity the last 12 hours: none  # Anxiety severity the last 12 hours: severe    Patient is on opioids: assessed and bowels ok/no needed changes to plan of care today.           Review of Systems:     Besides above, ROS was reviewed and is unremarkable          Medications:     I have reviewed this patient's medication profile and medications during this hospitalization.    Noted meds:    acetaminophen 975 mg TID  Gabapentin 100 mg at bedtime  Methadone 2.5 mg BID  miralax daily  Senna 1-2 tablets BID  Hydromorphone 2-4 mg q3h PRN- 18 mg over last 24 hours  Melatonin PRN- x1  Trazodone PRN- 1           Physical Exam:   Vitals were reviewed  Temp: 98.2  F (36.8  C) Temp src: Oral BP: 122/57   Heart Rate: 89 Resp: 18 SpO2: 97 % O2 Device: None (Room air)      Intake/Output Summary (Last 24 hours) at 12/2/2019 1013  Last data filed at 12/2/2019 0721  Gross per 24 hour   Intake 43 ml   Output 4400 ml   Net -4357 ml     Constitutional: Awake, alert, cooperative, no apparent distress  Lungs: No increased work of breathing  Abdomen: non-distended  Musculoskeletal: lower extremities edematous, redness bilaterally to lower extremities  Neurologic: Awake, alert,  oriented to name, place and time.    Neuropsychiatric: Normal affect  Skin: No rashes             Data Reviewed:     Reviewed recent labs, comments:   Sodium 141  Potassium 4.5  Creatinine 1.76  GFR 35  WBC 11.7  Hemoglobin 7.7  Platelets 273    [facility]  Palliative Care Consult Team  Pager: [phone]     Total time spent was 40 minutes,  >50% of time was spent counseling and/or coordination of care regarding symptom management and psychosocial support.  Spent time with patient discussing current complex health conditions, pain management, opioid safety, what to expect with methadone, need for non pharm tools to manage anxiety and pain, and counseling services. Coordination of care with the primary team, palliative SW regarding pain management.

## 2019-12-02 NOTE — PROGRESS NOTES
CLINICAL NUTRITION SERVICES - REASSESSMENT NOTE     Nutrition Prescription    RECOMMENDATIONS FOR MDs/PROVIDERS TO ORDER:  None at this time    Malnutrition Status:    Patient does not meet two of the established criteria necessary for diagnosing malnutrition but is at risk for malnutrition    Recommendations already ordered by Registered Dietitian (RD):  None additional at this time    Future/Additional Recommendations:  1. Encourage patient to consume at least 75% of meals TID or the equivalent with snacks/supplements.  If consuming less than this offer supplements, scheduled snacks, and/or consider ordering calorie counts to assess PO intake adequacy.    2. Consider outpatient dietitian/diabetes educator consult if indicated/appropriate     EVALUATION OF THE PROGRESS TOWARD GOALS   Diet: Low Consistent Carbohydrate + Boost Glucose Control between meals     Intake: Eating mostly % of meals the past week (range %) per flowsheets.  Ordering 2-3 meals/day over the past 3 days per [employer].  Diet changed from regular to low consistent CHO diet on 11/28.     NEW FINDINGS   Weight: trending up since admit, suspect fluid related and/or scale discrepancy?  Per I/Os pt is +4,973 mL since admit, and is currently diuresing per provider note today.    Labs: BGs 190s-200s today, were better controlled while on insulin gtt 12/26-12/1 but only transitioned to subcutaneous insulin yesterday    Meds: transitioned from insulin gtt to subcutaneous insulin yesterday -->   - Medium sliding scale insulin TID before meals + at bedtime  - Carb counting Novolog (1 unit per 5 g CHO and lunch and supper, 1 unit per 7 g CHO at breakfast)  - Lantus (25 units every morning)    Procedures: 11/7: ex lap, ZANDRA, BSO, and tumor debulking    MALNUTRITION  % Intake: No decreased intake noted  % Weight Loss: None noted  Subcutaneous Fat Loss: None observed per RD note on 11/25  Muscle Loss: None observed, assessment limited to position  and lethargy per RD note on 11/25  Fluid Accumulation/Edema: mild per provider note today  Malnutrition Diagnosis: Patient does not meet two of the established criteria necessary for diagnosing malnutrition but is at risk for malnutrition    Previous Goals   Diet adv v nutrition support within 2-3 days.  Evaluation: Met    Previous Nutrition Diagnosis  Inadequate oral intake related to decreased appetite and limited ability to consume adequate nutrition as evidenced by report of poor appetite and NPO diet order   Evaluation: Improving, updated    CURRENT NUTRITION DIAGNOSIS  Predicted inadequate nutrient intake (protein-energy) related to eating mostly % of meals documented the past week but potential for PO intake decline with hospital LOS    INTERVENTIONS  Implementation  Chart review, pt busy with other cares during attempts to visit    Goals  Patient to consume % of nutritionally adequate meal trays TID, or the equivalent with supplements/snacks.    Monitoring/Evaluation  Progress toward goals will be monitored and evaluated per protocol.     May Hanna RD, LD  7C RD pager: [phone]

## 2019-12-02 NOTE — PLAN OF CARE
OT 7C: cancel, pt with another provider at time of attempt, unable to check back in with schedule demands, will reschedule.

## 2019-12-02 NOTE — CONSULTS
"Social Work: Assessment with Discharge Plan    Patient Name:  Chela Love  :  1975  Age:  44 year old  MRN:  [id number]  Risk/Complexity Score:  Filed Complexity Screen Score: 9  Completed assessment with:  Pt at bedside and Pt's sister via phone. SW attempted to contact Mother    Presenting Information   Reason for Referral:  Discharge plan  Date of Intake:  2019  Referral Source:  Physician  Decision Maker:  Pt at baseline  Alternate Decision Maker:  NOK - Pt's mother Paty (70yo). No spouse or adult children. Then her three siblings would be NOK following parent  Health Care Directive:  Patient considering completing   Living Situation:  House - Pt lives in multi-story home, reports 18 stairs to enter home itself as well as 18 upstairs to bedroom. Pt does not drive, reports significant other or mother will assist with driving.   Previous Functional Status:  Pt does not drive, reports significant other or mother will assist with driving. Pt currently assisting mother due to mother being sick. Pt's mother assists with finances and meals   Patient and family understanding of hospitalization:  Stomach pain  Cultural/Language/Spiritual Considerations:  Pt is 43 yo single female who reports history of Mental Health and lack of access to services.   Adjustment to Illness:  Pt was teary during SW visit as she reflected on her past behavior towards family and life choices made up to this point. Pt discussed feelings of worthlessness and sadness regarding being sick and experiencing pain. Patient expressed fear of \"being a burden\" on her family. Patient discussed appreciation for her family members supporting her. SW reviewed Pt's goals/strengths with Pt and she loves to make her family laugh. Pt acknowledges that she has been much more open since being hospitalized to leaning on her family members and accepting their support as well as discontinuing \"unhealthy\" romantic relationships. "     Physical Health  Reason for Admission:  Severe sepsis with acute organ dysfunction (H)  Services Needed/Recommended:  TCU    Mental Health/Chemical Dependency  Diagnosis:  Anxiety, Depression, Alcohol use, Marijuana use, Methamphetamine use  Support/Services in Place:  None currently - Pt is open to establishing future MH services  Services Needed/Recommended:  Medication recommendations as well as community follow-up.     Support System  Significant relationship at present time:  Mother  Family of origin is available for support:  Sister  Other support available:  3 siblings (2 older brothers and 1 older sister)  Gaps in support system:  Yes - Pt's 70 yo mother has her own health issues  Patient is caregiver to:  None - no children    Provider Information   Primary Care Physician:  [place], [facility]   [phone]   Clinic:  [address] [place] [id number]      :  None- would like to be established with [facility] or [facility] worker    Financial   Income Source:  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   Financial Concerns:  YES - Pt's family planning to assist her with applying for SSDI. Cancer Legal Line Cancer Legal Line (P: [phone]) resource was provided.  Insurance:    Payor/Plan Subscriber Name Rel Member # Group #   BLUE PLUS - BLUE PLUS* JOSIAH GARCIA [facility] [id number] [facility]      [address] [address]       Discharge Plan   Patient and family discharge goal:  TCU  Provided education on discharge plan:  YES  Patient agreeable to discharge plan:  YES  A list of Medicare Certified Facilities was provided to the patient and/or family to encourage patient choice. Patient's choices for facility are:    - [facility] (F: [phone])  - [facility] (F: [phone]) -   - [facility] (F: [phone])   - [facility] (F: [phone])  Will NH provide Skilled rehabilitation or complex medical:  YES  General  information regarding anticipated insurance coverage and possible out of pocket cost was discussed. Patient and patient's family are aware patient may incur the cost of transportation to the facility, pending insurance payment: YES  Barriers to discharge:  Rehab placement    Discharge Recommendations   Anticipated Disposition:  Facility:  TBD  Transportation Needs:  Medical:  Wheelchair  Name of Transportation Company and Phone:  [facility] (Ph: [phone])    Additional comments   Chela Love is a 44 year old female with a history of HTN, HLD, drug abuse (methamphetamine, cannabis), and abnormal menses, who presents as a transfer from 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 Pt is her own decision maker (however, she highly leans on her mother to make decisions for her). SW involved for discharge planning, community resources, and adjustment to illness counseling.    SW met w/ Pt at bedside and spoke w/ sister via phone. Discussed insurance information (Blue Plus MA), [facility], potentially request a care coordinator, and [facility]. SW spoke w/ [facility] to request a M[place]hoices Assessment. Due to the patient currently in [facility] they would need to be contacted. However, they typically prefer not to see patients in TCU if possible and prefer to see at home. Which county to contact for the assessment would depend on which TCU Pt goes to. KAITLIN clarified the patient and/or family can call to request a MNChoices Assessment with [facility] (P: [phone]) when she discharges. KAITLIN discussed with pt's family of potential barrier to TCU placement due to chemical dependency history. TCU referrals were sent today.    TCU referrals::   - [facility] (F: [phone])  - [facility] (F: [phone]) -   - [facility]  [facility] (F: [phone]) - no bed available this week  - [facility] (F: [phone])    SANDY Duque, [facility]  7C Surgical/Oncology Unit   Phone: [phone]  Pager: [phone]

## 2019-12-02 NOTE — PLAN OF CARE
AVSS, afebrile.  Mood labile, patient tearful at times, but has been overall cooperative.  PO dilaudid and Tylenol for pain with adequate control reported.  Confused and forgetful this morning, but is now alert and oriented.  Using call light appropriately, bed alarm on for safety.  Transitioned to subcutaneous insulin with BG checks AC/HS. Sliding scale regular insulin with additional carb coverage provided for meals and snacks.  Patient needs reminders to report snacks.  ML incision with serous drainage, ABD to cover, changed prn.  Up with assist of 1 and walker, in chair for meals.  Voiding spontaneously, had large loose BM today.  No vaginal bleeding noted.  Generalized edema present.  PICC heparin locked, PIV saline locked.    Hgb 6.8, awaiting plan

## 2019-12-03 ENCOUNTER — APPOINTMENT (OUTPATIENT)
Dept: OCCUPATIONAL THERAPY | Facility: CLINIC | Age: 44
End: 2019-12-03
Attending: INTERNAL MEDICINE
Payer: COMMERCIAL

## 2019-12-03 LAB
ANION GAP SERPL CALCULATED.3IONS-SCNC: 4 MMOL/L (ref 3–14)
ANISOCYTOSIS BLD QL SMEAR: SLIGHT
BASOPHILS # BLD AUTO: 0.1 10E9/L (ref 0–0.2)
BASOPHILS NFR BLD AUTO: 0.9 %
BUN SERPL-MCNC: 37 MG/DL (ref 7–30)
CALCIUM SERPL-MCNC: 8.1 MG/DL (ref 8.5–10.1)
CHLORIDE SERPL-SCNC: 110 MMOL/L (ref 94–109)
CO2 SERPL-SCNC: 26 MMOL/L (ref 20–32)
CREAT SERPL-MCNC: 1.53 MG/DL (ref 0.52–1.04)
DIFFERENTIAL METHOD BLD: ABNORMAL
EOSINOPHIL # BLD AUTO: 0.1 10E9/L (ref 0–0.7)
EOSINOPHIL NFR BLD AUTO: 0.9 %
ERYTHROCYTE [DISTWIDTH] IN BLOOD BY AUTOMATED COUNT: 15.7 % (ref 10–15)
GFR SERPL CREATININE-BSD FRML MDRD: 41 ML/MIN/{1.73_M2}
GLUCOSE BLDC GLUCOMTR-MCNC: 178 MG/DL (ref 70–99)
GLUCOSE BLDC GLUCOMTR-MCNC: 192 MG/DL (ref 70–99)
GLUCOSE BLDC GLUCOMTR-MCNC: 198 MG/DL (ref 70–99)
GLUCOSE BLDC GLUCOMTR-MCNC: 200 MG/DL (ref 70–99)
GLUCOSE BLDC GLUCOMTR-MCNC: 220 MG/DL (ref 70–99)
GLUCOSE SERPL-MCNC: 158 MG/DL (ref 70–99)
HCT VFR BLD AUTO: 24.9 % (ref 35–47)
HGB BLD-MCNC: 7.5 G/DL (ref 11.7–15.7)
INR PPP: 1.35 (ref 0.86–1.14)
LYMPHOCYTES # BLD AUTO: 0.8 10E9/L (ref 0.8–5.3)
LYMPHOCYTES NFR BLD AUTO: 7.1 %
MCH RBC QN AUTO: 28.6 PG (ref 26.5–33)
MCHC RBC AUTO-ENTMCNC: 30.1 G/DL (ref 31.5–36.5)
MCV RBC AUTO: 95 FL (ref 78–100)
METAMYELOCYTES # BLD: 0.2 10E9/L
METAMYELOCYTES NFR BLD MANUAL: 1.8 %
MONOCYTES # BLD AUTO: 0.3 10E9/L (ref 0–1.3)
MONOCYTES NFR BLD AUTO: 2.7 %
NEUTROPHILS # BLD AUTO: 9.9 10E9/L (ref 1.6–8.3)
NEUTROPHILS NFR BLD AUTO: 85.7 %
PLATELET # BLD AUTO: 282 10E9/L (ref 150–450)
PLATELET # BLD EST: ABNORMAL 10*3/UL
POTASSIUM SERPL-SCNC: 4.7 MMOL/L (ref 3.4–5.3)
PROMYELOCYTES # BLD MANUAL: 0.1 10E9/L
PROMYELOCYTES NFR BLD MANUAL: 0.9 %
RBC # BLD AUTO: 2.62 10E12/L (ref 3.8–5.2)
SODIUM SERPL-SCNC: 140 MMOL/L (ref 133–144)
WBC # BLD AUTO: 11.5 10E9/L (ref 4–11)

## 2019-12-03 PROCEDURE — 25000132 ZZH RX MED GY IP 250 OP 250 PS 637: Performed by: STUDENT IN AN ORGANIZED HEALTH CARE EDUCATION/TRAINING PROGRAM

## 2019-12-03 PROCEDURE — 25000128 H RX IP 250 OP 636: Performed by: STUDENT IN AN ORGANIZED HEALTH CARE EDUCATION/TRAINING PROGRAM

## 2019-12-03 PROCEDURE — 85025 COMPLETE CBC W/AUTO DIFF WBC: CPT

## 2019-12-03 PROCEDURE — 12000001 ZZH R&B MED SURG/OB UMMC

## 2019-12-03 PROCEDURE — 36592 COLLECT BLOOD FROM PICC: CPT

## 2019-12-03 PROCEDURE — 00000146 ZZHCL STATISTIC GLUCOSE BY METER IP

## 2019-12-03 PROCEDURE — 25000132 ZZH RX MED GY IP 250 OP 250 PS 637: Performed by: HOSPITALIST

## 2019-12-03 PROCEDURE — 80048 BASIC METABOLIC PNL TOTAL CA: CPT

## 2019-12-03 PROCEDURE — 97535 SELF CARE MNGMENT TRAINING: CPT | Mod: GO

## 2019-12-03 PROCEDURE — 25000132 ZZH RX MED GY IP 250 OP 250 PS 637: Performed by: OBSTETRICS & GYNECOLOGY

## 2019-12-03 PROCEDURE — 25000125 ZZHC RX 250: Performed by: STUDENT IN AN ORGANIZED HEALTH CARE EDUCATION/TRAINING PROGRAM

## 2019-12-03 PROCEDURE — 85610 PROTHROMBIN TIME: CPT

## 2019-12-03 PROCEDURE — 25000132 ZZH RX MED GY IP 250 OP 250 PS 637: Performed by: NURSE PRACTITIONER

## 2019-12-03 PROCEDURE — 97530 THERAPEUTIC ACTIVITIES: CPT | Mod: GO

## 2019-12-03 RX ORDER — ACETAMINOPHEN 325 MG/1
650 TABLET ORAL EVERY 6 HOURS
Status: DISCONTINUED | OUTPATIENT
Start: 2019-12-03 | End: 2019-12-05 | Stop reason: HOSPADM

## 2019-12-03 RX ORDER — LIDOCAINE HYDROCHLORIDE 10 MG/ML
10 INJECTION, SOLUTION EPIDURAL; INFILTRATION; INTRACAUDAL; PERINEURAL ONCE
Status: COMPLETED | OUTPATIENT
Start: 2019-12-03 | End: 2019-12-03

## 2019-12-03 RX ADMIN — ACETAMINOPHEN 650 MG: 325 TABLET, FILM COATED ORAL at 18:32

## 2019-12-03 RX ADMIN — INSULIN ASPART 27 UNITS: 100 INJECTION, SOLUTION INTRAVENOUS; SUBCUTANEOUS at 18:15

## 2019-12-03 RX ADMIN — HYDROMORPHONE HYDROCHLORIDE 4 MG: 2 TABLET ORAL at 18:32

## 2019-12-03 RX ADMIN — HYDROXYZINE HYDROCHLORIDE 25 MG: 25 TABLET, FILM COATED ORAL at 11:48

## 2019-12-03 RX ADMIN — FAMOTIDINE 20 MG: 20 TABLET ORAL at 17:44

## 2019-12-03 RX ADMIN — CALCIUM CARBONATE (ANTACID) CHEW TAB 500 MG 500 MG: 500 CHEW TAB at 19:38

## 2019-12-03 RX ADMIN — HYDROMORPHONE HYDROCHLORIDE 4 MG: 2 TABLET ORAL at 13:25

## 2019-12-03 RX ADMIN — Medication 2.5 MG: at 19:38

## 2019-12-03 RX ADMIN — LIDOCAINE HYDROCHLORIDE 10 ML: 10 INJECTION, SOLUTION EPIDURAL; INFILTRATION; INTRACAUDAL; PERINEURAL at 17:43

## 2019-12-03 RX ADMIN — GABAPENTIN 100 MG: 100 CAPSULE ORAL at 22:22

## 2019-12-03 RX ADMIN — INSULIN ASPART 4 UNITS: 100 INJECTION, SOLUTION INTRAVENOUS; SUBCUTANEOUS at 13:20

## 2019-12-03 RX ADMIN — HYDROMORPHONE HYDROCHLORIDE 4 MG: 2 TABLET ORAL at 22:22

## 2019-12-03 RX ADMIN — HYDROMORPHONE HYDROCHLORIDE 4 MG: 2 TABLET ORAL at 05:38

## 2019-12-03 RX ADMIN — ACETAMINOPHEN 650 MG: 325 TABLET, FILM COATED ORAL at 09:00

## 2019-12-03 RX ADMIN — Medication 2.5 MG: at 09:00

## 2019-12-03 RX ADMIN — CALCIUM CARBONATE (ANTACID) CHEW TAB 500 MG 500 MG: 500 CHEW TAB at 09:02

## 2019-12-03 RX ADMIN — INSULIN ASPART 9 UNITS: 100 INJECTION, SOLUTION INTRAVENOUS; SUBCUTANEOUS at 09:17

## 2019-12-03 RX ADMIN — ACETAMINOPHEN 650 MG: 325 TABLET, FILM COATED ORAL at 22:22

## 2019-12-03 RX ADMIN — ACETAMINOPHEN 650 MG: 325 TABLET, FILM COATED ORAL at 13:26

## 2019-12-03 RX ADMIN — HYDROMORPHONE HYDROCHLORIDE 4 MG: 2 TABLET ORAL at 01:36

## 2019-12-03 RX ADMIN — HYDROMORPHONE HYDROCHLORIDE 4 MG: 2 TABLET ORAL at 09:00

## 2019-12-03 RX ADMIN — Medication 5 ML: at 07:47

## 2019-12-03 ASSESSMENT — ACTIVITIES OF DAILY LIVING (ADL)
ADLS_ACUITY_SCORE: 12

## 2019-12-03 ASSESSMENT — PAIN DESCRIPTION - DESCRIPTORS
DESCRIPTORS: ACHING
DESCRIPTORS: ACHING;DULL
DESCRIPTORS: ACHING
DESCRIPTORS: ACHING;CONSTANT
DESCRIPTORS: ACHING
DESCRIPTORS: ACHING
DESCRIPTORS: ACHING;CONSTANT

## 2019-12-03 ASSESSMENT — MIFFLIN-ST. JEOR: SCORE: 1770.82

## 2019-12-03 NOTE — PLAN OF CARE
Assumed care of Patient from [id number]. Patient A&Ox4, slightly drowsy. AVSS on RA. Abdominal pain managed with PRN dilaudid. Tolerating low carb diet. Denies nausea. BG Q4hr, no insulin needed per sliding scale. + flatus, +BM. Voiding with adequate urine output. Up with assist x1. Abdominal incision covered with ABD pad. SCDs on in bed. PICC SL. Continue with POC.

## 2019-12-03 NOTE — PLAN OF CARE
Discharge Planner OT   Patient plan for discharge: Pt is agreeable to TCU.   Current status: Pt with good participation in therapy session today. Performed bed mobility with CGA and max VCs for log roll technique to maintain post-surgical precautions. Wyatt for sit<>stand transfers from low surfaces. Pt completed standing ADLs at sink with CGA, ambulated ~100 feet with CGA and heavy upper extremity reliance on walker for balance support. Pt also stood continuously for ~10-12 minutes in hospital library while picking out books. Fatigued after activity and c/o increased abdominal pain, all VSS throughout. Tangential and distractible at times but is able to be re-directed.   Barriers to return to prior living situation: deconditioning, fatigue, pain, post-surgical precautions, cognition deficits   Recommendations for discharge: TCU  Rationale for recommendations: Pt is well below baseline level of function, would benefit from further skilled therapy to progress independence with ADLs and functional mobility.        Entered by: Karey Barrios 12/03/2019 2:31 PM

## 2019-12-03 NOTE — PLAN OF CARE
POD6 of XL, ZANDRA, BSO, Omentectomy, NATANAEL, Tumor Debulking, [place]. A&OX4. VSS on room air. Abdominal pain control with PRN dilaudid. On a low consistent carb diet, denies N/V. Abdominal incision covered with ABD pad. 0200am BG WNL. Voiding spontaneously. PICC and PIV saline locked. SBA with a walker. Calls appropriately. Continue with plan of care.

## 2019-12-03 NOTE — PLAN OF CARE
Alert & Orientated, forgetful at times but easily redirected. Uses call bell appropriately. Bed alarm on for safety.  Pain managed with scheduled tylenol and prn dilaudid with good pain relief. Tolerating  moderate CHO consistent diet. Patient on insulin sliding scale and carb coverage with meals.  Triple lumen PICC saline locked and PICC dressing changed. Left PIV S/L. Up with 1 assist and walker. Walked in hallway today.  Midline incision C/D/I and covered with primapore dressing. Voiding adequate amount of urine, denies vaginal bleeding. Edema in lower extremities.  Pt enjoyed going to patient visitor library this morning.  Plan is to discharge to TCU once an opening becomes available.

## 2019-12-03 NOTE — DISCHARGE SUMMARY
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 with mets to the right ovary  - Septic Shock- resolved  - Acute on chronic anemia  - Acute encephalopathy- resolved  - Acute kidney injury- improving  - Hyponatremia-resolved  - Lactic acidosis-resolved  - Vaginal bleeding-resolved  - Polysubstance use  - Hypertension  - Type 2 Diabetes  - Hyperglycemia- resolved  - Anxiety/Depression    Patient Active Problem List   Diagnosis     Severe sepsis with acute organ dysfunction (H)     Arteritis (H)     Asthma     Depression, recurrent (H)     Diabetes mellitus type II, uncontrolled (H)     Encounter for long-term (current) use of insulin (H)     Hypertension     Insomnia     Lower extremity ulceration (H)     Methicillin resistant Staphylococcus aureus infection     Migraine headache     Morbid obesity (H)     Vasculopathy     Ovarian mass     Procedures:  - ZANDRA, BSO omentectomy, abdominal washout, tumor debulking     Prior to Admission Medications:  Medications Prior to Admission   Medication Sig Dispense Refill Last Dose     gabapentin (NEURONTIN) 100 MG capsule Take 300 mg by mouth 3 times daily    Past Week at Unknown time     insulin glargine (LANTUS SOLOSTAR) 100 UNIT/ML pen Inject 25 Units Subcutaneous every evening   Past Month at Unknown time     metFORMIN (GLUCOPHAGE) 1000 MG tablet Take 1,000 mg by mouth 2 times daily (with meals)   Past Week at Unknown time     albuterol (PROAIR HFA/PROVENTIL HFA/VENTOLIN  HFA) 108 (90 Base) MCG/ACT inhaler Inhale 2 puffs into the lungs as needed   More than a month at Unknown time     Discharge Medications:   Chela Love   Home Medication Instructions [facility]:[id number]    Printed on:12/05/19 7684   Medication Information                      acetaminophen (TYLENOL) 325 MG tablet  Take 2 tablets (650 mg) by mouth every 4 hours as needed for mild pain             albuterol (PROAIR HFA/PROVENTIL HFA/VENTOLIN HFA) 108 (90 Base) MCG/ACT inhaler  Inhale 2 puffs into the lungs as needed             bisacodyl (DULCOLAX) 10 MG suppository  Place 1 suppository (10 mg) rectally daily as needed for constipation             bisacodyl (DULCOLAX) 5 MG EC tablet  Take 1 tablet (5 mg) by mouth daily as needed for constipation             calcium carbonate (TUMS) 500 MG chewable tablet  Take 1-2 tablets (500-1,000 mg) by mouth 2 times daily as needed for heartburn             famotidine (PEPCID) 20 MG tablet  Take 1 tablet (20 mg) by mouth daily             gabapentin (NEURONTIN) 100 MG capsule  Take 1 capsule (100 mg) by mouth At Bedtime             Heparin Sodium, Porcine, (HEPARIN ANTICOAGULANT) 5000 UNIT/0.5ML injection  Inject 0.5 mLs (5,000 Units) Subcutaneous every 8 hours for 21 days             HYDROmorphone (DILAUDID) 2 MG tablet  Take 1-2 tablets (2-4 mg) by mouth every 3 hours as needed for moderate to severe pain             hydrOXYzine (ATARAX) 25 MG tablet  Take 1 tablet (25 mg) by mouth every 6 hours as needed for anxiety or other (adjuvant pain)             insulin aspart (NOVOLOG PEN) 100 UNIT/ML pen  Inject 1 Units Subcutaneous See Admin Instructions Novolog 10 units with each meal ( based on 70 grams of CHO per meal)  Novolog custom correction scale 1 unit per 30 > 140 before meals and > 200 HS  Do Not give Correction Insulin if Pre-Meal BG less than 140   -169 give 1 units.  -199 give 2 units.  -229 give 3 units.  -259 give 4 units.  -289 give 5  units.  -319 give 6 units.  -349 give 7 units.  BG >/= 350 give 8 units.  If given at mealtime, administer within 30 minutes of start of      Do Not give Bedtime Correction Insulin if BG less than 200  -229 give 1 units.  -259 give 2 units.  -289 give 3 units.  -319 give 4 units.  -349 give 5 units.  BG >/= 350 give 6 units.     -monitor blood sugars before meals, bedtime and at 0200             insulin glargine (LANTUS PEN) 100 UNIT/ML pen  Inject 29 Units Subcutaneous every 24 hours             methadone (DOLOPHINE) 5 MG tablet  Take 0.5 tablets (2.5 mg) by mouth every 12 hours             ondansetron (ZOFRAN-ODT) 4 MG ODT tab  Take 1 tablet (4 mg) by mouth every 6 hours as needed for nausea or vomiting             polyethylene glycol (MIRALAX/GLYCOLAX) packet  Take 17 g by mouth daily             senna-docusate (SENOKOT-S/PERICOLACE) 8.6-50 MG tablet  Take 2 tablets by mouth 2 times daily Hold for loose stools             traZODone (DESYREL) 50 MG tablet  Take 1-2 tablets ( mg) by mouth nightly as needed for sleep               Consultations:  - Internal Medicine  - Nephrology  - Surgical ICU  - Endocrinology  - Palliative Care  - Psychiatry  - Psychology    Brief History of Illness:  Chela Love is a 44 year old female who is transfer from [facility] here with sepsis, leukocytosis, abdominal pain, vaginal bleeding and imaging concerning for ovarian malignancy. Her medical history is pertinent for T2DM, HTN, HLD. Per the patient's family, she also has a history of IV methamphetamine use, THC use, tobacco use, alcohol use and possible PCOS; however, at this time we don't have records to verify this history. The patient was not cooperative with the interview and kept stating she wanted something to drink and she wanted to go to sleep.      Per chart review she presented to the [facility] ED with abdominal pain and confusion. She was found to be in septic shock  with a leukocytosis. WBC 17.3, LA 4.0. Afebrile. She was given Zosyn and Vancomycin.      Arrangements were made to transfer her due to lack of ICU beds at [facility]. She was transferred to [facility] due to availability of Gyn Oncology services.    She was initially admitted to the Internal Medicine Service. On HD#2 she was switched to Ceftriaxone and Flagyl and diagnostic paracentesis was performed with 500 ml removed. She also had an TTE to evaluate for endocarditis. IR was consulted for possible biopsy, however felt this was unsafe due to vascularity of omentum. On HD#3 patient had low urine output with a rising creatinine and a renal US was ordered that showed no hydronephrosis. Nephrology was consulted (See  section below for details). Patient also had worsening respiratory status and was now requiring 4L O2. She had a negative VQ Scan and CXR. Given worsening clinical status, patient was transferred to Gyn/Onc service with plans to proceed to the operating room the following day for a Exploratory laparotomy, ZANDRA, BSO, and tumor debulking with suspicion of an intraabdominal infection. Risks of surgery were discussed including infection, bleeding, injury to surrounding organs, need for ICU admission or ventilator support post-operatively and even death.     Hospital Course:  Dz:   - CT imaging on admission at OSH showed showed [id number] cm right adnexal mass with ascites and omental caking present which was concerning for ovarian malignancy. Patient was a poor surgical candidate (Hgb A1C 15%, active infection, albumin 1.5) and IR biopsy of extra-ovarian metases was recommend for tissue diagnosis. However IR was consulted and reported biopsy would be high risk due to hypervascularity through omental caking. Patient had elevated tumor markers-  850. CEA 4.0,  203, . She underwent a paracentesis on on HD#2 (11/24), for which cytology negative for malignancy. On HD#3 patient had  worsening of her clinical status including decreasing renal function and increasing need for respiratory support. Decision was made to proceed to the operative room. On HD#4 patient had an XL, ZANDRA, BSO, Omentectomy, Abdominal wash-out and tumor debulking. Frozen pathology consistent with adenocarcinoma favoring endometrioid type. Grade 1 Stage IIIA endometrial cancer. Patient will follow-up with Gyn Onc post-operatively for care plan.    FEN:   - Her diet was slowly advanced on POD#1.  By discharge, she was tolerating a regular diet without nausea and vomiting and able to maintain her hydration without IVF supplementation.  Pain:   - Her pain was initially controlled with IV dilaudid.  Once tolerating PO pain meds, she was transitioned to a PO pain regimen. Her pain was poorly controlled, and palliative care was consulted. She was initiated on Methadone, PO dilaudid, and Gabapentin.  Her pain was well controlled on this and she was discharged home with these medications.  CV:   - Patient had a history of hypertension, and was not continued on any anti-hypertensives as she presented with septic shock. POD#0 patient was admitted to the SICU for hemodynamic support and was requiring pressors. Pressors were discontinued on POD#2. During her hospitalization she did have an ECHO, with initial concern for endocarditis in the setting of hx of IV drug use on admission. This showed EF of 60-65% without valvular malformations or vegetations. On POD#7 patient had acute onset of chest pain associated with shortness of breath. An EKG was obtained not concerning for acute ischemia and Troponin was wnl. Pain resolved spontaneously. No other acute cardiac issues.  PULM:   - She has no history of pulmonary issues. Pre-operatively patient was requiring 4L O2 to maintain oxygen saturation. On HD#3 she had a negative V/Q Scan and Negative CXR. She was intubated for surgery, and was extubated immediately post-operatively without issue.  She initially required O2 supplementation and was transitioned off of this without difficulty.  By discharge, her O2 sats were greater than 90% on RA.  She was encouraged to use her bedside IS while in house.  She had no other acute pulmonary issues while in house.  HEME:   - Her preoperative Hgb was 8.3, she received 2 unit(s) pRBC, and 1 unit FFP intraoperatively.  Her hgb dropped to 6.6 postoperatively and she received an additional unit of pRBC on POD#1, #4 and #7. Her Hgb was stable at 7.7 at the time of discharge.  She had no other acute heme issues while in house.  GI:   - She was made NPO prior to the procedure.  On POD#0, her diet was advanced to clear liquids and then advanced slowly as tolerated.  At the time of discharge, she was tolerating a regular diet without nausea and vomiting.  She will be discharged with a bowel regimen to prevent constipation in the postoperative period.  She had no acute GI issues while in house.  :    - Patient had urinary retention on admission and a nelson catheter was placed. Once ambulating unassisted and the creat was improving, the nelson catheter was removed. She continued to have worsening renal function with an elevated Creatinine despite fluid resuscitation in the setting of septic shock. She had a renal US on HD#3 that showed no hydroureteronephrosis or nephrolithiasis. Nephrology was consulted for management on HD#3. They recommended a trial of diuretic given deterioration of respiratory status and oliguria. Renal function started to improve on POD#0 with Creatinine trending from 3.92 to 3.72, and continued to improve during her hospital stay. Prior to discharge, the patient was voiding spontaneously without difficulty and her creatinine down trended to 1.17.   ID:   - Patient presented to the [facility] ED with abdominal pain and confusion. She was found to be in septic shock with a leukocytosis. WBC 17.3, LA 4.0. She was afebrile. She was started on Zosyn and  Vancomycin. Infectious work-up included negative CXR, negative UA, negative GC/CT, and negative BCx. On HD#2 she was transitioned to Ceftriaxone and Flagyl. She underwent paracentesis and fluid grew few gram variable bacilli. On HD#4 she had surgery, and darek purulence was encountered intra-abdominally. She had an abdominal wash-out and was started on Zosyn post-operatively. She had one isolated fever on POD#6, otherwise remained afebrile. WBC 8.0 at time of discharge.  ENDO:   - Patient has poorly controlled T2DM and presented with blood glucose > 600 and beta-hydroxybutyrate 0.39. Patients Hgb A1C was 15. Post-operatively patient required an insulin gtt for management of blood sugars. She was then started on Lantus at bedtime with a high dose sliding scale and drip was stopped. Endocrine was consulted. Her discharge regimen is lantus and novolog. Patient to follow up closely with endocrinology as an outpatient.  PSYCH/NEURO:   - Patient has history of anxiety, depression, substance use, hx of possible bipolar disorder. Psychiatry was consulted while inpatient. She was started on trazodone 50 mg at bedtime for sleep. PRN atarax for anxiety. Psychology was also consulted for further assessment as well as psychotherapy.  Skin: Patient developed a small 4 cm wound separation. Vertical mattress suture was placed at the superior and inferior aspects of the opening. Small amount of serous drainage. No signs of infection. Dressing changes will be continued BID. Warren straps in place and dry dressing over opening.  PPX:    - She was given SCDs, IS, PPI during her hospital course. She will complete a 28 day post op course of heparin 5000 units subcutaneous every 8 hours. She tolerated these prophylactic interventions without incident.        Discharge Instructions and Follow up:  Ms. Chela Love was discharged from the hospital with follow up for     Discharge Diet: Moderate diabetic diet  Discharge Activity:  Activity as tolerated  Discharge Follow up: 2-3 weeks with gyn onc at [facility], 1-2 weeks with PCP, and 1-2 weeks with endocrinology    Discharge Disposition:  Discharged to TCU    Discharge Staff: Dr Rafi Marie, CNP  12/5/2019 8:59 AM

## 2019-12-03 NOTE — PROGRESS NOTES
Notified by RN that patient was having increased pain, and part of her incision had opened. Upon evaluation, there was a 3 cm opening in the inferior 1/3 of the incision. Patient was given 0.5 mg IV dilaudid for pain control. Sterile cotton swab was used to probe the fascia which appeared intact. Kerlix dressing was damped with normal saline and packed into opening. This was covered with a sterile ABD dressing and tape.     Annabelle Campbell MD  OB/GYN PGY-2  12/02/19 7:33 PM

## 2019-12-03 NOTE — PROGRESS NOTES
Social Work Services Progress Note    Hospital Day: 10  Date of Initial Social Work Evaluation:  12/2/19  Collaborated with:  Chart review, TCU Admissions, Gyn/Onc    Data:  SW involved for discharge planning - TCU recommended.    SW followed up on the following TCU referrals. Per Gyn/Onc it is anticipated Pt will be medically ready to discharge tomorrow. Pt's mother also contacted SW today to discuss home setting. Pt's mother wanted IDT to know that Pt is able to return to her home and be cared for by her mother. Pt to continue to be the decision maker in discharge planning.     TCU referrals:  - [facility] (F: [phone]) - declined  - [facility] (P: [phone]; F: [phone]) - still reviewing; requesting Utox screen  - [facility] (F: [phone]) - no bed available this week  - [facility] (P: [phone]; F: [phone]) - left VM; no return call    Intervention:  Discharge planning    Assessment:  See bedside notes, medical team notes, PT/OT notes    Plan:    Anticipated Disposition:  Facility:  D    Barriers to d/c plan:  Medical stability; rehab placement    Follow Up:  SW to f/u & assist as needed.    SANDY Duque, HANANE  7C Surgical/Oncology Unit   Phone: [phone]  Pager: [phone]

## 2019-12-03 NOTE — PROGRESS NOTES
Diabetes Consult Daily  Progress Note          Assessment/Plan:                          Chela Love is a 44 year old female with a history of morbid obesity, TIIDM, HTN, asthma, and polysubstance abuse who presented initially to [facility].  At time of initial assessment, she was having nausea, vomiting, and vague abdominal pain, also found to have a large ovarian mass with ascites and peritoneal carcinomatosis. She was transferred to [facility] 11/23/19 in septic shock, for GYN ONC consult and surgical evaluation. She was taken to OR on 11/27 for ex-lap, ZANDRA, BSO, omentectomy, tumor debulking and abdominal washout. Blas purulence of the abdominal cavity was noted. She had septic/hypovolemic shock requiring pressors. Initial path is consistent with adenocarcinoma. She was started preoperatively on insulin drip for hyperglycemia.  Adenocarcinoma of likely endometrial origin  Intra-abdominal infection     Assessment:   Type II Diabetes, uncontrolled (A1c 15%)  Plan:  -lantus 25 units in the morning, will increase to 27 units this am  -novolog 1 unit per 5 grams of CHO for meals and snacks  -novolog medium intensity sliding scale before meals and HS, change to high intensity sliding scale  - diabetes education prior to discharge for new insulin regimen  - due to renal function, cannot safely resume Metformin at this time, will reassess prior to discharge along with the use of GLP1 agonist, if appropriate  -monitor glucose before meals, HS and 0200  -hypoglycemia protocol  -will continue to follow     Outpatient diabetes follow up:: on discharge, will recommend she establishes endocrine care (she prefers a clinic closer to her home, as she doesn't drive      Plan discussed with patient    Discharge: to TCU per primary team notes.            Interval History:   The last 24 hours progress and nursing notes reviewed.  Glucose at ~ 0200, 200 and am glucose 158/192  Made a slight increase in basal  "insulin (not as much as initially anticipated)  Appetite appears to be ok  Will require education prior to discharge  Did a test claim with pharmacy liaison, Sid covered by her insurance.     PTA:  -was not taking Lantus due to cost       Recent Labs   Lab 12/03/19  0753 12/03/19  0154 12/02/19  2204 12/02/19 2014 12/02/19  1651 12/02/19  1438 12/02/19  1257  12/02/19  0725  12/01/19  0825  11/30/19  0330  11/29/19  1514  11/29/19  0248   *  --   --   --   --   --   --   --  196*  --  115*  --  149*  --  123*  --  152*   BGM  --  200* 157* 180* 227* 253* 260*   < >  --    < >  --    < >  --    < >  --    < >  --     < > = values in this interval not displayed.               Review of Systems:   See interval hx          Medications:     Orders Placed This Encounter      Low Consistent CHO Diet       Physical Exam:  Gen: Alert, resting in bed, in NAD   HEENT: , mucous membranes are moist  Resp: non-labored  Ext: + lower extremity edema   Neuro:oriented x3, communicating clearly  /66 (BP Location: Left arm)   Pulse 88   Temp 99.8  F (37.7  C) (Oral)   Resp 18   Ht 1.676 m (5' 6\")   Wt 109.5 kg (241 lb 6.4 oz)   LMP 10/27/2019   SpO2 95%   Breastfeeding No   BMI 38.96 kg/m             Data:     Lab Results   Component Value Date    A1C 15.0 11/23/2019              CBC RESULTS:   Recent Labs   Lab Test 12/03/19 0753   WBC 11.5*   RBC 2.62*   HGB 7.5*   HCT 24.9*   MCV 95   MCH 28.6   MCHC 30.1*   RDW 15.7*        Recent Labs   Lab Test 12/03/19  0753 12/02/19  0725    141   POTASSIUM 4.7 4.5   CHLORIDE 110* 111*   CO2 26 24   ANIONGAP 4 6   * 196*   BUN 37* 43*   CR 1.53* 1.76*   MARILIN 8.1* 8.0*     Liver Function Studies -   Recent Labs   Lab Test 11/30/19  0330   PROTTOTAL 5.2*   ALBUMIN 1.5*   BILITOTAL 0.4   ALKPHOS 293*   AST 20   ALT 11     Lab Results   Component Value Date    INR 1.35 12/03/2019    INR 1.41 12/01/2019    INR 1.44 11/30/2019    INR 1.45 11/29/2019    " INR 1.57 11/29/2019    INR 1.69 11/28/2019    INR 1.83 11/27/2019    INR 1.81 11/27/2019    INR 1.76 11/27/2019    INR 1.75 11/27/2019    INR 1.55 11/25/2019    INR 1.59 11/23/2019           Val Bull -[id number]  Diabetes Management job code [id number]

## 2019-12-03 NOTE — PROGRESS NOTES
Horizontal mattress stitches of 4-0 vicryl placed in the incision at the superior and inferior aspects of the inferior umbilical incisional opening.     Briefly, the inferior and superior aspects of the incisional opening were infiltrated with 1% lidocaine. A total of 8 ml was used. One horizontal mattress stitch was placed at the inferior aspect of the open incision. The end of the suture was tied to the free end of the subcuticular stitch so it would not unravel further. Another horizontal mattress stitch was placed at the superior aspect. Steristrips were placed along the rest of the incision. The incisional opening was packed with a small amount of dry gauze. The patient tolerated the procedure well.     Aida Dotson MD PhD  Ob/Gyn PGY-4  12/3/2019 5:42 PM

## 2019-12-03 NOTE — PROGRESS NOTES
Gynecology Oncology Progress Note    HD#11 sepsis, leukocytosis, AMS  POD#6 XL, ZANDRA, BSO, Omentectomy, NATANAEL, Tumor Debulking, Washout    Disease: Pelvic mass, adenocarcinoma (favor endometrioid) on frozen    24 hour events:   - Wound Separation  - Lantus increased to 30 units qAM, high dose sliding scale insulin  - Psychologist consultation  - Isolated fever    Subjective: Patient is sleep, wakes easily. States she doesn't feel well, and then her abdomen is sore. Pain medication does help. States she has not passed flatus yesterday. Tolerating PO. No nausea or vomiting. No dizziness. Denies chest pain or shortness of breath.     Objective:   Vitals:    12/02/19 1921 12/02/19 2015 12/02/19 2156 12/03/19 0343   BP:  107/57 138/52 94/53   BP Location:  Left arm Left arm Left arm   Pulse:       Resp:   18 19   Temp:   100.5  F (38.1  C) 96.6  F (35.9  C)   TempSrc:   Oral Temporal   SpO2: 94% 95% 92% 95%   Weight:       Height:         General: Awake, alert, NAD  CV: RRR, no m/r/g  Resp: anterior lung fields clear  Abdomen:  soft, moderately distended, incision covered by ABD with serosanguinous fluid.  Extremities: Brown skin discoloration bilateral ankle and distal legs, 2+ edema bilaterally    I/Os  (Yesterday // Since Midnight)  Urine 3,300 cc // 500 cc    Labs:  CBC, BMP, INR pending    Assessment: Chela Love is a 44 year old with PMHx signficant for HTN, HLD, T2DM, and Polysubstance use, initially admitted to the medicine team for septic shock and AMS. She was found to have a right adnexal mass measuring [id number] cm. She was transferred to Gyn/Onc Service 11/26/19. She is now POD#6 from a XL, ZANDRA, BSO, and tumor debulking. She was admitted to SICU for hemodynamic monitoring postoperatively now on floor and hemodynamically stable.    Problem List:  - Adenocarcinoma of likely endometrial origin  - Intra-abdominal infection   - Oliguric CINDY from ATN   - Uncontrolled T2DM with hyperglycemia  - Acute blood loss  anemia  - Polysubstance use  - Anxiety, depression    Plan:    Dz: CT w/ 20 cm pelvic mass, ascites, omental caking. Frozen pathology - adenocarcinoma, favor endometrioid. Final pathology pending.  FEN: Tolerating carb-consistent diet. Mag 1.8 yesterday, repeat BMP this AM. Will replete if <1.6 by standard ERP protocol.   Pain: S/p Palliative consult. Started methadone for longer coverage as well as in the setting of poor pain tolerance with history of PSA. PO Dilaudid 2- 4mg q3h prn.  Gabapentin 100mg at bedtime for pain and anxiety. No NSAIDs until resolved CINDY. Palliative SW to see patient for mind-body techniques for pain management.  Heme:  Acute blood loss anemia, s/p 2u pRBC intraop and 1u pRBC post-op. Appropriate rise in Hgb after transfusions. Hgb drop to 7.1 on 12/1, recheck is 6.8. Transfused 1 unit pRBC on 12/1 > Hgb 7.7. Continue to hold Heparin.  CV: HTN, HLD. Holding home meds for now  Pulm: NI  GI: NI, bowel regimen, antiemetics PRN  : CINDY likely from intra-abdominal infection, Cr improving and UOP increasing. Nephrology recommended adding lasix if no change in weight/improvement in edema in the next few days. Currently diuresing well without lasix.  ID: Supra-infection ovarian mass consistent with malignancy.  Source control at the time of surgery.  WBC has normalized and she has remained afebrile.  She has completed 4 days of Zosyn s/p source control and all antibiotics have been discontinued.  Endo: Poorly controlled T2DM, A1C 15. Was on insulin gtt post-operatively. S/p Endocrinology consult, now transitioned to subcutaneous insulin.  Psych/Neuro/MSK: AMS, PSA (meth, tobacco, THC, alcohol). UDS pending. Bipolar disorder previously on Effexor. SW consult placed. Patient and family considering long term goal of rehab. S/p Psych consult. Trazodone added for sleep. S/p psychology consultation.  PPX: SCDs, IS  Drains/Lines: PICC, PIV    Dispo: PT/OT Recommend discharge to TCU. Social work consulted  to arrange placement.    Annabelle Campbell MD  OB/GYN PGY-2  12/03/19 6:10 AM    I have examined this patient personally with the team and agree with the above findings and plan.    Kevin Mckee

## 2019-12-04 ENCOUNTER — APPOINTMENT (OUTPATIENT)
Dept: PHYSICAL THERAPY | Facility: CLINIC | Age: 44
End: 2019-12-04
Attending: INTERNAL MEDICINE
Payer: COMMERCIAL

## 2019-12-04 LAB
ABO + RH BLD: NORMAL
ABO + RH BLD: NORMAL
ALBUMIN SERPL-MCNC: 1.6 G/DL (ref 3.4–5)
ANION GAP SERPL CALCULATED.3IONS-SCNC: 5 MMOL/L (ref 3–14)
ANION GAP SERPL CALCULATED.3IONS-SCNC: 5 MMOL/L (ref 3–14)
BASOPHILS # BLD AUTO: 0 10E9/L (ref 0–0.2)
BASOPHILS NFR BLD AUTO: 0.2 %
BLD GP AB SCN SERPL QL: NORMAL
BLD PROD TYP BPU: NORMAL
BLD PROD TYP BPU: NORMAL
BLD UNIT ID BPU: 0
BLOOD BANK CMNT PATIENT-IMP: NORMAL
BLOOD PRODUCT CODE: NORMAL
BPU ID: NORMAL
BUN SERPL-MCNC: 28 MG/DL (ref 7–30)
BUN SERPL-MCNC: 32 MG/DL (ref 7–30)
CALCIUM SERPL-MCNC: 7.7 MG/DL (ref 8.5–10.1)
CALCIUM SERPL-MCNC: 7.9 MG/DL (ref 8.5–10.1)
CHLORIDE SERPL-SCNC: 112 MMOL/L (ref 94–109)
CHLORIDE SERPL-SCNC: 112 MMOL/L (ref 94–109)
CO2 SERPL-SCNC: 25 MMOL/L (ref 20–32)
CO2 SERPL-SCNC: 26 MMOL/L (ref 20–32)
COPATH REPORT: NORMAL
CREAT SERPL-MCNC: 1.19 MG/DL (ref 0.52–1.04)
CREAT SERPL-MCNC: 1.3 MG/DL (ref 0.52–1.04)
DIFFERENTIAL METHOD BLD: ABNORMAL
EOSINOPHIL # BLD AUTO: 0.1 10E9/L (ref 0–0.7)
EOSINOPHIL NFR BLD AUTO: 1.3 %
ERYTHROCYTE [DISTWIDTH] IN BLOOD BY AUTOMATED COUNT: 15.8 % (ref 10–15)
GFR SERPL CREATININE-BSD FRML MDRD: 50 ML/MIN/{1.73_M2}
GFR SERPL CREATININE-BSD FRML MDRD: 56 ML/MIN/{1.73_M2}
GLUCOSE BLDC GLUCOMTR-MCNC: 127 MG/DL (ref 70–99)
GLUCOSE BLDC GLUCOMTR-MCNC: 142 MG/DL (ref 70–99)
GLUCOSE BLDC GLUCOMTR-MCNC: 154 MG/DL (ref 70–99)
GLUCOSE BLDC GLUCOMTR-MCNC: 177 MG/DL (ref 70–99)
GLUCOSE BLDC GLUCOMTR-MCNC: 187 MG/DL (ref 70–99)
GLUCOSE SERPL-MCNC: 150 MG/DL (ref 70–99)
GLUCOSE SERPL-MCNC: 193 MG/DL (ref 70–99)
HCT VFR BLD AUTO: 22.5 % (ref 35–47)
HGB BLD-MCNC: 6.6 G/DL (ref 11.7–15.7)
HGB BLD-MCNC: 8.2 G/DL (ref 11.7–15.7)
IMM GRANULOCYTES # BLD: 0.4 10E9/L (ref 0–0.4)
IMM GRANULOCYTES NFR BLD: 4.4 %
INR PPP: 1.37 (ref 0.86–1.14)
LYMPHOCYTES # BLD AUTO: 1.8 10E9/L (ref 0.8–5.3)
LYMPHOCYTES NFR BLD AUTO: 21.1 %
MCH RBC QN AUTO: 27.8 PG (ref 26.5–33)
MCHC RBC AUTO-ENTMCNC: 29.3 G/DL (ref 31.5–36.5)
MCV RBC AUTO: 95 FL (ref 78–100)
MONOCYTES # BLD AUTO: 0.8 10E9/L (ref 0–1.3)
MONOCYTES NFR BLD AUTO: 9.1 %
NEUTROPHILS # BLD AUTO: 5.5 10E9/L (ref 1.6–8.3)
NEUTROPHILS NFR BLD AUTO: 63.9 %
NRBC # BLD AUTO: 0 10*3/UL
NRBC BLD AUTO-RTO: 0 /100
NUM BPU REQUESTED: 2
PLATELET # BLD AUTO: 241 10E9/L (ref 150–450)
POTASSIUM SERPL-SCNC: 4.4 MMOL/L (ref 3.4–5.3)
POTASSIUM SERPL-SCNC: 4.4 MMOL/L (ref 3.4–5.3)
RBC # BLD AUTO: 2.37 10E12/L (ref 3.8–5.2)
SODIUM SERPL-SCNC: 142 MMOL/L (ref 133–144)
SODIUM SERPL-SCNC: 142 MMOL/L (ref 133–144)
SPECIMEN EXP DATE BLD: NORMAL
TRANSFUSION STATUS PATIENT QL: NORMAL
TRANSFUSION STATUS PATIENT QL: NORMAL
TROPONIN I SERPL-MCNC: <0.015 UG/L (ref 0–0.04)
WBC # BLD AUTO: 8.6 10E9/L (ref 4–11)

## 2019-12-04 PROCEDURE — 36592 COLLECT BLOOD FROM PICC: CPT | Performed by: STUDENT IN AN ORGANIZED HEALTH CARE EDUCATION/TRAINING PROGRAM

## 2019-12-04 PROCEDURE — 36415 COLL VENOUS BLD VENIPUNCTURE: CPT

## 2019-12-04 PROCEDURE — 97116 GAIT TRAINING THERAPY: CPT | Mod: GP | Performed by: PHYSICAL THERAPIST

## 2019-12-04 PROCEDURE — 97530 THERAPEUTIC ACTIVITIES: CPT | Mod: GP | Performed by: PHYSICAL THERAPIST

## 2019-12-04 PROCEDURE — 25000132 ZZH RX MED GY IP 250 OP 250 PS 637: Performed by: OBSTETRICS & GYNECOLOGY

## 2019-12-04 PROCEDURE — 25000128 H RX IP 250 OP 636: Performed by: STUDENT IN AN ORGANIZED HEALTH CARE EDUCATION/TRAINING PROGRAM

## 2019-12-04 PROCEDURE — 80048 BASIC METABOLIC PNL TOTAL CA: CPT | Performed by: STUDENT IN AN ORGANIZED HEALTH CARE EDUCATION/TRAINING PROGRAM

## 2019-12-04 PROCEDURE — 25000132 ZZH RX MED GY IP 250 OP 250 PS 637: Performed by: HOSPITALIST

## 2019-12-04 PROCEDURE — 36415 COLL VENOUS BLD VENIPUNCTURE: CPT | Performed by: STUDENT IN AN ORGANIZED HEALTH CARE EDUCATION/TRAINING PROGRAM

## 2019-12-04 PROCEDURE — 00000146 ZZHCL STATISTIC GLUCOSE BY METER IP

## 2019-12-04 PROCEDURE — 25000132 ZZH RX MED GY IP 250 OP 250 PS 637: Performed by: STUDENT IN AN ORGANIZED HEALTH CARE EDUCATION/TRAINING PROGRAM

## 2019-12-04 PROCEDURE — 25000131 ZZH RX MED GY IP 250 OP 636 PS 637: Performed by: NURSE PRACTITIONER

## 2019-12-04 PROCEDURE — 85018 HEMOGLOBIN: CPT | Performed by: STUDENT IN AN ORGANIZED HEALTH CARE EDUCATION/TRAINING PROGRAM

## 2019-12-04 PROCEDURE — 80048 BASIC METABOLIC PNL TOTAL CA: CPT

## 2019-12-04 PROCEDURE — 93005 ELECTROCARDIOGRAM TRACING: CPT

## 2019-12-04 PROCEDURE — 84484 ASSAY OF TROPONIN QUANT: CPT

## 2019-12-04 PROCEDURE — 82040 ASSAY OF SERUM ALBUMIN: CPT | Performed by: STUDENT IN AN ORGANIZED HEALTH CARE EDUCATION/TRAINING PROGRAM

## 2019-12-04 PROCEDURE — 25000132 ZZH RX MED GY IP 250 OP 250 PS 637: Performed by: NURSE PRACTITIONER

## 2019-12-04 PROCEDURE — 12000001 ZZH R&B MED SURG/OB UMMC

## 2019-12-04 PROCEDURE — 85610 PROTHROMBIN TIME: CPT | Performed by: STUDENT IN AN ORGANIZED HEALTH CARE EDUCATION/TRAINING PROGRAM

## 2019-12-04 PROCEDURE — P9016 RBC LEUKOCYTES REDUCED: HCPCS | Performed by: INTERNAL MEDICINE

## 2019-12-04 PROCEDURE — 93010 ELECTROCARDIOGRAM REPORT: CPT | Performed by: INTERNAL MEDICINE

## 2019-12-04 PROCEDURE — 85025 COMPLETE CBC W/AUTO DIFF WBC: CPT | Performed by: STUDENT IN AN ORGANIZED HEALTH CARE EDUCATION/TRAINING PROGRAM

## 2019-12-04 RX ORDER — ACETAMINOPHEN 325 MG/1
650 TABLET ORAL EVERY 4 HOURS PRN
DISCHARGE
Start: 2019-12-04

## 2019-12-04 RX ORDER — POLYETHYLENE GLYCOL 3350 17 G/17G
17 POWDER, FOR SOLUTION ORAL DAILY
DISCHARGE
Start: 2019-12-05 | End: 2021-07-21

## 2019-12-04 RX ORDER — CALCIUM CARBONATE 500 MG/1
1-2 TABLET, CHEWABLE ORAL 2 TIMES DAILY PRN
DISCHARGE
Start: 2019-12-04

## 2019-12-04 RX ORDER — GABAPENTIN 100 MG/1
100 CAPSULE ORAL AT BEDTIME
DISCHARGE
Start: 2019-12-04 | End: 2022-02-28

## 2019-12-04 RX ORDER — HYDROXYZINE HYDROCHLORIDE 25 MG/1
25 TABLET, FILM COATED ORAL EVERY 6 HOURS PRN
DISCHARGE
Start: 2019-12-04 | End: 2021-07-21

## 2019-12-04 RX ORDER — BISACODYL 10 MG
10 SUPPOSITORY, RECTAL RECTAL DAILY PRN
DISCHARGE
Start: 2019-12-04

## 2019-12-04 RX ORDER — FAMOTIDINE 20 MG/1
20 TABLET, FILM COATED ORAL DAILY
DISCHARGE
Start: 2019-12-04 | End: 2022-02-28

## 2019-12-04 RX ORDER — TRAZODONE HYDROCHLORIDE 50 MG/1
50-100 TABLET, FILM COATED ORAL
DISCHARGE
Start: 2019-12-04 | End: 2021-07-21

## 2019-12-04 RX ORDER — ONDANSETRON 4 MG/1
4 TABLET, ORALLY DISINTEGRATING ORAL EVERY 6 HOURS PRN
DISCHARGE
Start: 2019-12-04

## 2019-12-04 RX ORDER — AMOXICILLIN 250 MG
2 CAPSULE ORAL 2 TIMES DAILY
DISCHARGE
Start: 2019-12-04 | End: 2021-07-21

## 2019-12-04 RX ORDER — BISACODYL 5 MG
5 TABLET, DELAYED RELEASE (ENTERIC COATED) ORAL DAILY PRN
DISCHARGE
Start: 2019-12-04 | End: 2022-02-28

## 2019-12-04 RX ADMIN — Medication 5 ML: at 07:31

## 2019-12-04 RX ADMIN — ACETAMINOPHEN 650 MG: 325 TABLET, FILM COATED ORAL at 22:30

## 2019-12-04 RX ADMIN — HYDROMORPHONE HYDROCHLORIDE 4 MG: 2 TABLET ORAL at 10:03

## 2019-12-04 RX ADMIN — HYDROMORPHONE HYDROCHLORIDE 4 MG: 2 TABLET ORAL at 17:01

## 2019-12-04 RX ADMIN — INSULIN ASPART 14 UNITS: 100 INJECTION, SOLUTION INTRAVENOUS; SUBCUTANEOUS at 09:58

## 2019-12-04 RX ADMIN — ACETAMINOPHEN 650 MG: 325 TABLET, FILM COATED ORAL at 10:04

## 2019-12-04 RX ADMIN — HYDROMORPHONE HYDROCHLORIDE 4 MG: 2 TABLET ORAL at 21:00

## 2019-12-04 RX ADMIN — ACETAMINOPHEN 650 MG: 325 TABLET, FILM COATED ORAL at 17:01

## 2019-12-04 RX ADMIN — Medication 1 MG: at 22:30

## 2019-12-04 RX ADMIN — Medication 2.5 MG: at 20:25

## 2019-12-04 RX ADMIN — CALCIUM CARBONATE (ANTACID) CHEW TAB 500 MG 500 MG: 500 CHEW TAB at 08:39

## 2019-12-04 RX ADMIN — HYDROMORPHONE HYDROCHLORIDE 4 MG: 2 TABLET ORAL at 13:41

## 2019-12-04 RX ADMIN — CALCIUM CARBONATE (ANTACID) CHEW TAB 500 MG 500 MG: 500 CHEW TAB at 20:25

## 2019-12-04 RX ADMIN — GABAPENTIN 100 MG: 100 CAPSULE ORAL at 22:30

## 2019-12-04 RX ADMIN — FAMOTIDINE 20 MG: 20 TABLET ORAL at 18:00

## 2019-12-04 RX ADMIN — HYDROXYZINE HYDROCHLORIDE 50 MG: 25 TABLET, FILM COATED ORAL at 22:31

## 2019-12-04 RX ADMIN — HYDROXYZINE HYDROCHLORIDE 25 MG: 25 TABLET, FILM COATED ORAL at 13:41

## 2019-12-04 RX ADMIN — ACETAMINOPHEN 650 MG: 325 TABLET, FILM COATED ORAL at 04:37

## 2019-12-04 RX ADMIN — HYDROMORPHONE HYDROCHLORIDE 4 MG: 2 TABLET ORAL at 05:52

## 2019-12-04 RX ADMIN — Medication 2.5 MG: at 08:37

## 2019-12-04 RX ADMIN — HYDROMORPHONE HYDROCHLORIDE 4 MG: 2 TABLET ORAL at 01:41

## 2019-12-04 RX ADMIN — Medication 5 ML: at 06:58

## 2019-12-04 ASSESSMENT — ACTIVITIES OF DAILY LIVING (ADL)
ADLS_ACUITY_SCORE: 12
ADLS_ACUITY_SCORE: 12
ADLS_ACUITY_SCORE: 13
ADLS_ACUITY_SCORE: 12
ADLS_ACUITY_SCORE: 13
ADLS_ACUITY_SCORE: 12

## 2019-12-04 ASSESSMENT — PAIN DESCRIPTION - DESCRIPTORS
DESCRIPTORS: ACHING;CONSTANT
DESCRIPTORS: ACHING;CONSTANT
DESCRIPTORS: ACHING;CONSTANT;SORE
DESCRIPTORS: ACHING;CONSTANT
DESCRIPTORS: ACHING;CONSTANT;SORE
DESCRIPTORS: ACHING;CONSTANT

## 2019-12-04 NOTE — PLAN OF CARE
VSS on room air. Pain controlled with tylenol, oxycodone, and methadone. Tolerating diabetic diet. Voiding with adequate urine output. Had 3 loose stools this shift. Passing gas. Sutures placed on dehisced portion of midline incision by MD. Up with SBA and walker. Social work working on placement at TCU for discharge.

## 2019-12-04 NOTE — DISCHARGE INSTRUCTIONS
Diabetes Plan for Discharge to [facility]    Lantus 29 units at 8 am  Novolog 10 units with each meal ( based on 70 grams of CHO per meal)  Novolog custom correction scale 1 unit per 30 > 140 before meals and > 200 HS  Do Not give Correction Insulin if Pre-Meal BG less than 140    -169 give 1 units.   -199 give 2 units.   -229 give 3 units.   -259 give 4 units.   -289 give 5 units.   -319 give 6 units.   -349 give 7 units.   BG >/= 350 give 8 units.   If given at mealtime, administer within 30 minutes of start of      Do Not give Bedtime Correction Insulin if BG less than 200   -229 give 1 units.   -259 give 2 units.   -289 give 3 units.   -319 give 4 units.   -349 give 5 units.   BG >/= 350 give 6 units.     -monitor blood sugars before meals, bedtime and at 0200  -will need diabetes education prior to discharge from [facility]  -consider resuming Metformin if GFR continues to be 45 or > ( GFR 50 on 12/4)  -- outpatient follow-up to assess the benefit of a  GLP1  (will require a prior authorization)      Outpatient diabetes follow up: recommend establishing  endocrine care (she prefers a clinic closer to her home, as she doesn't drive) or PCP who will manage her diabetes within 7-10 days after discharge from [facility]

## 2019-12-04 NOTE — PROGRESS NOTES
Social Work Services Discharge Note      Patient Name:  Chela Love     Anticipated Discharge Date:  12/5/19    Discharge Disposition: TCU    [facility]  [address] [place]  [address]  P: [phone]  F: [phone]    Following MD:  Assigned per facility     Pre-Admission Screening (PAS) online form has been completed.  The Level of Care (LOC) is:  Determined  Confirmation Code is:  [id number]  Patient/caregiver informed of referral to [facility] for Pre-Admission Screening for skilled nursing facility (SNF) placement and to expect a phone call post discharge from SNF.     Additional Services/Equipment Arranged:  [employer] ([phone]) wheelchair at 0900. Patient stated that she is unable to drive herself and wishes to not burden her family with transportation so requested [employer] transport be arranged for her.       Patient / Family response to discharge plan:  In agreement.     Persons notified of above discharge plan:  Patient; patient's mother; medical team; accepting facility    Staff Discharge Instructions:  Please fax discharge orders and signed hard scripts for any controlled substances.  Please print a packet and send with patient.     CTS Handoff completed:  YES    Medicare Notice of Rights provided to the patient/family:  NO - not needed    HAIDER Floyd  7D Hematology/Oncology Social Worker  Phone: [phone]  Pager: [phone]  [email or web address]

## 2019-12-04 NOTE — PLAN OF CARE
Hgb: 6.6 --transfused via PICC:  1 unit PRBC completed at 1 pm without issues. Patient walked in the miranda this morning with PT (walker, gait belt). Sliding scale insulin: I unit this morning, none needed at 12 noon. Also carb coverage insulin and scheduled Lantus. Pt is now ordering a tray. She spend most of her shift on the phone, talking with family. On-going LE and general body edema. Abd dressing changed this morning by MD team. This afternoon: Dr Mckee spent time at bedside assessing and talking with her. PO PRN Dilaudid and scheduled Tylenol given. Bedside report: yes.

## 2019-12-04 NOTE — PROGRESS NOTES
Diabetes Consult Daily  Progress Note          Assessment/Plan:   Chela Love is a 44 year old female with a history of morbid obesity, TIIDM, HTN, asthma, and polysubstance abuse who presented initially to [facility].  At time of initial assessment, she was having nausea, vomiting, and vague abdominal pain, also found to have a large ovarian mass with ascites and peritoneal carcinomatosis. She was transferred to [facility] 11/23/19 in septic shock, for GYN ONC consult and surgical evaluation. She was taken to OR on 11/27 for ex-lap, ZANDRA, BSO, omentectomy, tumor debulking and abdominal washout. Blas purulence of the abdominal cavity was noted. She had septic/hypovolemic shock requiring pressors. Initial path is consistent with adenocarcinoma. She was started preoperatively on insulin drip for hyperglycemia.  Adenocarcinoma of likely endometrial origin  Intra-abdominal infection     Assessment:   Type II Diabetes, uncontrolled (A1c 15%)  Plan:  -lantus 27 units in the morning, increase to 29 units ( 0800)  -novolog 1 unit per 5 grams of CHO for meals and snacks, decreased to 1 unit per 7 grams of CHO ( starting with either lunch or dinner)  -novolog  high intensity sliding scale, changed to custom scale 1 units per 30 > 140 before meals and > 200 HS  -monitor glucose before meals, HS and 0200  -hypoglycemia protocol  -will continue to follow        Tentative Plan for Discharge:  Lantus 29 units at 8 am  Novolog 10 units with each meal ( based on 70 grams of CHO per meal)  Novolog custom correction scale 1 unit per 30 > 140 before meals and > 200 HS  Do Not give Correction Insulin if Pre-Meal BG less than 140    -169 give 1 units.   -199 give 2 units.   -229 give 3 units.   -259 give 4 units.   -289 give 5 units.   -319 give 6 units.   -349 give 7 units.   BG >/= 350 give 8 units.   If given at mealtime, administer within 30 minutes of start of      Do Not  give Bedtime Correction Insulin if BG less than 200   -229 give 1 units.   -259 give 2 units.   -289 give 3 units.   -319 give 4 units.   -349 give 5 units.   BG >/= 350 give 6 units.     -consider resuming Metformin if GFR continues to be 45 or > ( GFR 50 on 12/4)  -- outpatient follow-up to assess the benefit of a  GLP1  (will require a prior authorization)   -monitor blood sugars before meals, bedtime and at 0200  -will need diabetes education prior to discharge from TCU                          Outpatient diabetes follow up: recommend establishing  endocrine care (she prefers a clinic closer to her home, as she doesn't drive) or PCP who will manage her diabetes.    Plan discussed with patient and primary team.      Discharge: to TCU on Thursday Morning             Interval History:   The last 24 hours progress and nursing notes reviewed.  Blood sugars with improved control, increased basal insulin  Fasting glucose 150/142  Glucose at ~ 0200, 154  Pre-meal glucose 127 ( rally is a 2.5 hour PP), decreased the I:C ratio  Appetite is improving denies n/v  Worked with therapies and was sleepy when rounding, but would awake to answer questions.    PTA:  -was not taking Lantus due to cost ( test claim has coverage for lantus)    Recent Labs   Lab 12/04/19  0704 12/04/19  0205 12/03/19  2214 12/03/19  1626 12/03/19  1224 12/03/19  0852 12/03/19  0753 12/03/19  0154  12/02/19  0725  12/01/19  0825  11/30/19  0330  11/29/19  1514   *  --   --   --   --   --  158*  --   --  196*  --  115*  --  149*  --  123*   BGM  --  154* 220* 178* 198* 192*  --  200*   < >  --    < >  --    < >  --    < >  --     < > = values in this interval not displayed.               Review of Systems:   See interval hx          Medications:     Orders Placed This Encounter      Moderate Consistent CHO Diet       Physical Exam:  Gen: NAD   HEENT: mucous membranes are moist  Resp: Unlabored  Ext: No lower  "extremity edema   Neuro:oriented x3, communicating clearly  /62 (BP Location: Left arm)   Pulse 91   Temp 98.2  F (36.8  C) (Oral)   Resp 16   Ht 1.676 m (5' 6\")   Wt 109.9 kg (242 lb 4.8 oz)   LMP 10/27/2019   SpO2 94%   Breastfeeding No   BMI 39.11 kg/m             Data:     Lab Results   Component Value Date    A1C 15.0 11/23/2019              CBC RESULTS:   Recent Labs   Lab Test 12/04/19  0704   WBC 8.6   RBC 2.37*   HGB 6.6*   HCT 22.5*   MCV 95   MCH 27.8   MCHC 29.3*   RDW 15.8*        Recent Labs   Lab Test 12/04/19  0704 12/03/19  0753    140   POTASSIUM 4.4 4.7   CHLORIDE 112* 110*   CO2 26 26   ANIONGAP 5 4   * 158*   BUN 32* 37*   CR 1.30* 1.53*   MARILIN 7.9* 8.1*     Liver Function Studies -   Recent Labs   Lab Test 12/04/19  0704 11/30/19  0330   PROTTOTAL  --  5.2*   ALBUMIN 1.6* 1.5*   BILITOTAL  --  0.4   ALKPHOS  --  293*   AST  --  20   ALT  --  11     Lab Results   Component Value Date    INR 1.35 12/03/2019    INR 1.41 12/01/2019    INR 1.44 11/30/2019    INR 1.45 11/29/2019    INR 1.57 11/29/2019    INR 1.69 11/28/2019    INR 1.83 11/27/2019    INR 1.81 11/27/2019    INR 1.76 11/27/2019    INR 1.75 11/27/2019    INR 1.55 11/25/2019    INR 1.59 11/23/2019         I spent a total of 35 minutes bedside and on the inpatient unit managing the glycemic care of Chela Love. Over 50% of my time on the unit was spent counseling the patient  and/or coordinating care regarding .  See note for details.      Val Bull [phone]  Diabetes Management job code [id number]            "

## 2019-12-04 NOTE — PROGRESS NOTES
Gynecology Oncology Progress Note    HD#12 sepsis, leukocytosis, AMS  POD#7 XL, ZANDRA, BSO, Omentectomy, NATANAEL, Tumor Debulking, Washout    Disease: Pelvic mass, adenocarcinoma (favor endometrioid) on frozen    24 hour events:   - 2 stitches placed at inferior and superior aspects of incisional opening    Subjective: Patient doing well this AM. Pain is well controlled. Tolerating PO. No nausea or vomiting. Passing flatus. Voiding spontaneously. Denies dizziness or lightheadedness.    Objective:   Vitals:    12/03/19 1229 12/03/19 1539 12/03/19 2208 12/04/19 0339   BP: 111/51 105/63 111/52 117/59   BP Location: Left arm Left arm Left arm Left arm   Pulse: 83   91   Resp: 18 18 18 16   Temp: 98.5  F (36.9  C) 98.6  F (37  C) 98.5  F (36.9  C) 98.6  F (37  C)   TempSrc: Oral Oral Oral Oral   SpO2: 94% 95% 95% 94%   Weight:       Height:         General: Awake, alert, NAD  CV: RRR, no m/r/g  Resp: anterior lung fields clear  Abdomen:  soft, moderately distended, incision covered with steri strips  Extremities: Brown skin discoloration bilateral ankle and distal legs, 2+ edema bilaterally    I/Os  Urine 2350 cc yesterday    Labs:  CBC, BMP, Albumin pending    Assessment: Chela Love is a 44 year old with PMHx signficant for HTN, HLD, T2DM, and Polysubstance use, initially admitted to the medicine team for septic shock and AMS. She was found to have a right adnexal mass measuring [id number] cm. She was transferred to Gyn/Onc Service 11/26/19. She is now POD#7 from a XL, ZANDRA, BSO, and tumor debulking. She was admitted to SICU for hemodynamic monitoring postoperatively now on floor and hemodynamically stable.    Problem List:  - Adenocarcinoma of likely endometrial origin  - Intra-abdominal infection   - Oliguric CINDY from ATN   - Uncontrolled T2DM with hyperglycemia  - Acute blood loss anemia  - Polysubstance use  - Anxiety, depression    Plan:    Dz: CT w/ 20 cm pelvic mass, ascites, omental caking. Frozen pathology -  adenocarcinoma, favor endometrioid. Final pathology pending.  FEN: Tolerating carb-consistent diet.   Pain: S/p Palliative consult. Started methadone for longer coverage as well as in the setting of poor pain tolerance with history of PSA. PO Dilaudid 2- 4mg q3h prn.  Gabapentin 100mg at bedtime for pain and anxiety. No NSAIDs until resolved CINDY. Palliative SW to see patient for mind-body techniques for pain management.  Heme:  Acute blood loss anemia, s/p 2u pRBC intraop and 1u pRBC post-op. Appropriate rise in Hgb after transfusions. Hgb drop to 7.1 on 12/1, recheck is 6.8. Transfused 1 unit pRBC on 12/1 > Hgb 7.7. Continue to hold Heparin.  CV: HTN, HLD. Holding home meds for now  Pulm: NI  GI: NI, bowel regimen, antiemetics PRN  : CINDY likely from intra-abdominal infection, Cr improving and UOP increasing. Nephrology recommended adding lasix if no change in weight/improvement in edema in the next few days. Currently diuresing well without lasix.  ID: Supra-infection ovarian mass consistent with malignancy.  Source control at the time of surgery.  WBC has normalized and she has remained afebrile.  She has completed 4 days of Zosyn s/p source control and all antibiotics have been discontinued. Did have isolated fever on 12/2 at 2156. Has remained afebrile since, if additional fevers or leukocytosis will obtained blood cultures and start antibiotics.  Endo: Poorly controlled T2DM, A1C 15. Was on insulin gtt post-operatively. S/p Endocrinology consult, now transitioned to subcutaneous insulin.  Psych/Neuro/MSK: AMS, History of PSA (meth, tobacco, THC, alcohol). UDS negative on admission. Bipolar disorder previously on Effexor. SW consult placed. Patient and family considering long term goal of rehab. S/p Psych consult. Trazodone added for sleep. S/p psychology consultation.  PPX: SCDs, IS  Drains/Lines: PICC, PIV    Dispo: PT/OT Recommend discharge to TCU. Social work consulted to arrange placement.    Annabelle  MD Adrian  OB/GYN PGY-2  12/04/19 5:32 AM    I have examined this patient personally with the team and agree with the above findings and plan.    Kevin Mckee

## 2019-12-04 NOTE — PLAN OF CARE
POD7 of XL, ZANDRA, BSO, Omentectomy, NATANAEL, Tumor Debulking, Washout. A&OX4, lethargic at time.VSS on room air. Abdominal pain control with PRN dilaudid and scheduled tylenol. On a low consistent carb diet, denies N/V. Abdominal incision clean dry and intact. 0200am BG WNL. Voiding spontaneously. PICC and PIV saline locked. SBA with a walker. Calls appropriately. Edema in lower extremities.Continue with plan of care.

## 2019-12-04 NOTE — CONSULTS
Health Psychology                  Clinic    Department of Medicine  Debby Mesa, PhD, LP [phone]                          [facility]  [facility] Monster Alvarez, PhD, LP [phone]                  3rd Floor  [place] Mail [address]   Eric Merino, PhD, ABPP, LP [phone]     [address] [facility],   [address] [facility],  Raquel Randhawa,  PhD, LP [phone]            [address]  [place] [address] Dominga Olivares, PhD, LP [phone]     Lissa Miles, PhD [phone]     Inpatient Health Psychology Consultation    Date of Service:  12/4/19    BACKGROUND:  Chela Love is a 44 year old female with a history of HTN, HLD, drug abuse (methamphetamine, cannabis, and alcohol), and abnormal menses, who presented from [facility] for management of septic shock and large ovarian mass, 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     Referred by Dr. Martinez on 11/25/19, psychiatry evaluated Ms. Love on 12/1/19.    SUBJECTIVE:  Met with Ms. Love and discussed effectiveness of coping skills presented in previous visit.  She was able to recall the breathing exercise we discussed, but had not practiced.  She still had the list of information next to her bed that was left with instructions for a brief self-compassion exercise. She said she had gone to the library and had brought back multiple activities and things to her room to keep her occupied.      Ms. Love reported struggling with comments made my her mother related to being a burden.  She said that she feels guilty for putting her family through stress and expressed gratitude that her sister is able to help talk her out of feeling this way.  We discussed guilt and processed her emotional experience.  Highlighted the universality of needing help from others and that if she continues to recover and move towards her goals of sobriety she can be more  independent and repay others for the care they offered.      In addition to distress about perceived burdensomeness Ms. Love said that she was missing her friend and wanted to speak with her.  She said that her sister does not want her speaking with the friend because the friend still uses substances but Ms. Love feels like a disloyal friend by not reaching out. We discussed pros and cons of reaching out to her friend versus not reaching out.  Also discussed the fact that Ms. Love cannot contact the friend because she does not have her phone number currently.  Suggested she write down her thoughts so she can either send a message in the mail or relay the thoughts when she does get back in contact.    Lastly, discussed Ms. Love's pain experiences.  She said that she always has pain and that during the visit it was rated at seven out of 10.  Ms. Love wanted to eat her lunch and was unable to fully participate in a guided behavioral exercise to help with pain management but this writer provided rationale for use of skills that engage in objective observation of pain and provided information about an exercise in which she would focus on her pain and describe it before focusing on an external object.  She expressed understanding of this and was able to reiterate the reason it may be helpful.  She said she would try this exercise.      OBJECTIVE:  Ms. Love was seated upright in her hospital bed.  She said she was feeling negative and guilty and her affect was labile.  Thought processes tangential at times, easily redirected. Thought content appropriate to discussion. Speech of normal rate, rhythm, and volume.  Denied suicidal and homicidal ideation.       ASSESSMENT:  Ms. Love is still adjusting to her cancer diagnosis.  It is still difficult to specify diagnostically if her depressive symptoms are attributed to a mood disorder versus emotion dysregulation in context of personality disorder.  Given presentation of emotional dysregulation, interpersonal ineffectiveness, and poor distress tolerance she would likely benefit from interventions that target skill building if she is willing to participate.        DIAGNOSIS:  Anxiety disorder, unspecified (F41.9)  Depressive disorder, unspecified (F32.9)  Marijuana use disorder, active (F12.1)  Methamphetamine use disorder, active (F15.10)    PLAN:  Plan for health psychology to follow this patient approximately twice per week for the duration of their hospitalization.  Ms. Love agreed with this plan.    Please feel free to call in urgent concerns arise prior to the next follow-up session.     Lissa Miles, PhD  Health Psychology Fellow  Phone: [phone]    Time in: 1:48  Time out: 2:10    I did not see this patient directly. This patient was discussed with me in individual supervision, and I agree with the assessment and plan as documented. Raquel Randhawa, PhD, LP, December 5, 2019

## 2019-12-04 NOTE — PLAN OF CARE
Discharge Planner PT   Patient plan for discharge: TCU  Current status: min assist for supine to/from sidelying to/from sit with use of rail & HOB elevated; SBA for sit to/from stand from EOB, W/C & toilet with use of WW; amb with  ft x 2 with SBA & verbal instruction   Barriers to return to prior living situation: medical status; assist needed for mobility  Recommendations for discharge: TCU  Rationale for recommendations: will benefit from continued skilled PT intervention to address mobility deficits, improve strength & activity tolerance       Entered by: Paty Caro 12/04/2019 11:58 AM

## 2019-12-05 ENCOUNTER — OFFICE VISIT - HEALTHEAST (OUTPATIENT)
Dept: GERIATRICS | Facility: CLINIC | Age: 44
End: 2019-12-05

## 2019-12-05 ENCOUNTER — HOSPITAL ENCOUNTER (EMERGENCY)
Facility: CLINIC | Age: 44
Discharge: HOME | End: 2019-12-05
Attending: EMERGENCY MEDICINE | Admitting: EMERGENCY MEDICINE
Payer: COMMERCIAL

## 2019-12-05 ENCOUNTER — RECORDS - HEALTHEAST (OUTPATIENT)
Dept: ADMINISTRATIVE | Facility: OTHER | Age: 44
End: 2019-12-05

## 2019-12-05 VITALS
TEMPERATURE: 97.9 F | DIASTOLIC BLOOD PRESSURE: 51 MMHG | OXYGEN SATURATION: 97 % | BODY MASS INDEX: 38.94 KG/M2 | SYSTOLIC BLOOD PRESSURE: 95 MMHG | HEART RATE: 89 BPM | HEIGHT: 66 IN | WEIGHT: 242.3 LBS | RESPIRATION RATE: 18 BRPM

## 2019-12-05 VITALS
DIASTOLIC BLOOD PRESSURE: 58 MMHG | RESPIRATION RATE: 18 BRPM | SYSTOLIC BLOOD PRESSURE: 119 MMHG | HEIGHT: 66 IN | TEMPERATURE: 98.6 F | WEIGHT: 247.5 LBS | BODY MASS INDEX: 39.77 KG/M2 | HEART RATE: 94 BPM | OXYGEN SATURATION: 98 %

## 2019-12-05 DIAGNOSIS — T81.30XA WOUND DEHISCENCE: ICD-10-CM

## 2019-12-05 DIAGNOSIS — C80.1 ADENOCARCINOMA (H): ICD-10-CM

## 2019-12-05 DIAGNOSIS — T81.31XD POSTOPERATIVE WOUND DEHISCENCE, SUBSEQUENT ENCOUNTER: ICD-10-CM

## 2019-12-05 DIAGNOSIS — R19.00 PELVIC MASS: ICD-10-CM

## 2019-12-05 DIAGNOSIS — F41.9 ANXIETY: ICD-10-CM

## 2019-12-05 DIAGNOSIS — R52 PAIN: ICD-10-CM

## 2019-12-05 LAB
ANION GAP SERPL CALCULATED.3IONS-SCNC: 4 MMOL/L (ref 3–14)
BASOPHILS # BLD AUTO: 0 10E9/L (ref 0–0.2)
BASOPHILS NFR BLD AUTO: 0.5 %
BUN SERPL-MCNC: 26 MG/DL (ref 7–30)
CALCIUM SERPL-MCNC: 8 MG/DL (ref 8.5–10.1)
CHLORIDE SERPL-SCNC: 110 MMOL/L (ref 94–109)
CO2 SERPL-SCNC: 27 MMOL/L (ref 20–32)
CREAT SERPL-MCNC: 1.17 MG/DL (ref 0.52–1.04)
DIFFERENTIAL METHOD BLD: ABNORMAL
EOSINOPHIL # BLD AUTO: 0.1 10E9/L (ref 0–0.7)
EOSINOPHIL NFR BLD AUTO: 1.6 %
ERYTHROCYTE [DISTWIDTH] IN BLOOD BY AUTOMATED COUNT: 15.8 % (ref 10–15)
GFR SERPL CREATININE-BSD FRML MDRD: 57 ML/MIN/{1.73_M2}
GLUCOSE BLDC GLUCOMTR-MCNC: 152 MG/DL (ref 70–99)
GLUCOSE SERPL-MCNC: 121 MG/DL (ref 70–99)
HCT VFR BLD AUTO: 25.3 % (ref 35–47)
HGB BLD-MCNC: 7.7 G/DL (ref 11.7–15.7)
IMM GRANULOCYTES # BLD: 0.2 10E9/L (ref 0–0.4)
IMM GRANULOCYTES NFR BLD: 2 %
INTERPRETATION ECG - MUSE: NORMAL
LYMPHOCYTES # BLD AUTO: 1.7 10E9/L (ref 0.8–5.3)
LYMPHOCYTES NFR BLD AUTO: 20.7 %
MCH RBC QN AUTO: 28.3 PG (ref 26.5–33)
MCHC RBC AUTO-ENTMCNC: 30.4 G/DL (ref 31.5–36.5)
MCV RBC AUTO: 93 FL (ref 78–100)
MONOCYTES # BLD AUTO: 0.9 10E9/L (ref 0–1.3)
MONOCYTES NFR BLD AUTO: 11.2 %
NEUTROPHILS # BLD AUTO: 5.1 10E9/L (ref 1.6–8.3)
NEUTROPHILS NFR BLD AUTO: 64 %
NRBC # BLD AUTO: 0 10*3/UL
NRBC BLD AUTO-RTO: 0 /100
PLATELET # BLD AUTO: 244 10E9/L (ref 150–450)
POTASSIUM SERPL-SCNC: 4.4 MMOL/L (ref 3.4–5.3)
RBC # BLD AUTO: 2.72 10E12/L (ref 3.8–5.2)
SODIUM SERPL-SCNC: 141 MMOL/L (ref 133–144)
WBC # BLD AUTO: 8 10E9/L (ref 4–11)

## 2019-12-05 PROCEDURE — 25000132 ZZH RX MED GY IP 250 OP 250 PS 637: Performed by: STUDENT IN AN ORGANIZED HEALTH CARE EDUCATION/TRAINING PROGRAM

## 2019-12-05 PROCEDURE — 85025 COMPLETE CBC W/AUTO DIFF WBC: CPT | Performed by: STUDENT IN AN ORGANIZED HEALTH CARE EDUCATION/TRAINING PROGRAM

## 2019-12-05 PROCEDURE — 96372 THER/PROPH/DIAG INJ SC/IM: CPT | Performed by: EMERGENCY MEDICINE

## 2019-12-05 PROCEDURE — 99291 CRITICAL CARE FIRST HOUR: CPT | Performed by: EMERGENCY MEDICINE

## 2019-12-05 PROCEDURE — 25000128 H RX IP 250 OP 636

## 2019-12-05 PROCEDURE — 25000132 ZZH RX MED GY IP 250 OP 250 PS 637: Performed by: HOSPITALIST

## 2019-12-05 PROCEDURE — 00000146 ZZHCL STATISTIC GLUCOSE BY METER IP

## 2019-12-05 PROCEDURE — 36415 COLL VENOUS BLD VENIPUNCTURE: CPT | Performed by: STUDENT IN AN ORGANIZED HEALTH CARE EDUCATION/TRAINING PROGRAM

## 2019-12-05 PROCEDURE — 99285 EMERGENCY DEPT VISIT HI MDM: CPT | Mod: Z6 | Performed by: EMERGENCY MEDICINE

## 2019-12-05 PROCEDURE — 25000132 ZZH RX MED GY IP 250 OP 250 PS 637: Performed by: NURSE PRACTITIONER

## 2019-12-05 PROCEDURE — 25000128 H RX IP 250 OP 636: Performed by: STUDENT IN AN ORGANIZED HEALTH CARE EDUCATION/TRAINING PROGRAM

## 2019-12-05 PROCEDURE — 80048 BASIC METABOLIC PNL TOTAL CA: CPT | Performed by: STUDENT IN AN ORGANIZED HEALTH CARE EDUCATION/TRAINING PROGRAM

## 2019-12-05 RX ORDER — HYDROMORPHONE HYDROCHLORIDE 2 MG/1
2-4 TABLET ORAL
Qty: 20 TABLET | Refills: 0 | Status: SHIPPED | OUTPATIENT
Start: 2019-12-05 | End: 2019-12-18

## 2019-12-05 RX ORDER — HYDROMORPHONE HYDROCHLORIDE 1 MG/ML
0.5 INJECTION, SOLUTION INTRAMUSCULAR; INTRAVENOUS; SUBCUTANEOUS
Status: DISCONTINUED | OUTPATIENT
Start: 2019-12-05 | End: 2019-12-05

## 2019-12-05 RX ORDER — METHADONE HYDROCHLORIDE 5 MG/1
2.5 TABLET ORAL EVERY 12 HOURS
Qty: 7 TABLET | Refills: 0 | Status: SHIPPED | OUTPATIENT
Start: 2019-12-05 | End: 2019-12-18

## 2019-12-05 RX ORDER — HEPARIN SODIUM 5000 [USP'U]/.5ML
5000 INJECTION, SOLUTION INTRAVENOUS; SUBCUTANEOUS EVERY 8 HOURS
Status: DISCONTINUED | OUTPATIENT
Start: 2019-12-05 | End: 2019-12-05 | Stop reason: HOSPADM

## 2019-12-05 RX ORDER — HYDROMORPHONE HYDROCHLORIDE 1 MG/ML
1 INJECTION, SOLUTION INTRAMUSCULAR; INTRAVENOUS; SUBCUTANEOUS ONCE
Status: COMPLETED | OUTPATIENT
Start: 2019-12-05 | End: 2019-12-05

## 2019-12-05 RX ORDER — HEPARIN SODIUM 5000 [USP'U]/.5ML
5000 INJECTION, SOLUTION INTRAVENOUS; SUBCUTANEOUS EVERY 8 HOURS
Qty: 31.5 ML | Refills: 0 | DISCHARGE
Start: 2019-12-05 | End: 2019-12-26

## 2019-12-05 RX ADMIN — HYDROMORPHONE HYDROCHLORIDE 4 MG: 2 TABLET ORAL at 04:25

## 2019-12-05 RX ADMIN — HYDROMORPHONE HYDROCHLORIDE 4 MG: 2 TABLET ORAL at 00:20

## 2019-12-05 RX ADMIN — ACETAMINOPHEN 650 MG: 325 TABLET, FILM COATED ORAL at 04:25

## 2019-12-05 RX ADMIN — CALCIUM CARBONATE (ANTACID) CHEW TAB 500 MG 500 MG: 500 CHEW TAB at 08:36

## 2019-12-05 RX ADMIN — HYDROMORPHONE HYDROCHLORIDE 1 MG: 1 INJECTION, SOLUTION INTRAMUSCULAR; INTRAVENOUS; SUBCUTANEOUS at 15:08

## 2019-12-05 RX ADMIN — HEPARIN SODIUM 5000 UNITS: 10000 INJECTION, SOLUTION INTRAVENOUS; SUBCUTANEOUS at 08:41

## 2019-12-05 RX ADMIN — SENNOSIDES AND DOCUSATE SODIUM 2 TABLET: 8.6; 5 TABLET ORAL at 08:35

## 2019-12-05 RX ADMIN — Medication 2.5 MG: at 08:35

## 2019-12-05 ASSESSMENT — ACTIVITIES OF DAILY LIVING (ADL)
ADLS_ACUITY_SCORE: 13

## 2019-12-05 ASSESSMENT — ENCOUNTER SYMPTOMS
CONSTIPATION: 0
DYSURIA: 0
FATIGUE: 0
DIARRHEA: 0
FEVER: 0
SHORTNESS OF BREATH: 0
NAUSEA: 1
VOMITING: 0
ABDOMINAL DISTENTION: 0
CHILLS: 0

## 2019-12-05 ASSESSMENT — MIFFLIN-ST. JEOR: SCORE: 1794.4

## 2019-12-05 NOTE — PROGRESS NOTES
Gynecology Oncology Progress Note    HD#13 sepsis, leukocytosis, AMS  POD#8 XL, ZANDRA, BSO, Omentectomy, NATANAEL, Tumor Debulking, Washout    Disease: Pelvic mass, adenocarcinoma (favor endometrioid) on frozen    24 hour events:   - Transfused 1 upRBC  - EKG without ischemic changes  - Troponin wnl  - PICC removed    Subjective: Patient resting comfortably in bed. Chest pain and abdominal pain have resolved. Tolerating PO. No nausea or vomiting. Had BM yesterday, passing flatus.    Objective:   Vitals:    12/04/19 2145 12/04/19 2200 12/04/19 2215 12/04/19 2350   BP: 112/66 131/72 133/72 110/51   BP Location:    Left arm   Pulse:       Resp:    18   Temp:    98.2  F (36.8  C)   TempSrc:    Oral   SpO2: 95% 98% 97% 98%   Weight:       Height:         General: Awake, alert, NAD  CV: RRR, no m/r/g  Resp: anterior lung fields clear  Abdomen:  soft, moderately distended, incision covered with steri strips with stable skin incision separation, kohli straps in place   Extremities: Brown skin discoloration bilateral ankle and distal legs, 2+ edema bilaterally    I/Os  Intake/Output Summary (Last 24 hours) at 12/5/2019 0843  Last data filed at 12/5/2019 0659  Gross per 24 hour   Intake 1550 ml   Output 2800 ml   Net -1250 ml     Labs:    Lab Results   Component Value Date    WBC 8.0 12/05/2019     Lab Results   Component Value Date    RBC 2.72 12/05/2019     Lab Results   Component Value Date    HGB 7.7 12/05/2019     Lab Results   Component Value Date    HCT 25.3 12/05/2019     Lab Results   Component Value Date    MCV 93 12/05/2019     Lab Results   Component Value Date    MCH 28.3 12/05/2019     Lab Results   Component Value Date    MCHC 30.4 12/05/2019     Lab Results   Component Value Date    RDW 15.8 12/05/2019     Lab Results   Component Value Date     12/05/2019       Sodium   Date Value Ref Range Status   12/05/2019 141 133 - 144 mmol/L Final     Potassium   Date Value Ref Range Status   12/05/2019 4.4 3.4 -  5.3 mmol/L Final     Chloride   Date Value Ref Range Status   12/05/2019 110 (H) 94 - 109 mmol/L Final     Carbon Dioxide   Date Value Ref Range Status   12/05/2019 27 20 - 32 mmol/L Final     Anion Gap   Date Value Ref Range Status   12/05/2019 4 3 - 14 mmol/L Final     Glucose   Date Value Ref Range Status   12/05/2019 121 (H) 70 - 99 mg/dL Final     Urea Nitrogen   Date Value Ref Range Status   12/05/2019 26 7 - 30 mg/dL Final     Creatinine   Date Value Ref Range Status   12/05/2019 1.17 (H) 0.52 - 1.04 mg/dL Final     GFR Estimate   Date Value Ref Range Status   12/05/2019 57 (L) >60 mL/min/[1.73_m2] Final     Comment:     Non  GFR Calc  Starting 12/18/2018, serum creatinine based estimated GFR (eGFR) will be   calculated using the Chronic Kidney Disease Epidemiology Collaboration   (CKD-EPI) equation.       Calcium   Date Value Ref Range Status   12/05/2019 8.0 (L) 8.5 - 10.1 mg/dL Final       Assessment: Chela Love is a 44 year old with PMHx signficant for HTN, HLD, T2DM, and Polysubstance use, initially admitted to the medicine team for septic shock and AMS. She was found to have a right adnexal mass measuring [id number] cm. She was transferred to Gyn/Onc Service 11/26/19. She is now POD#8 from a XL, ZANDRA, BSO, and tumor debulking. She was admitted to SICU for hemodynamic monitoring postoperatively now on floor and hemodynamically stable. She is doing well and meeting goals for discharge today.     Problem List:  - Adenocarcinoma of likely endometrial origin  - Intra-abdominal infection   - Oliguric CINDY from ATN   - Uncontrolled T2DM with hyperglycemia  - Acute blood loss anemia  - Polysubstance use  - Anxiety, depression    Plan:    Dz: CT w/ 20 cm pelvic mass, ascites, omental caking. Frozen pathology - adenocarcinoma, favor endometrioid. Final pathology pending.  FEN: Tolerating carb-consistent diet.   Pain: S/p Palliative consult. Methadone 2.5 mg Q12h, Tylenol 650 mg Q6h, PO Dilaudid  2-4 mg Q3h PRN, Gabapentin 100 mg at bedtime, Atarax 50 mg Q6h PRN. No NSAIDs until resolved CINDY. Palliative SW to see patient for mind-body techniques for pain management.  Heme:  Acute blood loss anemia. Has received a total of 5 upRBC this admission. Most recently transfused on 12/4 with Hgb rising from 6.8 to 8.2. Morning Hgb slightly decreased at 7.7. Plan for AM Hgb tomorrow at TCU.   CV: HTN, HLD. Holding home meds for now. Episode of chest pain last night, EKG without ischemic changes and Troponin wnl. Improved spontaneously.  Pulm: NI  GI: NI, bowel regimen, antiemetics PRN  : CINDY likely from intra-abdominal infection, Cr improving and UOP adequate. Currently diuresing well without lasix.  ID: Supra-infection ovarian mass consistent with malignancy. She has completed 4 days of Zosyn s/p source control and all antibiotics have been discontinued. Did have isolated fever on 12/2 at 2156. Has remained afebrile since.   Endo: Poorly controlled T2DM, A1C 15. Was on insulin gtt post-operatively. S/p Endocrinology consult, now transitioned to subcutaneous insulin with improved blood glucose control now on 29U Lantus AM, Novalog 1:7g CHO, high sliding scale insulin. Will finalize insulin regimen prior to discharge.   Psych/Neuro/MSK: AMS, History of PSA (meth, tobacco, THC, alcohol). UDS negative on admission. Bipolar disorder previously on Effexor. SW consult placed. Patient and family considering long term goal of rehab. S/p Psych consult. Trazodone added for sleep. S/p psychology consultation.  PPX: SCDs, IS, subcutaneous heparin 5000U TID  Drains/Lines: PIV    Dispo: PT/OT Recommend discharge to TCU. Social work consulted, plan to discharge to TCU today.     Andi Rivera MD  Ob/Gyn Resident, PGY-1  12/05/19 8:46 AM      I have examined this patient personally with the team and agree with the above findings and plan.    Kevin Mckee

## 2019-12-05 NOTE — PLAN OF CARE
Occupational Therapy Discharge Summary    Reason for therapy discharge:    Discharged to transitional care facility.    Progress towards therapy goal(s). See goals on Care Plan in [facility] electronic health record for goal details.  Goals partially met.  Barriers to achieving goals:   discharge from facility.    Therapy recommendation(s):    Continued therapy is recommended.  Rationale/Recommendations:  pt would benefit from further therapy to progress independence with functional mobility and ADLs.

## 2019-12-05 NOTE — PROGRESS NOTES
Progress Note    S: Notified by RN of patient concern for chest pain. Upon evaluation of patient, Kera states her main concern is abdominal pain however she has had some chest pain and is feeling short of breath. This started abruptly when she returned to bed from being at the library. She received PO dilaudid minutes before my evaluation. She is unable to give me a description of the pain or if it radiates anywhere. Also complaining of lower back pain. Patient states no flatus today, did have a bowel movement. Reports mild nausea but no emesis. Voiding spontaneously.    O:  Patient Vitals for the past 12 hrs:   BP Temp Temp src Pulse Heart Rate Resp SpO2   12/04/19 2215 133/72 -- -- -- 87 -- 97 %   12/04/19 2200 131/72 -- -- -- 87 -- 98 %   12/04/19 2145 112/66 -- -- -- 87 -- 95 %   12/04/19 2139 114/64 -- -- -- 88 16 97 %   12/04/19 2058 119/62 98.4  F (36.9  C) Oral -- 87 16 94 %   12/04/19 1604 109/53 99  F (37.2  C) Oral -- 83 16 95 %   12/04/19 1245 99/47 99.1  F (37.3  C) Oral -- -- -- 94 %   12/04/19 1200 114/58 -- -- -- 85 16 99 %   12/04/19 1123 104/55 -- -- 89 -- 19 94 %   12/04/19 1045 106/60 -- -- -- 92 -- 97 %     Gen: Awake, alert, appears uncomfortable  Lungs: Clear to ascultation bilaterally  CV: Regular rate and rhythm  Ab: Diffusely tender to palpation, soft, mildly distended.    A/P: Chela Love is a 44 year old POD#7 from XL, ZANDRA, BSO and tumor debulking with new onset chest pain and worsening abdominal pain. Vital signs stable, not tachycardic, and O2 sat > 95% on RA. No acute findings on exam.   - Q15 minute vital signs  - Continuous pulse oximetry  - STAT EKG  - STAT BMP, Troponin  - Recent Hgb of 8.2 following blood transfusion earlier today.    Annabelle Campbell MD  OB/GYN PGY-2  12/04/19 9:32 PM    ADDENDUM:  Patient states both chest pain and abdominal pain have improved, although abdominal pain is still present. Vital signs remain stable. EKG significant for possible anterior infarct.  Will wait for Troponin to return and discuss EKG with Cardiology.    Annabelle Campbell MD  OB/GYN PGY-2  12/04/19 10:47 PM    ADDENDUM:  Discussed EKG with Cardiology Follow On Call. Low suspicion for ischemic disease at this time. Troponin Negative.    Annabelle Campbell MD  OB/GYN PGY-2  12/04/19 11:18 PM

## 2019-12-05 NOTE — PLAN OF CARE
POD8of XL, ZANDRA, BSO, Omentectomy, NATANAEL, Tumor Debulking, Washout. A&OX4, lethargic at time.VSS on room air. Abdominal pain control with PRN dilaudid and scheduled tylenol. On a low consistent carb diet, denies N/V. Abdominal incision clean dry and intact. 0200am BG WNL. Voiding spontaneously. Old PICC site with dressing clean dry and intact.PIV saline locked. SBA with a walker. Calls appropriately. Edema in lower extremities.Continue with plan of care. Plan to discharge to TCU today.

## 2019-12-05 NOTE — ED PROVIDER NOTES
"  History     Chief Complaint   Patient presents with     Post-op Problem     HPI  Chela Love is a 44 year old female who was discharged from [facility] today after hysterectomy and oophorectomy, diagnosed with endometrial adenocarcinoma with mets to R ovary. Patient had a 4cm wound separation on discharge that gyn/onc was aware of, and placed a vertical mattress suture at the superior and inferior aspects of the opening. Patient was not aware of this on discharge to the rehabilitation facility. Denies fevers, chills, vomiting, diarrhea, blood or drainage from incision site. Has not received pain medications today, and is in a lot of pain.    I have reviewed the Medications, Allergies, Past Medical and Surgical History, and Social History in the Epic system.    Review of Systems   Constitutional: Negative for chills, fatigue and fever.   Respiratory: Negative for shortness of breath.    Cardiovascular: Negative for chest pain.   Gastrointestinal: Positive for nausea. Negative for abdominal distention, constipation, diarrhea and vomiting.        Abdominal pain at incision site.   Genitourinary: Negative for decreased urine volume and dysuria.   Skin: Negative for rash.       Physical Exam   BP: 128/75  Pulse: 94  Heart Rate: 95  Temp: 98.6  F (37  C)  Resp: 16  Height: 167.6 cm (5' 6\")  Weight: 112.3 kg (247 lb 8 oz)  SpO2: 95 %    Physical Exam  Constitutional:       General: She is not in acute distress.     Appearance: She is obese.   Cardiovascular:      Rate and Rhythm: Normal rate and regular rhythm.      Pulses: Normal pulses.      Heart sounds: No murmur.   Pulmonary:      Effort: Pulmonary effort is normal. No respiratory distress.      Breath sounds: Normal breath sounds. No wheezing or rales.   Abdominal:      General: Bowel sounds are normal. There is no distension.      Palpations: Abdomen is soft.      Comments: Midline incision extends down entire abdomen. 4 cm wound dehiscence present at the inferior " end of the incision. Adipose tissue visible. No erythema or drainage.   Neurological:      Mental Status: She is alert.       ED Course         No labs or imaging.    Labs Ordered and Resulted from Time of ED Arrival Up to the Time of Departure from the ED - No data to display         Assessments & Plan (with Medical Decision Making)   Chela Love is a 44 year old female who was discharged from [facility] today after hysterectomy and oophorectomy, diagnosed with endometrial adenocarcinoma with mets to R ovary. Patient had a 4cm wound separation on discharge that gyn/onc was aware of. Wound care instructions were provided in the discharge summary. Patient was not aware of this wound separation, and was in pain, which is why she was brought to the ED today.    Vitals WNL. No signs of infection at the incision site. Wound is unchanged since hospital discharge today. Gave IM dilaudid 1mg x 1 dose today and patient's pain improved. Will discharge back to TCU today. Follow up wound care instructions from hospital discharge summary. Follow up with gyn/onc as previously scheduled.    I have reviewed the nursing notes.    I have reviewed the findings, diagnosis, plan and need for follow up with the patient.    New Prescriptions    No medications on file       Final diagnoses:   Postoperative wound dehiscence, subsequent encounter       12/5/2019   [facility], [place], EMERGENCY DEPARTMENT    Farrukh Dallas DO   PGY-1  [facility]    This data collected with the Resident working in the Emergency Department.  Patient was seen and evaluated by myself and I repeated the history and physical exam with the patient.  The plan of care was discussed with them.  The key portions of the note including the entire assessment and plan reflect my documentation.               Christopher Pablo DO  12/07/19 1316

## 2019-12-05 NOTE — PLAN OF CARE
VSS on room air. Pain somewhat controlled with tylenol, dilaudid, and methadone. Tolerating diabetic diet. Voiding with adequate urine output. Had two bowel movements this shift. Midlne incision with Warren strips. Up with SBA and walker. PICC removed because dressing was partially off and catheter was pulled out about an inch. Patient complaining of mild chest pain and dyspnea-MD notified. EKG and troponin ordered. Patient to discharge to TCU tomorrow at 0900.

## 2019-12-05 NOTE — PLAN OF CARE
Left ~ 9:30 am via w/c with transport staff to go to TCU. Breakfast completed and insulin coverage given. She voided spont and dressed in street clothes.

## 2019-12-05 NOTE — ED TRIAGE NOTES
Pt arrives via EMS from TCU  After having a hysterectomy and oophorectomy for ovarian cancer.  The incision to the lower abd has opened and the pt is having abd pain.   Pt was discharged from the hospital today.  1200 mg Tylenol given at TCU.

## 2019-12-05 NOTE — PLAN OF CARE
Physical Therapy Discharge Summary    Reason for therapy discharge:    Discharged to transitional care facility.    Progress towards therapy goal(s). See goals on Care Plan in [facility] electronic health record for goal details.  Goals partially met.  Barriers to achieving goals:   discharge from facility.    Therapy recommendation(s):    Continued therapy is recommended.  Rationale/Recommendations:  Continue to progress with strengthening, transfers, and ambulation.

## 2019-12-05 NOTE — DISCHARGE INSTRUCTIONS
4cm wound dehiscence was present on hospital discharge to rehab facility. Please see discharge instructions from hospital discharge for wound care instructions.

## 2019-12-05 NOTE — ED AVS SNAPSHOT
[facility], [place], Emergency Department  [address] [address]  Phone:  [phone]                                    Chela Love   MRN: [id number]    Department:  [facility], Emergency Department   Date of Visit:  12/5/2019           After Visit Summary Signature Page    I have received my discharge instructions, and my questions have been answered. I have discussed any challenges I see with this plan with the nurse or doctor.    ..........................................................................................................................................  Patient/Patient Representative Signature      ..........................................................................................................................................  Patient Representative Print Name and Relationship to Patient    ..................................................               ................................................  Date                                   Time    ..........................................................................................................................................  Reviewed by Signature/Title    ...................................................              ..............................................  Date                                               Time          22EPIC Rev 08/18

## 2019-12-06 ENCOUNTER — AMBULATORY - HEALTHEAST (OUTPATIENT)
Dept: ADMINISTRATIVE | Facility: CLINIC | Age: 44
End: 2019-12-06

## 2019-12-07 NOTE — TELEPHONE ENCOUNTER
ONCOLOGY INTAKE: Records Information      APPT INFORMATION: 12/13/19 - [facility]  Referring provider:  MODESTA Marie  Referring provider s clinic:  [facility]  Reason for visit/diagnosis:  post-op wound f/u  Has patient been notified of appointment date and time?: Yes    RECORDS INFORMATION:  Were the records received with the referral (via [employer])? Internal referral    Has patient been seen for any external appt for this diagnosis? No    If yes, where? NA    Has patient had any imaging or procedures outside of [facility] for this condition? No      If Yes, where? NA    ADDITIONAL INFORMATION:  Scheduled via [employer] from Jing MONET

## 2019-12-09 ENCOUNTER — OFFICE VISIT - HEALTHEAST (OUTPATIENT)
Dept: GERIATRICS | Facility: CLINIC | Age: 44
End: 2019-12-09

## 2019-12-09 DIAGNOSIS — T81.30XA WOUND DEHISCENCE: ICD-10-CM

## 2019-12-09 DIAGNOSIS — C80.1 ADENOCARCINOMA (H): ICD-10-CM

## 2019-12-09 DIAGNOSIS — F41.9 ANXIETY: ICD-10-CM

## 2019-12-09 DIAGNOSIS — F33.1 MODERATE EPISODE OF RECURRENT MAJOR DEPRESSIVE DISORDER (H): ICD-10-CM

## 2019-12-09 DIAGNOSIS — E11.65 UNCONTROLLED TYPE 2 DIABETES MELLITUS WITH HYPERGLYCEMIA (H): ICD-10-CM

## 2019-12-09 DIAGNOSIS — J45.20 MILD INTERMITTENT ASTHMA, UNSPECIFIED WHETHER COMPLICATED: ICD-10-CM

## 2019-12-09 DIAGNOSIS — R52 PAIN: ICD-10-CM

## 2019-12-09 DIAGNOSIS — I89.0 ACQUIRED LYMPHEDEMA OF LOWER EXTREMITY: ICD-10-CM

## 2019-12-09 NOTE — TELEPHONE ENCOUNTER
RECORDS STATUS - ALL OTHER DIAGNOSIS      Action    Action Taken December 9, 2019  After review of chart, verified all records in [facility]     RECORDS RECEIVED FROM: Internal Referral   DATE RECEIVED: In epic   NOTES STATUS DETAILS   OFFICE NOTE from referring provider     OFFICE NOTE from medical oncologist     DISCHARGE SUMMARY from hospital     DISCHARGE REPORT from the ER     OPERATIVE REPORT     MEDICATION LIST     CLINICAL TRIAL TREATMENTS TO DATE     LABS     PATHOLOGY REPORTS     ANYTHING RELATED TO DIAGNOSIS     GENONOMIC TESTING     TYPE:     IMAGING (NEED IMAGES & REPORT)     CT SCANS     MRI     MAMMO     ULTRASOUND     PET

## 2019-12-13 ENCOUNTER — PRE VISIT (OUTPATIENT)
Dept: ONCOLOGY | Facility: CLINIC | Age: 44
End: 2019-12-13

## 2019-12-16 ENCOUNTER — OFFICE VISIT - HEALTHEAST (OUTPATIENT)
Dept: GERIATRICS | Facility: CLINIC | Age: 44
End: 2019-12-16

## 2019-12-16 ENCOUNTER — AMBULATORY - HEALTHEAST (OUTPATIENT)
Dept: GERIATRICS | Facility: CLINIC | Age: 44
End: 2019-12-16

## 2019-12-16 ENCOUNTER — DOCUMENTATION ONLY (OUTPATIENT)
Dept: ENDOCRINOLOGY | Facility: CLINIC | Age: 44
End: 2019-12-16

## 2019-12-16 DIAGNOSIS — N73.9 PELVIC ABSCESS IN FEMALE: ICD-10-CM

## 2019-12-16 DIAGNOSIS — E11.65 UNCONTROLLED TYPE 2 DIABETES MELLITUS WITH HYPERGLYCEMIA (H): ICD-10-CM

## 2019-12-16 DIAGNOSIS — F41.9 ANXIETY: ICD-10-CM

## 2019-12-16 DIAGNOSIS — R52 PAIN: ICD-10-CM

## 2019-12-16 DIAGNOSIS — K21.9 GASTROESOPHAGEAL REFLUX DISEASE WITHOUT ESOPHAGITIS: ICD-10-CM

## 2019-12-16 DIAGNOSIS — A04.72 CLOSTRIDIUM DIFFICILE DIARRHEA: ICD-10-CM

## 2019-12-16 DIAGNOSIS — C54.1: ICD-10-CM

## 2019-12-16 DIAGNOSIS — R60.0 BILATERAL LOWER EXTREMITY EDEMA: ICD-10-CM

## 2019-12-16 DIAGNOSIS — T81.30XA WOUND DEHISCENCE: ICD-10-CM

## 2019-12-16 DIAGNOSIS — I10 ESSENTIAL HYPERTENSION: ICD-10-CM

## 2019-12-16 NOTE — PROGRESS NOTES
Patient did not show up for scheduled diabetes follow up appointment.     No charge for this encounter.     Citlalli Price PA-C  Diabetes Management Service  Pager [phone]

## 2019-12-17 ENCOUNTER — RECORDS - HEALTHEAST (OUTPATIENT)
Dept: LAB | Facility: CLINIC | Age: 44
End: 2019-12-17

## 2019-12-17 LAB
ANION GAP SERPL CALCULATED.3IONS-SCNC: 8 MMOL/L (ref 5–18)
BASOPHILS # BLD AUTO: 0 THOU/UL (ref 0–0.2)
BASOPHILS NFR BLD AUTO: 0 % (ref 0–2)
BUN SERPL-MCNC: 14 MG/DL (ref 8–22)
CALCIUM SERPL-MCNC: 8.8 MG/DL (ref 8.5–10.5)
CHLORIDE BLD-SCNC: 102 MMOL/L (ref 98–107)
CO2 SERPL-SCNC: 33 MMOL/L (ref 22–31)
CREAT SERPL-MCNC: 0.88 MG/DL (ref 0.6–1.1)
EOSINOPHIL # BLD AUTO: 0.2 THOU/UL (ref 0–0.4)
EOSINOPHIL NFR BLD AUTO: 5 % (ref 0–6)
ERYTHROCYTE [DISTWIDTH] IN BLOOD BY AUTOMATED COUNT: 15.9 % (ref 11–14.5)
GFR SERPL CREATININE-BSD FRML MDRD: >60 ML/MIN/1.73M2
GLUCOSE BLD-MCNC: 83 MG/DL (ref 70–125)
HCT VFR BLD AUTO: 26.5 % (ref 35–47)
HGB BLD-MCNC: 8.4 G/DL (ref 12–16)
LYMPHOCYTES # BLD AUTO: 2 THOU/UL (ref 0.8–4.4)
LYMPHOCYTES NFR BLD AUTO: 39 % (ref 20–40)
MCH RBC QN AUTO: 29.7 PG (ref 27–34)
MCHC RBC AUTO-ENTMCNC: 31.7 G/DL (ref 32–36)
MCV RBC AUTO: 94 FL (ref 80–100)
MONOCYTES # BLD AUTO: 0.5 THOU/UL (ref 0–0.9)
MONOCYTES NFR BLD AUTO: 10 % (ref 2–10)
NEUTROPHILS # BLD AUTO: 2.3 THOU/UL (ref 2–7.7)
NEUTROPHILS NFR BLD AUTO: 45 % (ref 50–70)
PLATELET # BLD AUTO: 234 THOU/UL (ref 140–440)
PMV BLD AUTO: 9.2 FL (ref 8.5–12.5)
POTASSIUM BLD-SCNC: 3.7 MMOL/L (ref 3.5–5)
RBC # BLD AUTO: 2.83 MILL/UL (ref 3.8–5.4)
SODIUM SERPL-SCNC: 143 MMOL/L (ref 136–145)
WBC: 5.1 THOU/UL (ref 4–11)

## 2019-12-18 ENCOUNTER — ANCILLARY PROCEDURE (OUTPATIENT)
Dept: ULTRASOUND IMAGING | Facility: CLINIC | Age: 44
End: 2019-12-18
Attending: PEDIATRICS
Payer: COMMERCIAL

## 2019-12-18 ENCOUNTER — ONCOLOGY VISIT (OUTPATIENT)
Dept: ONCOLOGY | Facility: CLINIC | Age: 44
End: 2019-12-18
Attending: NURSE PRACTITIONER
Payer: COMMERCIAL

## 2019-12-18 ENCOUNTER — RECORDS - HEALTHEAST (OUTPATIENT)
Dept: ADMINISTRATIVE | Facility: OTHER | Age: 44
End: 2019-12-18

## 2019-12-18 ENCOUNTER — OFFICE VISIT (OUTPATIENT)
Dept: PALLIATIVE CARE | Facility: CLINIC | Age: 44
End: 2019-12-18
Attending: INTERNAL MEDICINE
Payer: COMMERCIAL

## 2019-12-18 VITALS
BODY MASS INDEX: 36.62 KG/M2 | OXYGEN SATURATION: 99 % | DIASTOLIC BLOOD PRESSURE: 71 MMHG | HEIGHT: 66 IN | WEIGHT: 227.9 LBS | SYSTOLIC BLOOD PRESSURE: 148 MMHG | HEART RATE: 80 BPM | TEMPERATURE: 98.5 F

## 2019-12-18 DIAGNOSIS — R10.13 ABDOMINAL PAIN, EPIGASTRIC: ICD-10-CM

## 2019-12-18 DIAGNOSIS — Z90.710 S/P TAH-BSO: ICD-10-CM

## 2019-12-18 DIAGNOSIS — T81.31XD POSTOPERATIVE WOUND DEHISCENCE, SUBSEQUENT ENCOUNTER: ICD-10-CM

## 2019-12-18 DIAGNOSIS — C56.9 OVARIAN CANCER, UNSPECIFIED LATERALITY (H): ICD-10-CM

## 2019-12-18 DIAGNOSIS — R10.12 LUQ ABDOMINAL PAIN: Primary | ICD-10-CM

## 2019-12-18 DIAGNOSIS — R60.0 BILATERAL LOWER EXTREMITY EDEMA: ICD-10-CM

## 2019-12-18 DIAGNOSIS — C54.1 ENDOMETRIAL CARCINOMA (H): Primary | ICD-10-CM

## 2019-12-18 DIAGNOSIS — R10.12 LUQ ABDOMINAL PAIN: ICD-10-CM

## 2019-12-18 DIAGNOSIS — Z90.722 S/P TAH-BSO: ICD-10-CM

## 2019-12-18 DIAGNOSIS — Z90.79 S/P TAH-BSO: ICD-10-CM

## 2019-12-18 DIAGNOSIS — R45.86 MOOD AND AFFECT DISTURBANCE: ICD-10-CM

## 2019-12-18 DIAGNOSIS — Z90.710 S/P HYSTERECTOMY: Primary | ICD-10-CM

## 2019-12-18 PROCEDURE — 99214 OFFICE O/P EST MOD 30 MIN: CPT | Mod: 24 | Performed by: NURSE PRACTITIONER

## 2019-12-18 PROCEDURE — G0463 HOSPITAL OUTPT CLINIC VISIT: HCPCS | Mod: ZF

## 2019-12-18 PROCEDURE — 99215 OFFICE O/P EST HI 40 MIN: CPT | Mod: GC | Performed by: PEDIATRICS

## 2019-12-18 RX ORDER — HYDROMORPHONE HYDROCHLORIDE 2 MG/1
2-4 TABLET ORAL
Qty: 20 TABLET | Refills: 0 | Status: SHIPPED | OUTPATIENT
Start: 2019-12-18 | End: 2019-12-18

## 2019-12-18 RX ORDER — METHADONE HYDROCHLORIDE 5 MG/1
2.5 TABLET ORAL EVERY 12 HOURS
Qty: 7 TABLET | Refills: 0 | Status: SHIPPED | OUTPATIENT
Start: 2019-12-18 | End: 2021-07-21

## 2019-12-18 RX ORDER — HYDROMORPHONE HYDROCHLORIDE 2 MG/1
2-4 TABLET ORAL EVERY 6 HOURS PRN
Qty: 20 TABLET | Refills: 0 | Status: SHIPPED | OUTPATIENT
Start: 2019-12-18 | End: 2021-07-21

## 2019-12-18 ASSESSMENT — PAIN SCALES - GENERAL: PAINLEVEL: EXTREME PAIN (8)

## 2019-12-18 ASSESSMENT — MIFFLIN-ST. JEOR: SCORE: 1705.5
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
## 2019-12-18 NOTE — PROGRESS NOTES
"Gynecologic Oncology Follow-Up Visit    RE: Chela Love  MRN: [id number]  : 1975  Date of Visit: 2019    CC: Chela Love is a 44 year old  female with stage IIIA grade 1 endometrioid endometrial adenocarcinoma with a metastasis to the right ovary. She presented to clinic to follow up with palliative medicine- I was asked to evaluate her for worsening LUQ pain and her wound dehiscence.    HPI:  Chela comes to the clinic feeling poorly. She has had a complicated course recently- was admitted on 19 with sepsis and found to have an ovarian mass and carcinomatosis. At the time, she had significant pain, an CINDY (highest cr 3.92), and a hemoglobin A1C of 15. Her clinical condition was failing to improve, and considering her source of sepsis was likely intra-abdominal, she was taken to the OR on 19 for an XL, ZANDRA-BSO, omentectomy, lysis of adhesions, tumor debulking ([id number] R adnexal mass), and abdominal washout. She was found to have a purulent mass and over 1L bloody/purulent ascites was evacuated. She was discharged to a TCU but was admitted to [facility]  for fevers and pain, found to have an abscess that was too small for IR drainage (treated with IV abx) and also C diff. She had been feeling like she was improving until yesterday when she developed moderate to severe pain in her left upper quadrant which worsens when taking a deep breath in. No difficulty breathing. No n/v/d. She is worried \"that I will get an infection and die.\" Feels her abdomen is full and uncomfortable. Afebrile but feels subjectively warm at times, including her eyelids. She denies concerns with her wound other than reporting she has to request her nurses at TCU change this rather than it being done every day. Does have some drainage from this but not excessive. Not tender with dressing changes. She also notes her left leg is more swollen than her right.          Past Medical History:   Diagnosis Date "     Anxiety      Asthma      Bilateral lower extremity edema      Depression      HTN (hypertension)      Insomnia      Migraine      Obesity      Substance abuse (H)     Methamphetamine use     Type 2 diabetes mellitus (H)      Vasculopathy      Past Surgical History:   Procedure Laterality Date     LAPAROSCOPIC HYSTERECTOMY TOTAL, REMIGIO SALPINGO-OOPHORECTOMY, NODE DISSECTION, TUMOR STAGING, COMBINED N/A 11/27/2019    Procedure: Exploratory Laparotomy , TOTAL ABDOMINAL HYSTERECTOMY, Bilateral SALPINGoophorectomy, Omentectomy, Abdominal Washout  and Tumor DEBULKING;  Surgeon: Rosana Mckeon MD;  Location:  OR     No family history on file.  Social History     Socioeconomic History     Marital status: Single     Spouse name: Not on file     Number of children: Not on file     Years of education: Not on file     Highest education level: Not on file   Occupational History     Not on file   Social Needs     Financial resource strain: Not on file     Food insecurity:     Worry: Not on file     Inability: Not on file     Transportation needs:     Medical: Not on file     Non-medical: Not on file   Tobacco Use     Smoking status: Current Some Day Smoker     Smokeless tobacco: Never Used   Substance and Sexual Activity     Alcohol use: Not Currently     Drug use: Never     Sexual activity: Not on file   Lifestyle     Physical activity:     Days per week: Not on file     Minutes per session: Not on file     Stress: Not on file   Relationships     Social connections:     Talks on phone: Not on file     Gets together: Not on file     Attends Taoist service: Not on file     Active member of club or organization: Not on file     Attends meetings of clubs or organizations: Not on file     Relationship status: Not on file     Intimate partner violence:     Fear of current or ex partner: Not on file     Emotionally abused: Not on file     Physically abused: Not on file     Forced sexual activity: Not on file   Other  Topics Concern     Parent/sibling w/ CABG, MI or angioplasty before 65F 55M? Not Asked   Social History Narrative     Not on file       Current Outpatient Medications   Medication     acetaminophen (TYLENOL) 325 MG tablet     albuterol (PROAIR HFA/PROVENTIL HFA/VENTOLIN HFA) 108 (90 Base) MCG/ACT inhaler     bisacodyl (DULCOLAX) 10 MG suppository     bisacodyl (DULCOLAX) 5 MG EC tablet     calcium carbonate (TUMS) 500 MG chewable tablet     famotidine (PEPCID) 20 MG tablet     gabapentin (NEURONTIN) 100 MG capsule     Heparin Sodium, Porcine, (HEPARIN ANTICOAGULANT) 5000 UNIT/0.5ML injection     HYDROmorphone (DILAUDID) 2 MG tablet     hydrOXYzine (ATARAX) 25 MG tablet     insulin aspart (NOVOLOG PEN) 100 UNIT/ML pen     insulin glargine (LANTUS PEN) 100 UNIT/ML pen     methadone (DOLOPHINE) 5 MG tablet     naloxone (NARCAN) 4 MG/0.1ML nasal spray     ondansetron (ZOFRAN-ODT) 4 MG ODT tab     polyethylene glycol (MIRALAX/GLYCOLAX) packet     senna-docusate (SENOKOT-S/PERICOLACE) 8.6-50 MG tablet     traZODone (DESYREL) 50 MG tablet     No current facility-administered medications for this visit.      Allergies   Allergen Reactions     Bee Venom Other (See Comments)     swelling     Selenicereus Grandiflorus Rash       ROS  Constitutional, resp, CV, GI,  negative except as listed in HPI      Physical Exam:    See vitals from palliative visit:  /71  Pulse 80  Temp 98.5F  SpO2 99% on RA        CONSTITUTIONAL: Alert non-toxic appearing female, appears uncomfortable  RESPIRATORY: Lungs clear to auscultation, respiratory effort unlabored  CV: Regular rate and rhythm, S1S2, no clicks, murmurs, rubs, or gallops; LLE with 2+ pitting edema, RLE with 1+ pitting edema  GASTROINTESTINAL: Normoactive bowel sounds, abdomen soft, non-distended but mild to moderate tenderness to LUQ, LUQ warmer than remaining quadrants, no rebound tenderness, guarding, or rigidity.   LYMPHATIC: Cervical, supraclavicular, and inguinal lymph  nodes without lymphadenopathy  NEUROLOGIC: Sitting in wheelchair, assist x1 to get onto exam table  MUSCULOSKELETAL: Moves all extremities  SKIN: Vertical midline incision with dressing CDI, which was removed to reveal shallow dehiscence to vertical midline incision roughly 2-3cm in length, granulation tissue at the base, wound edges healthy and vitalized, no tunneling, no drainage, no surrounding warmth/erythema/induration/discharge/bleeding. Remainder of incision gently probed without tunneling or dehiscence. Dressing replaced.  PSYCHIATRIC: Tearful but interactive, appears somewhat disheveled. Answers questions appropriately. Grossly oriented.      Data:  BLE US pending  CBC and BMP pending  CT CAP with contrast pending      Assessment/Plan:  1.) Worsening left upper quadrant pain: No peritoneal signs, hemodynamically stable, afebrile. However, she seems quite symptomatic and given her history of purulence and sepsis prior to her surgery as well as a post-operative abscess that was not able to be drained as well as ascites and C diff, will obtain CT CAP with contrast for further evaluation. Concern for infection vs hematoma vs ascites. CBC BMP pending.  2.) Lower extremity edema: LLE > RLE, will obtain US to rule out DVT  3.) Wound dehiscence: Appears to be healing well without evidence of infection, continue daily wound care.  4.) Patient verbalized understanding of and agreement with plan.      TIA Will, FNP-C  Division of Gynecologic Oncology  [facility]  Pager: [phone]

## 2019-12-18 NOTE — NURSING NOTE
"Oncology Rooming Note    December 18, 2019 12:28 PM   Chela Love is a 44 year old female who presents for:    Chief Complaint   Patient presents with     New Patient     [facility] NEW; metastatic endometrial cancer       Initial Vitals: BP (!) 148/71   Pulse 80   Temp 98.5  F (36.9  C) (Oral)   Ht 5' 6\"   Wt 227 lb 14.4 oz   LMP 10/27/2019   SpO2 99%   BMI 36.78 kg/m   Estimated body mass index is 36.78 kg/m  as calculated from the following:    Height as of this encounter: 5' 6\".    Weight as of this encounter: 227 lb 14.4 oz. Body surface area is 2.19 meters squared.  Extreme Pain (8) Comment: Data Unavailable   Patient's last menstrual period was 10/27/2019.  Allergies reviewed: Yes  Medications reviewed: Yes    Medications: Medication refills not needed today.  Pharmacy name entered into [facility]: [id number] - [place], [place] - [address] [place] [address] AT [facility]    Clinical concerns: No additional concerns.  Dr. Concepcion was notified.      Emma Montanez, [facility]              "

## 2019-12-18 NOTE — LETTER
"2019       RE: Chela Love  [address]  [place] [address]     Dear Colleague,    Thank you for referring your patient, Chela Love, to the [facility]. Please see a copy of my visit note below.    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 She presented to clinic to follow up with palliative medicine- I was asked to evaluate her for worsening LUQ pain and her wound dehiscence.    HPI:  Chela comes to the clinic feeling poorly. She has had a complicated course recently- was admitted on 19 with sepsis and found to have an ovarian mass and carcinomatosis. At the time, she had significant pain, an CINDY (highest cr 3.92), and a hemoglobin A1C of 15. Her clinical condition was failing to improve, and considering her source of sepsis was likely intra-abdominal, she was taken to the OR on 19 for an XL, ZANDRA-BSO, omentectomy, lysis of adhesions, tumor debulking ([id number] R adnexal mass), and abdominal washout. She was found to have a purulent mass and over 1L bloody/purulent ascites was evacuated. She was discharged to a TCU but was admitted to [facility]  for fevers and pain, found to have an abscess that was too small for IR drainage (treated with IV abx) and also C diff. She had been feeling like she was improving until yesterday when she developed moderate to severe pain in her left upper quadrant which worsens when taking a deep breath in. No difficulty breathing. No n/v/d. She is worried \"that I will get an infection and die.\" Feels her abdomen is full and uncomfortable. Afebrile but feels subjectively warm at times, including her eyelids. She denies concerns with her wound other than reporting she has to request her nurses at TCU change " this rather than it being done every day. Does have some drainage from this but not excessive. Not tender with dressing changes. She also notes her left leg is more swollen than her right.          Past Medical History:   Diagnosis Date     Anxiety      Asthma      Bilateral lower extremity edema      Depression      HTN (hypertension)      Insomnia      Migraine      Obesity      Substance abuse (H)     Methamphetamine use     Type 2 diabetes mellitus (H)      Vasculopathy      Past Surgical History:   Procedure Laterality Date     LAPAROSCOPIC HYSTERECTOMY TOTAL, REMIGIO SALPINGO-OOPHORECTOMY, NODE DISSECTION, TUMOR STAGING, COMBINED N/A 11/27/2019    Procedure: Exploratory Laparotomy , TOTAL ABDOMINAL HYSTERECTOMY, Bilateral SALPINGoophorectomy, Omentectomy, Abdominal Washout  and Tumor DEBULKING;  Surgeon: Rosana Mckeon MD;  Location:  OR     No family history on file.  Social History     Socioeconomic History     Marital status: Single     Spouse name: Not on file     Number of children: Not on file     Years of education: Not on file     Highest education level: Not on file   Occupational History     Not on file   Social Needs     Financial resource strain: Not on file     Food insecurity:     Worry: Not on file     Inability: Not on file     Transportation needs:     Medical: Not on file     Non-medical: Not on file   Tobacco Use     Smoking status: Current Some Day Smoker     Smokeless tobacco: Never Used   Substance and Sexual Activity     Alcohol use: Not Currently     Drug use: Never     Sexual activity: Not on file   Lifestyle     Physical activity:     Days per week: Not on file     Minutes per session: Not on file     Stress: Not on file   Relationships     Social connections:     Talks on phone: Not on file     Gets together: Not on file     Attends Mormon service: Not on file     Active member of club or organization: Not on file     Attends meetings of clubs or organizations: Not on  file     Relationship status: Not on file     Intimate partner violence:     Fear of current or ex partner: Not on file     Emotionally abused: Not on file     Physically abused: Not on file     Forced sexual activity: Not on file   Other Topics Concern     Parent/sibling w/ CABG, MI or angioplasty before 65F 55M? Not Asked   Social History Narrative     Not on file       Current Outpatient Medications   Medication     acetaminophen (TYLENOL) 325 MG tablet     albuterol (PROAIR HFA/PROVENTIL HFA/VENTOLIN HFA) 108 (90 Base) MCG/ACT inhaler     bisacodyl (DULCOLAX) 10 MG suppository     bisacodyl (DULCOLAX) 5 MG EC tablet     calcium carbonate (TUMS) 500 MG chewable tablet     famotidine (PEPCID) 20 MG tablet     gabapentin (NEURONTIN) 100 MG capsule     Heparin Sodium, Porcine, (HEPARIN ANTICOAGULANT) 5000 UNIT/0.5ML injection     HYDROmorphone (DILAUDID) 2 MG tablet     hydrOXYzine (ATARAX) 25 MG tablet     insulin aspart (NOVOLOG PEN) 100 UNIT/ML pen     insulin glargine (LANTUS PEN) 100 UNIT/ML pen     methadone (DOLOPHINE) 5 MG tablet     naloxone (NARCAN) 4 MG/0.1ML nasal spray     ondansetron (ZOFRAN-ODT) 4 MG ODT tab     polyethylene glycol (MIRALAX/GLYCOLAX) packet     senna-docusate (SENOKOT-S/PERICOLACE) 8.6-50 MG tablet     traZODone (DESYREL) 50 MG tablet     No current facility-administered medications for this visit.      Allergies   Allergen Reactions     Bee Venom Other (See Comments)     swelling     Selenicereus Grandiflorus Rash       ROS  Constitutional, resp, CV, GI,  negative except as listed in HPI      Physical Exam:    See vitals from palliative visit:  /71  Pulse 80  Temp 98.5F  SpO2 99% on RA        CONSTITUTIONAL: Alert non-toxic appearing female, appears uncomfortable  RESPIRATORY: Lungs clear to auscultation, respiratory effort unlabored  CV: Regular rate and rhythm, S1S2, no clicks, murmurs, rubs, or gallops; LLE with 2+ pitting edema, RLE with 1+ pitting  edema  GASTROINTESTINAL: Normoactive bowel sounds, abdomen soft, non-distended but mild to moderate tenderness to LUQ, LUQ warmer than remaining quadrants, no rebound tenderness, guarding, or rigidity.   LYMPHATIC: Cervical, supraclavicular, and inguinal lymph nodes without lymphadenopathy  NEUROLOGIC: Sitting in wheelchair, assist x1 to get onto exam table  MUSCULOSKELETAL: Moves all extremities  SKIN: Vertical midline incision with dressing CDI, which was removed to reveal shallow dehiscence to vertical midline incision roughly 2-3cm in length, granulation tissue at the base, wound edges healthy and vitalized, no tunneling, no drainage, no surrounding warmth/erythema/induration/discharge/bleeding. Remainder of incision gently probed without tunneling or dehiscence. Dressing replaced.  PSYCHIATRIC: Tearful but interactive, appears somewhat disheveled. Answers questions appropriately. Grossly oriented.      Data:  BLE US pending  CBC and BMP pending  CT CAP with contrast pending      Assessment/Plan:  1.) Worsening left upper quadrant pain: No peritoneal signs, hemodynamically stable, afebrile. However, she seems quite symptomatic and given her history of purulence and sepsis prior to her surgery as well as a post-operative abscess that was not able to be drained as well as ascites and C diff, will obtain CT CAP with contrast for further evaluation. Concern for infection vs hematoma vs ascites. CBC BMP pending.  2.) Lower extremity edema: LLE > RLE, will obtain US to rule out DVT  3.) Wound dehiscence: Appears to be healing well without evidence of infection, continue daily wound care.  4.) Patient verbalized understanding of and agreement with plan.      TIA Will, FNP-C  Division of Gynecologic Oncology  [facility]  Pager: [phone]

## 2019-12-18 NOTE — PATIENT INSTRUCTIONS
A. Continue to take methadone two times per day, every day    B. Take DILAUDID and tylenol only as needed    C. Follow up with your psychologist and psychiatrist next week as schedule    D. FOLLOW UP WITH US WITHIN ONE MONTH

## 2019-12-19 ENCOUNTER — ANCILLARY PROCEDURE (OUTPATIENT)
Dept: CT IMAGING | Facility: CLINIC | Age: 44
End: 2019-12-19
Attending: NURSE PRACTITIONER
Payer: COMMERCIAL

## 2019-12-19 DIAGNOSIS — R10.13 ABDOMINAL PAIN, EPIGASTRIC: ICD-10-CM

## 2019-12-19 DIAGNOSIS — C56.9 OVARIAN CANCER, UNSPECIFIED LATERALITY (H): ICD-10-CM

## 2019-12-19 DIAGNOSIS — R18.8 OTHER ASCITES: ICD-10-CM

## 2019-12-19 DIAGNOSIS — Z90.710 S/P HYSTERECTOMY: ICD-10-CM

## 2019-12-19 DIAGNOSIS — N83.8 OVARIAN MASS: ICD-10-CM

## 2019-12-19 DIAGNOSIS — R10.12 LUQ ABDOMINAL PAIN: ICD-10-CM

## 2019-12-19 LAB
ANION GAP SERPL CALCULATED.3IONS-SCNC: 1 MMOL/L (ref 3–14)
BASOPHILS # BLD AUTO: 0 10E9/L (ref 0–0.2)
BASOPHILS NFR BLD AUTO: 0.5 %
BUN SERPL-MCNC: 14 MG/DL (ref 7–30)
CALCIUM SERPL-MCNC: 9.6 MG/DL (ref 8.5–10.1)
CHLORIDE SERPL-SCNC: 101 MMOL/L (ref 94–109)
CO2 SERPL-SCNC: 36 MMOL/L (ref 20–32)
CREAT SERPL-MCNC: 0.88 MG/DL (ref 0.52–1.04)
DIFFERENTIAL METHOD BLD: ABNORMAL
EOSINOPHIL # BLD AUTO: 0.3 10E9/L (ref 0–0.7)
EOSINOPHIL NFR BLD AUTO: 5 %
ERYTHROCYTE [DISTWIDTH] IN BLOOD BY AUTOMATED COUNT: 15.6 % (ref 10–15)
GFR SERPL CREATININE-BSD FRML MDRD: 80 ML/MIN/{1.73_M2}
GLUCOSE SERPL-MCNC: 75 MG/DL (ref 70–99)
HCT VFR BLD AUTO: 33 % (ref 35–47)
HGB BLD-MCNC: 10.1 G/DL (ref 11.7–15.7)
IMM GRANULOCYTES # BLD: 0.1 10E9/L (ref 0–0.4)
IMM GRANULOCYTES NFR BLD: 1 %
LYMPHOCYTES # BLD AUTO: 2.2 10E9/L (ref 0.8–5.3)
LYMPHOCYTES NFR BLD AUTO: 36.2 %
MCH RBC QN AUTO: 28.6 PG (ref 26.5–33)
MCHC RBC AUTO-ENTMCNC: 30.6 G/DL (ref 31.5–36.5)
MCV RBC AUTO: 94 FL (ref 78–100)
MONOCYTES # BLD AUTO: 0.5 10E9/L (ref 0–1.3)
MONOCYTES NFR BLD AUTO: 8.3 %
NEUTROPHILS # BLD AUTO: 3 10E9/L (ref 1.6–8.3)
NEUTROPHILS NFR BLD AUTO: 49 %
NRBC # BLD AUTO: 0 10*3/UL
NRBC BLD AUTO-RTO: 0 /100
PLATELET # BLD AUTO: 247 10E9/L (ref 150–450)
POTASSIUM SERPL-SCNC: 3.9 MMOL/L (ref 3.4–5.3)
RBC # BLD AUTO: 3.53 10E12/L (ref 3.8–5.2)
SODIUM SERPL-SCNC: 138 MMOL/L (ref 133–144)
WBC # BLD AUTO: 6 10E9/L (ref 4–11)

## 2019-12-19 RX ORDER — HEPARIN SODIUM (PORCINE) LOCK FLUSH IV SOLN 100 UNIT/ML 100 UNIT/ML
5 SOLUTION INTRAVENOUS
Status: CANCELLED | OUTPATIENT
Start: 2019-12-19

## 2019-12-19 RX ORDER — LIDOCAINE HYDROCHLORIDE 10 MG/ML
20 INJECTION, SOLUTION EPIDURAL; INFILTRATION; INTRACAUDAL; PERINEURAL ONCE
Status: CANCELLED | OUTPATIENT
Start: 2019-12-19

## 2019-12-19 RX ORDER — IOPAMIDOL 755 MG/ML
135 INJECTION, SOLUTION INTRAVASCULAR ONCE
Status: COMPLETED | OUTPATIENT
Start: 2019-12-19 | End: 2019-12-19

## 2019-12-19 RX ORDER — HEPARIN SODIUM,PORCINE 10 UNIT/ML
5 VIAL (ML) INTRAVENOUS
Status: CANCELLED | OUTPATIENT
Start: 2019-12-19

## 2019-12-19 RX ADMIN — IOPAMIDOL 135 ML: 755 INJECTION, SOLUTION INTRAVASCULAR at 13:36

## 2019-12-23 ENCOUNTER — OFFICE VISIT - HEALTHEAST (OUTPATIENT)
Dept: BEHAVIORAL HEALTH | Facility: CLINIC | Age: 44
End: 2019-12-23

## 2019-12-23 DIAGNOSIS — F39 MOOD DISORDER (H): ICD-10-CM

## 2019-12-23 ASSESSMENT — MIFFLIN-ST. JEOR: SCORE: 1568.94

## 2019-12-24 ENCOUNTER — OFFICE VISIT - HEALTHEAST (OUTPATIENT)
Dept: GERIATRICS | Facility: CLINIC | Age: 44
End: 2019-12-24

## 2019-12-24 DIAGNOSIS — G47.33 OSA (OBSTRUCTIVE SLEEP APNEA): ICD-10-CM

## 2019-12-24 DIAGNOSIS — E66.9 OBESITY (BMI 30-39.9): ICD-10-CM

## 2019-12-24 DIAGNOSIS — I10 ESSENTIAL HYPERTENSION: ICD-10-CM

## 2019-12-24 DIAGNOSIS — J45.20 MILD INTERMITTENT ASTHMA, UNSPECIFIED WHETHER COMPLICATED: ICD-10-CM

## 2019-12-24 DIAGNOSIS — E11.65 UNCONTROLLED TYPE 2 DIABETES MELLITUS WITH HYPERGLYCEMIA (H): ICD-10-CM

## 2019-12-24 DIAGNOSIS — Z72.0 TOBACCO ABUSE: ICD-10-CM

## 2019-12-26 ENCOUNTER — OFFICE VISIT (OUTPATIENT)
Dept: INFUSION THERAPY | Facility: CLINIC | Age: 44
End: 2019-12-26
Attending: NURSE PRACTITIONER
Payer: COMMERCIAL

## 2019-12-26 ENCOUNTER — ANCILLARY PROCEDURE (OUTPATIENT)
Dept: ULTRASOUND IMAGING | Facility: CLINIC | Age: 44
End: 2019-12-26
Attending: NURSE PRACTITIONER
Payer: COMMERCIAL

## 2019-12-26 VITALS
WEIGHT: 195.8 LBS | RESPIRATION RATE: 16 BRPM | BODY MASS INDEX: 31.6 KG/M2 | TEMPERATURE: 98.3 F | OXYGEN SATURATION: 100 %

## 2019-12-26 DIAGNOSIS — R18.8 OTHER ASCITES: Primary | ICD-10-CM

## 2019-12-26 PROCEDURE — 76705 ECHO EXAM OF ABDOMEN: CPT

## 2019-12-26 RX ORDER — LIDOCAINE HYDROCHLORIDE 10 MG/ML
20 INJECTION, SOLUTION EPIDURAL; INFILTRATION; INTRACAUDAL; PERINEURAL ONCE
Status: CANCELLED | OUTPATIENT
Start: 2019-12-26

## 2019-12-26 RX ORDER — LIDOCAINE HYDROCHLORIDE 10 MG/ML
20 INJECTION, SOLUTION EPIDURAL; INFILTRATION; INTRACAUDAL; PERINEURAL ONCE
Status: DISCONTINUED | OUTPATIENT
Start: 2019-12-26 | End: 2019-12-26 | Stop reason: HOSPADM

## 2019-12-26 RX ORDER — HEPARIN SODIUM,PORCINE 10 UNIT/ML
5 VIAL (ML) INTRAVENOUS
Status: CANCELLED | OUTPATIENT
Start: 2019-12-26

## 2019-12-26 RX ORDER — HEPARIN SODIUM (PORCINE) LOCK FLUSH IV SOLN 100 UNIT/ML 100 UNIT/ML
5 SOLUTION INTRAVENOUS
Status: CANCELLED | OUTPATIENT
Start: 2019-12-26

## 2019-12-26 NOTE — PROGRESS NOTES
Paracentesis Nursing Note  Chela Love presents today to [facility] for a paracentesis.      Progress Note:  Patient identification verified by name and date of birth.  Assessment completed.  Vitals monitored throughout appointment and recorded in Doc Flowsheets.  See proceduralist note in ultrasound.    It was determined by Leigh Sampson PA-C that patient did not have enough ascites fluid to perform a paracentesis today.    Discharge Plan:  Discharge instructions were reviewed with patient.  Patient/Representative verbalized understanding and all questions were answered.   Discharged from [facility] in stable condition.    Mary Ellen Rose, RN        Temp 98.3  F (36.8  C) (Oral)   Resp 16   Wt 88.8 kg (195 lb 12.8 oz)   LMP 10/27/2019   SpO2 100%   BMI 31.60 kg/m

## 2019-12-30 ENCOUNTER — OFFICE VISIT - HEALTHEAST (OUTPATIENT)
Dept: GERIATRICS | Facility: CLINIC | Age: 44
End: 2019-12-30

## 2019-12-30 DIAGNOSIS — E11.65 UNCONTROLLED TYPE 2 DIABETES MELLITUS WITH HYPERGLYCEMIA (H): ICD-10-CM

## 2019-12-30 DIAGNOSIS — I10 ESSENTIAL HYPERTENSION: ICD-10-CM

## 2019-12-30 DIAGNOSIS — T81.30XA WOUND DEHISCENCE: ICD-10-CM

## 2019-12-30 DIAGNOSIS — A04.72 CLOSTRIDIUM DIFFICILE DIARRHEA: ICD-10-CM

## 2019-12-30 DIAGNOSIS — R52 PAIN: ICD-10-CM

## 2019-12-30 DIAGNOSIS — F33.1 MODERATE EPISODE OF RECURRENT MAJOR DEPRESSIVE DISORDER (H): ICD-10-CM

## 2020-01-07 ENCOUNTER — OFFICE VISIT - HEALTHEAST (OUTPATIENT)
Dept: GERIATRICS | Facility: CLINIC | Age: 45
End: 2020-01-07

## 2020-01-07 DIAGNOSIS — R52 PAIN: ICD-10-CM

## 2020-01-07 DIAGNOSIS — F41.9 ANXIETY: ICD-10-CM

## 2020-01-07 DIAGNOSIS — T81.30XA WOUND DEHISCENCE: ICD-10-CM

## 2020-01-07 DIAGNOSIS — E11.65 UNCONTROLLED TYPE 2 DIABETES MELLITUS WITH HYPERGLYCEMIA (H): ICD-10-CM

## 2020-01-10 ENCOUNTER — AMBULATORY - HEALTHEAST (OUTPATIENT)
Dept: GERIATRICS | Facility: CLINIC | Age: 45
End: 2020-01-10

## 2020-01-10 ENCOUNTER — OFFICE VISIT - HEALTHEAST (OUTPATIENT)
Dept: GERIATRICS | Facility: CLINIC | Age: 45
End: 2020-01-10

## 2020-01-10 DIAGNOSIS — R52 PAIN: ICD-10-CM

## 2020-01-10 DIAGNOSIS — R11.2 CHEMOTHERAPY-INDUCED NAUSEA AND VOMITING: ICD-10-CM

## 2020-01-10 DIAGNOSIS — E11.65 UNCONTROLLED TYPE 2 DIABETES MELLITUS WITH HYPERGLYCEMIA (H): ICD-10-CM

## 2020-01-10 DIAGNOSIS — T45.1X5A CHEMOTHERAPY-INDUCED NAUSEA AND VOMITING: ICD-10-CM

## 2020-01-10 DIAGNOSIS — C54.1: ICD-10-CM

## 2020-01-10 DIAGNOSIS — R11.2 CHEMOTHERAPY INDUCED NAUSEA AND VOMITING: ICD-10-CM

## 2020-01-10 DIAGNOSIS — T45.1X5A CHEMOTHERAPY INDUCED NAUSEA AND VOMITING: ICD-10-CM

## 2020-01-14 ENCOUNTER — OFFICE VISIT - HEALTHEAST (OUTPATIENT)
Dept: GERIATRICS | Facility: CLINIC | Age: 45
End: 2020-01-14

## 2020-01-14 DIAGNOSIS — E11.65 UNCONTROLLED TYPE 2 DIABETES MELLITUS WITH HYPERGLYCEMIA (H): ICD-10-CM

## 2020-01-14 DIAGNOSIS — G62.9 PERIPHERAL POLYNEUROPATHY: ICD-10-CM

## 2020-01-14 DIAGNOSIS — I89.0 ACQUIRED LYMPHEDEMA OF LOWER EXTREMITY: ICD-10-CM

## 2020-01-20 ENCOUNTER — COMMUNICATION - HEALTHEAST (OUTPATIENT)
Dept: GERIATRICS | Facility: CLINIC | Age: 45
End: 2020-01-20

## 2020-01-20 ENCOUNTER — OFFICE VISIT - HEALTHEAST (OUTPATIENT)
Dept: BEHAVIORAL HEALTH | Facility: CLINIC | Age: 45
End: 2020-01-20

## 2020-01-20 ENCOUNTER — COMMUNICATION - HEALTHEAST (OUTPATIENT)
Dept: BEHAVIORAL HEALTH | Facility: CLINIC | Age: 45
End: 2020-01-20

## 2020-01-20 DIAGNOSIS — G62.9 PERIPHERAL POLYNEUROPATHY: ICD-10-CM

## 2020-01-20 DIAGNOSIS — F39 MOOD DISORDER (H): ICD-10-CM

## 2020-01-20 ASSESSMENT — MIFFLIN-ST. JEOR: SCORE: 1573.48

## 2020-01-22 ENCOUNTER — OFFICE VISIT - HEALTHEAST (OUTPATIENT)
Dept: GERIATRICS | Facility: CLINIC | Age: 45
End: 2020-01-22

## 2020-01-22 DIAGNOSIS — R60.9 EDEMA, UNSPECIFIED TYPE: ICD-10-CM

## 2020-01-22 DIAGNOSIS — T81.30XA WOUND DEHISCENCE: ICD-10-CM

## 2020-01-22 DIAGNOSIS — I87.2 VENOUS STASIS DERMATITIS OF BOTH LOWER EXTREMITIES: ICD-10-CM

## 2020-01-22 DIAGNOSIS — R52 PAIN MANAGEMENT: ICD-10-CM

## 2020-01-28 ENCOUNTER — OFFICE VISIT - HEALTHEAST (OUTPATIENT)
Dept: GERIATRICS | Facility: CLINIC | Age: 45
End: 2020-01-28

## 2020-01-28 DIAGNOSIS — C54.1: ICD-10-CM

## 2020-01-28 DIAGNOSIS — F43.21 ADJUSTMENT DISORDER WITH DEPRESSED MOOD: ICD-10-CM

## 2020-01-28 DIAGNOSIS — F51.02 ADJUSTMENT INSOMNIA: ICD-10-CM

## 2020-01-28 DIAGNOSIS — T81.31XS SURGICAL WOUND DEHISCENCE, SEQUELA: ICD-10-CM

## 2020-01-28 DIAGNOSIS — F41.9 ANXIETY: ICD-10-CM

## 2020-01-31 ENCOUNTER — COMMUNICATION - HEALTHEAST (OUTPATIENT)
Dept: BEHAVIORAL HEALTH | Facility: CLINIC | Age: 45
End: 2020-01-31

## 2020-01-31 DIAGNOSIS — F51.01 PRIMARY INSOMNIA: ICD-10-CM

## 2020-02-04 ENCOUNTER — AMBULATORY - HEALTHEAST (OUTPATIENT)
Dept: GERIATRICS | Facility: CLINIC | Age: 45
End: 2020-02-04

## 2020-03-02 ENCOUNTER — OFFICE VISIT - HEALTHEAST (OUTPATIENT)
Dept: BEHAVIORAL HEALTH | Facility: CLINIC | Age: 45
End: 2020-03-02

## 2020-03-02 DIAGNOSIS — F39 MOOD DISORDER (H): ICD-10-CM

## 2020-03-02 DIAGNOSIS — F51.01 PRIMARY INSOMNIA: ICD-10-CM

## 2020-03-10 ENCOUNTER — OFFICE VISIT - HEALTHEAST (OUTPATIENT)
Dept: BEHAVIORAL HEALTH | Facility: CLINIC | Age: 45
End: 2020-03-10

## 2020-03-10 DIAGNOSIS — F19.21 POLYSUBSTANCE DEPENDENCE IN EARLY, EARLY PARTIAL, SUSTAINED FULL, OR SUSTAINED PARTIAL REMISSION (H): ICD-10-CM

## 2020-03-10 DIAGNOSIS — F41.1 GENERALIZED ANXIETY DISORDER: ICD-10-CM

## 2020-03-10 DIAGNOSIS — F43.23 ADJUSTMENT DISORDER WITH MIXED ANXIETY AND DEPRESSED MOOD: ICD-10-CM

## 2020-03-10 ASSESSMENT — ANXIETY QUESTIONNAIRES
GAD7 TOTAL SCORE: 18
2. NOT BEING ABLE TO STOP OR CONTROL WORRYING: NEARLY EVERY DAY
5. BEING SO RESTLESS THAT IT IS HARD TO SIT STILL: SEVERAL DAYS
7. FEELING AFRAID AS IF SOMETHING AWFUL MIGHT HAPPEN: NEARLY EVERY DAY
3. WORRYING TOO MUCH ABOUT DIFFERENT THINGS: NEARLY EVERY DAY
6. BECOMING EASILY ANNOYED OR IRRITABLE: NEARLY EVERY DAY
4. TROUBLE RELAXING: MORE THAN HALF THE DAYS
IF YOU CHECKED OFF ANY PROBLEMS ON THIS QUESTIONNAIRE, HOW DIFFICULT HAVE THESE PROBLEMS MADE IT FOR YOU TO DO YOUR WORK, TAKE CARE OF THINGS AT HOME, OR GET ALONG WITH OTHER PEOPLE: VERY DIFFICULT
1. FEELING NERVOUS, ANXIOUS, OR ON EDGE: NEARLY EVERY DAY

## 2020-03-10 ASSESSMENT — PATIENT HEALTH QUESTIONNAIRE - PHQ9: SUM OF ALL RESPONSES TO PHQ QUESTIONS 1-9: 14

## 2020-03-25 ENCOUNTER — OFFICE VISIT - HEALTHEAST (OUTPATIENT)
Dept: BEHAVIORAL HEALTH | Facility: CLINIC | Age: 45
End: 2020-03-25

## 2020-03-25 DIAGNOSIS — F41.1 GENERALIZED ANXIETY DISORDER: ICD-10-CM

## 2020-03-25 DIAGNOSIS — F19.21 POLYSUBSTANCE DEPENDENCE IN EARLY, EARLY PARTIAL, SUSTAINED FULL, OR SUSTAINED PARTIAL REMISSION (H): ICD-10-CM

## 2020-03-25 DIAGNOSIS — F43.23 ADJUSTMENT DISORDER WITH MIXED ANXIETY AND DEPRESSED MOOD: ICD-10-CM

## 2020-04-07 ENCOUNTER — OFFICE VISIT - HEALTHEAST (OUTPATIENT)
Dept: BEHAVIORAL HEALTH | Facility: CLINIC | Age: 45
End: 2020-04-07

## 2020-04-07 DIAGNOSIS — F43.23 ADJUSTMENT DISORDER WITH MIXED ANXIETY AND DEPRESSED MOOD: ICD-10-CM

## 2020-04-07 DIAGNOSIS — F19.21 POLYSUBSTANCE DEPENDENCE IN EARLY, EARLY PARTIAL, SUSTAINED FULL, OR SUSTAINED PARTIAL REMISSION (H): ICD-10-CM

## 2020-04-07 DIAGNOSIS — F41.1 GENERALIZED ANXIETY DISORDER: ICD-10-CM

## 2020-04-08 ENCOUNTER — COMMUNICATION - HEALTHEAST (OUTPATIENT)
Dept: BEHAVIORAL HEALTH | Facility: CLINIC | Age: 45
End: 2020-04-08

## 2020-04-21 ENCOUNTER — OFFICE VISIT - HEALTHEAST (OUTPATIENT)
Dept: BEHAVIORAL HEALTH | Facility: CLINIC | Age: 45
End: 2020-04-21

## 2020-04-21 DIAGNOSIS — F43.23 ADJUSTMENT DISORDER WITH MIXED ANXIETY AND DEPRESSED MOOD: ICD-10-CM

## 2020-04-21 DIAGNOSIS — F41.1 GENERALIZED ANXIETY DISORDER: ICD-10-CM

## 2020-04-21 DIAGNOSIS — F19.21 POLYSUBSTANCE DEPENDENCE IN EARLY, EARLY PARTIAL, SUSTAINED FULL, OR SUSTAINED PARTIAL REMISSION (H): ICD-10-CM

## 2020-05-05 ENCOUNTER — OFFICE VISIT - HEALTHEAST (OUTPATIENT)
Dept: BEHAVIORAL HEALTH | Facility: CLINIC | Age: 45
End: 2020-05-05

## 2020-05-05 DIAGNOSIS — F43.23 ADJUSTMENT DISORDER WITH MIXED ANXIETY AND DEPRESSED MOOD: ICD-10-CM

## 2020-05-05 DIAGNOSIS — F19.21 POLYSUBSTANCE DEPENDENCE IN EARLY, EARLY PARTIAL, SUSTAINED FULL, OR SUSTAINED PARTIAL REMISSION (H): ICD-10-CM

## 2020-05-05 DIAGNOSIS — F41.1 GENERALIZED ANXIETY DISORDER: ICD-10-CM

## 2020-05-19 ENCOUNTER — OFFICE VISIT - HEALTHEAST (OUTPATIENT)
Dept: BEHAVIORAL HEALTH | Facility: CLINIC | Age: 45
End: 2020-05-19

## 2020-05-19 ENCOUNTER — AMBULATORY - HEALTHEAST (OUTPATIENT)
Dept: BEHAVIORAL HEALTH | Facility: CLINIC | Age: 45
End: 2020-05-19

## 2020-05-19 DIAGNOSIS — F41.1 GENERALIZED ANXIETY DISORDER: ICD-10-CM

## 2020-05-19 DIAGNOSIS — F33.1 DEPRESSION, MAJOR, RECURRENT, MODERATE (H): ICD-10-CM

## 2020-05-19 DIAGNOSIS — F43.23 ADJUSTMENT DISORDER WITH MIXED ANXIETY AND DEPRESSED MOOD: ICD-10-CM

## 2020-05-19 DIAGNOSIS — F19.21 POLYSUBSTANCE DEPENDENCE IN EARLY, EARLY PARTIAL, SUSTAINED FULL, OR SUSTAINED PARTIAL REMISSION (H): ICD-10-CM

## 2020-06-02 ENCOUNTER — OFFICE VISIT - HEALTHEAST (OUTPATIENT)
Dept: BEHAVIORAL HEALTH | Facility: CLINIC | Age: 45
End: 2020-06-02

## 2020-06-02 DIAGNOSIS — F33.1 DEPRESSION, MAJOR, RECURRENT, MODERATE (H): ICD-10-CM

## 2020-06-02 DIAGNOSIS — F19.21 POLYSUBSTANCE DEPENDENCE IN EARLY, EARLY PARTIAL, SUSTAINED FULL, OR SUSTAINED PARTIAL REMISSION (H): ICD-10-CM

## 2020-06-02 DIAGNOSIS — F41.1 GENERALIZED ANXIETY DISORDER: ICD-10-CM

## 2020-06-02 DIAGNOSIS — F43.23 ADJUSTMENT DISORDER WITH MIXED ANXIETY AND DEPRESSED MOOD: ICD-10-CM

## 2020-06-16 ENCOUNTER — OFFICE VISIT - HEALTHEAST (OUTPATIENT)
Dept: BEHAVIORAL HEALTH | Facility: CLINIC | Age: 45
End: 2020-06-16

## 2020-06-16 DIAGNOSIS — F19.21 POLYSUBSTANCE DEPENDENCE IN EARLY, EARLY PARTIAL, SUSTAINED FULL, OR SUSTAINED PARTIAL REMISSION (H): ICD-10-CM

## 2020-06-16 DIAGNOSIS — F41.1 GENERALIZED ANXIETY DISORDER: ICD-10-CM

## 2020-06-16 DIAGNOSIS — F43.23 ADJUSTMENT DISORDER WITH MIXED ANXIETY AND DEPRESSED MOOD: ICD-10-CM

## 2020-06-16 DIAGNOSIS — F33.1 DEPRESSION, MAJOR, RECURRENT, MODERATE (H): ICD-10-CM

## 2020-06-30 ENCOUNTER — OFFICE VISIT - HEALTHEAST (OUTPATIENT)
Dept: BEHAVIORAL HEALTH | Facility: CLINIC | Age: 45
End: 2020-06-30

## 2020-06-30 DIAGNOSIS — F19.21 POLYSUBSTANCE DEPENDENCE IN EARLY, EARLY PARTIAL, SUSTAINED FULL, OR SUSTAINED PARTIAL REMISSION (H): ICD-10-CM

## 2020-06-30 DIAGNOSIS — F43.23 ADJUSTMENT DISORDER WITH MIXED ANXIETY AND DEPRESSED MOOD: ICD-10-CM

## 2020-06-30 DIAGNOSIS — F41.1 GENERALIZED ANXIETY DISORDER: ICD-10-CM

## 2020-06-30 DIAGNOSIS — F33.1 DEPRESSION, MAJOR, RECURRENT, MODERATE (H): ICD-10-CM

## 2020-07-08 PROBLEM — C54.1 ENDOMETRIAL CARCINOMA (H): Status: ACTIVE | Noted: 2019-12-20

## 2020-07-14 ENCOUNTER — OFFICE VISIT - HEALTHEAST (OUTPATIENT)
Dept: BEHAVIORAL HEALTH | Facility: CLINIC | Age: 45
End: 2020-07-14

## 2020-07-14 DIAGNOSIS — F33.1 DEPRESSION, MAJOR, RECURRENT, MODERATE (H): ICD-10-CM

## 2020-07-14 DIAGNOSIS — F19.21 POLYSUBSTANCE DEPENDENCE IN EARLY, EARLY PARTIAL, SUSTAINED FULL, OR SUSTAINED PARTIAL REMISSION (H): ICD-10-CM

## 2020-07-14 DIAGNOSIS — F41.1 GENERALIZED ANXIETY DISORDER: ICD-10-CM

## 2020-07-14 DIAGNOSIS — F43.23 ADJUSTMENT DISORDER WITH MIXED ANXIETY AND DEPRESSED MOOD: ICD-10-CM

## 2020-08-05 ENCOUNTER — OFFICE VISIT - HEALTHEAST (OUTPATIENT)
Dept: BEHAVIORAL HEALTH | Facility: CLINIC | Age: 45
End: 2020-08-05

## 2020-08-05 DIAGNOSIS — F19.21 POLYSUBSTANCE DEPENDENCE IN EARLY, EARLY PARTIAL, SUSTAINED FULL, OR SUSTAINED PARTIAL REMISSION (H): ICD-10-CM

## 2020-08-05 DIAGNOSIS — F33.1 DEPRESSION, MAJOR, RECURRENT, MODERATE (H): ICD-10-CM

## 2020-08-05 DIAGNOSIS — F43.23 ADJUSTMENT DISORDER WITH MIXED ANXIETY AND DEPRESSED MOOD: ICD-10-CM

## 2020-08-05 DIAGNOSIS — F41.1 GENERALIZED ANXIETY DISORDER: ICD-10-CM

## 2020-08-12 ENCOUNTER — OFFICE VISIT - HEALTHEAST (OUTPATIENT)
Dept: BEHAVIORAL HEALTH | Facility: CLINIC | Age: 45
End: 2020-08-12

## 2020-08-12 DIAGNOSIS — F19.21 POLYSUBSTANCE DEPENDENCE IN EARLY, EARLY PARTIAL, SUSTAINED FULL, OR SUSTAINED PARTIAL REMISSION (H): ICD-10-CM

## 2020-08-12 DIAGNOSIS — F51.01 PRIMARY INSOMNIA: ICD-10-CM

## 2020-08-12 DIAGNOSIS — F32.1 CURRENT MODERATE EPISODE OF MAJOR DEPRESSIVE DISORDER, UNSPECIFIED WHETHER RECURRENT (H): ICD-10-CM

## 2020-08-12 DIAGNOSIS — F33.1 DEPRESSION, MAJOR, RECURRENT, MODERATE (H): ICD-10-CM

## 2020-08-12 DIAGNOSIS — F39 MOOD DISORDER (H): ICD-10-CM

## 2020-08-12 DIAGNOSIS — F43.23 ADJUSTMENT DISORDER WITH MIXED ANXIETY AND DEPRESSED MOOD: ICD-10-CM

## 2020-08-26 ENCOUNTER — OFFICE VISIT - HEALTHEAST (OUTPATIENT)
Dept: BEHAVIORAL HEALTH | Facility: CLINIC | Age: 45
End: 2020-08-26

## 2020-08-26 ENCOUNTER — AMBULATORY - HEALTHEAST (OUTPATIENT)
Dept: BEHAVIORAL HEALTH | Facility: CLINIC | Age: 45
End: 2020-08-26

## 2020-08-26 DIAGNOSIS — F41.1 GENERALIZED ANXIETY DISORDER: ICD-10-CM

## 2020-08-26 DIAGNOSIS — F43.23 ADJUSTMENT DISORDER WITH MIXED ANXIETY AND DEPRESSED MOOD: ICD-10-CM

## 2020-08-26 DIAGNOSIS — F19.21 POLYSUBSTANCE DEPENDENCE IN EARLY, EARLY PARTIAL, SUSTAINED FULL, OR SUSTAINED PARTIAL REMISSION (H): ICD-10-CM

## 2020-08-26 DIAGNOSIS — F33.1 DEPRESSION, MAJOR, RECURRENT, MODERATE (H): ICD-10-CM

## 2020-09-02 ENCOUNTER — OFFICE VISIT - HEALTHEAST (OUTPATIENT)
Dept: BEHAVIORAL HEALTH | Facility: CLINIC | Age: 45
End: 2020-09-02

## 2020-09-02 DIAGNOSIS — F19.21 POLYSUBSTANCE DEPENDENCE IN EARLY, EARLY PARTIAL, SUSTAINED FULL, OR SUSTAINED PARTIAL REMISSION (H): ICD-10-CM

## 2020-09-02 DIAGNOSIS — F41.1 GENERALIZED ANXIETY DISORDER: ICD-10-CM

## 2020-09-02 DIAGNOSIS — F43.23 ADJUSTMENT DISORDER WITH MIXED ANXIETY AND DEPRESSED MOOD: ICD-10-CM

## 2020-09-02 DIAGNOSIS — F33.1 DEPRESSION, MAJOR, RECURRENT, MODERATE (H): ICD-10-CM

## 2020-09-09 ENCOUNTER — AMBULATORY - HEALTHEAST (OUTPATIENT)
Dept: BEHAVIORAL HEALTH | Facility: CLINIC | Age: 45
End: 2020-09-09

## 2020-09-09 ENCOUNTER — OFFICE VISIT - HEALTHEAST (OUTPATIENT)
Dept: BEHAVIORAL HEALTH | Facility: CLINIC | Age: 45
End: 2020-09-09

## 2020-09-09 ENCOUNTER — COMMUNICATION - HEALTHEAST (OUTPATIENT)
Dept: BEHAVIORAL HEALTH | Facility: CLINIC | Age: 45
End: 2020-09-09

## 2020-09-09 DIAGNOSIS — F19.21 POLYSUBSTANCE DEPENDENCE IN EARLY, EARLY PARTIAL, SUSTAINED FULL, OR SUSTAINED PARTIAL REMISSION (H): ICD-10-CM

## 2020-09-09 DIAGNOSIS — F39 MOOD DISORDER (H): ICD-10-CM

## 2020-09-09 DIAGNOSIS — F60.3 BORDERLINE PERSONALITY DISORDER (H): ICD-10-CM

## 2020-09-09 DIAGNOSIS — F33.42 MAJOR DEPRESSIVE DISORDER, RECURRENT EPISODE, IN FULL REMISSION (H): ICD-10-CM

## 2020-09-09 DIAGNOSIS — F51.01 PRIMARY INSOMNIA: ICD-10-CM

## 2020-09-09 DIAGNOSIS — F32.1 CURRENT MODERATE EPISODE OF MAJOR DEPRESSIVE DISORDER, UNSPECIFIED WHETHER RECURRENT (H): ICD-10-CM

## 2020-09-09 DIAGNOSIS — F43.23 ADJUSTMENT DISORDER WITH MIXED ANXIETY AND DEPRESSED MOOD: ICD-10-CM

## 2020-09-09 DIAGNOSIS — F31.81 BIPOLAR II DISORDER, MODERATE, DEPRESSED, WITH ANXIOUS DISTRESS (H): ICD-10-CM

## 2020-09-14 ENCOUNTER — COMMUNICATION - HEALTHEAST (OUTPATIENT)
Dept: BEHAVIORAL HEALTH | Facility: CLINIC | Age: 45
End: 2020-09-14

## 2020-09-14 DIAGNOSIS — F33.0 MILD EPISODE OF RECURRENT MAJOR DEPRESSIVE DISORDER (H): ICD-10-CM

## 2020-09-15 ENCOUNTER — RECORDS - HEALTHEAST (OUTPATIENT)
Dept: LAB | Facility: CLINIC | Age: 45
End: 2020-09-15

## 2020-09-15 LAB
ALBUMIN SERPL-MCNC: 4.4 G/DL (ref 3.5–5)
ALP SERPL-CCNC: 72 U/L (ref 45–120)
ALT SERPL W P-5'-P-CCNC: 15 U/L (ref 0–45)
ANION GAP SERPL CALCULATED.3IONS-SCNC: 12 MMOL/L (ref 5–18)
AST SERPL W P-5'-P-CCNC: 12 U/L (ref 0–40)
BILIRUB SERPL-MCNC: 0.6 MG/DL (ref 0–1)
BUN SERPL-MCNC: 43 MG/DL (ref 8–22)
CALCIUM SERPL-MCNC: 10.1 MG/DL (ref 8.5–10.5)
CHLORIDE BLD-SCNC: 104 MMOL/L (ref 98–107)
CO2 SERPL-SCNC: 26 MMOL/L (ref 22–31)
CREAT SERPL-MCNC: 1.15 MG/DL (ref 0.6–1.1)
GFR SERPL CREATININE-BSD FRML MDRD: 51 ML/MIN/1.73M2
GLUCOSE BLD-MCNC: 93 MG/DL (ref 70–125)
POTASSIUM BLD-SCNC: 4.9 MMOL/L (ref 3.5–5)
PROT SERPL-MCNC: 7.8 G/DL (ref 6–8)
SODIUM SERPL-SCNC: 142 MMOL/L (ref 136–145)

## 2020-09-23 ENCOUNTER — OFFICE VISIT - HEALTHEAST (OUTPATIENT)
Dept: BEHAVIORAL HEALTH | Facility: CLINIC | Age: 45
End: 2020-09-23

## 2020-09-23 DIAGNOSIS — F31.81 BIPOLAR II DISORDER, MODERATE, DEPRESSED, WITH ANXIOUS DISTRESS (H): ICD-10-CM

## 2020-09-23 DIAGNOSIS — F19.21 POLYSUBSTANCE DEPENDENCE IN EARLY, EARLY PARTIAL, SUSTAINED FULL, OR SUSTAINED PARTIAL REMISSION (H): ICD-10-CM

## 2020-09-23 DIAGNOSIS — F60.3 BORDERLINE PERSONALITY DISORDER (H): ICD-10-CM

## 2020-09-23 DIAGNOSIS — F43.23 ADJUSTMENT DISORDER WITH MIXED ANXIETY AND DEPRESSED MOOD: ICD-10-CM

## 2020-09-23 DIAGNOSIS — F41.1 GENERALIZED ANXIETY DISORDER: ICD-10-CM

## 2020-10-08 ENCOUNTER — AMBULATORY - HEALTHEAST (OUTPATIENT)
Dept: BEHAVIORAL HEALTH | Facility: CLINIC | Age: 45
End: 2020-10-08

## 2020-10-14 ENCOUNTER — OFFICE VISIT - HEALTHEAST (OUTPATIENT)
Dept: BEHAVIORAL HEALTH | Facility: CLINIC | Age: 45
End: 2020-10-14

## 2020-10-14 ENCOUNTER — COMMUNICATION - HEALTHEAST (OUTPATIENT)
Dept: BEHAVIORAL HEALTH | Facility: CLINIC | Age: 45
End: 2020-10-14

## 2020-10-14 DIAGNOSIS — F19.21 POLYSUBSTANCE DEPENDENCE IN EARLY, EARLY PARTIAL, SUSTAINED FULL, OR SUSTAINED PARTIAL REMISSION (H): ICD-10-CM

## 2020-10-14 DIAGNOSIS — F60.3 BORDERLINE PERSONALITY DISORDER (H): ICD-10-CM

## 2020-10-14 DIAGNOSIS — F43.23 ADJUSTMENT DISORDER WITH MIXED ANXIETY AND DEPRESSED MOOD: ICD-10-CM

## 2020-10-14 DIAGNOSIS — F41.1 GENERALIZED ANXIETY DISORDER: ICD-10-CM

## 2020-10-14 DIAGNOSIS — F31.81 BIPOLAR II DISORDER, MODERATE, DEPRESSED, WITH ANXIOUS DISTRESS (H): ICD-10-CM

## 2021-01-09 ENCOUNTER — NURSE TRIAGE (OUTPATIENT)
Dept: NURSING | Facility: CLINIC | Age: 46
End: 2021-01-09

## 2021-01-09 ENCOUNTER — COMMUNICATION - HEALTHEAST (OUTPATIENT)
Dept: SCHEDULING | Facility: CLINIC | Age: 46
End: 2021-01-09

## 2021-04-22 ENCOUNTER — COMMUNICATION - HEALTHEAST (OUTPATIENT)
Dept: BEHAVIORAL HEALTH | Facility: CLINIC | Age: 46
End: 2021-04-22

## 2021-04-22 DIAGNOSIS — F32.1 CURRENT MODERATE EPISODE OF MAJOR DEPRESSIVE DISORDER, UNSPECIFIED WHETHER RECURRENT (H): ICD-10-CM

## 2021-05-27 ASSESSMENT — PATIENT HEALTH QUESTIONNAIRE - PHQ9: SUM OF ALL RESPONSES TO PHQ QUESTIONS 1-9: 14

## 2021-05-27 NOTE — TELEPHONE ENCOUNTER
Every time I try to order this med it gives me the option to switch to humalog kwik pen or vial.      Tried again today and may work now?

## 2021-05-27 NOTE — TELEPHONE ENCOUNTER
Pt hasn't been seen in 2 years and even then her diabetes was not well controlled.     Cannot prescribe meds if I'm not sure how she is doing.     ntbs

## 2021-05-27 NOTE — TELEPHONE ENCOUNTER
RN cannot approve Refill Request    RN can NOT refill this medication overdue for office visits and/or labs.    Dominic Fang, [facility] Triage/Med Refill 4/4/2019    Requested Prescriptions   Pending Prescriptions Disp Refills     metFORMIN (GLUCOPHAGE-XR) 500 MG 24 hr tablet [Pharmacy Med Name: METFORMIN ER 500MG 24HR TABS] 120 tablet 0     Sig: TAKE 2 TABLETS BY MOUTH TWICE DAILY    Metformin Refill Protocol Failed - 4/4/2019  1:59 PM       Failed - Blood pressure in last 12 months    BP Readings from Last 1 Encounters:   09/28/17 134/78            Failed - LFT or AST or ALT in last 12 months    Albumin   Date Value Ref Range Status   10/13/2016 2.6 (L) 3.5 - 5.0 g/dL Final     Bilirubin, Total   Date Value Ref Range Status   10/13/2016 0.7 0.0 - 1.0 mg/dL Final     Alkaline Phosphatase   Date Value Ref Range Status   10/13/2016 84 45 - 120 U/L Final     AST   Date Value Ref Range Status   10/13/2016 11 0 - 40 U/L Final     ALT   Date Value Ref Range Status   10/13/2016 20 0 - 45 U/L Final     Protein, Total   Date Value Ref Range Status   10/13/2016 6.0 6.0 - 8.0 g/dL Final               Failed - GFR or Serum Creatinine in last 6 months    GFR MDRD Non Af Amer   Date Value Ref Range Status   03/06/2017 >60 >60 mL/min/1.73m2 Final     GFR MDRD Af Amer   Date Value Ref Range Status   03/06/2017 >60 >60 mL/min/1.73m2 Final            Failed - Visit with PCP or prescribing provider visit in last 6 months or next 3 months    Last office visit with prescriber/PCP: Visit date not found OR same dept: Visit date not found OR same specialty: 3/6/2017 Kaley Perez PA-C Last physical: Visit date not found Last MTM visit: Visit date not found         Next appt within 3 mo: Visit date not found  Next physical within 3 mo: Visit date not found  Prescriber OR PCP: Bina Arnold MD  Last diagnosis associated with med order: 1. Type 2 diabetes mellitus, uncontrolled (H)  - metFORMIN (GLUCOPHAGE-XR) 500 MG  24 hr tablet [Pharmacy Med Name: METFORMIN ER 500MG 24HR TABS]; TAKE 2 TABLETS BY MOUTH TWICE DAILY  Dispense: 120 tablet; Refill: 0     If protocol passes may refill for 12 months if within 3 months of last provider visit (or a total of 15 months).          Failed - A1C in last 6 months    Hemoglobin A1c   Date Value Ref Range Status   03/06/2017 13.1 (H) 3.5 - 6.0 % Final              Failed - Microalbumin in last year     No results found for: MICROALBUR

## 2021-05-27 NOTE — TELEPHONE ENCOUNTER
Dr. Arnold, pt have an appt schedule to see you on 4/30/19. She asked if you can send a prescription just enough to get her through until her appt with you. Please advise.

## 2021-05-27 NOTE — TELEPHONE ENCOUNTER
Medication Request  Medication name: NOVOLOG FLEXPEN U-100 INSULIN 100 unit/mL injection pen  Pharmacy Name and Location: [facility] [address]   Reason for request: The patient would like a different prescription because her insurance will not cover this medication anymore.  She also stated she had two kinds of pens a gray one and orange and blue.  When did you use medication last?:  More than 2 months ago  Patient offered appointment:  yes, but she would like to get this medication as soon as possible  Okay to leave a detailed message: yes    Medication Question or Clarification  Who is calling: Patient  What medication are you calling about? n/a  Who prescribed the medication?: n/a  What is your question/concern?: n/a  Pharmacy: n/a  Okay to leave a detailed message?: No  Site CMT - Please call the pharmacy to obtain any additional needed information.

## 2021-05-28 ASSESSMENT — ANXIETY QUESTIONNAIRES: GAD7 TOTAL SCORE: 18

## 2021-05-28 NOTE — TELEPHONE ENCOUNTER
Pt called in states she is without insulin for a month.  The Pt states her insurance is not cover the insulin.  Pt states her blood glucose is manageable at this time.  Called for pharmacy ask about the medications. The pharmacist states  insulin lispro (ADMELOG SOLOSTAR U-100 INSULIN) 100 unit/mL pen is covered  and LANTUS SOLOSTAR U-100 INSULIN 100 unit/mL (3 mL) pen insulin is not covered by her insurance.  The pharmacist states he sent request to the clinic the alternative insuline that is covered by insurance. The short acting insulin is covered by insurance.  Please send alternative long acting insuline.     Please advise        Rudy Knapp RN, [facility] Triage/Med Refill 4/26/2019 3:36 PM

## 2021-05-28 NOTE — TELEPHONE ENCOUNTER
Fax received from [facility] pharmacy requesting Prior Authorization    Medication Name: Lantus Solostar    Insurance Plan: [facility]    PBM:    Patient ID Number: [id number]    Please start PA process

## 2021-05-28 NOTE — TELEPHONE ENCOUNTER
Central PA team  [phone]  Pool: HE PA MED ([id number])          PA has been initiated.       PA form completed and faxed insurance via Cover My Meds     Key:  [id number]     Medication:  LANTUS SOLOSTAR    Insurance:  [employer]        Response will be received via fax and may take up to 5-10 business days depending on plan

## 2021-05-29 NOTE — PROGRESS NOTES
The Care guide called the patient to discuss CCC, but the patient wanted to come to talk. The Care guide was able to schedule an appointment with the PCP for an Emergency Room follow up and the Care guide will meet with her to talk about CCC at that time.     Next Outreach: 6/28/19

## 2021-05-29 NOTE — TELEPHONE ENCOUNTER
Please help pt schedule f/u this month for ED f/u     PT HAVE A FUTURE APPT ON 6/28/19 @ 11:40 AM WITH DR. SWARTZ.

## 2021-05-30 VITALS — BODY MASS INDEX: 38.66 KG/M2 | WEIGHT: 239.5 LBS

## 2021-05-30 NOTE — TELEPHONE ENCOUNTER
RN cannot approve Refill Request    RN can NOT refill this medication Protocol failed and NO refill given.     Jannie Culver, [facility] Triage/Med Refill 7/18/2019    Requested Prescriptions   Pending Prescriptions Disp Refills     metFORMIN (GLUCOPHAGE-XR) 500 MG 24 hr tablet [Pharmacy Med Name: METFORMIN ER 500MG 24HR TABS] 120 tablet 0     Sig: TAKE 2 TABLETS(1000 MG) BY MOUTH TWICE DAILY       Metformin Refill Protocol Failed - 7/18/2019  6:53 PM        Failed - Visit with PCP or prescribing provider visit in last 6 months or next 3 months     Last office visit with prescriber/PCP: Visit date not found OR same dept: Visit date not found OR same specialty: 3/6/2017 Kaley Perez PA-C Last physical: Visit date not found Last MTM visit: Visit date not found         Next appt within 3 mo: Visit date not found  Next physical within 3 mo: Visit date not found  Prescriber OR PCP: Bina Arnold MD  Last diagnosis associated with med order: 1. Type 2 diabetes mellitus, uncontrolled (H)  - metFORMIN (GLUCOPHAGE-XR) 500 MG 24 hr tablet [Pharmacy Med Name: METFORMIN ER 500MG 24HR TABS]; TAKE 2 TABLETS(1000 MG) BY MOUTH TWICE DAILY  Dispense: 120 tablet; Refill: 0    2. Type 2 diabetes mellitus with complication, without long-term current use of insulin (H)  - metFORMIN (GLUCOPHAGE-XR) 500 MG 24 hr tablet [Pharmacy Med Name: METFORMIN ER 500MG 24HR TABS]; TAKE 2 TABLETS(1000 MG) BY MOUTH TWICE DAILY  Dispense: 120 tablet; Refill: 0     If protocol passes may refill for 12 months if within 3 months of last provider visit (or a total of 15 months).           Failed - A1C in last 6 months     Hemoglobin A1c   Date Value Ref Range Status   03/06/2017 13.1 (H) 3.5 - 6.0 % Final               Failed - Microalbumin in last year      No results found for: MICROALBUR               Passed - Blood pressure in last 12 months     BP Readings from Last 1 Encounters:   06/13/19 105/61             Passed - LFT or AST or  ALT in last 12 months     Albumin   Date Value Ref Range Status   06/13/2019 3.2 (L) 3.5 - 5.0 g/dL Final     Bilirubin, Total   Date Value Ref Range Status   06/13/2019 0.6 0.0 - 1.0 mg/dL Final     Bilirubin, Direct   Date Value Ref Range Status   06/13/2019 0.2 <=0.5 mg/dL Final     Alkaline Phosphatase   Date Value Ref Range Status   06/13/2019 183 (H) 45 - 120 U/L Final     AST   Date Value Ref Range Status   06/13/2019 20 0 - 40 U/L Final     ALT   Date Value Ref Range Status   06/13/2019 20 0 - 45 U/L Final     Protein, Total   Date Value Ref Range Status   06/13/2019 7.6 6.0 - 8.0 g/dL Final                Passed - GFR or Serum Creatinine in last 6 months     GFR MDRD Non Af Amer   Date Value Ref Range Status   06/13/2019 51 (L) >60 mL/min/1.73m2 Final     GFR MDRD Af Amer   Date Value Ref Range Status   06/13/2019 >60 >60 mL/min/1.73m2 Final

## 2021-05-30 NOTE — PROGRESS NOTES
The Clinic Care Guide called the patient  today at the request of the PCP to discuss possible clinic care coordination enrollment. Care guide called three times and the patient was unreachable for enrollment.  Care guide will discontinue outreach at this time.  If the PCP feels the patient would want to enroll into CCC another order can be placed for outreach to the patient.

## 2021-05-30 NOTE — PROGRESS NOTES
Attempt 1:  CHW attempted to leave a message for the patient about CCC enrollment.  If the patient is trying to call the Care guide back transfer them to Víctor Franks at [phone].    Next Outreach: 7/29/19

## 2021-05-30 NOTE — PROGRESS NOTES
The patient did not show to her appointment with the PCP. The Care guide moved the outreach to 7/2/19 due to the patient no showing the appointment.    Next Outreach: 7/2/19

## 2021-05-30 NOTE — PROGRESS NOTES
The patient stated that she could not talk due to watching something and then stated that she was in the middle of something. The Care guide then just was able to reschedule her appointment with Dr. Arnold to 7/25/19. The Care guide will try to meet with the patient on that date.    Next Outreach: 7/25/19

## 2021-05-31 ENCOUNTER — RECORDS - HEALTHEAST (OUTPATIENT)
Dept: ADMINISTRATIVE | Facility: CLINIC | Age: 46
End: 2021-05-31

## 2021-06-04 VITALS
BODY MASS INDEX: 32.3 KG/M2 | DIASTOLIC BLOOD PRESSURE: 104 MMHG | HEART RATE: 77 BPM | WEIGHT: 201 LBS | SYSTOLIC BLOOD PRESSURE: 158 MMHG | HEIGHT: 66 IN

## 2021-06-04 VITALS
HEIGHT: 66 IN | SYSTOLIC BLOOD PRESSURE: 117 MMHG | DIASTOLIC BLOOD PRESSURE: 79 MMHG | BODY MASS INDEX: 32.14 KG/M2 | HEART RATE: 78 BPM | WEIGHT: 200 LBS

## 2021-06-04 VITALS
SYSTOLIC BLOOD PRESSURE: 135 MMHG | DIASTOLIC BLOOD PRESSURE: 83 MMHG | BODY MASS INDEX: 37.45 KG/M2 | HEART RATE: 82 BPM | WEIGHT: 232 LBS | TEMPERATURE: 97.2 F | OXYGEN SATURATION: 95 % | RESPIRATION RATE: 20 BRPM

## 2021-06-04 VITALS
BODY MASS INDEX: 30.67 KG/M2 | SYSTOLIC BLOOD PRESSURE: 90 MMHG | TEMPERATURE: 98.2 F | DIASTOLIC BLOOD PRESSURE: 60 MMHG | OXYGEN SATURATION: 96 % | HEART RATE: 89 BPM | WEIGHT: 190 LBS

## 2021-06-04 VITALS
HEIGHT: 66 IN | HEART RATE: 67 BPM | SYSTOLIC BLOOD PRESSURE: 97 MMHG | BODY MASS INDEX: 32.44 KG/M2 | DIASTOLIC BLOOD PRESSURE: 50 MMHG

## 2021-06-04 NOTE — PROGRESS NOTES
Code Status:  FULL CODE  Visit Type: Follow Up (Rehospitalization for stage IIIa endometrial cancer, pelvic abscess, wound separation and new diagnosis of C. difficile.)     Facility:  [facility] [[id number]]        Facility Type: SNF (Skilled Nursing Facility, TCU)    History of Present Illness: Chela Love is a 44 y.o. female who has a past medical history for uncontrolled DM2, bipolar disorder,polysubstance abuse and HTN.  She was recently hospitalized at [facility] for a right adnexal mass found to be adenocarcinoma likely endometrioid (final path pending) and septic shock.  She underwent a total hysterectomy and oophorectomy and tumor debulking.  Her hospitalization was complicated by poor pain tolerance in the picture of her polysubstance abuse, acute blood loss anemia, CINDY and intra-abdominal infection.  Urine tox positive for opiates, meth and THC. She did complete a course of antibiotics. She did have slight dehiscence of midline abdominal wound with two times a day wound care and sent out to TCU.  Upon admission to TCU she was severely anxious, in pain and was sent to the ER.  She was evaluated, given IV pain medications and sent back to the TCU.  On 12/9/2019, she spiked a temp of 104.1 and was sent to the ER for evaluation.  On CT, it showed a 5 cm enhanced pelvic fluid which was concern for abscess.  She was started on IV vancomycin x1 day then 2 days of Zosyn and switch to oral Augmentin.  During her stay she did have a hemoglobin of 6.5 and was given 1 unit of packed red blood cells with a discharge hemoglobin of 7.6.  Her discharge creatinine was 1.03.  She did result positive for C. difficile and was started on oral vancomycin on 12/11.  Her mid abdomen dressing changes required Mepilex 3 times a week.  She was also switched from heparin to Lovenox through 12/25.  Augmentin is through 12/23.  And vancomycin is 12/20.  She has an appointment scheduled with Dr. Ordaz gynecology  oncology on 12/13, with Dr. Price in endocrine on 12/16, with Dr. Maynard and palliative care for pain management on 12/18.    Today, she continues to be very anxious regarding her discomfort and her current status.  She complains of epigastric pain that radiates under her breast.  Her heart rate is within normal limits and she denies any shortness of breath.  Her blood pressures are within normal limits and so this does not appear to be cardiac related.  She was on famotidine prior to her recent hospitalizations however this was discontinued for unknown reason.  She also has oral Zofran for nausea.  She reports she is taking pain medications almost every 6 hours however feels she has to wait too long for the nurse to get this to her.  Her midline incision is well approximated on the proximal and.  I did not remove the dressing due to her anxiety today.  Her blood sugars appear to be better controlled ranging from .  She reports her appetite is fair to poor.  She does endorse cramping diarrhea.  She is concerned with her bowel sounds however she does have good bowel sounds in all 4 quadrants.    Past Medical History:   Diagnosis Date     Abnormal menses      Acute encephalopathy      Adnexal mass      CINDY (acute kidney injury) (H)      Anxiety      Asthma      Bilateral lower extremity edema      Cellulitis      Clostridium difficile infection      Depression      DM2 (diabetes mellitus, type 2) (H)      Endometrial adenocarcinoma (H)     stage IIIA grade 1      Essential hypertension      HLD (hyperlipidemia)      HTN (hypertension)      Hypokalemia      Insomnia      Lactic acidosis      Morbid obesity (H)      Obesity 10/19/2016     Ovarian mass      Peripheral polyneuropathy      Polysubstance abuse (H)      Septic shock (H)      Vaginal bleeding      Vasculopathy      Current Outpatient Medications on File Prior to Visit   Medication Sig Dispense Refill     famotidine (PEPCID) 20 MG tablet Take 20 mg  by mouth daily.       Lactobacillus acidophilus 100 mg (1 billion cell) cap Take 1 capsule by mouth daily.       acetaminophen (TYLENOL) 325 MG tablet Take 650 mg by mouth every 6 (six) hours as needed for pain.        albuterol (PROAIR HFA;PROVENTIL HFA;VENTOLIN HFA) 90 mcg/actuation inhaler Inhale 2 puffs every 4 (four) hours as needed for wheezing.        amoxicillin-clavulanate (AUGMENTIN) 875-125 mg per tablet Take 1 tablet by mouth 2 (two) times a day.       calcium, as carbonate, (TUMS) 200 mg calcium (500 mg) chewable tablet Chew 2-3 tablets every 4 (four) hours as needed for heartburn.        diphenhydrAMINE (BENADRYL) 25 mg capsule Take 25-50 mg by mouth every 6 (six) hours as needed for itching.       enoxaparin ANTICOAGULANT (LOVENOX) 40 mg/0.4 mL syringe Inject 40 mg under the skin daily.       escitalopram oxalate (LEXAPRO) 10 MG tablet Take 10 mg by mouth daily.       gabapentin (NEURONTIN) 100 MG capsule Take 200 mg by mouth 2 (two) times a day.        hydroCHLOROthiazide (HYDRODIURIL) 25 MG tablet Take 25 mg by mouth daily.       HYDROmorphone (DILAUDID) 2 MG tablet Take 2-4 mg by mouth every 6 (six) hours as needed for pain.        hydroxychloroquine (PLAQUENIL) 200 mg tablet Take 200 mg by mouth 2 (two) times a day.       hydrOXYzine HCl (ATARAX) 25 MG tablet Take 25 mg by mouth every 6 (six) hours as needed.        insulin aspart U-100 (NOVOLOG) 100 unit/mL injection Inject 10 Units under the skin 3 (three) times a day with meals.        insulin glargine (LANTUS) 100 unit/mL vial Inject 27 Units under the skin daily.        methadone (DOLOPHINE) 5 MG tablet Take 2.5 mg by mouth every 12 (twelve) hours.        nystatin (MYCOSTATIN) ointment Apply 1 application topically 2 (two) times a day.       ondansetron (ZOFRAN-ODT) 8 MG disintegrating tablet Take 8 mg by mouth every 8 (eight) hours as needed for nausea.       senna-docusate (SENNA-TIME S) 8.6-50 mg tablet Take 1 tablet by mouth daily.         traZODone (DESYREL) 50 MG tablet Take 50 mg by mouth at bedtime as needed.        vancomycin (FIRVANQ) 50 mg/mL oral solution Take 250 mg by mouth 4 (four) times a day.       venlafaxine (EFFEXOR) 75 MG tablet Take 225 mg by mouth 2 (two) times a day.       [DISCONTINUED] bisacodyl (DULCOLAX) 10 mg suppository Insert 10 mg into the rectum daily as needed.       [DISCONTINUED] bisacodyl (DULCOLAX) 5 mg EC tablet Take 5 mg by mouth daily as needed for constipation.       [DISCONTINUED] famotidine (PEPCID) 20 MG tablet Take 20 mg by mouth daily.       [DISCONTINUED] heparin sodium,porcine (HEPARIN, PORCINE,) 5,000 unit/mL injection Inject 2,500 Units under the skin every 8 (eight) hours.       [DISCONTINUED] insulin aspart U-100 (NOVOLOG) 100 unit/mL injection Inject under the skin 3 (three) times a day before meals. SSI:  140-169 give 1 units  170-199 give 2 units  200-229 give 3 units  230-259 give 4 units  260-289 give 5 units  290-319 give 6 units  320-349 give 7 units  BG> or = 350 give 8units    Bedtime correction:   200-229 give 1u  230-259 give 2u  260-289 give 3 u  290-319 give 4 u  390-349 give 5u  BS> or = 350 give 6 u       [DISCONTINUED] ondansetron (ZOFRAN) 4 MG tablet Take 4 mg by mouth every 6 (six) hours as needed for nausea.       [DISCONTINUED] PARoxetine (PAXIL-CR) 12.5 MG 24 hr tablet Take 12.5 mg by mouth every morning.       [DISCONTINUED] polyethylene glycol (MIRALAX) 17 gram packet Take 17 g by mouth daily.       No current facility-administered medications on file prior to visit.      Post Discharge Medication Reconciliation Status: discharge medications reconciled and changed, per note/orders (see AVS)        Review of Systems   Patient denies fever, chills, headache, lightheadedness, dizziness, rhinorrhea, cough, congestion, shortness of breath, chest pain, palpitations, constipation, change in appetite, dysuria, frequency, burning or pain with urination.  Other than stated in HPI all other  review of systems is negative.         Physical Exam   Vital signs: /90, heart rate 76, respiratory 18, temp 98.2, 95% on room air.  GENERAL APPEARANCE: Well developed, well nourished, in no acute distress  HEENT: normocephalic, atraumatic  PERRL, sclerae anicteric, conjunctivae clear and moist, EOM intact  Neck: Supple with no lymphadenopathy, no thyromegaly, trachea midline  CARDS: RRR, S1-S2, no murmurs gallops or rubs appreciated.  LUNGS: Lung sounds CTA, no adventitious sounds, respiratory effort normal.  ABD: Soft, nondistended.  Positive bowel sounds in all 4 quadrants, midline incision.  EXTREMITIES: +1 pitting edema of her right lower extremity pedally, soft nonpitting edema of her left lower extremity pedally.  NEURO: Alert and oriented x 3.  Face is symmetric.  SKIN: Midline abdominal incision that is approximately 15cm in length.  Proximal half of incision is well approximatedno drainage or s/s of infection.   visible, serous drainage, no surrounding erythema or s/s of infection.  Did not view the dehisced area of her wound.  PSYCH: Anxious        Labs: .No results found for this or any previous visit (from the past 240 hour(s)).      Assessment:  1. Endometrial cancer, FIGO stage IIIA (H)     2. Pain     3. Anxiety     4. Uncontrolled type 2 diabetes mellitus with hyperglycemia (H)     5. Wound dehiscence     6. Pelvic abscess in female     7. Gastroesophageal reflux disease without esophagitis     8. Clostridium difficile diarrhea     9. Essential hypertension     10. Bilateral lower extremity edema         Plan:     Endometrial cancer: Follow-up with gynecology oncology.  Continue with Lovenox through 12/25 for anticoagulation.    Pain: Continues to be an ongoing issue however it is likely exacerbated by her anxiety.  She reports that her pain is well managed currently with Dilaudid 2 to 4 mg every 6 hours as needed, as needed Tylenol, gabapentin 200 mg twice daily, and hydroxyzine.  I did  request that nursing give her hydroxyzine more often as they have not been giving this.    Anxiety: We will continue with venlafaxine and Lexapro in hopes of getting a secondary effect of the hydroxyzine she is using for pain.    Diabetes: Blood sugars appear to be more controlled.  Change lispro to aspart 10 units AC 3 times daily and continue with glargine 27 units daily.     Wound dehiscence: Nursing to provide wound care 3 times weekly.  Continue with Augmentin through 12/23 for abscess.    C. difficile: Continues to have diarrhea continue vancomycin through 12/20.    Hypertension: Stable at this time continue hydrochlorothiazide 25 mg daily.    Bilateral lower extremity: Continues to have lower extremity however is less pitting.  She has +1 pitting edema of her right which is greater than her left which appears to be mostly pedal.  Continue with Ace wraps bilaterally daily.        35 total minutes spent with 20 minutes spent face-to-face with patient and nursing in counseling and coordination of the above plan of care.        Electronically signed by: Bell Saavedra, NURIS

## 2021-06-04 NOTE — PROGRESS NOTES
Code Status:  FULL CODE  Visit Type: Follow Up (New admission after recent ER visit for wound dehiscence.)     Facility:  [facility] [[id number]]        Facility Type: SNF (Skilled Nursing Facility, TCU)    History of Present Illness: Chela Love is a 44 y.o. female who has a past medical history for uncontrolled DM2, bipolar disorder,polysubstance abuse and HTN.  She was recently hospitalized at [facility] for a right adnexal mass found to be adenocarcinoma likely endometrioid (final path pending) and septic shock.  She underwent a total hysterectomy and oophorectomy and tumor debulking.  Her hospitalization was complicated by poor pain tolerance in the picture of her polysubstance abuse, acute blood loss anemia, CINDY and intra-abdominal infection.  Urine tox positive for opiates, meth and THC. She did complete a course of antibiotics.  Unfortunately, a discharge summary was not sent with the patient upon admission and I don't quite know the complete hospital course and plan of care. She did have slight dehiscence of midline abdominal wound with two times a day wound care and sent out to TCU.  Upon admission to TCU she was severely anxious, in pain and I could not find documentation indicating the extent of the dehiscence and so she was sent to the ER.  She was evaluated, given IV pain medications and sent back to the TCU.     Today, she reports that her pain is gradually improving with the current pain medications.  She is on methadone 2.5 mg twice daily and Dilaudid every 3 hours as needed.  She reports she is having loose stools with current bowel regimen.  Upon exam, her abdominal dehiscence does appear slightly smaller.  The wound base does appear dry even though there is small amounts of slough.  Surrounding tissue shows no erythem and drainage is serous.  She has an appointment scheduled to follow-up with gynecology oncology at the end of this week. She does endorse significant anxiety and  depression is requesting medications frequently.  She also endorses difficulty sleeping and would like to have scheduled sleep medications.    She does endorse nausea with vomiting yesterday and states that she does not feel like eating much today.  Her other major complaint is heaviness of her lower extremities and she has significant +3 pitting edema.  There is no calf tenderness, redness or heat to her lower extremities bilaterally.  She is on subcu heparin 3 times daily.      Her vital signs are stable however later this afternoon nursing did find she had a temp of 104.1 with an elevated heart rate.    Review of Systems   Patient denies fever, chills, headache, lightheadedness, dizziness, rhinorrhea, cough, congestion, shortness of breath, chest pain, palpitations, constipation, change in appetite, dysuria, frequency, burning or pain with urination.  Other than stated in HPI all other review of systems is negative.         Physical Exam   Vital signs: /63, heart rate 83, respiratory 18, temp 97.3, 93% on room air.  GENERAL APPEARANCE: Well developed, well nourished, in no acute distress  HEENT: normocephalic, atraumatic  PERRL, sclerae anicteric, conjunctivae clear and moist, EOM intact  Neck: Supple with no lymphadenopathy, no thyromegaly, trachea midline  CARDS: RRR, S1-S2, no murmurs gallops or rubs appreciated.  LUNGS: Lung sounds CTA, no adventitious sounds, respiratory effort normal.  ABD: Soft, nondistended.  Positive bowel sounds in all 4 quadrants, midline incision.  EXTREMITIES: No cyanosis, clubbing or edema.  NEURO: Alert and oriented x 3.  Face is symmetric.  SKIN: Midline abdominal incision that is approximately 15cm in length.  Proximal half of incision is well approximated with steristrips, no drainage or s/s of infection.  The proximal end of the second have of the incision is  approximately 4 cm fascia and subcutaneous tissue is visible, serous drainage, no surrounding erythema  or s/s of infection.   PSYCH: Anxious, flat affect          Labs: .No results found for this or any previous visit (from the past 240 hour(s)).      Assessment:  1. Adenocarcinoma (H)     2. Pain     3. Anxiety     4. Uncontrolled type 2 diabetes mellitus with hyperglycemia (H)     5. Mild intermittent asthma, unspecified whether complicated     6. Wound dehiscence     7. Acquired lymphedema of lower extremity     8. Moderate episode of recurrent major depressive disorder (H)         Plan:   Adenocarcinoma: Follow-up with gynecology oncology at the end of this week.  Continue heparin subcu 3 times daily    Pain: Pain is difficult to manage however patient reports this is now improving with scheduled methadone 2.5 mg, as needed Dilaudid did  patient that because she is on a large amount of opiates constipation is likely a side effect.  I counseled her that I would want her to have looser stools in order to easily have bowel movements due to risk of further dehiscence of abdominal wound.  She is agreeable to continue with current bowel regimen.    Anxiety and depression is a large factor affecting her comfort and pain.  I will schedule hydroxyzine 25 mg 3 times daily, start her on Paxil CR 12.5 mg at at bedtime, for sleep I will schedule trazodone 50 mg at at bedtime.  Nursing will alert me if she becomes too sleepy.    Diabetes: Blood sugars range from 100-2 63 with most of her blood sugars averaging 200s.  Continue with Lantus, AC NovoLog and sliding scale NovoLog.    Asthma: We will at this time continue PRN albuterol inhaler.    Wound dehiscence: Wound shows no infection, I will change her dressing changes to moist to dry twice daily in order to improve approximation.  Did order an abdominal binder as so the Warren ties could be removed as they are significantly soiled.    Lymphedema of lower extremities: At this time nursing will apply Ace wraps daily as I do not believe she would tolerate lymphedema  wraps.  If she does not have improvement with Ace wraps would consider lymphedema massage and wraps by therapy.  Could consider a small dose of diuretic however I would like to try nonmedication tactics first.    Of note: Patient was sent back to the emergency room due to elevated temperature and tachycardia as I do not feel we could quickly manage her symptoms and her risk for infection is elevated.    35 total minutes spent with 20 minutes spent face-to-face with patient and nursing in counseling and coordination of her above plan of care.                Electronically signed by: Bell Saavedra, NURIS

## 2021-06-04 NOTE — PROGRESS NOTES
Code Status:  FULL CODE  Visit Type: Problem Visit (abdominal incision dehiscence)     Facility:  [facility] [[id number]]        Facility Type: SNF (Skilled Nursing Facility, TCU)    History of Present Illness: Chela Love is a 44 y.o. female who has a past medical history for uncontrolled DM2, bipolar disorder,polysubstance abuse and HTN.  She was recently hospitalized at [facility] for a right adnexal mass found to be adenocarcinoma likely endometrioid (final path pending) and septic shock.  She underwent a XL, ZANDRA, BSO and tumor debulking.  Her hospitalization was complicated by poor pain tolerance in the picture of her polysubstance abuse, acute blood loss anemia, CINDY and intra-abdominal infection.  She did complete a course of antibiotics.  Unfortunately, a discharge summary was not sent with the patient upon admission and I don't quite know the complete hospital course and plan of care. There is mention in a progress note of wound separation but no assessment depiction.  Nursing alerted me that she was having increased pain and anxiety.  She arrived and was very weepy.  She did calm somewhat after nursing oriented her to the facility.  Upon nursing removing her abdominal dressing it was found that she had approximately a 4cm wound dehiscence of midline wound.  The patient was very anxious during exam and is unable to tell me if this opening is worse than she had at the hospital.  Pharmacy is saying that it will be yet a few more hours before we are to obtain a pain medications or something to help with her anxiety. The open area has serosang drainage but does not appear to have a s/s of infection.     Review of Systems ROS is difficult to obtain due to patient's anxiety and pain.  She is crying and shaking during entire exam.          Physical Exam   Vital signs: /79, HR 96, resp 16, temp 98.1  GENERAL APPEARANCE: Well developed, well nourished, crying and shaking  HEENT: normocephalic,  atraumatic  PERRL, sclerae anicteric, conjunctivae clear and moist, EOM intact  LUNGS: Lung sounds CTA, no adventitious sounds, respiratory effort normal.  ABD: Soft, nondistended.  Patient does cry when I barely touch her abdomen.  No rigidity noted  EXTREMITIES: No cyanosis, clubbing or edema.  NEURO: Alert and oriented x 3.  Face is symmetric.  SKIN: Midline abdominal incision that is approximately 15cm in length.  Proximal half of incision is well approximated with steristrips, no drainage or s/s of infection.  The proximal end of the second have of the incision is  approximately 4cm fascia and subcutaneous tissue is visible, serous drainage, no surrounding erythema or s/s of infection.   PSYCH: anxious, crying and shaking          Labs: .No results found for this or any previous visit (from the past 240 hour(s)).      Assessment:  1. Wound dehiscence     2. Adenocarcinoma (H)     3. Pelvic mass     4. Pain     5. Anxiety         Plan:     At this time, it is difficult to tell if this wound is more dehisced than it was at the hospital and given the fact that there is no dc summary to better guide my care and pain is poorly managed I am sending her back to the [facility] to be assessed and to assure she can be managed at a TCU.           Electronically signed by: Bell Saavedra CNP

## 2021-06-04 NOTE — PROGRESS NOTES
[facility]      Facility:    [facility] [[id number]]  Code Status: FULL CODE      Chief Complaint/Reason for Visit:  Chief Complaint   Patient presents with     H & P       HPI:   Ms. Love is a 44-year-old female with a past medical history of recently diagnosed stage III endometrial cancer, recent C. difficile colitis, wound dehiscence, multifactorial anemia including acute blood loss and need for transfusion, uncontrolled type 2 diabetes, polysubstance/methamphetamine abuse, steroid-dependent asthma, depression/anxiety, hypertension who has been admitted to [facility] for care following her recent hospitalization.  I missed her last week as she was out on appointment.    Patient was hospitalized from December 9 through December 12 due to sepsis with fever to 104.  She was found to have a 5 cm fluid collection, presumed abscess which was not amenable to IR drainage due to location.  Thus she was treated with vancomycin plus Zosyn and discharged on Augmentin for 14 days, just completed on December 23.  She was diagnosed with C. difficile on December 10 and placed on p.o. Vanco, she has also completed that course as of December 20.  Her hemoglobin was as low as 6.5 required 1 unit PRBC.  She also had wound dehiscence with wound VAC in place and is followed by the wound care team.    Subjective/review of systems:  -I have reviewed her psychiatry/mental health notes from 12/23/2019.  Their pharmaceutical recommendations are as follows: Effexor changed to XR form to 25 mg daily which is a new dose.  Her Lexapro was discontinued in favor of Remeron 15 mg at bedtime.  Her trazodone was continued without change.  -Patient went to the cancer Center today.  I do not have their note but she reports that they are planning to place a port this Thursday with chemotherapy to start shortly thereafter.  She will be receiving this care through the [facility] system.  -Patient  "reports that she has a fluid collection/ascites and that she anticipates it being tapped this Thursday when they do the port work as well.  She says that she was told the abscess/5 cm fluid collection associated with her recent sepsis did resolve.  -Patient had another hospitalization from November 23 through December 5 where she presented with septic shock and the right pelvic mass was discovered and felt to be superinfected.  She was treated with Zosyn and Vanco.  Should the situation was complicated by encephalopathy and difficult pain control.  She was started on methadone gabapentin and Dilaudid that hospitalization.  She also suffered from acute urinary retention with acute renal failure with creatinine as high as 3.92.  She had wound dehiscence without hospitalization.  This was following exploratory lap to deal with the mass which proved to be the endometrial cancer in the right ovarian area.  She also had acute respiratory failure.  She also had a TTE which was negative.  -Blood sugars have come under nice control.  Her mealtime insulin has been cut by 50% from 10 units to 5 units with each meal.  She remains on glargine 27 units daily.  -Blood pressures are frequently mildly elevated.  She is not having severe elevations.  -Patient says she was recently in pain for 3 days.  She says it was unexplained and involve \"her whole body\".  She describes significant anxiety associated with it.  She did not use her hydroxyzine and I suggested that might be a good idea next time if she ever has it again which is her concern.  Patient reports that she is happy with the facility.  She is eating better now.  She has had some weight gain but she says that the swelling in her legs has gotten much better during this period of weight gain.  She is having no vomiting.  She is having no diarrhea it seems that the C. difficile has resolved.  No fever sweats or chills.  Sleep is adequate.  She is tolerating her medications.  " Remainder is negative with    Past Medical History:  Past Medical History:   Diagnosis Date     Abnormal menses      Acute encephalopathy      Adnexal mass      CINDY (acute kidney injury) (H)      Anxiety      Asthma      Bilateral lower extremity edema      Cellulitis      Clostridium difficile infection      Depression      DM2 (diabetes mellitus, type 2) (H)      Endometrial adenocarcinoma (H)     stage IIIA grade 1      Essential hypertension      HLD (hyperlipidemia)      HTN (hypertension)      Hypokalemia      Insomnia      Lactic acidosis      Morbid obesity (H)      Obesity 10/19/2016     Ovarian mass      Peripheral polyneuropathy      Polysubstance abuse (H)      Septic shock (H)      Vaginal bleeding      Vasculopathy            Surgical History:  Past Surgical History:   Procedure Laterality Date     TOTAL ABDOMINAL HYSTERECTOMY W/ BILATERAL SALPINGOOPHORECTOMY  11/27/2019    Procedure: Exploratory Laparotomy , TOTAL ABDOMINAL HYSTERECTOMY, Bilateral SALPINGoophorectomy, Omentectomy, Abdominal Washout and Tumor DEBULKING; Surgeon: Rosana Mckeon MD; Location: [facility] OR       WISDOM TOOTH EXTRACTION         Family History:   Family History   Problem Relation Age of Onset     Diabetes Mother      Heart disease Mother      Fibromyalgia Mother      Arthritis Mother      Depression Father      Drug abuse Father      No Medical Problems Sister      Diabetes Brother      Diabetes Brother        Social History:  family history includes Arthritis in her mother; Depression in her father; Diabetes in her brother, brother, and mother; Drug abuse in her father; Fibromyalgia in her mother; Heart disease in her mother; No Medical Problems in her sister.  Apparently the patient's father was diagnosed with bipolar affective disorder but both patient and her mother denies any other mental health or chemical dependency history in the family.     As above  Social History     Socioeconomic History     Marital status:  Single     Spouse name: None     Number of children: None     Years of education: None     Highest education level: None   Occupational History     None   Social Needs     Financial resource strain: None     Food insecurity:     Worry: None     Inability: None     Transportation needs:     Medical: None     Non-medical: None   Tobacco Use     Smoking status: Former Smoker     Packs/day: 0.50     Types: Cigarettes     Smokeless tobacco: Former User   Substance and Sexual Activity     Alcohol use: No     Drug use: Not Currently     Types: Marijuana, Methamphetamines     Sexual activity: Yes   Lifestyle     Physical activity:     Days per week: None     Minutes per session: None     Stress: None   Relationships     Social connections:     Talks on phone: None     Gets together: None     Attends Taoist service: None     Active member of club or organization: None     Attends meetings of clubs or organizations: None     Relationship status: None     Intimate partner violence:     Fear of current or ex partner: None     Emotionally abused: None     Physically abused: None     Forced sexual activity: None   Other Topics Concern     None   Social History Narrative    Lives by herself.          Review of Systems    Vitals:    12/24/19 2142   BP: 135/83   Pulse: 82   Resp: 20   Temp: 97.2  F (36.2  C)   SpO2: 95%   Weight: (!) 232 lb (105.2 kg)       Physical Exam  Patient is just back from her cancer center appointment.  She says she is emotional and worried about chemotherapy and the port placement which is appropriate.  She is pleasant oriented x3 normally conversant and appreciative of her care.  Neuropsych atraumatic sclera clear nonicteric EOMI oropharynx is clear  Neck is supple without mass heart is regular in the 70s S1-S2 without murmur gallop or rub her lungs are clear she was raising oral smoke or wheezing her abdomen shows wound VAC in place with dark gauze obscuring the wound itself.  There is no evidence of  cellulitis.  She is nontender.  Bowel sounds within normal limits.  No organomegaly or mass  Medication List:  Current Outpatient Medications   Medication Sig     acetaminophen (TYLENOL) 325 MG tablet Take 650 mg by mouth every 6 (six) hours as needed for pain.      albuterol (PROAIR HFA;PROVENTIL HFA;VENTOLIN HFA) 90 mcg/actuation inhaler Inhale 2 puffs every 4 (four) hours as needed for wheezing.      calcium, as carbonate, (TUMS) 200 mg calcium (500 mg) chewable tablet Chew 2-3 tablets every 4 (four) hours as needed for heartburn.      diphenhydrAMINE (BENADRYL) 25 mg capsule Take 25-50 mg by mouth every 6 (six) hours as needed for itching.     enoxaparin ANTICOAGULANT (LOVENOX) 40 mg/0.4 mL syringe Inject 40 mg under the skin daily.     escitalopram oxalate (LEXAPRO) 10 MG tablet Take 10 mg by mouth daily.     famotidine (PEPCID) 20 MG tablet Take 20 mg by mouth daily.     gabapentin (NEURONTIN) 100 MG capsule Take 200 mg by mouth 2 (two) times a day.      hydroCHLOROthiazide (HYDRODIURIL) 25 MG tablet Take 25 mg by mouth daily.     HYDROmorphone (DILAUDID) 2 MG tablet Take 2-4 mg by mouth every 6 (six) hours as needed for pain.      hydrOXYzine HCl (ATARAX) 25 MG tablet Take 25 mg by mouth every 6 (six) hours as needed.      insulin aspart U-100 (NOVOLOG) 100 unit/mL injection Inject 5 Units under the skin 3 (three) times a day with meals.      insulin glargine (LANTUS) 100 unit/mL vial Inject 27 Units under the skin daily.      Lactobacillus acidophilus 100 mg (1 billion cell) cap Take 1 capsule by mouth daily.     methadone (DOLOPHINE) 5 MG tablet Take 2.5 mg by mouth every 12 (twelve) hours.      nystatin (MYCOSTATIN) ointment Apply 1 application topically 2 (two) times a day.     ondansetron (ZOFRAN-ODT) 8 MG disintegrating tablet Take 8 mg by mouth every 8 (eight) hours as needed for nausea.     senna-docusate (SENNA-TIME S) 8.6-50 mg tablet Take 1 tablet by mouth daily.      traZODone (DESYREL) 50 MG  tablet Take 50 mg by mouth at bedtime as needed.      venlafaxine (EFFEXOR) 75 MG tablet Take 225 mg by mouth daily.     hydroxychloroquine (PLAQUENIL) 200 mg tablet Take 200 mg by mouth 2 (two) times a day.     mirtazapine (REMERON) 15 MG tablet Take 1 tablet (15 mg total) by mouth at bedtime.       Labs:  Results for JOSIAH GARCIA (MRN [id number]) as of 12/24/2019 22:07   Ref. Range 12/17/2019 04:55   Sodium Latest Ref Range: 136 - 145 mmol/L 143   Potassium Latest Ref Range: 3.5 - 5.0 mmol/L 3.7   Chloride Latest Ref Range: 98 - 107 mmol/L 102   CO2 Latest Ref Range: 22 - 31 mmol/L 33 (H)   Anion Gap, Calculation Latest Ref Range: 5 - 18 mmol/L 8   BUN Latest Ref Range: 8 - 22 mg/dL 14   Creatinine Latest Ref Range: 0.60 - 1.10 mg/dL 0.88   GFR MDRD Af Amer Latest Ref Range: >60 mL/min/1.73m2 >60   GFR MDRD Non Af Amer Latest Ref Range: >60 mL/min/1.73m2 >60   Calcium Latest Ref Range: 8.5 - 10.5 mg/dL 8.8   Glucose Latest Ref Range: 70 - 125 mg/dL 83   WBC Latest Ref Range: 4.0 - 11.0 thou/uL 5.1   RBC Latest Ref Range: 3.80 - 5.40 mill/uL 2.83 (L)   Hemoglobin Latest Ref Range: 12.0 - 16.0 g/dL 8.4 (L)   Hematocrit Latest Ref Range: 35.0 - 47.0 % 26.5 (L)   MCV Latest Ref Range: 80 - 100 fL 94   MCH Latest Ref Range: 27.0 - 34.0 pg 29.7   MCHC Latest Ref Range: 32.0 - 36.0 g/dL 31.7 (L)   RDW Latest Ref Range: 11.0 - 14.5 % 15.9 (H)   Platelets Latest Ref Range: 140 - 440 thou/uL 234   MPV Latest Ref Range: 8.5 - 12.5 fL 9.2   Neutrophils % Latest Ref Range: 50 - 70 % 45 (L)   Lymphocytes % Latest Ref Range: 20 - 40 % 39   Monocytes % Latest Ref Range: 2 - 10 % 10   Eosinophils % Latest Ref Range: 0 - 6 % 5   Basophils % Latest Ref Range: 0 - 2 % 0   Neutrophils Absolute Latest Ref Range: 2.0 - 7.7 thou/uL 2.3   Lymphocytes Absolute Latest Ref Range: 0.8 - 4.4 thou/uL 2.0   Monocytes Absolute Latest Ref Range: 0.0 - 0.9 thou/uL 0.5   Eosinophils Absolute Latest Ref Range: 0.0 - 0.4 thou/uL 0.2   Basophils  Absolute Latest Ref Range: 0.0 - 0.2 thou/uL 0.0       Assessment:    ICD-10-CM    1. Obesity (BMI 30-39.9) E66.9    2. Uncontrolled type 2 diabetes mellitus with hyperglycemia (H) E11.65    3. ELSIE (obstructive sleep apnea) G47.33    4. Essential hypertension I10    5. Mild intermittent asthma, unspecified whether complicated J45.20    6. Tobacco abuse Z72.0      Assessment/plan    1.  Stage III grade 1 endometrial CA       Status post exploratory lap       Secondary abscess formation resulting in rehospitalization        Wound dehiscence with wound VAC in place          Patient has stabilized after her second hospitalization.  She is doing relatively well here working with therapy and feeling stronger.  Her appetite is better.  Her blood sugar control is much better.  She is contemplating the next steps in her treatment with some anxiety, which is quite appropriate   -Follow-up Thursday with cancer center, probable port placement.  Questionable abdominal fluid evaluation, I am not sure exactly what that entails as I have not seen their office notes from today    2.  Mood disorder     See 1223 psychiatry note.  Diagnosis is unclear, she has had bipolar diagnosis in the past and they are not making that diagnosis as of this time.   -Medication changes as noted above in my review    3.  Insulin-dependent type 2 diabetes     Historically poorly controlled with A1c's in the 15s.  Her control now is quite good.  In fact she has had a recent decrease in her short acting insulin dose at mealtimes to avoid hypoglycemia.    4.  Acute on chronic anemia     On iron replacement    5.  Substance abuse, methamphetamine drug of choice     Chemical dependency evaluation and treatment will be appropriate when her overall health condition allows    6.  Deconditioning     -Continue work with therapy    7.  Hypertension     Control has not been ideal lately but it is also acceptable for the time being.  I have elected to make no  additional medication changes considering those that have been made recently.  We will continue to monitor and adjust in the future if need be.  She is going to be going under new procedures and chemotherapy which could put her at risk for hypotension and low volume status.    8.  C. difficile colitis    Appears to be resolved.  Avoid antibiotics unless absolutely necessary     9.  Chronic opiate dependent pain     Complicated by anxiety.  I do not want to add additional medications to her mix.  We discussed nonpharmaceutical techniques for dealing with anxiety pain.  I also encouraged her to try the hydroxyzine which she already has access to which could help anxiety.        Electronically signed by: Rell Nickerson MD     Addendum: pt is in need for a FWW for safe ambulation and to provide more independence during ADLs and community mobility because of her functional limitations related to surgical wound pain and weakness following surgery for her endometrial cancer.     MD Daniel

## 2021-06-04 NOTE — PROGRESS NOTES
"Initial Outpatient Psychiatry Consult Note     12/23/2019    Chela Love, a 44 y.o. female, presents for an initial intake to this clinic on December 23 of 2019.  The patient has a reported prior history of bipolar affective disorder, as well as a history of polysubstance abuse including methamphetamine abuse.  I also see she has had a diagnosis of depressive disorder, NOS.  In addition she has morbid obesity and diabetes mellitus type 2 and polycystic ovary syndrome.  She has steroid dependent asthma.  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  She has had a ZANDRA, SBO and omentectomy.  Early in December 2019 she was admitted to the hospital with an abscess in the context of fevers.  She had significant anxiety over her diagnoses and prognosis.  Review of the records shows that she had been on Effexor 225 mg a day but apparently non-compliant with that.  She is also recently been on Lexapro.  She has had trazodone for sleep.  She was referred to this clinic due to concerns of worsening mood in the context of the situational stressors.  The patient apparently had been followed here in the distant past by 1 of our therapist.    The patient presents with her mother who the patient describes as supportive.  Both the patient and mother admitted that the patient's past psychiatric history is a bit unclear in the context of her methamphetamine use as well as poor compliance with medication trials.  They are unsure whether she is ever had any actual manic episodes although they do agree that the patient has had longstanding depression and anxiety \"for as long as I can remember\" according to the patient.  She admits she has been noncompliant with medication and although the chart reflects that she is on Effexor 225 twice a day the patient is unsure whether she is actually taking it twice a day.  She also does not believe that the intent was to give her both Lexapro and Effexor at the same " time.  Although she is extremely vague.    The patient reports that her current anxiety level is a 2 out of 5.  She denies having any desire to be dead or thoughts of suicide.  She reports periodic anxiety often when she thinks about the future and the situation she is then.  She does not identify any specific triggers however.  She describes her mood as depressed but she is feeling more hopeful and states she appreciates all the support her mother and other team members are giving her.  She tells me that her desire is to stay sober.  She is been sober now for a little over 2 months.  She states she would be open to chemical dependency treatment at some point but at this point is focused on her physical health and need for chemotherapy.  She is in a structured setting at the [facility] and is not having any of her old friends come visit.  From there she may go to a supervised setting under the care of her mother at her mother's house.  She may relinquish her apartment at some point in the near future although has not made that decision.    She denies having any history of psychosis.  She reports she is having a very difficult time sleeping at night.  Her thoughts often are on her future and her cancer diagnoses.  She does utilize the trazodone but finds that not to be particularly helpful.  She has never been on Remeron and is willing to augment her antidepressant with that.  Risks and benefits were discussed.      Current Medications:  Medications were personally reviewed at this meeting.  Please see the chart for current medication list.         Past medical History:  Patientis allergic to venom-honey bee and other allergy (see comments).   has a past medical history of Abnormal menses, Acute encephalopathy, Adnexal mass, CINDY (acute kidney injury) (H), Anxiety, Asthma, Bilateral lower extremity edema, Cellulitis, Clostridium difficile infection, Depression, DM2 (diabetes mellitus, type 2) (H),  Endometrial adenocarcinoma (H), Essential hypertension, HLD (hyperlipidemia), HTN (hypertension), Hypokalemia, Insomnia, Lactic acidosis, Morbid obesity (H), Obesity (10/19/2016), Ovarian mass, Peripheral polyneuropathy, Polysubstance abuse (H), Septic shock (H), Vaginal bleeding, and Vasculopathy.   has a past surgical history that includes [place] tooth extraction and Total abdominal hysterectomy w/ bilateral salpingoophorectomy (11/27/2019).    Please see above.    Past Psychiatric History:  The patient has a history of depressive disorder, NOS I also see reports of bipolar affective disorder and polysubstance abuse including methamphetamine.    Apparently currently on Lexapro 10 mg a day, the chart also reflects Effexor 225 mg twice a day and trazodone 50 mg at night as needed.  Gabapentin 200 mg twice a day.  She continues with as needed hydroxyzine.    Patient reports she has never had any psychiatric hospitalizations.  She is never had any suicidal ideation or suicide is never had any psychosis and does not believe she is ever had any periods of barbara.  She however has been depressed and anxious as long as she remembers but the anxiety has gotten much worse with her recent cancer diagnoses and current medical sequela.  She does not believe that she has been on other psychotropic medication but is an extremely poor historian admits she has been noncompliant even with recent psychotropic medication trials which she is unsure of.      Substance Abuse History:   reports that she has quit smoking. Her smoking use included cigarettes. She smoked 0.50 packs per day. She has quit using smokeless tobacco. She reports previous drug use. Drugs: Marijuana and Methamphetamines. She reports that she does not drink alcohol.  She apparently has been sober for 2 months.  She is currently followed through the [facility] for her methadone and will receive spot UAs there to ensure compliance.  Patient reports she is  never undergone chemical dependency treatment before and stated she would be willing to do that.      Family History:  family history includes Arthritis in her mother; Depression in her father; Diabetes in her brother, brother, and mother; Drug abuse in her father; Fibromyalgia in her mother; Heart disease in her mother; No Medical Problems in her sister.  Apparently the patient's father was diagnosed with bipolar affective disorder but both patient and her mother denies any other mental health or chemical dependency history in the family.      Social History:  Social History     Social History Narrative    Lives by herself.     Patient reports she left high school between 10th and 11th grade.  She did receive her GED online.  She is currently living alone.  She reports no legal history.  She has never undergone chemical dependency assessment or treatment but is struggled with methamphetamine abuse for many years.  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  She is currently residing at [facility] and will be starting chemotherapy for her ovarian cancer.  After that she will likely be moving home with her mother.             Review Of Systems:  Patient reports ongoing abdominal pain.  This is often related to having excess fluid in her abdomen due to her current medical condition.  She is going to have a tap this Thursday.         Mental Status Exam:  Appearance: Patient was tired somewhat unkempt.  She was flat and slow.  She was near tears a couple of times during the interview.  No obvious pain.  She was not short of breath.  Behavior: Cooperative pleasant and appeared sincere.  She was not agitated or restless.  Overall psychomotor slowed.  Speech: Sentence structure was intact.  Somewhat vague soft and monotone.  Not pressured or rambling.  Answers were consistent and appropriate.  Mood/Affect: Flat and slow.  Depressed.  Slightly anxious and occasionally seeking reassurance but she struggles with  periodic significant anxiety by her report.  Thought Content: No hallucinations or delusions  Suicidal or Homicidal Thoughts: None apparent or reported  Thought Process/Formulation: Somewhat slow but able to track and follow.  No racing thoughts.  Not loose.  Associations: Fair  Fund of Knowledge: Fair  Attention/Concentration: She was a bit slow but able to attend.  Concentration appeared adequate.  Insight: Fair  Judgement: Chronically poor but fair at this time.  Memory: Fair  Motor Status: No current tremor.  Symmetric.  Globally slowed.  Fund of Knowledge: Fair.  Orientation: Oriented x3.      Recent Labs:  No results found for this or any previous visit (from the past 24 hour(s)).      Diagnosis:  Mood disorder, NOS rule out major depressive disorder with anxious features, rule out mood disorder secondary to chemical use    Rule out bipolar affective disorder    Methamphetamine substance use disorder, severe        Plan:  I recommend that I follow the patient from a psychiatric standpoint.  I will assess for the appropriateness of possible psychotropic medication trials/changes.  At this time we are going to change her Effexor XR to 225 mg a day.  I have discontinued her Lexapro.  I have started her on Remeron 15 mg at night.  We will continue with the trazodone at this time.  The patient is getting random UAs and is currently in a structured and safe setting and will begin chemotherapy in the near future.  Following that we will be moving home with mom under close supervision.  We are considering the possibility of future chemical dependency assessment and treatment.  We will arrange for an individual therapist in the meantime.  Risks and benefits of the medications were discussed with the patient.  All are in agreement.    Patient will return to this clinic in 4 to 6 weeks for med check.  Both she and her mother agree to call before then with any questions or concerns.    The patient will seek out  appropriate emergency services should that become necessary.  I will make myself available if any questions, concerns, or problems arise.       Thank you for allowing me to participate in the care of this patient.         Vinod Suárez MD

## 2021-06-04 NOTE — PROGRESS NOTES
Correct pharmacy verified with patient and confirmed in snapshot? [x] yes []no    Charge captured ? [x] yes  [] no    Medications Phoned  to Pharmacy [] yes [x]no  Name of Pharmacist:  List Medications, including dose, quantity and instructions      Medication Prescriptions given to patient   [] yes  [x] no   List the name of the drug the prescription was written for.       Medications ordered this visit were e-scribed.  Verified by order class [x] yes  [] no  Remeron 15 mg   Medication changes or discontinuations were communicated to patient's pharmacy: [] yes  [x] no    UA collected [] yes  [x] no    [place] Prescription Monitoring Program Reviewed? [x] yes  [] no    Referrals were made to:  Psychology     Future appointment was made: [x] yes  [] no  1/20/2020  Dictation completed at time of chart check: [x] yes  [] no    I have checked the documentation for today s encounters and the above information has been reviewed and completed.

## 2021-06-04 NOTE — PROGRESS NOTES
Code Status:  FULL CODE  Visit Type: Follow Up (abdominal pain, DM2)     Facility:  [facility] [[id number]]        Facility Type: SNF (Skilled Nursing Facility, TCU)    History of Present Illness: Chela Love is a 44 y.o. female who has a past medical history for uncontrolled DM2, bipolar disorder,polysubstance abuse and HTN.  She was recently hospitalized at [facility] for a right adnexal mass found to be adenocarcinoma likely endometrioid (final path pending) and septic shock.  She underwent a total hysterectomy and oophorectomy and tumor debulking.  Her hospitalization was complicated by poor pain tolerance in the picture of her polysubstance abuse, acute blood loss anemia, CINDY and intra-abdominal infection.  Urine tox positive for opiates, meth and THC. She did complete a course of antibiotics. She did have slight dehiscence of midline abdominal wound with two times a day wound care and sent out to TCU.  Upon admission to TCU she was severely anxious, in pain and was sent to the ER.  She was evaluated, given IV pain medications and sent back to the TCU.  On 12/9/2019, she spiked a temp of 104.1 and was sent to the ER for evaluation.  On CT, it showed a 5 cm enhanced pelvic fluid which was concern for abscess.  She was started on IV vancomycin x1 day then 2 days of Zosyn and switch to oral Augmentin.  During her stay she did have a hemoglobin of 6.5 and was given 1 unit of packed red blood cells with a discharge hemoglobin of 7.6.  Her discharge creatinine was 1.03.  She did result positive for C. difficile and was started on oral vancomycin on 12/11.  Her mid abdomen dressing changes required Mepilex 3 times a week.  She was also switched from heparin to Lovenox through 12/25.  Augmentin is through 12/23.  And vancomycin is 12/20.  She has an appointment scheduled with Dr. Ordaz gynecology oncology on 12/13, with Dr. Price in endocrine on 12/16, with Dr. Maynard and palliative care for pain  management on 12/18.    Today, she appears calmer than she has the past 2 weeks.  She states that she is planning to get her port in tomorrow and start chemotherapy next week.  Her incision is almost completely closed with approximately 2 cm x 1 cm open area with red granulation and approximately 0.5 cm depth.  There is no surrounding signs or symptoms of infection around her abdominal incision.  She reports that she has some ongoing twinges of pain and nausea which resolved after taking a pain medication.  She continues on scheduled methadone twice daily and as needed Dilaudid.  Her Dilaudid has now tapered down to 2 mg every 6 hours as needed.  He also continues with gabapentin twice daily and states that she has had some significant twinges of pain in her groin going down her legs.  She does states she has burning numbness of her feet at times.  She was seen by psychiatry last week and there were changes noted in his note to her psych medications however these orders were not transferred to the nursing home and she has not been taking her medications as directed per her psychiatrist.  He had recommended changing her Effexor to XR at 225 mg, deseeding Lexapro and starting on mirtazapine.  She reports her mood does feel a little better than it has in the past couple weeks.  Her fasting blood sugars continue to be approximately 160s to 180s.  Her blood sugars all range from 160s to 260s.    Past Medical History:   Diagnosis Date     Abnormal menses      Acute encephalopathy      Adnexal mass      CINDY (acute kidney injury) (H)      Anxiety      Asthma      Bilateral lower extremity edema      Cellulitis      Clostridium difficile infection      Depression      DM2 (diabetes mellitus, type 2) (H)      Endometrial adenocarcinoma (H)     stage IIIA grade 1      Essential hypertension      HLD (hyperlipidemia)      HTN (hypertension)      Hypokalemia      Insomnia      Lactic acidosis      Morbid obesity (H)      Obesity  10/19/2016     Ovarian mass      Peripheral polyneuropathy      Polysubstance abuse (H)      Septic shock (H)      Vaginal bleeding      Vasculopathy      Current Outpatient Medications on File Prior to Visit   Medication Sig Dispense Refill     acetaminophen (TYLENOL) 325 MG tablet Take 650 mg by mouth every 6 (six) hours as needed for pain.        albuterol (PROAIR HFA;PROVENTIL HFA;VENTOLIN HFA) 90 mcg/actuation inhaler Inhale 2 puffs every 4 (four) hours as needed for wheezing.        calcium, as carbonate, (TUMS) 200 mg calcium (500 mg) chewable tablet Chew 2-3 tablets every 4 (four) hours as needed for heartburn.        famotidine (PEPCID) 20 MG tablet Take 20 mg by mouth daily.       gabapentin (NEURONTIN) 100 MG capsule Take 200 mg by mouth 2 (two) times a day.        hydroCHLOROthiazide (HYDRODIURIL) 25 MG tablet Take 25 mg by mouth daily.       HYDROmorphone (DILAUDID) 2 MG tablet Take by mouth every 6 (six) hours as needed for pain.        hydrOXYzine HCl (ATARAX) 25 MG tablet Take 25 mg by mouth every 6 (six) hours as needed.        insulin aspart U-100 (NOVOLOG) 100 unit/mL injection Inject 5 Units under the skin 3 (three) times a day with meals.        insulin glargine (LANTUS) 100 unit/mL vial Inject 30 Units under the skin daily.        methadone (DOLOPHINE) 5 MG tablet Take 2.5 mg by mouth every 12 (twelve) hours.        mirtazapine (REMERON) 15 MG tablet Take 1 tablet (15 mg total) by mouth at bedtime. 30 tablet 3     nystatin (MYCOSTATIN) ointment Apply 1 application topically 2 (two) times a day.       ondansetron (ZOFRAN-ODT) 8 MG disintegrating tablet Take 8 mg by mouth every 8 (eight) hours as needed for nausea.       senna-docusate (SENNA-TIME S) 8.6-50 mg tablet Take 1 tablet by mouth daily.        traZODone (DESYREL) 50 MG tablet Take 50 mg by mouth at bedtime as needed.        venlafaxine (EFFEXOR) 75 MG tablet Take 225 mg by mouth daily.       [DISCONTINUED] diphenhydrAMINE (BENADRYL) 25  mg capsule Take 25-50 mg by mouth every 6 (six) hours as needed for itching.       [DISCONTINUED] escitalopram oxalate (LEXAPRO) 10 MG tablet Take 10 mg by mouth daily.       [DISCONTINUED] hydroxychloroquine (PLAQUENIL) 200 mg tablet Take 200 mg by mouth 2 (two) times a day.       [DISCONTINUED] Lactobacillus acidophilus 100 mg (1 billion cell) cap Take 1 capsule by mouth daily.       No current facility-administered medications on file prior to visit.        Review of Systems   Patient denies fever, chills, headache, lightheadedness, dizziness, rhinorrhea, cough, congestion, shortness of breath, chest pain, palpitations, constipation, change in appetite, dysuria, frequency, burning or pain with urination.  Other than stated in HPI all other review of systems is negative.         Physical Exam   Vital signs: /73, heart rate 74, respiratory 16, temp 97.0.  GENERAL APPEARANCE: Well developed, well nourished, in no acute distress  HEENT: normocephalic, atraumatic  PERRL, sclerae anicteric, conjunctivae clear and moist, EOM intact  NCARDS: RRR, S1-S2, no murmurs gallops or rubs appreciated.  LUNGS: Lung sounds CTA, no adventitious sounds, respiratory effort normal.  ABD: Soft, nondistended, nontender positive bowel sounds in all 4 quadrants, midline incision.  EXTREMITIES: Trace lower extremity edema bilaterally  NEURO: Alert and oriented x 3.  Face is symmetric.  SKIN: Most for midline abdominal incision is well approximated without surrounding signs and symptoms of infection.  She has a small opening of 2 cm x 1 cm with a 0.5 cm depth on the distal end of her incision that has good granulation tissue without significant drainage.  PSYCH: Euthymic        Labs: .No results found for this or any previous visit (from the past 240 hour(s)).      Assessment:  1. Uncontrolled type 2 diabetes mellitus with hyperglycemia (H)     2. Pain     3. Moderate episode of recurrent major depressive disorder (H)     4. Wound  dehiscence     5. Clostridium difficile diarrhea     6. Essential hypertension         Plan:     Uncontrolled type 2 diabetes: Blood sugars are improving will increase glargine to 30 units at at bedtime continue NovoLog 5 units 3 times daily before meals and sliding scale insulin.  Likely will be able to increase the AC and wean her off of sliding scale soon.    Pain: Improving Dilaudid has changed to 2 mg every 6 hours will extend that to next week.  Continue with methadone 2.5 mg twice daily and will continue gabapentin.  Would likely like to wean off gabapentin as needed in the next 2 weeks.  Did discuss this with the patient and she does become anxious during conversation about her pain management.  We will continue to work on this with her.    Depression: We will implement recommendations from psychiatry by discontinuing Lexapro and changing her Effexor to XR and starting her on mirtazapine.  I did  her on the effects of the medication and the side effects.  She is agreeable.    Wound dehiscence: This is improving nursing to continue daily dressing change and have a cleanse cleaning.    C. difficile: Resolved vancomycin has been completed and she no longer has diarrhea will also discontinue lactobacillus and Benadryl.    Hypertension: Stable continue hydrochlorothiazide 25 mg daily likely could decrease this if her blood pressures continue to run SBP's in the 110s.      35 total minutes spent with 20 minutes spent face-to-face with patient counseling and coordination regarding her upcoming chemotherapy, plan for diabetes and depression.        Electronically signed by: Bell Saavedra CNP

## 2021-06-05 NOTE — PROGRESS NOTES
Code Status:  FULL CODE  Visit Type: Follow Up (pain, depression, sleep and wound)     Facility:  [facility] [[id number]]        Facility Type: SNF (Skilled Nursing Facility, TCU)    History of Present Illness: Chela Love is a 44 y.o. female who has a past medical history for uncontrolled DM2, bipolar disorder,polysubstance abuse and HTN.  She was recently hospitalized at [facility] for a right adnexal mass found to be adenocarcinoma likely endometrioid (final path pending) and septic shock.  She underwent a total hysterectomy and oophorectomy and tumor debulking.  Her hospitalization was complicated by poor pain tolerance in the picture of her polysubstance abuse, acute blood loss anemia, CINDY and intra-abdominal infection.  Urine tox positive for opiates, meth and THC. She did complete a course of antibiotics. She did have slight dehiscence of midline abdominal wound with two times a day wound care and sent out to TCU.  Upon admission to TCU she was severely anxious, in pain and was sent to the ER.  She was evaluated, given IV pain medications and sent back to the TCU.  On 12/9/2019, she spiked a temp of 104.1 and was sent to the ER for evaluation.  On CT, it showed a 5 cm enhanced pelvic fluid which was concern for abscess.  She was started on IV vancomycin x1 day then 2 days of Zosyn and switch to oral Augmentin.  During her stay she did have a hemoglobin of 6.5 and was given 1 unit of packed red blood cells with a discharge hemoglobin of 7.6.  Her discharge creatinine was 1.03.  She did result positive for C. difficile and was started on oral vancomycin on 12/11.  Her mid abdomen dressing changes required Mepilex 3 times a week.  She was also switched from heparin to Lovenox through 12/25.  Augmentin is through 12/23.  And vancomycin is 12/20.  She has an appointment with Dr. Ordaz gynecology oncology on 12/13, with Dr. Price in endocrine on 12/16, with Dr. Maynard and palliative care for  pain management on 12/18.    Today, I see her in follow-up after increasing her Remeron last week.  I did discuss with her her medications and explained that she is on all the antidepressants and sleep medications that her psychiatrist has ordered.  I explained that I had increased her Remeron per his request last week.  She is very weepy during my visit voicing anxiety and lack of sleep recently.  She states she is nervous regarding her upcoming chemotherapy appointment and does not know what to expect on Thursday.  She states she is significantly fatigued and tired due to not sleeping at night related to her anxiety.  Her midline abdominal wound appears moist with good granulation tissue in the base.  She reports her pain is stable and she has not felt any side effects of weaning off of her methadone.    Past Medical History:   Diagnosis Date     Abnormal menses      Acute encephalopathy      Adnexal mass      CINDY (acute kidney injury) (H)      Anxiety      Asthma      Bilateral lower extremity edema      Cellulitis      Clostridium difficile infection      Depression      DM2 (diabetes mellitus, type 2) (H)      Endometrial adenocarcinoma (H)     stage IIIA grade 1      Essential hypertension      HLD (hyperlipidemia)      HTN (hypertension)      Hypokalemia      Insomnia      Lactic acidosis      Morbid obesity (H)      Obesity 10/19/2016     Ovarian mass      Peripheral polyneuropathy      Polysubstance abuse (H)      Septic shock (H)      Vaginal bleeding      Vasculopathy      Current Outpatient Medications on File Prior to Visit   Medication Sig Dispense Refill     hydrOXYzine HCl (ATARAX) 25 MG tablet Take 25 mg by mouth at bedtime.       acetaminophen (TYLENOL) 325 MG tablet Take 650 mg by mouth every 6 (six) hours as needed for pain.        albuterol (PROAIR HFA;PROVENTIL HFA;VENTOLIN HFA) 90 mcg/actuation inhaler Inhale 2 puffs every 4 (four) hours as needed for wheezing.        calcium, as carbonate,  (TUMS) 200 mg calcium (500 mg) chewable tablet Chew 2-3 tablets every 4 (four) hours as needed for heartburn.        famotidine (PEPCID) 20 MG tablet Take 20 mg by mouth daily.       gabapentin (NEURONTIN) 100 MG capsule Take 200 mg by mouth 2 (two) times a day.        hydroCHLOROthiazide (HYDRODIURIL) 25 MG tablet Take 25 mg by mouth daily.       hydrOXYzine HCl (ATARAX) 25 MG tablet Take 25 mg by mouth every 6 (six) hours as needed.        insulin aspart U-100 (NOVOLOG) 100 unit/mL injection Inject 7 Units under the skin 3 (three) times a day with meals.        insulin glargine (LANTUS) 100 unit/mL vial Inject 30 Units under the skin daily.        LORazepam (ATIVAN) 0.5 MG tablet Take 1 tablet (0.5 mg total) by mouth every 4 (four) hours as needed for anxiety. 1 tablet 0     mirtazapine (REMERON) 15 MG tablet Take 1 tablet (15 mg total) by mouth at bedtime. 30 tablet 3     nystatin (MYCOSTATIN) ointment Apply 1 application topically 2 (two) times a day.       ondansetron (ZOFRAN-ODT) 8 MG disintegrating tablet Take 8 mg by mouth every 8 (eight) hours as needed for nausea.       prochlorperazine (COMPAZINE) 10 MG tablet Take 10 mg by mouth every 6 (six) hours as needed for nausea.       senna-docusate (SENNA-TIME S) 8.6-50 mg tablet Take 1 tablet by mouth daily.        traZODone (DESYREL) 50 MG tablet Take 100 mg by mouth at bedtime as needed.        venlafaxine (EFFEXOR) 75 MG tablet Take 225 mg by mouth daily.       No current facility-administered medications on file prior to visit.        Review of Systems   Patient denies fever, chills, headache, lightheadedness, dizziness, rhinorrhea, cough, congestion, shortness of breath, chest pain, palpitations, constipation, change in appetite, dysuria, frequency, burning or pain with urination.  Other than stated in HPI all other review of systems is negative.       Physical Exam   Vital signs: /52, heart rate 92, respiratory 20, temp 98.0  GENERAL APPEARANCE:  Well developed, well nourished, in no acute distress  HEENT: normocephalic, atraumatic  PERRL, sclerae anicteric, conjunctivae clear and moist, EOM intact  CARDS: RRR, S1-S2, no murmurs gallops or rubs appreciated.  LUNGS: Lung sounds CTA, no adventitious sounds, respiratory effort normal.  ABD: Soft, nondistended, nontender positive bowel sounds in all 4 quadrants  EXTREMITIES: Trace lower extremity edema bilaterally with venous stasis skin changes  NEURO: Alert and oriented x 3.  Face is symmetric.  SKIN: 2 cm x 1 cm open area of midline abdomen incision with moist granular base.  No curling edges of wound or signs or symptoms of infection.  PSYCH: Weepy and anxious        Labs: .No results found for this or any previous visit (from the past 240 hour(s)).      Assessment:  1. Adjustment disorder with depressed mood     2. Anxiety     3. Adjustment insomnia     4. Endometrial cancer, FIGO stage IIIA (H)     5. Surgical wound dehiscence, sequela         Plan:      Adjustment disorder with depression and anxiety: Continue to follow-up with psychiatry which she will be seeing next Thursday.  Continue mirtazapine and venlafaxine.    Insomnia: We will increase her trazodone to 200 mg at at bedtime and add hydroxyzine 25 mg at at bedtime for her anxiety.  She does have PRN lorazepam however I would like to avoid that as much as possible.  I will continue to monitor and consider changes as needed.    Endometrial cancer: She will have chemotherapy on Thursday of this week and she has Zofran and Compazine for her nausea.    Surgical wound dehiscence: Her wound is healing well this week continue with hydrogel to base and cover with Mepilex daily.      Electronically signed by Bell Saavedra CNP

## 2021-06-05 NOTE — PROGRESS NOTES
Code Status:  FULL CODE  Visit Type: Problem Visit (Lower extremity swelling)     Facility:  [facility] [[id number]]        Facility Type: SNF (Skilled Nursing Facility, TCU)    History of Present Illness: Chela Love is a 44 y.o. female.   Per chart review TCU course, thus far:  who has a past medical history for uncontrolled DM2, bipolar disorder,polysubstance abuse and HTN.  She was recently hospitalized at [facility] for a right adnexal mass found to be adenocarcinoma likely endometrioid (final path pending) and septic shock.  She underwent a total hysterectomy and oophorectomy and tumor debulking.  Her hospitalization was complicated by poor pain tolerance in the picture of her polysubstance abuse, acute blood loss anemia, CINDY and intra-abdominal infection.  Urine tox positive for opiates, meth and THC. She did complete a course of antibiotics. She did have slight dehiscence of midline abdominal wound with two times a day wound care and sent out to TCU.  Upon admission to TCU she was severely anxious, in pain and was sent to the ER.  She was evaluated, given IV pain medications and sent back to the TCU.  On 12/9/2019, she spiked a temp of 104.1 and was sent to the ER for evaluation.  On CT, it showed a 5 cm enhanced pelvic fluid which was concern for abscess.  She was started on IV vancomycin x1 day then 2 days of Zosyn and switch to oral Augmentin.  During her stay she did have a hemoglobin of 6.5 and was given 1 unit of packed red blood cells with a discharge hemoglobin of 7.6.  Her discharge creatinine was 1.03.  She did result positive for C. difficile and was started on oral vancomycin on 12/11.  Her mid abdomen dressing changes required Mepilex 3 times a week.  She was also switched from heparin to Lovenox through 12/25.  Augmentin is through 12/23.  And vancomycin is 12/20.  She has an appointment scheduled with Dr. Ordaz gynecology oncology on 12/13, with Dr. Price in endocrine on  "12/16, with Dr. Maynard and palliative care for pain management on 12/18.\"    Today: Patient was seen per nursing request to evaluate lower extremities for edema and requests for Ace wraps.  Lower extremities with venous stasis changes and trace edema.  She reports that they do feel a lot better with Ace wraps.  She also asks me to evaluate her feet she is very scared of diabetic neuropathy causing her to lose her leg or foot.  I reminded her that good blood sugar control and monitoring feet every night would help prevent this.  I see no open sores currently.  She then suegues into her anxiety regarding potential for discharge later this week.  She is reportedly going to discharge home on her own and she lives in in her own home with a dog.  She still needs some assistance with ambulation.  I did encourage her to speak with social work and I also spoke with nursing about this.  She did report some nights that everything to be last week for her new ovarian cancer, she says she is feeling little better today.        Past Medical History:   Diagnosis Date     Abnormal menses      Acute encephalopathy      Adnexal mass      CINDY (acute kidney injury) (H)      Anxiety      Asthma      Bilateral lower extremity edema      Cellulitis      Clostridium difficile infection      Depression      DM2 (diabetes mellitus, type 2) (H)      Endometrial adenocarcinoma (H)     stage IIIA grade 1      Essential hypertension      HLD (hyperlipidemia)      HTN (hypertension)      Hypokalemia      Insomnia      Lactic acidosis      Morbid obesity (H)      Obesity 10/19/2016     Ovarian mass      Peripheral polyneuropathy      Polysubstance abuse (H)      Septic shock (H)      Vaginal bleeding      Vasculopathy      Current Outpatient Medications on File Prior to Visit   Medication Sig Dispense Refill     acetaminophen (TYLENOL) 325 MG tablet Take 650 mg by mouth every 6 (six) hours as needed for pain.        albuterol (PROAIR HFA;PROVENTIL " HFA;VENTOLIN HFA) 90 mcg/actuation inhaler Inhale 2 puffs every 4 (four) hours as needed for wheezing.        calcium, as carbonate, (TUMS) 200 mg calcium (500 mg) chewable tablet Chew 2-3 tablets every 4 (four) hours as needed for heartburn.        famotidine (PEPCID) 20 MG tablet Take 20 mg by mouth daily.       gabapentin (NEURONTIN) 100 MG capsule Take 200 mg by mouth 2 (two) times a day.        hydroCHLOROthiazide (HYDRODIURIL) 25 MG tablet Take 25 mg by mouth daily.       HYDROmorphone (DILAUDID) 2 MG tablet Take by mouth every 6 (six) hours as needed for pain.        hydrOXYzine HCl (ATARAX) 25 MG tablet Take 25 mg by mouth every 6 (six) hours as needed.        insulin aspart U-100 (NOVOLOG) 100 unit/mL injection Inject 7 Units under the skin 3 (three) times a day with meals.        insulin glargine (LANTUS) 100 unit/mL vial Inject 30 Units under the skin daily.        LORazepam (ATIVAN) 0.5 MG tablet Take 1 tablet (0.5 mg total) by mouth every 4 (four) hours as needed for anxiety. 1 tablet 0     methadone (DOLOPHINE) 5 MG tablet Take 2.5 mg by mouth every 12 (twelve) hours.        mirtazapine (REMERON) 15 MG tablet Take 1 tablet (15 mg total) by mouth at bedtime. 30 tablet 3     nystatin (MYCOSTATIN) ointment Apply 1 application topically 2 (two) times a day.       ondansetron (ZOFRAN-ODT) 8 MG disintegrating tablet Take 8 mg by mouth every 8 (eight) hours as needed for nausea.       prochlorperazine (COMPAZINE) 10 MG tablet Take 10 mg by mouth every 6 (six) hours as needed for nausea.       senna-docusate (SENNA-TIME S) 8.6-50 mg tablet Take 1 tablet by mouth daily.        traZODone (DESYREL) 50 MG tablet Take 50 mg by mouth at bedtime as needed.        venlafaxine (EFFEXOR) 75 MG tablet Take 225 mg by mouth daily.       No current facility-administered medications on file prior to visit.        Review of Systems   Patient denies fever, chills, headache, lightheadedness, dizziness, rhinorrhea, cough,  congestion, shortness of breath, chest pain, palpitations, constipation, change in appetite, dysuria, frequency, burning or pain with urination.  Other than stated in HPI all other review of systems is negative.         Physical Exam   BP 90/60   Pulse 89   Temp 98.2  F (36.8  C)   Wt 190 lb (86.2 kg)   SpO2 96%   BMI 30.67 kg/m        GENERAL APPEARANCE: Well developed, well nourished, in no acute distress  HEENT: normocephalic, atraumatic  PERRL, sclerae anicteric, conjunctivae clear and moist, EOM intact  CARDS: RRR, S1-S2, no murmurs gallops or rubs appreciated.  LUNGS: Lung sounds CTA, no adventitious sounds, respiratory effort normal.  ABD: Soft, nondistended, nontender positive bowel sounds in all 4 quadrants  EXTREMITIES: Trace lower extremity edema bilaterally, venous stasis changes.  NEURO: Alert and oriented x 3.  Face is symmetric.  SKIN: open abdominal wound; covered with Mepilex dressing today.  PSYCH: Anxious regarding potential for discharge later this week.        Labs: .No results found for this or any previous visit (from the past 240 hour(s)).      Assessment:  1. Acquired lymphedema of lower extremity     2. Peripheral polyneuropathy     3. Uncontrolled type 2 diabetes mellitus with hyperglycemia (H)         Plan:    -Okay for Ace wraps to lower extremities, counseled regarding diabetes and polyneuropathy to examine feet nightly before bed.  -Encourage discussion with , nursing prior to discharge home to ensure everything is in place for her discharge for her to competently succeed at her home.     Electronically signed by Emma Burnett CNP.

## 2021-06-05 NOTE — TELEPHONE ENCOUNTER
Attempted to reach patient's mother (as she requested) to let her know that the script was sent for Trazodone. Mailbox is full. Will attempt to call later. Also tried calling the [facility] and pt is no longer there.

## 2021-06-05 NOTE — TELEPHONE ENCOUNTER
Please connect with the patient regarding concerns with her medication. The patient is stating she is not receiving all of her medications at her group home/assised living setting. Please connect with the patient at [phone] to clarify medications and dosages.

## 2021-06-05 NOTE — TELEPHONE ENCOUNTER
Pt's mother called with concerns regarding patient's sleep. She reports that pt hasn't slept at night for the last eight days. And before that, her sleep wasn't great either. She's not taking any pain medications right now. She's currently taking trazodone 50 mg at bedtime, but they are requesting that be increased to 200 mg at HS. Please advise. Cayla would like a call back with the outcome.     If a new script is prescribed, they would like it to go to [facility] in [place]. Pt currently resides at [facility].

## 2021-06-05 NOTE — PROGRESS NOTES
Outpatient Followup Psychiatric Evaluation      Pertinent History: The patient presented for an initial intake to this clinic on December 23 of 2019.  The patient has a reported prior history of bipolar affective disorder, as well as a history of polysubstance abuse including methamphetamine abuse.  I also see she has had a diagnosis of depressive disorder, NOS.  In addition she has morbid obesity and diabetes mellitus type 2 and polycystic ovary syndrome.  She has steroid dependent asthma.  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  She has had a ZANDRA, SBO and omentectomy.  Early in December 2019 she was admitted to the hospital with an abscess in the context of fevers.  She had significant anxiety over her diagnoses and prognosis.  Review of the records shows that she had been on Effexor 225 mg a day but apparently non-compliant with that.  She is also recently been on Lexapro.  She has had trazodone for sleep.  She was referred to this clinic due to concerns of worsening mood in the context of the situational stressors.  The patient apparently had been followed here in the distant past by 1 of our therapist.  At that visit we did change her Effexor XR to 225 mg a day.  I have discontinued her Lexapro.  I have started her on Remeron 15 mg at night.  The patient was in a structured and safe setting to prevent relapse and begin chemotherapy.    Current Symptoms: Patient presents today with multiple stressors including having completed her first round of chemotherapy.  Apparently her boyfriend broke up with her.  She describes her self is being on an emotional roller coaster.  She was living in a community house and was unsure whether she was receiving psychotropic medication.  Was tearful throughout the interview.  She states she is grateful for where she is staying but is unsure whether they are giving her meds.  She wonders whether she is getting all the narcotics she is supposed to  have.  She reports she is depressed but denies having any desire to be dead or thoughts of suicide.  She has been sober since prior to Thanksgiving and we spent some time talking about the importance of that.  She states she tends to perseverate on negative thinking and we talked about some strategies to avoid that and I also am going to refer her to an individual therapist.  She denies having any psychotic symptoms.  She reports chemotherapy is made her weak and having a difficult time walking and she is using a walker today.  She is frustrated by this as well.  She also feels bad that people do not trust her to stay sober but we talked about the importance of having support and oversight during this difficult time and she did appreciate that.  She denies side effects to the medication.  She reports she is not sleeping well and my plan is to increase the Remeron to 30 mg once we establish that she is in fact getting that medication.    Current Medications:  Current medications were reviewed.  Please see the chart for additional information.    Medication Compliance: yes    Side Effects to Medications:  no      Vitals:  Wt Readings from Last 3 Encounters:   01/20/20 201 lb (91.2 kg)   01/15/20 190 lb (86.2 kg)   12/24/19 (!) 232 lb (105.2 kg)     Temp Readings from Last 3 Encounters:   01/15/20 98.2  F (36.8  C)   12/24/19 97.2  F (36.2  C)   11/22/19 98.8  F (37.1  C) (Oral)     BP Readings from Last 3 Encounters:   01/20/20 (!) 158/104   01/15/20 90/60   12/24/19 135/83     Pulse Readings from Last 3 Encounters:   01/20/20 77   01/15/20 89   12/24/19 82       Problem List (Please see medical records):  Active Problems:    * No active hospital problems. *        Mental Status Exam:   Appearance: Patient is in no obvious distress.  No significant pain and no shortness of breath.  The patient however appears quite slow and flat.  Behavior: Patient does participate but does not initiate much.  Slow.  Limited effort.   No reports of any recent significant behavioral difficulties.  Speech: Sentence structure is intact.  Able to dialogue.  Not pressured or rambling.  Answers are appropriate.  Mood/Affect: Depressed.  Tearful throughout much of the interview.  Slow.  The patient appears quite flat and disinterested.  No anxiety.  No lability.  No barbara.  No irritability.  Thought Content:  No evidence of psychosis. No recent reported psychosis.  Suicidal or Homicidal Thoughts:  None apparent or reported.   Thought Process/Formulation: Needing prompts to participate.  Slow and concrete.  No racing thoughts.  The patient is able to follow some conversation.  Associations: Slow.  Not loose.  No racing thoughts.  Fund of Knowledge: Able to participate.  No apparent recent change.  Attention/Concentration: Tracking and following conversation.  The patient is slow and flat.  No obvious recent change.  Insight: No apparent recent change.  Fair.  Judgement: No apparent recent change.  Fair.  Memory:   Slow.  A bit slow with limited participation.  Motor Status:   No current tremor.  No reports of any recent change.  Orientation: No reports of any recent change.  Grossly unchanged.    Diagnosis managed and treated at today's visit :    Mood disorder, NOS rule out major depressive disorder with anxious features, rule out mood disorder secondary to chemical use     Rule out bipolar affective disorder     Methamphetamine substance use disorder, severe      Plan:  Medication Adjustment:  I would like to increase the patient's Remeron to 30 mg but first want to confirm that she is actually taking that medication along with the Effexor.  The patient stated she would have her team call us later today.  I have notified the nurse as well to try and follow-up on this.    Other:   I have referred the patient to an individual therapist here.  The patient will return in 6 weeks for medication check.  She agrees to call or return sooner if questions,  concerns or problems arise.  We spent time talking about things she could be doing to focus her energy on more positive activities and not dwelling on the negative aspects of her current situation.  She was going to try and implement those.    Continue with the support of the clinic, reassurance, and redirection. Staff monitoring and ongoing assessments per team plan. Current psychotropic medication appears to represent the minimum effective dosage and appears medically necessary. We will continue to monitor and reassess. This team will utilize appropriate emergency services if necessary. I will make myself available if concerns or problems arise.    Vinod Suárez MD

## 2021-06-05 NOTE — PROGRESS NOTES
Code Status:  FULL CODE  Visit Type: Follow Up (pain management) and Problem Visit (abdominal wound)     Facility:  [facility] [[id number]]        Facility Type: SNF (Skilled Nursing Facility, TCU)    History of Present Illness: Chela Love is a 44 y.o. female who has a past medical history for uncontrolled DM2, bipolar disorder,polysubstance abuse and HTN.  She was recently hospitalized at [facility] for a right adnexal mass found to be adenocarcinoma likely endometrioid (final path pending) and septic shock.  She underwent a total hysterectomy and oophorectomy and tumor debulking.  Her hospitalization was complicated by poor pain tolerance in the picture of her polysubstance abuse, acute blood loss anemia, CINDY and intra-abdominal infection.  Urine tox positive for opiates, meth and THC. She did complete a course of antibiotics. She did have slight dehiscence of midline abdominal wound with two times a day wound care and sent out to TCU.  Upon admission to TCU she was severely anxious, in pain and was sent to the ER.  She was evaluated, given IV pain medications and sent back to the TCU.  On 12/9/2019, she spiked a temp of 104.1 and was sent to the ER for evaluation.  On CT, it showed a 5 cm enhanced pelvic fluid which was concern for abscess.  She was started on IV vancomycin x1 day then 2 days of Zosyn and switch to oral Augmentin.  During her stay she did have a hemoglobin of 6.5 and was given 1 unit of packed red blood cells with a discharge hemoglobin of 7.6.  Her discharge creatinine was 1.03.  She did result positive for C. difficile and was started on oral vancomycin on 12/11.  Her mid abdomen dressing changes required Mepilex 3 times a week.  She was also switched from heparin to Lovenox through 12/25.  Augmentin is through 12/23.  And vancomycin is 12/20.  She has an appointment with Dr. Ordaz gynecology oncology on 12/13, with Dr. Price in endocrine on 12/16, with Dr. Maynard and  palliative care for pain management on 12/18.    Today, nursing request visit due to evaluation of her abdominal wound.  Upon evaluation her abdominal wound is very superficial however does appear to be a bit dry and pausing in healing.  Wound size is approximately 3 cm x 2 cm.  She has no signs and symptoms of infection.  She reports that her pain is well controlled and she has been off the Dilaudid for some time.  Reports her nausea is pretty much resolved and she does have a good appetite.  Planning for more chemo starting tomorrow.    She reports that her lower extremities did not have Ace wraps and she endorses heavy feeling of her legs.  She does have venous stasis changes to her lower extremities with trace edema.    Past Medical History:   Diagnosis Date     Abnormal menses      Acute encephalopathy      Adnexal mass      CINDY (acute kidney injury) (H)      Anxiety      Asthma      Bilateral lower extremity edema      Cellulitis      Clostridium difficile infection      Depression      DM2 (diabetes mellitus, type 2) (H)      Endometrial adenocarcinoma (H)     stage IIIA grade 1      Essential hypertension      HLD (hyperlipidemia)      HTN (hypertension)      Hypokalemia      Insomnia      Lactic acidosis      Morbid obesity (H)      Obesity 10/19/2016     Ovarian mass      Peripheral polyneuropathy      Polysubstance abuse (H)      Septic shock (H)      Vaginal bleeding      Vasculopathy      Current Outpatient Medications on File Prior to Visit   Medication Sig Dispense Refill     acetaminophen (TYLENOL) 325 MG tablet Take 650 mg by mouth every 6 (six) hours as needed for pain.        albuterol (PROAIR HFA;PROVENTIL HFA;VENTOLIN HFA) 90 mcg/actuation inhaler Inhale 2 puffs every 4 (four) hours as needed for wheezing.        calcium, as carbonate, (TUMS) 200 mg calcium (500 mg) chewable tablet Chew 2-3 tablets every 4 (four) hours as needed for heartburn.        famotidine (PEPCID) 20 MG tablet Take 20 mg  by mouth daily.       gabapentin (NEURONTIN) 100 MG capsule Take 200 mg by mouth 2 (two) times a day.        hydroCHLOROthiazide (HYDRODIURIL) 25 MG tablet Take 25 mg by mouth daily.       hydrOXYzine HCl (ATARAX) 25 MG tablet Take 25 mg by mouth every 6 (six) hours as needed.        insulin aspart U-100 (NOVOLOG) 100 unit/mL injection Inject 7 Units under the skin 3 (three) times a day with meals.        insulin glargine (LANTUS) 100 unit/mL vial Inject 30 Units under the skin daily.        LORazepam (ATIVAN) 0.5 MG tablet Take 1 tablet (0.5 mg total) by mouth every 4 (four) hours as needed for anxiety. 1 tablet 0     mirtazapine (REMERON) 15 MG tablet Take 1 tablet (15 mg total) by mouth at bedtime. 30 tablet 3     nystatin (MYCOSTATIN) ointment Apply 1 application topically 2 (two) times a day.       ondansetron (ZOFRAN-ODT) 8 MG disintegrating tablet Take 8 mg by mouth every 8 (eight) hours as needed for nausea.       prochlorperazine (COMPAZINE) 10 MG tablet Take 10 mg by mouth every 6 (six) hours as needed for nausea.       senna-docusate (SENNA-TIME S) 8.6-50 mg tablet Take 1 tablet by mouth daily.        traZODone (DESYREL) 50 MG tablet Take 50 mg by mouth at bedtime as needed.        venlafaxine (EFFEXOR) 75 MG tablet Take 225 mg by mouth daily.       [DISCONTINUED] HYDROmorphone (DILAUDID) 2 MG tablet Take by mouth every 6 (six) hours as needed for pain.        [DISCONTINUED] methadone (DOLOPHINE) 5 MG tablet Take 0.5 tablets (2.5 mg total) by mouth every 12 (twelve) hours. 60 tablet 0     No current facility-administered medications on file prior to visit.        Review of Systems   Patient denies fever, chills, headache, lightheadedness, dizziness, rhinorrhea, cough, congestion, shortness of breath, chest pain, palpitations, constipation, change in appetite, dysuria, frequency, burning or pain with urination.  Other than stated in HPI all other review of systems is negative.       Physical Exam   Vital  signs: BP 92/54, heart rate 85, respiratory 18, temp 97.8, blood sugars  with most 160  GENERAL APPEARANCE: Well developed, well nourished, in no acute distress  HEENT: normocephalic, atraumatic  PERRL, sclerae anicteric, conjunctivae clear and moist, EOM intact  CARDS: RRR, S1-S2, no murmurs gallops or rubs appreciated.  LUNGS: Lung sounds CTA, no adventitious sounds, respiratory effort normal.  ABD: Soft, nondistended, nontender positive bowel sounds in all 4 quadrants  EXTREMITIES: Trace lower extremity edema bilaterally with venous stasis skin changes  NEURO: Alert and oriented x 3.  Face is symmetric.  SKIN: 3 cm x 2 cm open area of midline abdomen incision with red wound base appearing to be dry in nature.  No curling edges of wound or signs or symptoms of infection.  PSYCH: euthymic        Labs: .No results found for this or any previous visit (from the past 240 hour(s)).      Assessment:  1. Wound dehiscence     2. Venous stasis dermatitis of both lower extremities     3. Edema, unspecified type     4. Pain management         Plan:    Wound dehiscence: wound is looking a bit dry.  Will change dressing changes to hydrogel mesh two times a day.  Discussed with nursing and they will start this today.      Venous stasis: will dc ace wraps and add compression hose daily.  Counseled patient on the use and purpose of compression hose.  continue with gabapentin    Edema: This is well controlled believe this will be improved and heaviness of lower extremities will improve with compression.    Pain management: I did discuss pain management with the patient and she is completely weaned off of Dilaudid.  She states she has no pain and is agreeable to wean off of methadone.  I will give her 2.5 mg of methadone daily x3 days and then 2.5 mg every other day and then discontinue.    I did review her recent psychiatric note with Dr. Suárez on 1/20 and there was some concern that she was not getting her medications as  he had prescribed.  I did discuss this with her and explained that she is getting her medications as prescribed by Dr. Suárez.  She is grateful for this explanation.  He did recommend increasing Remeron to 30 mg daily and so I will do that at this time.        Electronically signed by Bell Saavedra CNP

## 2021-06-05 NOTE — TELEPHONE ENCOUNTER
Pt's mom, Cayla, called and would like to speak with RN staff.  She declined to give any specific information.  She can be reached at [phone].

## 2021-06-05 NOTE — TELEPHONE ENCOUNTER
Called patients mother back and will provide another copy of the AVS from today's visit for clarification of current medications list for patients facility.

## 2021-06-05 NOTE — TELEPHONE ENCOUNTER
The order for her trazodone is 100 mg at bedtime.  They should try that with a may repeat x1.  He computer will not let me make this change.  Could you do that for me?

## 2021-06-05 NOTE — PROGRESS NOTES
Code Status:  FULL CODE  Visit Type: Follow Up (s/p chemo induction, n/v)     Facility:  [facility] [[id number]]        Facility Type: SNF (Skilled Nursing Facility, TCU)    History of Present Illness: Chela Love is a 44 y.o. female who has a past medical history for uncontrolled DM2, bipolar disorder,polysubstance abuse and HTN.  She was recently hospitalized at [facility] for a right adnexal mass found to be adenocarcinoma likely endometrioid (final path pending) and septic shock.  She underwent a total hysterectomy and oophorectomy and tumor debulking.  Her hospitalization was complicated by poor pain tolerance in the picture of her polysubstance abuse, acute blood loss anemia, CINDY and intra-abdominal infection.  Urine tox positive for opiates, meth and THC. She did complete a course of antibiotics. She did have slight dehiscence of midline abdominal wound with two times a day wound care and sent out to TCU.  Upon admission to TCU she was severely anxious, in pain and was sent to the ER.  She was evaluated, given IV pain medications and sent back to the TCU.  On 12/9/2019, she spiked a temp of 104.1 and was sent to the ER for evaluation.  On CT, it showed a 5 cm enhanced pelvic fluid which was concern for abscess.  She was started on IV vancomycin x1 day then 2 days of Zosyn and switch to oral Augmentin.  During her stay she did have a hemoglobin of 6.5 and was given 1 unit of packed red blood cells with a discharge hemoglobin of 7.6.  Her discharge creatinine was 1.03.  She did result positive for C. difficile and was started on oral vancomycin on 12/11.  Her mid abdomen dressing changes required Mepilex 3 times a week.  She was also switched from heparin to Lovenox through 12/25.  Augmentin is through 12/23.  And vancomycin is 12/20.  She has an appointment scheduled with Dr. Ordaz gynecology oncology on 12/13, with Dr. Price in endocrine on 12/16, with Dr. Maynard and palliative care for  pain management on 12/18.    She started chemotherapy yesterday 1/9.  Today nursing reports significant nausea and vomiting.  She was given Zofran with good relief.  She also has Compazine which was ordered by oncology yesterday.  She is afebrile and denies any abdominal pain.  She states she is able to get down water and coffee this morning.  Her abdominal pain is stable on the lower dose of Dilaudid.  I did observe her coming in from the patio after smoking a cigarette later in the day.  Her blood sugars were elevated last night in the 200s and 300s.  This is abnormal for her is most the time she has very good control her blood sugars.      Past Medical History:   Diagnosis Date     Abnormal menses      Acute encephalopathy      Adnexal mass      CINDY (acute kidney injury) (H)      Anxiety      Asthma      Bilateral lower extremity edema      Cellulitis      Clostridium difficile infection      Depression      DM2 (diabetes mellitus, type 2) (H)      Endometrial adenocarcinoma (H)     stage IIIA grade 1      Essential hypertension      HLD (hyperlipidemia)      HTN (hypertension)      Hypokalemia      Insomnia      Lactic acidosis      Morbid obesity (H)      Obesity 10/19/2016     Ovarian mass      Peripheral polyneuropathy      Polysubstance abuse (H)      Septic shock (H)      Vaginal bleeding      Vasculopathy      Current Outpatient Medications on File Prior to Visit   Medication Sig Dispense Refill     prochlorperazine (COMPAZINE) 10 MG tablet Take 10 mg by mouth every 6 (six) hours as needed for nausea.       acetaminophen (TYLENOL) 325 MG tablet Take 650 mg by mouth every 6 (six) hours as needed for pain.        albuterol (PROAIR HFA;PROVENTIL HFA;VENTOLIN HFA) 90 mcg/actuation inhaler Inhale 2 puffs every 4 (four) hours as needed for wheezing.        calcium, as carbonate, (TUMS) 200 mg calcium (500 mg) chewable tablet Chew 2-3 tablets every 4 (four) hours as needed for heartburn.        famotidine  (PEPCID) 20 MG tablet Take 20 mg by mouth daily.       gabapentin (NEURONTIN) 100 MG capsule Take 200 mg by mouth 2 (two) times a day.        hydroCHLOROthiazide (HYDRODIURIL) 25 MG tablet Take 25 mg by mouth daily.       HYDROmorphone (DILAUDID) 2 MG tablet Take by mouth every 6 (six) hours as needed for pain.        hydrOXYzine HCl (ATARAX) 25 MG tablet Take 25 mg by mouth every 6 (six) hours as needed.        insulin aspart U-100 (NOVOLOG) 100 unit/mL injection Inject 7 Units under the skin 3 (three) times a day with meals.        insulin glargine (LANTUS) 100 unit/mL vial Inject 30 Units under the skin daily.        methadone (DOLOPHINE) 5 MG tablet Take 2.5 mg by mouth every 12 (twelve) hours.        mirtazapine (REMERON) 15 MG tablet Take 1 tablet (15 mg total) by mouth at bedtime. 30 tablet 3     nystatin (MYCOSTATIN) ointment Apply 1 application topically 2 (two) times a day.       ondansetron (ZOFRAN-ODT) 8 MG disintegrating tablet Take 8 mg by mouth every 8 (eight) hours as needed for nausea.       senna-docusate (SENNA-TIME S) 8.6-50 mg tablet Take 1 tablet by mouth daily.        traZODone (DESYREL) 50 MG tablet Take 50 mg by mouth at bedtime as needed.        venlafaxine (EFFEXOR) 75 MG tablet Take 225 mg by mouth daily.       No current facility-administered medications on file prior to visit.        Review of Systems   Patient denies fever, chills, headache, lightheadedness, dizziness, rhinorrhea, cough, congestion, shortness of breath, chest pain, palpitations, constipation, change in appetite, dysuria, frequency, burning or pain with urination.  Other than stated in HPI all other review of systems is negative.         Physical Exam   Vital signs: /73, heart rate 94, respiratory 16, temp 97.9.  GENERAL APPEARANCE: Well developed, well nourished, in no acute distress  HEENT: normocephalic, atraumatic  PERRL, sclerae anicteric, conjunctivae clear and moist, EOM intact  CARDS: RRR, S1-S2, no  murmurs gallops or rubs appreciated.  LUNGS: Lung sounds CTA, no adventitious sounds, respiratory effort normal.  ABD: Soft, nondistended, nontender positive bowel sounds in all 4 quadrants  EXTREMITIES: Trace lower extremity edema bilaterally  NEURO: Alert and oriented x 3.  Face is symmetric.  SKIN: open abdominal wound not observed today  PSYCH: euthymic        Labs: .No results found for this or any previous visit (from the past 240 hour(s)).      Assessment:  1. Chemotherapy induced nausea and vomiting     2. Uncontrolled type 2 diabetes mellitus with hyperglycemia (H)     3. Pain     4. Endometrial cancer, FIGO stage IIIA (H)         Plan:    Nausea and vomiting: Did  patient on being sure to drink plenty of fluids even if she was unable to eat.  May need to consider an oral supplement in the future if we are unable to get this under control.  Continue PRN Zofran and Compazine.  Discussed with nurse to alternate if possible.  She was also started on lorazepam for nausea/vomiting and anxiety.    Next chemotherapy not until next Monday.    Diabetes: Yesterday's blood sugars were elevated I am going to consider those incidental and continue to monitor.  Continue with current glargine and AC insulin.    Pain: Pain is well controlled weaning off Dilaudid for next week.  Continue gabapentin.  Continue methadone.  Plan to wean from methadone in 2 weeks.      Electronically signed by Bell Saavedra CNP

## 2021-06-05 NOTE — PROGRESS NOTES
Correct pharmacy verified with patient and confirmed in snapshot? [x] yes []no    Charge captured ? [x] yes  [] no    Medications Phoned  to Pharmacy [] yes [x]no  Name of Pharmacist:  List Medications, including dose, quantity and instructions      Medication Prescriptions given to patient   [] yes  [x] no   List the name of the drug the prescription was written for.       Medications ordered this visit were e-scribed.  Verified by order class [] yes  [x] no    Medication changes or discontinuations were communicated to patient's pharmacy: [] yes  [x] no    UA collected [] yes  [x] no    [place] Prescription Monitoring Program Reviewed? [x] yes  [] no    Referrals were made to:  Psychology     Future appointment was made: [x] yes  [] no  3/02/2020  Dictation completed at time of chart check: [x] yes  [] no    I have checked the documentation for today s encounters and the above information has been reviewed and completed.

## 2021-06-05 NOTE — PROGRESS NOTES
Code Status:  FULL CODE  Visit Type: Follow Up (pain, anxiety, chemotherapy plan)     Facility:  [facility] [[id number]]        Facility Type: SNF (Skilled Nursing Facility, TCU)    History of Present Illness: Chela Love is a 44 y.o. female who has a past medical history for uncontrolled DM2, bipolar disorder,polysubstance abuse and HTN.  She was recently hospitalized at [facility] for a right adnexal mass found to be adenocarcinoma likely endometrioid (final path pending) and septic shock.  She underwent a total hysterectomy and oophorectomy and tumor debulking.  Her hospitalization was complicated by poor pain tolerance in the picture of her polysubstance abuse, acute blood loss anemia, CINDY and intra-abdominal infection.  Urine tox positive for opiates, meth and THC. She did complete a course of antibiotics. She did have slight dehiscence of midline abdominal wound with two times a day wound care and sent out to TCU.  Upon admission to TCU she was severely anxious, in pain and was sent to the ER.  She was evaluated, given IV pain medications and sent back to the TCU.  On 12/9/2019, she spiked a temp of 104.1 and was sent to the ER for evaluation.  On CT, it showed a 5 cm enhanced pelvic fluid which was concern for abscess.  She was started on IV vancomycin x1 day then 2 days of Zosyn and switch to oral Augmentin.  During her stay she did have a hemoglobin of 6.5 and was given 1 unit of packed red blood cells with a discharge hemoglobin of 7.6.  Her discharge creatinine was 1.03.  She did result positive for C. difficile and was started on oral vancomycin on 12/11.  Her mid abdomen dressing changes required Mepilex 3 times a week.  She was also switched from heparin to Lovenox through 12/25.  Augmentin is through 12/23.  And vancomycin is 12/20.  She has an appointment scheduled with Dr. Ordaz gynecology oncology on 12/13, with Dr. Price in endocrine on 12/16, with Dr. Maynard and palliative  care for pain management on 12/18.    Today, she reports that her pain is significantly less and she is using less Dilaudid.  Her abdominal wound is almost completely closed with an approximate 1 cm x 1 cm opening with full granulation to the base.  She verbalizes significant anxiety surrounding her upcoming chemotherapy induction on Thursday of this week.  She also states that now that her pain is more controlled she feels she has more time to think and causing her anxiety.  Her anxiety medications were switched last week per her psychiatrist.  Her fasting blood sugars in the a.m. are 1 60-1 90s and most of her other blood sugars are less than 200s.  She reports she feels she is eating better and and moving better.  She denies any issues with her bowels or bladder.    Past Medical History:   Diagnosis Date     Abnormal menses      Acute encephalopathy      Adnexal mass      CINDY (acute kidney injury) (H)      Anxiety      Asthma      Bilateral lower extremity edema      Cellulitis      Clostridium difficile infection      Depression      DM2 (diabetes mellitus, type 2) (H)      Endometrial adenocarcinoma (H)     stage IIIA grade 1      Essential hypertension      HLD (hyperlipidemia)      HTN (hypertension)      Hypokalemia      Insomnia      Lactic acidosis      Morbid obesity (H)      Obesity 10/19/2016     Ovarian mass      Peripheral polyneuropathy      Polysubstance abuse (H)      Septic shock (H)      Vaginal bleeding      Vasculopathy      Current Outpatient Medications on File Prior to Visit   Medication Sig Dispense Refill     acetaminophen (TYLENOL) 325 MG tablet Take 650 mg by mouth every 6 (six) hours as needed for pain.        albuterol (PROAIR HFA;PROVENTIL HFA;VENTOLIN HFA) 90 mcg/actuation inhaler Inhale 2 puffs every 4 (four) hours as needed for wheezing.        calcium, as carbonate, (TUMS) 200 mg calcium (500 mg) chewable tablet Chew 2-3 tablets every 4 (four) hours as needed for heartburn.         famotidine (PEPCID) 20 MG tablet Take 20 mg by mouth daily.       gabapentin (NEURONTIN) 100 MG capsule Take 200 mg by mouth 2 (two) times a day.        hydroCHLOROthiazide (HYDRODIURIL) 25 MG tablet Take 25 mg by mouth daily.       HYDROmorphone (DILAUDID) 2 MG tablet Take by mouth every 6 (six) hours as needed for pain.        hydrOXYzine HCl (ATARAX) 25 MG tablet Take 25 mg by mouth every 6 (six) hours as needed.        insulin aspart U-100 (NOVOLOG) 100 unit/mL injection Inject 7 Units under the skin 3 (three) times a day with meals.        insulin glargine (LANTUS) 100 unit/mL vial Inject 30 Units under the skin daily.        methadone (DOLOPHINE) 5 MG tablet Take 2.5 mg by mouth every 12 (twelve) hours.        mirtazapine (REMERON) 15 MG tablet Take 1 tablet (15 mg total) by mouth at bedtime. 30 tablet 3     nystatin (MYCOSTATIN) ointment Apply 1 application topically 2 (two) times a day.       ondansetron (ZOFRAN-ODT) 8 MG disintegrating tablet Take 8 mg by mouth every 8 (eight) hours as needed for nausea.       senna-docusate (SENNA-TIME S) 8.6-50 mg tablet Take 1 tablet by mouth daily.        traZODone (DESYREL) 50 MG tablet Take 50 mg by mouth at bedtime as needed.        venlafaxine (EFFEXOR) 75 MG tablet Take 225 mg by mouth daily.       No current facility-administered medications on file prior to visit.        Review of Systems   Patient denies fever, chills, headache, lightheadedness, dizziness, rhinorrhea, cough, congestion, shortness of breath, chest pain, palpitations, constipation, change in appetite, dysuria, frequency, burning or pain with urination.  Other than stated in HPI all other review of systems is negative.         Physical Exam   Vital signs: /77, heart rate 86, respiratory 16, height 96.6, 93% on room air.  GENERAL APPEARANCE: Well developed, well nourished, in no acute distress  HEENT: normocephalic, atraumatic  PERRL, sclerae anicteric, conjunctivae clear and moist, EOM  intact  CARDS: RRR, S1-S2, no murmurs gallops or rubs appreciated.  LUNGS: Lung sounds CTA, no adventitious sounds, respiratory effort normal.  ABD: Soft, nondistended, nontender positive bowel sounds in all 4 quadrants, midline incision with most well approximated.  EXTREMITIES: Trace lower extremity edema bilaterally  NEURO: Alert and oriented x 3.  Face is symmetric.  SKIN: Open area of abdominal incision is approximately 1 cm x 1 cm with good red granulation tissue in the base.  Surrounding tissue is free of signs and symptoms of infection.  PSYCH: Weepy at times        Labs: .No results found for this or any previous visit (from the past 240 hour(s)).      Assessment:  1. Uncontrolled type 2 diabetes mellitus with hyperglycemia (H)     2. Wound dehiscence     3. Pain     4. Anxiety         Plan:      Equipment needed: Patient is progressing with therapy and requires a FWW for safe mobility and ambulation during ADL and in the community.  Walker will allow her to be as independent as possible during her recovery of weakness from recent abdominal surgery and ongoing weakness r/t to chemo induction.     Diabetes: Blood sugars are improving will increase AC aspart to 7 units 3 times daily continue Lantus.    Wound dehiscence: Wound is improving and continues to heal.  Nursing to continue current wound care daily.    Pain: I did discuss patient's plan for weaning her off the Dilaudid over the next 2 weeks.  I will change her to Dilaudid 2 mg every 6 hours x7 days as needed and then down to 1 mg every 6 hours x7 days as needed and then discontinue.  We did discuss the methadone and the future plan to wean her off of that.  She is agreeable to that plan she also continues on gabapentin.    Anxiety: She has been seen by psychiatry for medication management.  I did have a long discussion with her about processing her anxious feelings.  She does articulate her feelings and processed foods well when she has someone to  talk to.  Continue to follow-up with [facility] in-house psychology to help her process her anxiety surrounding her current medical status.  I did reassure her that she continues to need TCU level of care and that she is incrementally improving.    35 total minutes spent with 25 minutes spent face-to-face with patient in counseling regarding her anxiety and normal recovery phase.  Also counseled her and coordinated for in-house psychology.    Electronically signed by Bell Saavedra CNP

## 2021-06-06 NOTE — PROGRESS NOTES
Outpatient Followup Psychiatric Evaluation      Pertinent History: The patient presented for an initial intake to this clinic on December 23 of 2019.  The patient has a reported prior history of bipolar affective disorder, as well as a history of polysubstance abuse including methamphetamine abuse.  I also see she has had a diagnosis of depressive disorder, NOS.  In addition she has morbid obesity and diabetes mellitus type 2 and polycystic ovary syndrome.  She has steroid dependent asthma.  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  She has had a ZANDRA, SBO and omentectomy.  Early in December 2019 she was admitted to the hospital with an abscess in the context of fevers.  She had significant anxiety over her diagnoses and prognosis.  Review of the records shows that she had been on Effexor 225 mg a day but apparently non-compliant with that.  She is also recently been on Lexapro.  She has had trazodone for sleep.  She was referred to this clinic due to concerns of worsening mood in the context of the situational stressors.  The patient apparently had been followed here in the distant past by 1 of our therapist.  At that visit we did change her Effexor XR to 225 mg a day.  I have discontinued her Lexapro.  I have started her on Remeron 15 mg at night.  The patient was in a structured and safe setting to prevent relapse and begin chemotherapy.    I saw the patient in late January 2020.  The patient had just completed her first round of chemotherapy and had multiple stressors including a break-up with a boyfriend.  She described more emotional difficulties.  She was living in a community house.  She was tearful throughout the interview.  She reported being sober since Thanksgiving.  There was some question about her current medication regime and what she was receiving at her placement.  We did attempt to increase her Remeron at that visit but later through phone calls we did increase her  trazodone.  We continued her on Effexor.    Current Symptoms: Patient presents on her own today.  Her nephew apparently drove her here but was not involved in the interview.  The patient has moved to [facility] in [place].  Apparently that is a TCU.  She reports she loves it there and they treat her well.  She states she is involved throughout the day and activities and states it has been quite enjoyable.  It is in [place] and we talked about the fact that [place] may offer some activities and the opportunity to be outside when the weather warms and she is looking forward to that.    Patient states her mood is improving.  She still depressed and did tell the staff that she felt like a burden on people but she told me that things were definitely better.  She denied having any desire to be dead or thoughts of suicide.  No panic attacks.  She states she still has trouble sleeping despite the change in the trazodone and the addition of the low-dose Remeron but states it is a bit better.  She denies having any psychotic symptoms.  No barbara.  Patient reports she continues to be sober and we spent some time talking about the importance of this.  She is quite proud of this and states she has no urges to return to using.  She denies having any new medical diagnoses or treatments.  She states she undergoes chemotherapy about every 2-1/2 weeks and she can hardly hold her head up during those periods as it is quite hard on her system.  She reports even during those times she is not suicidal.    No new medical issues or diagnoses.  No new allergies.  She denies side effects to the medication.  We talked about the possibility of a further increase in the Remeron and she would like to do that to try and improve mood and sleep.  Risks and benefits were discussed.  We discussed sleep hygiene but she apparently does not nap during the day as they do keep her busy.  She had no other questions or  concerns.    Current Medications:  Current medications were reviewed.  Please see the chart for additional information.    Medication Compliance: yes    Side Effects to Medications:  no      Vitals:  Wt Readings from Last 3 Encounters:   01/20/20 201 lb (91.2 kg)   01/15/20 190 lb (86.2 kg)   12/24/19 (!) 232 lb (105.2 kg)     Temp Readings from Last 3 Encounters:   01/15/20 98.2  F (36.8  C)   12/24/19 97.2  F (36.2  C)   11/22/19 98.8  F (37.1  C) (Oral)     BP Readings from Last 3 Encounters:   01/20/20 (!) 158/104   01/15/20 90/60   12/24/19 135/83     Pulse Readings from Last 3 Encounters:   01/20/20 77   01/15/20 89   12/24/19 82       Problem List (Please see medical records):  Active Problems:    * No active hospital problems. *        Mental Status Exam:   Appearance: Patient is awake.  The patient does appear flat and slow.  No obvious pain.  No current shortness of breath.  Behavior: The patient does participate.  A bit slow.  A slight delay.  No reports of any significant behavioral dyscontrol or agitation.  Speech: Soft-spoken.  Answers are consistent and appropriate.  Able to dialogue and initiate.  Mood/Affect: The patient appears depressed.  No barbara.  No anxiety.  No agitation.  Flat and slow.  Thought Content: No psychosis is apparent.  No recent reported psychosis.  Patient denies such.  Suicidal or Homicidal Thoughts:  None apparent or reported.   Thought Process/Formulation: Able to track and follow conversation.  Able to participate.  Somewhat slow.  No evidence of any racing thoughts.  No agitation.  Not loose  Associations: Grossly intact.  Slow tracking adequately.  No reports of any significant recent change.  Fund of Knowledge: Grossly intact. No reports of any recent change.  Attention/Concentration: Grossly adequate.  Tracking and following with appropriate answers.  Able to initiate.  No reports of any recent change.  Insight: Appears adequate.   No reports of any recent  change.  Judgement: Appears adequate.  No reports of any recent change.  Memory: Appears adequate.  No reported recent change.  Slow.   Motor Status: Patient is in a wheelchair and generally weak.  No asymmetry.  No current tremor.  Orientation: Grossly intact.  No recent change.      Diagnosis managed and treated at today's visit :    Mood disorder, NOS rule out major depressive disorder with anxious features, rule out mood disorder secondary to chemical use     Rule out bipolar affective disorder     Methamphetamine substance use disorder, severe      Plan:  Medication Adjustment:  I have increased the patient's Remeron to 30 mg.  She was at 15 mg.  We will continue with the other medications as ordered.    Other:   I did suggest that the patient return back to her individual therapist.  We will continue to encourage the patient to participate in activities offered at the TCU.  She agrees to call with any questions or concerns.    Continue with the support of the clinic, reassurance, and redirection. Staff monitoring and ongoing assessments per team plan. Current psychotropic medication appears to represent the minimum effective dosage and appears medically necessary. We will continue to monitor and reassess. This team will utilize appropriate emergency services if necessary. I will make myself available if concerns or problems arise.    Vinod Suárez MD

## 2021-06-06 NOTE — PROGRESS NOTES
Correct pharmacy verified with patient and confirmed in snapshot? [x] yes []no    Charge captured ? [x] yes  [] no    Medications Phoned  to Pharmacy [] yes [x]no  Name of Pharmacist:  List Medications, including dose, quantity and instructions      Medication Prescriptions given to patient   [] yes  [x] no   List the name of the drug the prescription was written for.       Medications ordered this visit were e-scribed.  Verified by order class [x] yes  [] no  Effexor-XR 75 mg; Remeron 30 mg; trazodone 100 mg   Medication changes or discontinuations were communicated to patient's pharmacy: [] yes  [x] no    UA collected [] yes  [x] no    [place] Prescription Monitoring Program Reviewed? [x] yes  [] no    Referrals were made to:  None    Future appointment was made: [x] yes  [] no  4/13/2020  Dictation completed at time of chart check: [x] yes  [] no    I have checked the documentation for today s encounters and the above information has been reviewed and completed.
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
## 2021-06-07 NOTE — PROGRESS NOTES
"Diagnostic Assessment    [] Brief  ?[x] Standard    Date(s): 2020  Start Time: 1:12pm  Stop Time: 1:59pm    Patient Name: Chela Love  Age: 45 y.o.       1975    Referral Source: Bina Arnold MD  Therapist: HAIDER Ahumada                                                                                    Persons Present: patient, therapist    Telemedicine Visit: The patient's condition can be safely assessed and treated via synchronous audio and visual telemedicine encounter.      Reason for Telemedicine Visit: Services only offered telehealth    Originating Site (Patient Location): Patient's home    Distant Site (Provider Location): [facility]: [facility]    Consent:  The patient/guardian has verbally consented to: the potential risks and benefits of telemedicine (video visit) versus in person care; bill my insurance or make self-payment for services provided; and responsibility for payment of non-covered services.     Mode of Communication:  Video Conference via Apalya    As the provider I attest to compliance with applicable laws and regulations related to telemedicine.    Those present for this visit: patient, therapist       Chief Complaint (in the patients words; reason patient believes they have been referred):  Patient     Patient s expectation for treatment (patient stated initial goal; i.e.: I want to let go of my worries , Medication treatment if indicated):  \"I would feel better about everything. I wouldn't be so depressed and anxious.\"    Sources/references used in completing this assessment: (face-to-face interview, Patient chart, adult intake questionnaire, etc.)  Telemedicine Face to face interview, Patient Chart, TAVIA-7, PHQ-9,  Sigel Suicide Rating Scale, CAGE, WHODAS    Presenting Problem/History:    Functional impairments:   Personal: 4  Family: 2  Work: 4  Social: 4     How does the presenting problem affect patients " daily functioning:   Patient feels unable to cope with multiple stressors.  She is irritable with others, sometimes reclusive. Has temper outbursts with staff at her nursing home.    Issues/Stressors: Endometrial cancer with active chemotherapy treatment, neuropathy associated with cancer treatments, inability to walk, loss of house due to missed payments, stress of living in nursing home where all the other residents are much older than she, hx of drug use and ways that has affected her relationships.    Please select all that apply:    Physical Problems: Dizzines, Dry mouth, Flushes or chills, Rapid heart pounding, Headaches, Numbness, Skin rash, Inability to sleep, Sleeping too much and Decreased energy    Social Problems: Religous problems, No close friends, Unstable relationships, Decreased social activity and Loss of interest in activities    Behavioral Problems: Reckless (history of), Subtance abuse (history of) and Temper outbursts    Cognitive Problems: Distractability, Poor attention, Indecisiveness, Forgetful, Racing thoughts, Bothersome thoughts and Worries    Emotional Problems: Anxious, Angry, Rageful, Nervous, Apathetic, Irritable, Emptiness, Boredom, Depressed mood, Feelings of shame, Feelings of guilt, Lack of self confidence and Inferiority feelings       Onset/Frequency/Duration presenting problem symptoms:  Patient states she's had anxiety and depression since she was a young child, but especially teen and young adult years.    How does the patient perceive her problem in relation to how others see her problem?  Mother, sister are supportive of patient receiving  care.     Family/Social History:    Marriages/Significant other (including patients evaluation of the relationship quality):  No hx of marriages.  Her boyfriend of 10+ years broke up with her in the fall of 2020 after she was diagnosed with cancer.  She still has a hard time with this.    Children (sex and ages, any significant  "issues):  No children    Parents (ages, living or , how many years ):  Mother is living and supportive. Father is **    Siblings (birth order, ages, significant issues):  Patient has one sister and one brother, both are supportive, give rides to medical appts, etc.    Climate in family of origin (how does the patient perceive their childhood experience):  \"Good childhood\", no issues identified.    Education (type and level of education):  Patient completed HS.    Problems with Learning or School (developmental issues, learning disabilities, behavioral concerns in school):  Patient cites hx of difficulties with attention.    Developmental factors (developmental milestones, head injuries, CVA s, etc. that may have impeded milestones):  None reported.    Significant personal relationships including patient s evaluation of the relationship quality (Co-worker s, neighbor s, AA groups, Yarsani peers, etc.):   Not making use of any formal support groups such as NA, AA, Yarsani.  Receives some support from staff at nursing home.    Significant life events (what does the patient identify as a personal life changing/influencing event):  Drug use, boyfriend leaving her.    Sexual/physical/emotional/financial abuse/traumatic event. (any child protection involvement; who reported, Impact on patient/family/other):   Patient endorses exposure to traumatic events when she was using drugs, does not want to discuss at this time.    PTSD Symptoms (See addendum for PTSD Criteria):  No    denies flashbacks, intrusive memories, nightmares.      Contextual Non-personal factors contributing to the patients concerns (divorce in family, nation/natural disasters):  None reported.    Strengths/personal resources (what does the patient do well, what is going well in life, positive personality characteristics):  Supportive family, in a stable living situation in nursing home (but wants to get out), is sober from drug abuse, " "    Weaknesses (what does patient identify as a weakness):  Anxiety, depression, \"Lettin people run over me\".    Support network(s)/Resources (including strength and quality of social networks, who does the client consider supportive, other agencies or services patient uses):   Mother, sister, nursing home staff are supportive.    Belief system:    Patient identifies as having a higher power.  Is not affiliated with any Zoroastrianism.    Cultural influences and impact on patient (ask about all aspects of culture and ask which are relevant to the patient. Go beyond nationality and ethnicity. Consider biases, life style, community style, i.e.: urban, poverty, abuse, etc). see page 5 Diagnostic Assessment, Clinical Training for descriptors):  Patient describes growing up in family where support to one another, hard work were valued.    Cultural impact on health and health care (how does patient s culture influence how the patient receives health care):   Patient utilizes western model of health care.    Current living situation (Household members, housing status, stability, multiple moves, potential eviction):  Currently living in nursing home.    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    Financial Concerns (basic status, housing, food, clothing are they on any assistance including SSI/SSDI):   Stressed, does not know if she was awarded social security, MA is paying for care at nursing home.    Legal Problems (DUI S, divorce, law suits, etc.):  None reported.    Hobbies/Interests:    Coloring, tv.      Family Mental Health/Medical History:    Family Mental Health:   Patient states her father had untreated depression.    Family history of Suicide:  None reported.    Family history Chemical Dependency:  Father had drug abuse and depression.    Family Medical history:  family history includes Arthritis in her mother; Depression in her father; Diabetes in her " brother, brother, and mother; Drug abuse in her father; Fibromyalgia in her mother; Heart disease in her mother; No Medical Problems in her sister.      Patient Medical History:      Hospitalizations (When/Where):   Patient has had multiple  hospital medical admissions since her diagnosis of cancer, she's also had ER care for wounds, diabetes instability.  Past Surgical History:   Procedure Laterality Date     TOTAL ABDOMINAL HYSTERECTOMY W/ BILATERAL SALPINGOOPHORECTOMY  11/27/2019    Procedure: Exploratory Laparotomy , TOTAL ABDOMINAL HYSTERECTOMY, Bilateral SALPINGoophorectomy, Omentectomy, Abdominal Washout and Tumor DEBULKING; Surgeon: Rosana Mckeon MD; Location: [facility] OR       WISDOM TOOTH EXTRACTION         Medical diagnoses/concerns: (i.e.: Heart disease, thyroid problems,  Bld. Pressure,  seizures,  head Inj., Other)   Past Medical History:   Diagnosis Date     Abnormal menses      Acute encephalopathy      Adnexal mass      CINDY (acute kidney injury) (H)      Anxiety      Asthma      Bilateral lower extremity edema      Cellulitis      Clostridium difficile infection      Depression      DM2 (diabetes mellitus, type 2) (H)      Endometrial adenocarcinoma (H)     stage IIIA grade 1      Essential hypertension      HLD (hyperlipidemia)      HTN (hypertension)      Hypokalemia      Insomnia      Lactic acidosis      Morbid obesity (H)      Obesity 10/19/2016     Ovarian mass      Peripheral polyneuropathy      Polysubstance abuse (H)      Septic shock (H)      Vaginal bleeding      Vasculopathy        Current physician/other non psychiatric medical provider's:    Bina Arnold MD    Date of last medical exam:  Doesn't know dates, sees physicians regularly for cancer care.    Current Medications:       Current Outpatient Medications:      acetaminophen (TYLENOL) 325 MG tablet, Take 650 mg by mouth every 6 (six) hours as needed for pain. , Disp: , Rfl:      albuterol (PROAIR HFA;PROVENTIL  HFA;VENTOLIN HFA) 90 mcg/actuation inhaler, Inhale 2 puffs every 4 (four) hours as needed for wheezing. , Disp: , Rfl:      calcium, as carbonate, (TUMS) 200 mg calcium (500 mg) chewable tablet, Chew 2-3 tablets every 4 (four) hours as needed for heartburn. , Disp: , Rfl:      famotidine (PEPCID) 20 MG tablet, Take 20 mg by mouth daily., Disp: , Rfl:      gabapentin (NEURONTIN) 100 MG capsule, Take 200 mg by mouth 2 (two) times a day. , Disp: , Rfl:      hydroCHLOROthiazide (HYDRODIURIL) 25 MG tablet, Take 25 mg by mouth daily., Disp: , Rfl:      hydrOXYzine HCl (ATARAX) 25 MG tablet, Take 25 mg by mouth every 6 (six) hours as needed. , Disp: , Rfl:      insulin aspart U-100 (NOVOLOG) 100 unit/mL injection, Inject 7 Units under the skin 3 (three) times a day with meals. , Disp: , Rfl:      insulin glargine (LANTUS) 100 unit/mL vial, Inject 30 Units under the skin daily. , Disp: , Rfl:      LORazepam (ATIVAN) 0.5 MG tablet, Take 1 tablet (0.5 mg total) by mouth every 4 (four) hours as needed for anxiety., Disp: 1 tablet, Rfl: 0     mirtazapine (REMERON) 30 MG tablet, Take 1 tablet (30 mg total) by mouth at bedtime., Disp: 30 tablet, Rfl: 3     nystatin (MYCOSTATIN) ointment, Apply 1 application topically 2 (two) times a day., Disp: , Rfl:      ondansetron (ZOFRAN-ODT) 8 MG disintegrating tablet, Take 8 mg by mouth every 8 (eight) hours as needed for nausea., Disp: , Rfl:      prochlorperazine (COMPAZINE) 10 MG tablet, Take 10 mg by mouth every 6 (six) hours as needed for nausea., Disp: , Rfl:      senna-docusate (SENNA-TIME S) 8.6-50 mg tablet, Take 1 tablet by mouth daily. , Disp: , Rfl:      traZODone (DESYREL) 100 MG tablet, Take 1 tablet (100 mg total) by mouth at bedtime. May repeat x 1, Disp: 60 tablet, Rfl: 3     venlafaxine (EFFEXOR) 75 MG tablet, Take 3 tablets (225 mg total) by mouth daily., Disp: 90 tablet, Rfl: 3        Past Mental Health History:    Previous mental health diagnosis:  Patient has  "previously had diagnosis of Major Depression, Anxiety, Bipolar Disorder, Polysubstance abuse.    Date of diagnosis:  Early adult    Hx of Mental Health Treatment or Services:  Patient previously received  care but never continued.  She is currently receiving care from Dr. Jose in psychiatry.    CÉSAR Received:     No, visit is done remotely.    Hx of MH Tx/Hospitalizations (When/Where: must include a review of patient s record.  If not available, why, what if anything are you doing to obtain a record?):   None reported.    Hx of Psychiatric Medications:  Gabapentin, Lorazepam, Effexor, Mirtazapine, Trazedone  Previously prescribed with Lexapro      Suicidal/Homicidal Risk Assessment:    Suicidal: None reported  Ideation: none reported  History of Past Attempt(s): description: none reported  Crisis Plan: Agreeable to call 911 should she have impulses to harm herself.    Homicidal: None reported     History of destruction to property:  Description: \"not that I remember\"  Crisis Plan: not needed    Non- Substance Abuse Addictive Behaviors/Compulsive Behaviors:  None Reported    Comments:   denies    Chemical Use/Abuse History:    CAGE-AID (screening to determine a patients use/abuse/dependency):      2/4  (no drug use at this time)    Alcohol:  None Reported at this time, has previously used  Type: has previously abused Etoh of all kinds              Frequency: Weekly  Age of first use: teen                         Date of last use: November of 2019                                                                          Street Drugs:  None at this time, previously used street drugs for years.  Type: Polysubstance abuse including methamphetamine abuse               Frequency: Daily when she was using, none now.  Age of first use: teens                         Date of last use: sober since November of 2019    Prescription Drugs:  None Reported    Tobacco:  Yes  Type: cigarettes               Frequency: Daily  Age of " "first use: teen                         Date of last use: today    Caffeine:  Yes  Type: coffee               Frequency: Daily  Age of first use: young adult                         Date of last use: today    Currently in a treatment program: No     History of CD Treatment:      None reported               Mental Status Evaluation:    Grooming: Disheveled  Attire: Appropriate  Age: Appears Stated  Behavior Towards Examiner: Cooperative  Motor Activity: Within normal   Eye Contact: Appropriate  Mood: Depressed  Affect: Congruent w/content of speech, Tearful, Flat, Anxious and Depressed  Speech/Language: Within normal  Attention: Distractible  Concentration: Brief  Thought Process: Within normal and Slow  Thought Content: Within noramlWithin normal  Orientation: X 3No Evidence of Impairment  Memory: No Evidence of Impairment  Judgement: Impairment and Minimal  Estimated Intelligence: Average  Demonstrated Insight: Adequate  Fund of Knowledge: adequate       Clinical Impressions/Assessment/Recommendations: (Stands alone; is a synopsis of patients story, any impacting family or cultural issue on diagnosis and how patient meets criteria for diagnosis)  .   Patient is a 45 year old  female who presents for telemedicine visit to the outpatient  clinic for the purpose of completing DA in order to establish psychotherapy treatment.     Patient reports significant losses since being diagnosed with ovarian cancer last fall: lost her dog (now with sister, boyfriend of 10 years broke up with her, lost house (owned 3 years) and all belongings (family didn't allow patient back into home due to \"meth residue\".\"  [later explains this was when she was diagnosed with cancer]  She is having chemotherapy every other week with significant side affects: neuropathy from chemo has made walking and balance difficult, she is using wheelchair and unable to walk due to chemo.  \"I have to do rehab (physical) over and over\". Unable to " "work due to her cancer treatments. She is appreciative of caregivers at her nursing home but hates that everyone else are seniors, many of them with reduced cognitive abilities. Because of this she feels quite lonely. She desperarately wants to move out of the nursing home into her own housing.  She relates a long hx of bad relationships and being \"used\" in both primary and in friendships.  Now the coronavirus public health crisis is particularly stressful because she knows she is at more risk, and especially because she isn't allowed any company or to leave the nursing home where she stays.    Patient complains of depressive symptoms including little interest or pleasure in doing things, feeling depressed and down, difficulty sleeping and sleeping too much, feeling tired with poor energy, feeling bad about herself, difficulty concentrating.  Patient denies suicidal ideation, plan, or intent at this time.  Patient denies SI ideation, plan, or intent.   She complains of anxiety including feeling nervous and on edge, not being able to control her worry, worrying too much about different things, difficulty relaxing, irritability, and feeling afraid that something awful will happen.  Patient endorses hx of manic behavior and mood but \"isn't sure\" that she ever had these symptoms when she wasn't using drugs.  Denies these symptoms since she stopped using drugs in the fall of 2019.  Patient denies other psychotic symptoms.  She endorses a hx of trauma during her drug using years but does not want to further discuss.  She denies flashbacks, intrusive memories, nightmares specific to abuse (sometimes has nightmares).    Patient reports long hx of drug abuse and dependency, mostly methamphetamines. She was never in CD treatment.  Currently on methadone (uncertain if this is for addiction or for pain associated with cancer dx).  Patient states she has not used street drugs since November of 2019.   Patient has multiple " "medical dx even before she was diagnosed with endometrial cancer, including diabetes (used to not take care of it when she was taking drugs), steroid dependent asthma, polycystic ovary syndrome.    Patient denies hx of inpatient MH admissions.  Denies hx of suicidality.  Long hx of anxiety and depression.  States she was once diagnosed with Bipolar Disorder \"but I don't know it that's right\", does not know if she'd had any of those sx when she wasn't using drugs.  Was also diagnosed with Borderline Personality Disorder.   Patient saw Dr. Jose in psychiatry for initial evaluation on 12/23/2019 and continues to see him.  [facility] but doesn't remember names or dates, and never continued.    Based on the information provided by patient both verball and written it is clear that patient meets criteria for Major Depression, recurrent, moderate and Adjustment Disorder with Mixed Features. Differential diagnosis considered includes Bipolar Disorder, this should be considered a Rule Out at this time since patient does cite hx of mood swings but is unable to determine if she had these symptoms apart from her drug abuse.  Also considered is Borderline Personality Disorder, again patient has certainly had traits of same (self-destructive behavior, unstable relationships, episodic anger) but difficult to sort out from her hx of drug abuse.     She has multiple stressors that appear to exacerbate symptoms.  In view of all of this and patient's agreement, it is recommended that patient begin individual psychotherapy every other week.      Patient would be best serviced by therapeutic interventions that provide a client centered atmosphere with positive regard.  In addition, the patient may benefit from Solution-Based, CBT and skill-building therapies, along with Motivational Interviewing and DBT.      Patent will be referred to community resources as may be helpful to her symptomology and " stressors.  Chemical dependency would appear appropriate but given recent sobriety since cancer dx it is unclear if she would be eligible. She would probably benefit from individualized/care coordination type of treatment at this time. Recovery support is appropriate and will be discussed again with patient.    Diagnosis:  Major Depression, Recurrent, Moderate  Adjustment Disorder with mixed emotional features  Polysubstance abuse, in early remission  Traits of Borderline Personality Disorder  Rule out Bipolar Disorder      WHODAS 2.0 12-item version: self-administered: 83%     Scores presented in qualifiers to represent level of disability.  SEVERE Problem (high, extreme, ...) 50-95%    H1= 31  H2= 16  H3= 31    Assessment of client resolving presenting mental health concerns:    Ability: low to average  Motivation: average  Willingness: average      Initial Therapy Plan (ex: develop therapeutic relationship with therapist, Refer to psychiatry/psych testing, etc.):    1. Establish therapeutic realtionship.      2. Coordinate with psychiatry.      3. Consider adjunct sources of support including CD treatment, [facility], cancer support group, etc.    Is patient's family involved in the treatment?  Yes     If yes, How?    Patient's mother and siblings are supportive of patient receiving MH care.      Therapist s Signature/Supervisor/co-signature statement:   HAIDER Ahumada

## 2021-06-07 NOTE — TELEPHONE ENCOUNTER
Writer informed Pt via telephone that at this time we need to cancel her appointment for 4/13/2020 due to Dr. Suárez being out of the office on a unexpected NATANAEL and no current return date.      Pt was instructed to contact her PCP Dr. Arnold for continued medication management.    Will send in-basket to provider to let Dr. Arnold team know.  With current medications directions.      Pt would like to contacted when provider returns to the office.

## 2021-06-07 NOTE — PROGRESS NOTES
"    Mental Health Visit Note    4/7/2020    Start time: 11:43am   Stop Time: 12:33pm   Session # 3    Telemedicine Visit: The patient's condition can be safely assessed and treated via synchronous audio and visual telemedicine encounter.      Reason for Telemedicine Visit:  Services only offered telehealth      Originating Site (Patient Location):              Patient's home              Distant Site (Provider Location):         [facility]: [facility]    Consent:  The patient/guardian has verbally consented to: the potential risks and benefits of telemedicine (video visit) versus in person care; bill my insurance or make self-payment for services provided; and responsibility for payment of non-covered services.     Mode of Communication:  Video Conference via Safend    As the provider I attest to compliance with applicable laws and regulations related to telemedicine.    Session Type: Patient is presenting for an Individual session.   Persons present include patient and therapist.    Chela Love is a 45 y.o. female is being seen today for   Chief Complaint   Patient presents with      Follow Up     Anxiety     anxiety in the context of cancer, medical risk in time of COVID, hormones, personal loss     Depression     low mood in the context of cancer, medical risk, house being \"looted\"     Addiction     in early remission from drug addiction   .     New symptoms or complaints: None      Functional Impairment:   Personal: 4  Family: 3  Work: 4  Social:4    Clinical assessment of mental status: Patient's grooming is Well groomed, attire is Appropriate, and age appears Appears Stated. Behavior towards examiner is Cooperative, motor activity is Within normal, and eye contact is Appropriate.  Patient's mood is Sad, Anxious, Depressed and affect is Congruent w/content of speech, Labile, Tearful, Anxious and Depressed.  Speech/language is Excessive, Dramatic and Pressured, attention is " "Distractible, and concentration is Brief. Thought process is Within normal, thought content is Within noraml and  Within normal.  Patient's orientation is X 3, memory is  Impairment, Recent, Remote and Mild, judgement is Impairment and Minimal, and estimated intelligence is Average. Demonstrated insight is Adequate while fund of knowledge is adequate.    Suicidal/Homicidal Ideation present: None Reported This Session    Patient's impression of their current status:   \"I feel like nothing.  I've lost everything.  I don't even have friends.\"  Patient processes distress over having lost friendship group (all drug users), her house (can no longer make payments and house will be repossessed), belongings (all stolen or trashed by drug using housemates),   She's able to recognize that her \"old life\" was very harmful to her, but still grieves her independence, her home, her belongings, and being part of a social group.    Processes stress around COVID public health crisis that prevents people from visiting her in the nursing home where she stays.  Unable to walk due to neuropathy in feet/legs due to cancer treatments.    Patient reports follow up on therapy homework: She tried to find ways to be of service to the staff in her nursing home through expressing gratitude and a sense of humor.  Has not yet started make any cards for them but has a plan to ask occupational therapy.  She reminds her self daily of her support system 7 of 7 days.  Calls her family for emotional support.  Remains sober from drugs and alcohol.     Therapist impression of patients current state: Acute depression along with grief and loss issues.  Anxious but much of that is reality based.    Processed negative cognitions, reinforced strengths, validated patient efforts and feelings.  Discussed value of mindfulness on depression and anxiety. Discussed apps she can utilize for guided mindfulness meditation:  Mindspace, Calm    Type of psychotherapeutic " technique provided: Client centered, Solution-focused, CBT and Mindfulness     Progress toward short term goals:Progress as expected,   She tried to find ways to be of service to the staff in her nursing home through expressing gratitude and a sense of humor.  Has not yet started make any cards for them but has a plan to ask occupational therapy.  She reminds her self daily of her support system 7 of 7 days.  Calls her family for emotional support.  Remains sober from drugs and alcohol.    Review of long term goals: Not done at today's visit     Diagnosis:   1. Generalized anxiety disorder    2. Adjustment disorder with mixed anxiety and depressed mood    3. Polysubstance dependence in early, early partial, sustained full, or sustained partial remission (H)        Plan and Follow up: She will return for follow-up in 2 weeks.  Patient will complete ppwk for diagnostic assessment mailed to her.  She will remind herself daily of support system.    She will find ways to be of service to others through thoughtful activities like cards, phone calls, and expressions of gratitude and appreciation as well as a sense of humor.   She will remain sober from drugs and alcohol.    Long version [place] to be completed next session along with DA.  Plan to refer to Rule 25 in the hopes of referral to CD Care Coordination.    Diagnostic assessment to be completed next session.  Not completed today due to patient's acute tearful distress and difficulty with connection.    Discharge Criteria/Planning: Patient will continue with follow-up until therapy can be discontinued without return of signs and symptoms.    SANDY Ahumada, LICSW  4/7/2020

## 2021-06-08 NOTE — PROGRESS NOTES
Mental Health Visit Note    Patient: Chela Love    : 1975 MRN: [id number]    2020    Start time: 1:15pm    Stop Time: 2:00pm    Session # 6    Session Type: Patient is presenting for an Individual session.    Chela Love is a 45 y.o. female is being seen today for    Chief Complaint   Patient presents with     MH Follow Up     Video Visit     Depression     Low mood with physical and medical issues, rejection of boyfriend, housing issues     Anxiety     anxiety in the context of housing, medical, physical issues     Addiction     in early remission from drug abuse   .     Telemedicine Visit: The patient's condition can be safely assessed and treated via synchronous audio and visual telemedicine encounter.      Reason for Telemedicine Visit: Services only offered telehealth    Originating Site (Patient Location): Patient's home (nursing home)    Distant Site (Provider Location): [facility]: [facility]    Consent:  The patient/guardian has verbally consented to: the potential risks and benefits of telemedicine (video visit) versus in person care; bill my insurance or make self-payment for services provided; and responsibility for payment of non-covered services.     Mode of Communication:  Video Conference via Doximity  As the provider I attest to compliance with applicable laws and regulations related to telemedicine.    Those present for this visit: patient, therapist    Follow up in regards to ongoing symptom management of depression, anxiety, stress    New symptoms or complaints: None    Functional Impairment:   Personal: 4  Family: 2  Social: 4  Work: 4    Clinical assessment of mental status:   Chela Love presented on time. MS is same as last visit :  She was oriented x3, open and cooperative, and dressed appropriately for this session and weather. Her memory was Normal cognitive functioning .  Her speech was  Dramatic and Loud.  Language was  "fluent.  Concentration and focus is Within normal. Psychosis is not noted or reported. She reports her mood is Labile, Irritable, Anxious and Depressed.  Affect is congruent with speech and is Labile, Irritable, Anxious and Depressed.  Fund of knowledge is adequate. Insight is adequate for therapy.    Suicidal/Homicidal Ideation present: Patient denies suicidal and homicidal ideations/means or plans.     Patient's impression of their current status:   \"My whole life feels like a waste.\"  \"I'm carrying around so much guilt.\"  \"I can't take this any more.  I'm living with a 95 year old who doesn't know who I am.\"  Patient reports mood is depressed, anxious, panicky.  Patient reports mood is depressed and anxious in the context of living in a nursing home due to cancer disabilities.   She is \"very upset\" that housing advocate wants to place her in a hi-rise \"where everyone one is old again\".  \"I told him I wouldn't do it.\"  Processes how she feels that she wasted her life with the boyfriend and the friends that she had over that time.  Processes multiple losses associated both with cancer and of her poor choices when she was using drugs: home, employment, friends.    Therapist impression of patients current state: This 45 y.o. White or  female presents with significant distress including irritability, depression, and anxiety in the context of multiple psychosocial stressors.  Mood is labile and she appears fairly vulnerable with mood swings probably partly associated with hormonal changes due to cancer treatment.  Patient is socially isolated given that all of her previous friends were drug abusers, family support is good.    Processed negative cognitions, reinforced strengths, validated patient efforts and feelings.  Offered empathic listening and reflections and questions intended to evoke change talk, explored needs and resources, and provided emotional support.  Discussed that her goal is to move as " "quickly as possible and that a subsidized high rise that accepts both seniors as well as people with disabilities might be an interim step to eventually living in [facility].  Encourage patient to talk with  at her nursing home regarding the status of her Social Security application.    Assessment tools used today include: None today.  She is still not on My Chart and struggles with concentration to be able to complete scales verbally.    Type of psychotherapeutic technique provided: Client centered, Solution-focused and CBT    Progress toward short term goals:Mixed progress:  Try to remind herself daily of her support system including her mother and sister as well as workers in the nursing home, says she did this about 3 times per week.  States she struggled to practice being of service to others to expressions of gratitude and having a sense of humor \"I just have not felt up to it, but I know threatening to do\".  She remains free of drugs and alcohol.    Workers in the nursing home  Review of long term goals: Long-term goals reviewed   and Treatment Plan updated .     Diagnosis:   1. Depression, major, recurrent, moderate (H)    2. Generalized anxiety disorder    3. Adjustment disorder with mixed anxiety and depressed mood    4. Polysubstance dependence in early, early partial, sustained full, or sustained partial remission (H)     ** worsening    Plan and Follow-up: Patient will return for follow up in two weeks.  Patient will remind herself daily of her support system including her family and nursing home workers.  She will find ways to be of service to others through thoughtful activities such as cards, phone calls, expressions of gratitude and appreciation, and her sense of humor.  She will remain sober from drugs and alcohol.     Plan to refer to Rule 25 in the hopes of referral to CD Care Coordination.    Discharge Criteria/Planning: Patient will continue with follow-up until therapy can " be discontinued without return of signs and symptoms.    I have reviewed the note as documented above.  This accurately captures the substance of my conversation with the patient.    As the provider I attest to compliance with applicable laws and regulations related to telemedicine.  Performed and documented by Beulah Hartman, LICSW5/19/2020

## 2021-06-08 NOTE — PROGRESS NOTES
Date of Service:1/13/2017    Chief Complaint:   Chief Complaint   Patient presents with     Follow-up       History:    Chela presents to clinic for reevaluation of her right leg ulcer.  She is tolerating her 2-layer wrap without any difficulty.  She is not having any pain in the ulcer.      Current Outpatient Prescriptions   Medication Sig Dispense Refill     albuterol (PROVENTIL HFA;VENTOLIN HFA) 90 mcg/actuation inhaler Inhale 2 puffs.       gabapentin (NEURONTIN) 300 MG capsule Take 1 capsule (300 mg total) by mouth 3 (three) times a day. 90 capsule 0     HYDROcodone-acetaminophen 5-325 mg per tablet Take 1 tablet by mouth every 8 (eight) hours as needed. 20 tablet 0     LANTUS SOLOSTAR 100 unit/mL (3 mL) pen Inject 50 Units under the skin bedtime. 11.65 Type 2 with hyperglycemia 5 Box PRN     lidocaine (XYLOCAINE) 2 % jelly Apply topically as needed.       lisinopril (PRINIVIL,ZESTRIL) 40 MG tablet Take 0.5 tablets (20 mg total) by mouth daily. 30 tablet 0     metFORMIN (GLUCOPHAGE-XR) 750 MG 24 hr tablet Take 1 tablet (750 mg total) by mouth daily with breakfast. 30 tablet 0     MICROLET LANCET   98     NOVOLOG FLEXPEN 100 unit/mL injection pen Check blood sugar three (3) times daily.  11.65 Type 2 with hyperglycemia  Flex Pen Needles - BD Ultra-fine Anat Pen Needles - NDC [address]-27 - dispense 1 case, refill PRN for 1 year  No supplies needed 5 Pre-filled Pen Syringe PRN     Current Facility-Administered Medications   Medication Dose Route Frequency Provider Last Rate Last Dose     lidocaine 2 % jelly (XYLOCAINE)   Topical PRN Hortencia Markham, NURIS           Allergies   Allergen Reactions     Venom-Honey Bee Hives     swelling     Other Allergy (See Comments) Rash     Cactus flower extract       Physical Exam:    Vitals:    01/13/17 1034   BP: 120/66   Pulse: 66   Resp: 18   Temp: 99.3  F (37.4  C)    There is no height or weight on file to calculate BMI.    General:  41 y.o. female in no apparent  distress.    Psychiatric:  Alert and oriented x 3.  Cooperative.   Integumentary:  Skin is uniformly warm, dry and pink.  Lower extremity edema: slightly firm  Ulcerations:  There is no corrine wound erythema, induration, maceration, denudement, fluctuance or warmth surrounding the ulcer(s).     Vasc Edema 12/16/2016 12/22/2016 12/29/2016 1/6/2017 1/13/2017   Right just above MTP 23.2 23.5 23.8 23.5 24   Right Ankle 25.1 25 25.0 24.8 26   Right Widest Calf 43 43 42.5 44.5 42.5   Right Thigh Up 10cm - - - - -   Left - just above MTP 24.1 23 23.8 23.8 -   Left Ankle 25.2 25 25.5 26 -   Left Widest Calf 44.6 42 42 44.5 -   Left Thigh Up 10cm - - - - -       Wound 10/21/16 R calf superior (Active)   Pre Size Length 1.2 1/6/2017 10:00 AM   Pre Size Width 0.7 1/6/2017 10:00 AM   Pre Total Sq cm 0.84 1/6/2017 10:00 AM   Post Size Length 0.9 1/6/2017 10:00 AM   Post Size Width 0.7 1/6/2017 10:00 AM   Post Size Depth 1 12/29/2016  8:00 AM   Undermined na 10/21/2016  2:00 PM   Tunneling na 10/21/2016  2:00 PM   Description scab 1/13/2017 10:00 AM       Wound 10/21/16 R calf inferior (Active)   Pre Size Length 0.4 1/13/2017 10:00 AM   Pre Size Width 0.5 1/13/2017 10:00 AM   Pre Total Sq cm 0.2 1/13/2017 10:00 AM   Post Size Length 0.9 1/6/2017 10:00 AM   Post Size Width 1 1/6/2017 10:00 AM   Undermined n 1/13/2017 10:00 AM   Tunneling n 1/13/2017 10:00 AM   Description beefy red wound bed 11/14/2016 10:00 AM       Wound 10/11/16 Other wound type (comment) Leg (Active)       Assessment:  1. Leg ulcer, right, limited to breakdown of skin     2. Type 2 diabetes mellitus with complication, without long-term current use of insulin     3. Peripheral polyneuropathy     4. Obesity (BMI 30-39.9)         A new wound was identified today: no.    Plan:  1.   Debridement of the right leg ulcer was recommended.  After consent was obtained and topical 2% Xylocaine was applied under clean conditions, and using a #15 blade,the devitalized dermal  tissue was debrided for a total square centimeters of 0.93. Following debridement, there was a decrease in the nonviable tissue. The patient tolerated the procedure without any difficulty.     2.    Right leg ulcer with healing complicated by peripheral neuropathy, diabetes, Venous insufficiency, tobacco abuse and obesity.  The ulcers are slightly better.    3.   Treatment:   Due to the slow healing rate of the ulcer and the diagnosis of Venous Insufficiency due to venous hypertension} of the Right leg and is free of infection  Biovance was recommended and consent was obtained for the application.  This is application # 7.   Their is no evidence of osteomyelitis.  There is adequate blood flow for healing.       For the procedure, the patient was placed in a comfortable position with the wound bed exposed. Biovance  application is being performed in a staged procedure in an attempt to achieve rapid wound closure. The Tissue Identification Number is indicated on the consent form dated 1/13/2017.     The graft size is 1.0 cm x 2 cm and the estimated wastage was 0.   The graft was prepared and applied following the  guidelines.  The graft was covered with a non-adherent contact layer and secured with mepitel and a veil.  Then, an ABD dressing was applied.  The patient will continue to utilize 2-layer wrap for compression.  Patient tolerated the procedure without any complications and was educated and expressed an understanding of the proper wound treatment until their follow up.    4.   Patient will follow up with me in one week  for reevaluation.      Hortencia Markham, APRN, CNP,  [facility]  [phone]

## 2021-06-08 NOTE — PROGRESS NOTES
Date of Service:1/20/2017    Chief Complaint:   Chief Complaint   Patient presents with     Follow-up     Wound Check       History:    Chela presents to clinic for reevaluation of her right lower leg ulcer.  She presented to clinic today with a Band-Aid on the ulcer and her 2-layer wrap was removed.  She did not provide an explanation, but is upset today secondary to her break up with her boyfriend.  She is feeling a little more discomfort in the ulcer this past week.    Current Outpatient Prescriptions   Medication Sig Dispense Refill     albuterol (PROVENTIL HFA;VENTOLIN HFA) 90 mcg/actuation inhaler Inhale 2 puffs.       gabapentin (NEURONTIN) 300 MG capsule Take 1 capsule (300 mg total) by mouth 3 (three) times a day. 90 capsule 0     HYDROcodone-acetaminophen 5-325 mg per tablet Take 1 tablet by mouth every 8 (eight) hours as needed. 20 tablet 0     LANTUS SOLOSTAR 100 unit/mL (3 mL) pen Inject 50 Units under the skin bedtime. 11.65 Type 2 with hyperglycemia 5 Box PRN     lidocaine (XYLOCAINE) 2 % jelly Apply topically as needed.       lisinopril (PRINIVIL,ZESTRIL) 40 MG tablet Take 0.5 tablets (20 mg total) by mouth daily. 30 tablet 0     metFORMIN (GLUCOPHAGE-XR) 750 MG 24 hr tablet Take 1 tablet (750 mg total) by mouth daily with breakfast. 30 tablet 0     MICROLET LANCET   98     NOVOLOG FLEXPEN 100 unit/mL injection pen Check blood sugar three (3) times daily.  11.65 Type 2 with hyperglycemia  Flex Pen Needles - BD Ultra-fine Anat Pen Needles - NDC [address]-02 - dispense 1 case, refill PRN for 1 year  No supplies needed 5 Pre-filled Pen Syringe PRN     Current Facility-Administered Medications   Medication Dose Route Frequency Provider Last Rate Last Dose     lidocaine 2 % jelly (XYLOCAINE)   Topical PRN Hortencia Markham, NURIS           Allergies   Allergen Reactions     Venom-Honey Bee Hives     swelling     Other Allergy (See Comments) Rash     Cactus flower extract       Physical Exam:    Vitals:     01/20/17 0900   BP: 130/80   Pulse: 72   Resp: 16   Temp: 97  F (36.1  C)    There is no height or weight on file to calculate BMI.    General:  41 y.o. female in no apparent distress.    Psychiatric:  Alert and oriented x 3.  Cooperative.   Integumentary:  Skin is uniformly warm, dry and pink.  Lower extremity edema: minimal  Ulcerations:  There is no corrine wound erythema, induration, maceration, denudement, fluctuance or warmth surrounding the ulcer(s).       Wound 10/21/16 R calf superior (Active)   Pre Size Length 1.3 1/20/2017 10:00 AM   Pre Size Width 0.7 1/20/2017 10:00 AM   Pre Total Sq cm 0.84 1/6/2017 10:00 AM   Post Size Length 0 1/20/2017 10:00 AM   Post Size Width 0 1/20/2017 10:00 AM   Post Size Depth 1 12/29/2016  8:00 AM   Undermined na 10/21/2016  2:00 PM   Tunneling na 10/21/2016  2:00 PM   Description Biovance #7  applied 1/13/2017 10:00 AM       Wound 10/21/16 R calf inferior (Active)   Pre Size Length 1.2 1/20/2017 10:00 AM   Pre Size Width 0.6 1/20/2017 10:00 AM   Pre Total Sq cm 0.2 1/13/2017 10:00 AM   Post Size Length 0.6 1/20/2017 10:00 AM   Post Size Width 0.8 1/20/2017 10:00 AM   Undermined n 1/13/2017 10:00 AM   Tunneling n 1/13/2017 10:00 AM   Description beefy red wound bed 11/14/2016 10:00 AM       Wound 10/11/16 Other wound type (comment) Leg (Active)       Assessment:  1. Leg ulcer, right, limited to breakdown of skin     2. Type 2 diabetes mellitus with complication, without long-term current use of insulin     3. Peripheral polyneuropathy     4. Obesity (BMI 30-39.9)     5. Venous hypertension, chronic, bilateral     6. Local infection of wound  Culture/Gram Stain: Wound       A new wound was identified today: no.    Plan:  1.   Debridement of the right leg ulcer was recommended.  After consent was obtained and topical 2% Xylocaine was applied under clean conditions, and using a #15 blade,the devitalized dermal tissue was debrided for a total square centimeters of 1.56.  Following debridement, there was a decrease in the nonviable tissue. The patient tolerated the procedure without any difficulty.     2.    Right leg ulcer with healing complicated by Venous insufficiency, peripheral neuropathy, obesity and diabetes.  The superior ulcer has resolved.  The inferior ulcer remains unchanged. I obtained an aerobic culture and sensitivity today.  I will treat her topically and wait for the culture results before prescribing an oral antibiotic.    3.   Treatment:   Silvercel will be applied to the wound base and covered with an ABD.  A 2-layer wrap will then be applied.  I will consider applying Biovance again next week.    4.   Patient will follow up with me in one week  for reevaluation.      Hortencia Markham, APRN, CNP,  [facility]  [phone]

## 2021-06-08 NOTE — PROGRESS NOTES
"Mental Health tele Visit Note    Patient: Chela Love    : 1975 MRN: [id number]    Date: 2020  Start time: 1:35pm   Stop Time: 2:00pm   Session # 7    The patient has been notified of the following:   \"We have found that certain health care needs can be provided without the need for a face to face visit.  This service lets us provide the care you need with a phone conversation.  I will have full access to your [place] medical record during this entire phone call.   I will be taking notes for your medical record. Since this is like an office visit, we will bill your insurance company for this service.  There are potential benefits and risks of telephone visits (e.g. limits to patient confidentiality) that differ from in-person visits.?  Confidentiality still applies for telephone services, and nobody will record the visit.  It is important to be in a quiet, private space that is free of distractions (including cell phone or other devices) during the visit.?? If during the course of the call I believe a telephone visit is not appropriate, you will not be charged for this service\"  Session Type: Patient is participating in a telemedicine phone visit by telephone due to patient not taking call for video visit. Visit abbreviated by patient availability.  Chief Complaint   Patient presents with      Follow Up     Telephone visit     Anxiety     anxiety in context of medical, housing insecurity     Depression     low mood with medical, housing insecurity     Addiction     in early remission       New symptoms or complaints: None reported    Functional Impairment:   Personal: 4  Family: 2  Work: 4  Social:4          ASSESSMENT: Current Emotional / Mental Status (status of significant symptoms):              Risk status (Self / Other harm or suicidal ideation)              Patient identifies making efforts at personal safety including hand hygiene and social distancing as possible in the context of " "nursing home where she lives.              Patient denies current or recent suicidal ideation or behaviors.              Patient denies current or recent homicidal ideation or behaviors.              Patient denies current or recent self injurious behavior or ideation.              Patient denies other safety concerns.              Patient denies change in risk factors.              Patient denies change in protective factors.  Identifies her family as a reason she would never suicide.              Recommended that patient call 911 or go to the local ED should there be a change in any of these risk factors.                Attitude:                                   Unmotivated to prepare for psychotherapy session.              Orientation:                             Oriented x3.              Speech                          Rate / Production:       Normal/ Responsive Normal                           Volume:                       Normal               Mood:                                      Anxious  Depressed               Thought Content:                    Clear               Thought Form:                        Coherent  Logical               Insight:                                     Fair       Patient's impression of their current status:   Patient reports mood is anxious and depressed, struggling with motivation.  She is 35 minutes late for session, engaging only when writer called her out of concern  Patient processes difficulty that it is \"too easy\" to remain dependent on staff for all activities and structure of her day.  Acknowledges that she has the capacity to organize her day and create reminders for her appointments.    Therapist impression of patients current state: This 45-year-old  female presents with considerable difficulty with motivation in the context of depression and anxiety.  Stressors include living in a nursing home where no one is her age and many residents have cognitive " issues.  She is able to admit that she is dependent on nursing home staff but that she has the capacity to become more independent and take responsibility for her schedule.  Once contacted by telephone, patient is open and cooperative.    Session is abbreviated due to patient not showing up for the ER visit.  Processed negative cognitions, reinforced strengths, validated patient efforts and feelings. Discussed clinic attendance policy, writer's concern for patient who states her desire to move into independent housing but that she is not taking responsibility for her own appointments.  Brainstormed options for reminders, patient able to identify options to utilize paper calendar and phone reminders.  Discussed that Occupational Therapy services at her nursing home may be able to help her establish same.    Type of psychotherapeutic technique provided: Insight oriented, Client centered, Solution-focused and Motivatonal Interviewing    Progress toward short term goals: Poor progress, Patient does not show up for session until she is called by writer.   States she did remind herself of her emotional support system including family and staff.  Did not follow through with finding ways to be of service to others through thoughtful activities such as cards, phone calls, expressions of gratitude and appreciation as well as her sense of humor.  She does remain sober from drugs and alcohol in the context of structured living situation..    Review of long term goals: Not done at today's visit       Diagnosis:  1. Depression, major, recurrent, moderate (H)    2. Generalized anxiety disorder    3. Adjustment disorder with mixed anxiety and depressed mood    4. Polysubstance dependence in early, early partial, sustained full, or sustained partial remission (H)        Plan and Follow up: Patient will return for follow-up in 2 weeks.  She will meet with occupational therapy to establish calendar and reminder system so that she can  take responsibility for her own virtual appointments.  She will remind herself daily of her emotional support system including her family and nursing home workers.  She will find ways to be of service to others through thoughtful activities such as cards, phone calls, expressions of gratitude and appreciation, her sense of humor.  She will remain sober from drugs and alcohol.    Discharge Criteria/Planning: Patient will continue with follow-up until therapy can be discontinued without return of signs and symptoms.    I have reviewed the note as documented above.  This accurately captures the substance of my conversation with the patient.  As the provider I attest to compliance with applicable laws and regulations related to telemedicine.  Beulah Hartman, LICSW, MSW, LICSW  6/4/2020

## 2021-06-08 NOTE — PROGRESS NOTES
Outpatient Mental Health Treatment Plan    Name:  Chela Love  :  1975  MRN:  [id number]    Treatment Plan:  Initial Treatment Plan  2020  Intake/initial treatment plan date:  2020  Benefit and risks and alternatives have been discussed: Yes  Is this treatment appropriate with minimal intrusion/restrictions: Yes  Estimated duration of treatment:  Approximately 10+ sessions.  Anticipated frequency of services:  Every 2 weeks  Necessity for frequency: This frequency is needed to establish therapeutic goals and for continuity of care in order to monitor progress.  Necessity for treatment: To address cognitive, behavioral, and/or emotional barriers in order to work toward goals and to improve quality of life.    Session Type: Patient is presenting for an Individual session.  (video visit)    Plan:      ? Depression    Goal:  Decrease average depression level from 10 to 3.   Strategies:    ?[x] Decrease social isolation     [x] Increase involvement in meaningful activities     ?[x] Discuss sleep hygiene     ?[x] Explore thoughts and expectations about self and others     ?[x] Process grief (loss of significant person, independence, role,        etc.)     ?[x] Assess for suicide risk     ?[x] Implement physical activity routine (with physician approval)     [x] Consider introduction of bibliotherapy and/or videos     [x] Continue compliance with medical treatment plan (or explore         barriers)       ?  ?Degree to which this is a problem: 4  Degree to which goal is met: 0  Date of Review: 2020            ?   ? Anxiety    Goal:  Decrease average anxiety level from 9 to 3.   Strategies: ? [x]Learn and practice relaxation techniques and other coping        strategies (e.g., thought stopping, reframing, meditation)     ? [x] Increase involvement in meaningful activities     ? [x] Discuss sleep hygiene     ? [x] Explore thoughts and expectations about self and others     ? [x] Identify and  monitor triggers for panic/anxiety symptoms     ? [x] Implement physical activity routine (with physician approval)     ? [x] Consider introduction of bibliotherapy and/or videos     ? [x] Continue compliance with medical treatment plan (or explore          barriers)                                       []     Degree to which this is a problem: 4  Degree to which goal is met: 0  Date of Review: 5/19/2020     Adjustment to disability / changes    Goal:  Increase % acceptance from 10%  to 60%.   Strategies: ?[x]  Explore thoughts and expectations about self and others   [x] Explore emotional reactions to illness/injury     ?[x] Learn and practice relaxation techniques and other coping        strategies     [x] Implement physical activity routine (with physician approval)                [x] Increase involvement in meaningful activities                [x] Engage in values clarification and goal-setting     [x] Consider introduction of bibliotherapy and/or videos                [x] Explore barriers to cooperation with rehabilitation progra?m   Degree to which this is a problem: 4  Degree to which goal is met: 0  Date of Review: 5/19/2020    Substance use  Goal:  To remain free of substance abuse and to develop a sober support system.   Strategies: ? [x] Consider referral for chemical dependency evaluation     ? [x] Discuss barriers to participating in AA or other peer-facilitated           groups         [x] Address environmental factors which may interfere with                                                    sobriety     ? [x] Explore short-term versus long-term consequences of use     ? [x] Continue compliance with medical treatment plan (or explore          barriers)       ?  Degree to which this is a problem: 3  Degree to which goal is met: 0  Date of Review: 5/19/2020            Functional Impairment:  1=Not at all/Rarely  2=Some days  3=Most Days  4=Every Day    Personal : 4  Family : 2  Social : 4  "  Work/school : 4    Diagnosis:  Major Depression, Recurrent, Moderate  Adjustment Disorder with mixed emotional features  Polysubstance abuse, in early remission  Traits of Borderline Personality Disorder  Rule out Bipolar Disorder    WHODAS 2.0 12-item version: self-administered: 83%      Scores presented in qualifiers to represent level of disability.  SEVERE Problem (high, extreme, ...) 50-95%     H1= 31  H2= 16  H3= 31        Clinical assessments and measures completed:. TAVIA-7, PHQ-9 and CAGE-AID, [place] suicide assessment scale     Strengths:  Supportive family, in a stable living situation in nursing home (but wants to get out), is sober from drug abuse,   Limitations:  Anxiety, depression, \"Lettin people run over me\", acute medical stressors and physical limitations  Cultural Considerations: Patient describes growing up in family where support to one another, hard work were valued. Patient identifies as having a higher power.  Is not affiliated with any [employer]. Patient utilizes western model of health care.    Persons responsible for this plan: Patient and Provider            Psychotherapist Signature           Patient Signature:              Guardian Signature             Provider: Performed and documented by Beulah Hartman [facility]   Date:  5/19/2020      "

## 2021-06-08 NOTE — PROGRESS NOTES
Mental Health Visit Note    Patient: Chela Love    : 1975 MRN: [id number]    2020    Start time: 1:13    Stop Time: 1:25 and 1: 45 to 1:56  [only 23 minutes total due to connectivity issues]   Session # 5    Session Type: Patient is presenting for an Individual session.    Chela Love is a 45 y.o. female is being seen today for    Chief Complaint   Patient presents with     MH Follow Up     telephone visit     Depression     depression in the context of multiple stressors: poor health with cancer and hospitalization, nursing home,      Anxiety     anxiety in the context of housing difficulty,      Addiction     in early remission   .     Telemedicine Visit: The patient's condition can be safely assessed and treated via synchronous audio and visual telemedicine encounter.      Reason for Telemedicine Visit: Services only offered telehealth    Originating Site (Patient Location): Patient's home (nursing home)    Distant Site (Provider Location): [facility]: [facility]    Consent:  The patient/guardian has verbally consented to: the potential risks and benefits of telemedicine (video visit) versus in person care; bill my insurance or make self-payment for services provided; and responsibility for payment of non-covered services.     Mode of Communication:  Video Conference via [employer]  **poor connection, went down 2x    As the provider I attest to compliance with applicable laws and regulations related to telemedicine.    Those present for this visit: patient, therapist    Follow up in regards to ongoing symptom management of depression, anxiety, stress    New symptoms or complaints: None    Functional Impairment:   Personal: 4  Family: 2  Social: 4  Work: 4    Clinical assessment of mental status:   Chela Love presented on time.   She was oriented x3, open and cooperative, and dressed appropriately for this session and weather. Her memory was Normal  "cognitive functioning .  Her speech was  Dramatic and Loud.  Language was fluent.  Concentration and focus is Within normal. Psychosis is not noted or reported. She reports her mood is Labile, Irritable, Anxious and Depressed.  Affect is congruent with speech and is Labile, Irritable, Anxious and Depressed.  Fund of knowledge is adequate. Insight is adequate for therapy.    Suicidal/Homicidal Ideation present: Patient denies suicidal and homicidal ideations/means or plans.     Patient's impression of their current status:   \"I can't take this any more.  I'm living with a 95 year old who doesn't know who I am.\"  Patient reports mood is depressed and anxious in the context of living in a nursing home due to cancer disabilities.   She is \"very upset\" that housing advocate wants to place her in a hi-rise \"where everyone one is old again\".  \"I told him I wouldn't do it.\"    Therapist impression of patients current state: This 45 y.o. White or  female presents with Dandre in the context of multiple psychosocial stressors including cancer, neuropathy resulting in inability to walk, homelessness except for nursing home placement, lack of peer social support.  She is labile today and only minimally cooperative in session.  Poor electronic connection: treatment plan to be completed next session when connectivity is better and patient able to participate.    Processed negative cognitions, reinforced strengths, validated patient efforts and feelings. Offered empathic listening and reflections and questions intended to evoke change talk, explored needs and resources, and provided emotional support. Discussed her goal to move as quickly as possible. Admits she's probably going to need some services in order to be capable of independent living.  Cries again, does not know whether she's been awarded social security.    Assessment tools used today include: MH assessment scales not pushed out to patient, she is still not on " My Chart. Agreed we would pursue next session.    Type of psychotherapeutic technique provided: Client centered, Solution-focused and CBT    Progress toward short term goals:Mixed progress: states she hasn't received patient questionaire for purposes of DA. States she has tried to remind herself of her support system, including her mother and her sister as well as workers at the nursing home, 3x/week. She tried to be of service to others through expressions of gratitude and having a sense of humour.  Remains sober for drugs and alcohol.    Review of long term goals: Not done at today's visit .     Diagnosis:   1. Generalized anxiety disorder    2. Adjustment disorder with mixed anxiety and depressed mood    3. Polysubstance dependence in early, early partial, sustained full, or sustained partial remission (H)     ** worsening    Plan and Follow-up: Patient will return for follow up in two weeks.  She will complete ppwk for DA as available, asking staff to help fax it. She  Will remind herself daily of support system.  She will find ways to be of service to others through thoughtful activities such as cards, phone calls, expressions of gratitude and appreciations, sense of humour.  She will remain sober from drugs and alcohol.      Long version [place] to be completed next session along with DA.  Plan to refer to Rule 25 in the hopes of referral to [facility] Coord[facility].      Discharge Criteria/Planning: Patient will continue with follow-up until therapy can be discontinued without return of signs and symptoms.    I have reviewed the note as documented above.  This accurately captures the substance of my conversation with the patient.    As the provider I attest to compliance with applicable laws and regulations related to telemedicine.  Performed and documented by Beulah Hartman [facility],  5/05/2020

## 2021-06-08 NOTE — PROGRESS NOTES
Follow up Vascular Visit       Date of Service:2/9/2017    Date Last Seen: Visit date not found; 1/31/2017    Chief Complaint: right shin ulcer; edema    History:   Past Medical History:   Diagnosis Date     Cellulitis      Diabetes      Essential hypertension      Hypokalemia      Obesity 10/19/2016     Peripheral polyneuropathy        Pt returns to the Vascular clinic with regards to their right shin ulcer and edema. Pt is typically seen by Hortencia Gaston CNP; I am seeing her in her absence. Pt arrives alone today. She developed a venous stasis ulcer to right leg; this has currently being treated with silvercel and 2 layer compression wrap. Pt is tolerating this well. She was previously wearing compression stocking. She denies fevers, chills, no new n/t.     Allergies: Venom-honey bee and Other allergy (see comments)    Physical Exam:    Visit Vitals     /78     Pulse 100     Temp 97.2  F (36.2  C) (Oral)     Resp 18       General:  Patient presents to clinic in no apparent distress.  Head: normocephalic atraumatic  Psychiatric:  Alert and oriented x3.   Respiratory: unlabored breathing; no cough  Integumentary:  Skin is uniformly warm, dry and pink.    Wound #1 Location: right  Size: 0.5L x 0.5W x 0depth.  No sinus tract present, Wound base: initially covered with thin eschar; this was sharply debrided to reveal 100% viable wound bed  No undermining present. Periwound: no denudement, erythema, induration, maceration or warmth.      Circumferential volume measures:    Vasc Edema 1/6/2017 1/13/2017 1/20/2017 1/31/2017 2/9/2017   Right just above MTP 23.5 24 24.8 25 23.3   Right Ankle 24.8 26 25.5 26 24.5   Right Widest Calf 44.5 42.5 43.6 43 42.5   Right Thigh Up 10cm - - - - -   Left - just above MTP 23.8 - 24.1 - -   Left Ankle 26 - 25 - -   Left Widest Calf 44.5 - 43.3 - -   Left Thigh Up 10cm - - - - -       Ulceration(s)/Wound(s):     Wound 10/21/16 R calf middle (Active)   Pre Size Length 0.5 2/9/2017   9:00 AM   Pre Size Width 0.5 2/9/2017  9:00 AM   Pre Total Sq cm 0.25 2/9/2017  9:00 AM   Post Size Length 0 1/20/2017 10:00 AM   Post Size Width 0 1/20/2017 10:00 AM   Post Size Depth 1 12/29/2016  8:00 AM   Undermined na 10/21/2016  2:00 PM   Tunneling na 10/21/2016  2:00 PM   Description Biovance #7  applied 1/13/2017 10:00 AM       Wound 10/21/16 R calf inferior  (Active)   Pre Size Length 0.6 1/31/2017 11:00 AM   Pre Size Width 0.4 1/31/2017 11:00 AM   Pre Total Sq cm 0.2 1/13/2017 10:00 AM   Post Size Length 0.6 1/31/2017 11:00 AM   Post Size Width 0.8 1/31/2017 11:00 AM   Undermined n 1/13/2017 10:00 AM   Tunneling n 1/13/2017 10:00 AM   Description beefy red wound bed 11/14/2016 10:00 AM       Wound 01/31/17 r leg superior (Active)   Pre Size Length 1.3 1/31/2017 11:00 AM   Pre Size Width 1.4 1/31/2017 11:00 AM   Pre Total Sq cm 1.82 1/31/2017 11:00 AM       Wound 10/11/16 Other wound type (comment) Leg (Active)       Lab Values    No results found for: SEDRATE  Lab Results   Component Value Date    CREATININE 0.72 10/14/2016     Lab Results   Component Value Date    HGBA1C 11.6 (H) 10/11/2016     Lab Results   Component Value Date    BUN 11 10/14/2016     Lab Results   Component Value Date    ALBUMIN 2.6 (L) 10/13/2016     No results found for: CXDKSNAX55US          Impression:   venous stasis ulcer to right leg  Venous insufficiency  Venous hypertension  Obesity with bmi 39  Diabetes type 2  Tobacco abuse      Are any of these wounds new today: No; Location: na    Assessment/Plan:          1. Excisional debridement of the ulcer(s) was recommended today, after consent was obtained and 2% Xylocaine was applied using a sterile curet the epidermal and dermal was sharply debrided for a total square cm of 0.25. The devitalized and necrotic tissue was removed and the wounds appeared much  afterwards. The patient tolerated this well.           2. Edema: will continue with the 2 layer; pt is nearly healed;  she was told to bring in her compression stocking next week           3.  Wound treatment:will continue with the silvercel           4. Nutrition: diabetic diet           5. Offloading: na     Patient will follow up with me in 1 weeks for reevaluation. They were instructed to call the clinic sooner with any signs or symptoms of infection or any further questions/concerns. Answered all questions.    Rhonda Alvarado DNP, RN, CNP, [facility]  [phone]

## 2021-06-08 NOTE — PROGRESS NOTES
Date of Service:1/31/2017    Chief Complaint:   Chief Complaint   Patient presents with     Follow-up       History:    Chela presents to clinic for reevaluation of her right lower leg.  She failed her appointment last week and her mepilex border remained on for 10 days.  She is denying any pain in her ulcer and the drainage is unchanged.  She is wearing tubigrip for compression today because the 2-layer wrap was removed about 3 days ago.    Current Outpatient Prescriptions   Medication Sig Dispense Refill     albuterol (PROVENTIL HFA;VENTOLIN HFA) 90 mcg/actuation inhaler Inhale 2 puffs.       gabapentin (NEURONTIN) 300 MG capsule Take 1 capsule (300 mg total) by mouth 3 (three) times a day. 90 capsule 0     HYDROcodone-acetaminophen 5-325 mg per tablet Take 1 tablet by mouth every 8 (eight) hours as needed. 20 tablet 0     LANTUS SOLOSTAR 100 unit/mL (3 mL) pen Inject 50 Units under the skin bedtime. 11.65 Type 2 with hyperglycemia 5 Box PRN     lidocaine (XYLOCAINE) 2 % jelly Apply topically as needed.       lisinopril (PRINIVIL,ZESTRIL) 40 MG tablet Take 0.5 tablets (20 mg total) by mouth daily. 30 tablet 0     metFORMIN (GLUCOPHAGE-XR) 750 MG 24 hr tablet Take 1 tablet (750 mg total) by mouth daily with breakfast. 30 tablet 0     MICROLET LANCET   98     NOVOLOG FLEXPEN 100 unit/mL injection pen Check blood sugar three (3) times daily.  11.65 Type 2 with hyperglycemia  Flex Pen Needles - BD Ultra-fine Anat Pen Needles - NDC [address]-94 - dispense 1 case, refill PRN for 1 year  No supplies needed 5 Pre-filled Pen Syringe PRN     sulfamethoxazole-trimethoprim (SEPTRA DS) 800-160 mg per tablet Take 1 tablet by mouth 2 (two) times a day for 10 days. 20 tablet 0     Current Facility-Administered Medications   Medication Dose Route Frequency Provider Last Rate Last Dose     lidocaine 2 % jelly (XYLOCAINE)   Topical PRN Hortencia Markham, CNP           Allergies   Allergen Reactions     Venom-Honey Bee Hives      swelling     Other Allergy (See Comments) Rash     Cactus flower extract       Physical Exam:    Vitals:    01/31/17 1126   BP: 130/72   Pulse: 70   Resp: 18   Temp: 97.2  F (36.2  C)    There is no height or weight on file to calculate BMI.    General:  41 y.o. female in no apparent distress.    Psychiatric:  Alert and oriented x 3.  Cooperative.   Integumentary:  Skin is uniformly warm, dry and pink.  Lower extremity edema: slight  Ulcerations:  There is no corrine wound erythema, induration, maceration, denudement, fluctuance or warmth surrounding the ulcer(s).     Vasc Edema 12/29/2016 1/6/2017 1/13/2017 1/20/2017 1/31/2017   Right just above MTP 23.8 23.5 24 24.8 25   Right Ankle 25.0 24.8 26 25.5 26   Right Widest Calf 42.5 44.5 42.5 43.6 43   Right Thigh Up 10cm - - - - -   Left - just above MTP 23.8 23.8 - 24.1 -   Left Ankle 25.5 26 - 25 -   Left Widest Calf 42 44.5 - 43.3 -   Left Thigh Up 10cm - - - - -       Wound 10/21/16 R calf middle (Active)   Pre Size Length 1 1/31/2017 11:00 AM   Pre Size Width 1.4 1/31/2017 11:00 AM   Pre Total Sq cm 1.4 1/31/2017 11:00 AM   Post Size Length 0 1/20/2017 10:00 AM   Post Size Width 0 1/20/2017 10:00 AM   Post Size Depth 1 12/29/2016  8:00 AM   Undermined na 10/21/2016  2:00 PM   Tunneling na 10/21/2016  2:00 PM   Description Biovance #7  applied 1/13/2017 10:00 AM       Wound 10/21/16 R calf inferior  (Active)   Pre Size Length 0.6 1/31/2017 11:00 AM   Pre Size Width 0.4 1/31/2017 11:00 AM   Pre Total Sq cm 0.2 1/13/2017 10:00 AM   Post Size Length 0.6 1/31/2017 11:00 AM   Post Size Width 0.8 1/31/2017 11:00 AM   Undermined n 1/13/2017 10:00 AM   Tunneling n 1/13/2017 10:00 AM   Description beefy red wound bed 11/14/2016 10:00 AM       Wound 01/31/17 r leg superior (Active)   Pre Size Length 1.3 1/31/2017 11:00 AM   Pre Size Width 1.4 1/31/2017 11:00 AM   Pre Total Sq cm 1.82 1/31/2017 11:00 AM           Assessment:  1. Local infection of wound   sulfamethoxazole-trimethoprim (SEPTRA DS) 800-160 mg per tablet   2. Leg ulcer, right, limited to breakdown of skin     3. Peripheral polyneuropathy     4. Type 2 diabetes mellitus with complication, without long-term current use of insulin     5. Venous hypertension, chronic, bilateral     6. Obesity (BMI 30-39.9)         A new wound was identified today: no.    Plan:  1.   Debridement of the right leg ulcer was recommended.  After consent was obtained and topical 2% Xylocaine was applied under clean conditions, and using a #15 blade,the devitalized dermal tissue was debrided for a total square centimeters of 0.6. Following debridement, there was a decrease in the nonviable tissue. The patient tolerated the procedure without any difficulty.     2.    Right leg ulcer, ulcer is stable.  No improvement.  I will treat her orally for an infection with bactrim DS for 10 days.     3.   Treatment: Silvercel will be continued over the open area and covered with an ABD.  A 2-layer wrap will then be applied    4.   Patient will follow up with me in one week  for reevaluation.      Hortencia Markham, APRN, CNP,  [facility]  [phone]

## 2021-06-08 NOTE — PROGRESS NOTES
Date of Service:1/6/2017    Chief Complaint:   Chief Complaint   Patient presents with     Follow-up       History:    Chela presents to clinic for reevaluation of her right leg ulcer.  The 2-layer wrap became so uncomfortable around the ankle that she removed it 2 days ago.  She left the dressing intact and applied the Tubigrip for compression.  This resolved the pain in her ankle.  She is not having any pain in the ulcer.      Current Outpatient Prescriptions   Medication Sig Dispense Refill     albuterol (PROVENTIL HFA;VENTOLIN HFA) 90 mcg/actuation inhaler Inhale 2 puffs.       gabapentin (NEURONTIN) 300 MG capsule Take 1 capsule (300 mg total) by mouth 3 (three) times a day. 90 capsule 0     HYDROcodone-acetaminophen 5-325 mg per tablet Take 1 tablet by mouth every 8 (eight) hours as needed. 20 tablet 0     LANTUS SOLOSTAR 100 unit/mL (3 mL) pen Inject 50 Units under the skin bedtime. 11.65 Type 2 with hyperglycemia 5 Box PRN     lidocaine (XYLOCAINE) 2 % jelly Apply topically as needed.       lisinopril (PRINIVIL,ZESTRIL) 40 MG tablet Take 0.5 tablets (20 mg total) by mouth daily. 30 tablet 0     metFORMIN (GLUCOPHAGE-XR) 750 MG 24 hr tablet Take 1 tablet (750 mg total) by mouth daily with breakfast. 30 tablet 0     MICROLET LANCET   98     NOVOLOG FLEXPEN 100 unit/mL injection pen Check blood sugar three (3) times daily.  11.65 Type 2 with hyperglycemia  Flex Pen Needles - BD Ultra-fine Anat Pen Needles - NDC [address]-45 - dispense 1 case, refill PRN for 1 year  No supplies needed 5 Pre-filled Pen Syringe PRN     Current Facility-Administered Medications   Medication Dose Route Frequency Provider Last Rate Last Dose     lidocaine 2 % jelly (XYLOCAINE)   Topical PRN Hortencia Markham, CNP   1 application at 12/22/16 0822       Allergies   Allergen Reactions     Venom-Honey Bee Hives     swelling     Other Allergy (See Comments) Rash     Cactus flower extract       Physical Exam:    Vitals:    01/06/17  1034   BP: 100/62   Resp: 16   Temp: 98.2  F (36.8  C)    There is no height or weight on file to calculate BMI.    General:  41 y.o. female in no apparent distress.    Psychiatric:  Alert and oriented x 3.  Cooperative.   Integumentary:  Skin is uniformly warm, dry and pink.  Lower extremity edema: slightly firm  Ulcerations:  There is no corrine wound erythema, induration, maceration, denudement, fluctuance or warmth surrounding the ulcer(s).     Vasc Edema 12/8/2016 12/16/2016 12/22/2016 12/29/2016 1/6/2017   Right just above MTP 23.2 23.2 23.5 23.8 23.5   Right Ankle 24 25.1 25 25.0 24.8   Right Widest Calf 45 43 43 42.5 44.5   Right Thigh Up 10cm - - - - -   Left - just above MTP 23 24.1 23 23.8 23.8   Left Ankle 24.5 25.2 25 25.5 26   Left Widest Calf 44 44.6 42 42 44.5   Left Thigh Up 10cm - - - - -       Wound 10/21/16 R calf superior (Active)   Pre Size Length 1.2 1/6/2017 10:00 AM   Pre Size Width 0.7 1/6/2017 10:00 AM   Pre Total Sq cm 0.84 1/6/2017 10:00 AM   Post Size Length 0.9 1/6/2017 10:00 AM   Post Size Width 0.7 1/6/2017 10:00 AM   Post Size Depth 1 12/29/2016  8:00 AM   Undermined na 10/21/2016  2:00 PM   Tunneling na 10/21/2016  2:00 PM   Description Applied Biovance #4 12/22/2016  8:00 AM       Wound 10/21/16 R calf inferior (Active)   Pre Size Length 0.9 1/6/2017 10:00 AM   Pre Size Width 0.1 1/6/2017 10:00 AM   Pre Total Sq cm 0.09 1/6/2017 10:00 AM   Post Size Length 0.9 1/6/2017 10:00 AM   Post Size Width 1 1/6/2017 10:00 AM   Undermined na 11/14/2016 10:00 AM   Tunneling na 11/14/2016 10:00 AM   Description beefy red wound bed 11/14/2016 10:00 AM       Wound 10/11/16 Other wound type (comment) Leg (Active)       Assessment:  1. Leg ulcer, right, limited to breakdown of skin     2. Peripheral polyneuropathy     3. Type 2 diabetes mellitus with complication, without long-term current use of insulin     4. Obesity (BMI 30-39.9)     5. Tobacco abuse     6. Venous hypertension, chronic, bilateral          A new wound was identified today: no.    Plan:  1.   Debridement of the right leg ulcer was recommended.  After consent was obtained and topical 2% Xylocaine was applied under clean conditions, and using a #15 blade,the devitalized dermal tissue was debrided for a total square centimeters of 0.93. Following debridement, there was a decrease in the nonviable tissue. The patient tolerated the procedure without any difficulty.       2.    Right leg ulcer with healing complicated by peripheral neuropathy, diabetes, Venous insufficiency, tobacco abuse and obesity.  The ulcers are slightly better.    3.   Treatment:   Due to the slow healing rate of the ulcer and the diagnosis of Venous Insufficiency due to venous hypertension} of the Right leg and is free of infection  Biovance was recommended and consent was obtained for the application.  This is application # 6.   Their is no evidence of osteomyelitis.  There is adequate blood flow for healing.       For the procedure, the patient was placed in a comfortable position with the wound bed exposed. Biovance  application is being performed in a staged procedure in an attempt to achieve rapid wound closure. The Tissue Identification Number is indicated on the consent form dated 1/6/2017.     The graft size is 3.0 x 3.5 cm and the estimated wastage was 45%.   The graft was prepared and applied following the  guidelines.  The graft was covered with a non-adherent contact layer and secured with mepitel and a veil.  Then, an ABD dressing was applied.  The patient will continue to utilize 2-layer wrap for compression.  Patient tolerated the procedure without any complications and was educated and expressed an understanding of the proper wound treatment until their follow up.    4.   Patient will follow up with me in one week  for reevaluation.      Hortencia Markham, APRN, CNP,  [facility]  [phone]

## 2021-06-08 NOTE — PROGRESS NOTES
Mental Health Visit Note    Patient: Chela Love    : 1975 MRN: [id number]    2020    Start time: 1:14pm    Stop Time: 2:00pm    Session # 8    Session Type: Patient is presenting for an Individual session.    Chela Love is a 45 y.o. female is being seen today for    Chief Complaint   Patient presents with     MH Follow Up     Depression     Significant depression in the context of social isolation and multiple losses     Anxiety     Anxiety in the the context of family concerns     Addiction     in early remisson   .     Telemedicine Visit: The patient's condition can be safely assessed and treated via synchronous audio and visual telemedicine encounter.      Reason for Telemedicine Visit: Services only offered telehealth    Originating Site (Patient Location): Patient's home (nursing home)    Distant Site (Provider Location): [facility]:     Consent:  The patient/guardian has verbally consented to: the potential risks and benefits of telemedicine (video visit) versus in person care; bill my insurance or make self-payment for services provided; and responsibility for payment of non-covered services.     Mode of Communication:  Video Conference via [employer]  As the provider I attest to compliance with applicable laws and regulations related to telemedicine.    Those present for this visit: patient, therapist    Follow up in regards to ongoing symptom management of depression, anxiety, stress    New symptoms or complaints: None    Functional Impairment:   Personal: 4  Family: 2  Social: 4  Work: 4    Clinical assessment of mental status:   Chela Love presented on time:  She was oriented x3, open and cooperative, and dressed appropriately for this session and weather. Her memory was Normal cognitive functioning .  Her speech was  Dramatic and Loud.  Language was fluent.  Concentration and focus is Within normal. Psychosis is not noted or reported.  "She reports her mood is Labile, Tearful, Anxious and Depressed.  Affect is congruent with speech and is Labile, Tearful, Anxious and Depressed.  Fund of knowledge is adequate. Insight is adequate for therapy.    Suicidal/Homicidal Ideation present: Patient denies suicidal and homicidal ideations/means or plans.     Patient's impression of their current status:   \"I'm terrible. All I have is loss. Nothing is good.\"  \"I have no friends.\" \"I don't even feel human. I feel like I'm being punished.\"  \"I don't want to give my dog up. It's devastating.\"  \"My mom is in the hospital and I can't take care of her.\"  Patient reports mood is depressed and anxious in the context of multiple losses and concerns.    Processes multiple stressors:  She is faced with giving up her dog because the dog can no longer stay with her sister.  Patient identifies feeling devastated that she can keep the dog.  She's worried about her mother, who is in the hospital for unknown issue related to losing control of her legs.   Patient is unable to walk still due to affects of chemotherapy.  She has fear that she will not be able to regain her walking, or that the cancer will not be resolved and chemo would continue severe side effects.    Therapist impression of patients current state: This 45-year-old white female presents with complaints of depression, grief, anxiety in the context of multiple losses and psychosocial stressors.  Acute stressors include cancer, having to live in a nursing home because of lack of physical function, social isolation due to her age difference there.  Previous loss of her home, belongings, job, boyfriend, dog continue to be difficult for her.  Some of this was in the context of drug abuse.  Hospitalization and illness of her mother extremely difficult as the mother has been the most consistent support to patient.  She is shown increased ability to take responsibility for her appointments and response to work during her " "session.  She is open and cooperative in session.    Processed negative cognitions, reinforced strengths, validated patient efforts and feelings. Conducted value clarification around her dog and what is the most important.  Patient admits that having a stable home for the dog as a most important thing and that additionally she is physically unable to manage him because of his size and strength.  She is able to imagine him happier and a more stable situation.  Brainstormed ways that she can support her mother despite not being able to visit or offer her direct care.  Patient identifies making phone calls and sending a homemade card is something within her control.  Discussed her goals around medical care, patient identifies really wanting to do everything she can so that she can \"beat cancer and get my life back\".  Discussed and provided patient with referral for Rule 25 in the hopes of establishing  Care Coordinaton individual counseling/sobriety support while she is still in cancer treatment.     Assessment tools used today include: None today. She is still not on My Chart and struggles with concentration to be able to complete scales verbally.    Type of psychotherapeutic technique provided: Client centered, Solution-focused and CBT.    Progress toward short term goals:Mixed progress:  Patient met with occupational therapy to establish calendar and reminder system so that she can take responsibility for her own remote appointments.  She has made herself available for psychotherapy.  Says she reminded herself daily of her emotional support system including her family and the nursing home workers.  Struggles still to find ways to be of service to others through discussed activities such as cards but has made phone calls to her mother, expressed  gratitude and appreciation to nursing home workers.  She remained sober from substances and extremely structured environment.    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     Diagnosis:   1. Depression, major, recurrent, moderate (H)    2. Generalized anxiety disorder    3. Adjustment disorder with mixed anxiety and depressed mood    4. Polysubstance dependence in early, early partial, sustained full, or sustained partial remission (H)     ** worsening    Plan and Follow-up: Patient will return for follow up in two weeks.  Patient will call Barbara to complete rule 25 intake through [facility].  She will work with issues of loss by reminding herself of her values as discussed in the ways that she is living them now as compared with when she was using drugs.  Patient will continue to assure availability for multiple medical appointments and psychotherapy without relying on nursing home staff.  She will ask occupational therapy for ideas if she has difficulty doing this.  She will continue to remind herself daily of emotional support system including her family and nursing home workers.  She will find ways to be of service to that sober support system through phone calls, expressions of gratitude and appreciation, and her sense of humor.  She will remain sober from drugs and alcohol.      Discharge Criteria/Planning: Patient will continue with follow-up until therapy can be discontinued without return of signs and symptoms.    I have reviewed the note as documented above.  This accurately captures the substance of my conversation with the patient.    As the provider I attest to compliance with applicable laws and regulations related to telemedicine.  Performed and documented by HAIDER Ahumada  6/16/2020

## 2021-06-08 NOTE — PROGRESS NOTES
Pt arrived, no dressing on wound but piece of coban around her ankle. Pt pretty down she said she broke up with her boyfriend. Denies suicidal ideation but claims she just wants to sleep. Pt weepy and down.

## 2021-06-09 NOTE — PROGRESS NOTES
"  Subjective:    Chela Love is a 41 y.o. female who presents for evaluation of multiple concerns:    1.  Cough.  She has had a cough for at least a week.  Sometimes it seems to get better, other times worse.  Overall it is not improving.  She may have a tactile fever off and on.  Sometimes she is coughing up green sputum.  She has been around people who have had coughs.  She does take Mucinex which helps a little bit.  She is a smoker.  She was down to smoking 2 cigarettes a day.  She has not had anything to smoke over the past 3 days.  She also has a history of intermittent asthma.  She has not had an inhaler to use for some time.    2.  Neck wound.  She is already being seen by vascular for ongoing leg ulcers.  She says she has a wound on her neck that she would like looked at.  She states that wound care has not seen this.  Her last visit with wound care was last Wednesday, 1 week ago.  Patient says that this wound started after that visit.  She cannot think of anything that would have caused it, perhaps a bug bite.  Sometimes she scratches or picks at her wounds.  She does have a history of MRSA.  Patient thinks the wound on her neck is getting better.  It was quite painful, but then it started to drain.    3.  Depression and anxiety.  These of been long-term problems for her.  She is quite tearful when discussing these.  She has had problems with depression and anxiety before.  She thinks she maybe took Effexor at one point.  She is does not think it worked very well.    She has had increased worry, stress, and anxiety recently.  Increased depression.  She says that right now her anxiety \"takes the fun out of everything.\"  She says she finds herself worrying about \"stupid stuff.\"  She has decreased desire or motivation to do anything.  She is not cleaning her house as well as she normally does.  She often feels hopeless.    She does not drive.  Luckily she has a supportive family, which is able to watch " out for her and provide her transportation when needed.  She thinks she is able to complete her ADLs, but sometimes it is a struggle.  She lives by herself with her dog.  She is able to take care of her dog without problems.  She has a past history of drug and alcohol use.  No current drug or alcohol use.    She feels that her friends the same group of friends she had when she was using drugs, may not be good for her.  She does not think she has ever been diagnosed with bipolar disorder in the past.  She is unable to provide a lot of details about any previous psychiatric care.    She takes gabapentin for her peripheral polyneuropathy.    Patient Active Problem List   Diagnosis     Cellulitis and abscess of leg, except foot     Type 2 diabetes mellitus with complication, without long-term current use of insulin     Peripheral polyneuropathy     Hypokalemia     ELSIE (obstructive sleep apnea)     Essential hypertension     Weakness     Obesity     MRSA infection     Major depression     Ulcer of lower extremity     Anxiety     Intermittent asthma       Current Outpatient Prescriptions:      albuterol (PROVENTIL HFA;VENTOLIN HFA) 90 mcg/actuation inhaler, 1-2 puffs every 4-6 hours, as needed., Disp: 1 Inhaler, Rfl: 1     gabapentin (NEURONTIN) 300 MG capsule, Take 1 capsule (300 mg total) by mouth 3 (three) times a day., Disp: 90 capsule, Rfl: 3     LANTUS SOLOSTAR 100 unit/mL (3 mL) pen, Inject 50 Units under the skin bedtime. 11.65 Type 2 with hyperglycemia, Disp: 5 Box, Rfl: PRN     lidocaine (XYLOCAINE) 2 % jelly, Apply topically as needed., Disp: , Rfl:      lisinopril (PRINIVIL,ZESTRIL) 40 MG tablet, Take 0.5 tablets (20 mg total) by mouth daily., Disp: 30 tablet, Rfl: 0     metFORMIN (GLUCOPHAGE-XR) 750 MG 24 hr tablet, Take 1 tablet (750 mg total) by mouth daily with breakfast., Disp: 30 tablet, Rfl: 3     MICROLET LANCET, , Disp: , Rfl: 98     NOVOLOG FLEXPEN 100 unit/mL injection pen, Check blood sugar three  (3) times daily. 11.65 Type 2 with hyperglycemia Flex Pen Needles - BD Ultra-fine Anat Pen Needles - NDC [address]-11 - dispense 1 case, refill PRN for 1 year No supplies needed, Disp: 5 Pre-filled Pen Syringe, Rfl: PRN     sertraline (ZOLOFT) 50 MG tablet, Take 1 tablet (50 mg total) by mouth daily., Disp: 30 tablet, Rfl: 3    Current Facility-Administered Medications:      lidocaine 2 % jelly (XYLOCAINE), , Topical, PRN, Hortencia Markham, CNP, 1 application at 03/01/17 0905     Objective:   Allergies:  Venom-honey bee and Other allergy (see comments)    Vitals:  Vitals:    03/06/17 1108   BP: 118/60   Pulse: 87   Resp: 20   Temp: 98.8  F (37.1  C)   SpO2: 92%     Body mass index is 38.66 kg/(m^2).    Vital signs reviewed.  General: Patient is alert and oriented x 3, in no apparent distress, she is appropriately groomed, somewhat tearful  Ears: TMs are non-erythematous with good light reflex bilaterally  Throat: no erythema, edema or exudate noted  Lymphatic: no anterior cervical lymph node enlargement  Cardiac: regular rate and rhythm, no murmurs  Pulmonary: lungs clear to auscultation bilaterally, no crackles, rales, rhonchi, or wheezing noted  Skin: elliptical shaped lesion present on posterior neck, approximately 2-3 cm in diameter and 3-4 cm in length, appears to be healing by secondary intention, no active drainage, no significant redness or induration    Results for orders placed or performed in visit on 03/06/17   Glycosylated Hemoglobin A1c   Result Value Ref Range    Hemoglobin A1c 13.1 (H) 3.5 - 6.0 %   HM2(CBC w/o Differential)   Result Value Ref Range    WBC 9.6 4.0 - 11.0 thou/uL    RBC 4.20 3.80 - 5.40 mill/uL    Hemoglobin 12.6 12.0 - 16.0 g/dL    Hematocrit 37.5 35.0 - 47.0 %    MCV 89 80 - 100 fL    MCH 30.0 27.0 - 34.0 pg    MCHC 33.6 32.0 - 36.0 g/dL    RDW 10.6 (L) 11.0 - 14.5 %    Platelets 229 140 - 440 thou/uL    MPV 7.8 7.0 - 10.0 fL    other labs pending.    Assessment and Plan:   1.   Cough in smoker.  Patient has not had anything to smoke for several days.  Prescription sent for azithromycin.  Follow-up by the end of the week if no significant improvement.    2.  Depression and anxiety.  Patient has had treatment for this before.  She is somewhat vague as to what she has tried in the past.  Starting sertraline once daily.  She says she has no documented diagnosis of bipolar disorder.  I reviewed that if she should notice that she is becoming more manic or having any adverse side effects from this medicine, she should stop it and let us know.  Also if she notices depression or anxiety worsening with starting this medicine she should stop it and let us know.  Referral also put in for counseling.  Certainly consider psychiatry in the future if appropriate.  Patient denies any thoughts of wanting to hurt him/herself or others and does contract for safety.    3.  Healing neck wound.  Wound does appear to be healing today, no active infection or drainage.  I suspect longer healing time is due to her elevated blood sugars.  I do not think any additional treatment would be needed at this time.  She will have wound care look at the spot on her neck next time she comes in to review her leg ulcers.  I reviewed reasons for her to notify us sooner, such as worsening pain, increased drainage, or other concerns for worsening infection on the neck wound.    4.  Type 2 diabetes, uncontrolled.  Patient states she is taking metformin 750 mg once daily, Lantus 50 units at bedtime, and NovoLog 10-15 units with meals.  She states she is taking her medicines regularly, except she has been out of metformin for about 5 days.  She states that when she checks her blood sugars, they are normally in the 140s, highest 170s.  I am working on having her establish care with one of our doctors.  Will adjust metformin, have her follow-up with pharmacist.  See chart note.    This dictation uses voice recognition software, which  may contain typographical errors.

## 2021-06-09 NOTE — PROGRESS NOTES
Date of Service:3/1/2017    Chief Complaint:   Chief Complaint   Patient presents with     Wound Check       History:    Cheal presents to clinic for reevaluation of her lower leg ulcers.  She is continuing to wear her 2-layer wrap, but removed it last night and did not put any on this morning.  She is offering no complaints and proudly reports that she had 2 cigarettes during this past week.     Current Outpatient Prescriptions   Medication Sig Dispense Refill     albuterol (PROVENTIL HFA;VENTOLIN HFA) 90 mcg/actuation inhaler Inhale 2 puffs.       gabapentin (NEURONTIN) 300 MG capsule Take 1 capsule (300 mg total) by mouth 3 (three) times a day. 90 capsule 0     HYDROcodone-acetaminophen 5-325 mg per tablet Take 1 tablet by mouth every 8 (eight) hours as needed. 20 tablet 0     LANTUS SOLOSTAR 100 unit/mL (3 mL) pen Inject 50 Units under the skin bedtime. 11.65 Type 2 with hyperglycemia 5 Box PRN     lidocaine (XYLOCAINE) 2 % jelly Apply topically as needed.       lisinopril (PRINIVIL,ZESTRIL) 40 MG tablet Take 0.5 tablets (20 mg total) by mouth daily. 30 tablet 0     metFORMIN (GLUCOPHAGE-XR) 750 MG 24 hr tablet Take 1 tablet (750 mg total) by mouth daily with breakfast. 30 tablet 0     MICROLET LANCET   98     NOVOLOG FLEXPEN 100 unit/mL injection pen Check blood sugar three (3) times daily.  11.65 Type 2 with hyperglycemia  Flex Pen Needles - BD Ultra-fine Anat Pen Needles - NDC [address]-00 - dispense 1 case, refill PRN for 1 year  No supplies needed 5 Pre-filled Pen Syringe PRN     Current Facility-Administered Medications   Medication Dose Route Frequency Provider Last Rate Last Dose     lidocaine 2 % jelly (XYLOCAINE)   Topical PRN Hortencia Markham, CNP   1 application at 03/01/17 0905       Allergies   Allergen Reactions     Venom-Honey Bee Hives     swelling     Other Allergy (See Comments) Rash     Cactus flower extract       Physical Exam:    Vitals:    03/01/17 0900   BP: 110/70   Pulse: 60    Resp: 18   Temp: 98  F (36.7  C)    There is no height or weight on file to calculate BMI.    General:  41 y.o. female in no apparent distress.    Psychiatric:  Alert and oriented x 3.  Cooperative.   Integumentary:  Skin is uniformly warm, dry and pink.  Lower extremity edema: +1  Ulcerations:  There is no corrine wound erythema, induration, maceration, denudement, fluctuance or warmth surrounding the ulcer(s).     Vasc Edema 1/13/2017 1/20/2017 1/31/2017 2/9/2017 3/1/2017   Right just above MTP 24 24.8 25 23.3 24   Right Ankle 26 25.5 26 24.5 27.5   Right Widest Calf 42.5 43.6 43 42.5 45   Left - just above MTP - 24.1 - - 24   Left Ankle - 25 - - 27.5   Left Widest Calf - 43.3 - - 45.6       Wound 10/21/16 R calf middle (Active)   Pre Size Length 0.5 2/9/2017  9:00 AM   Pre Size Width 0.5 2/9/2017  9:00 AM   Pre Total Sq cm 0.25 2/9/2017  9:00 AM   Post Size Length 0 1/20/2017 10:00 AM   Post Size Width 0 1/20/2017 10:00 AM   Post Size Depth 1 12/29/2016  8:00 AM   Undermined na 10/21/2016  2:00 PM   Tunneling na 10/21/2016  2:00 PM   Description Biovance #7  applied 1/13/2017 10:00 AM       Wound 10/21/16 R calf inferior  (Active)   Pre Size Length 0.6 1/31/2017 11:00 AM   Pre Size Width 0.4 1/31/2017 11:00 AM   Pre Total Sq cm 0.2 1/13/2017 10:00 AM   Post Size Length 0.6 1/31/2017 11:00 AM   Post Size Width 0.8 1/31/2017 11:00 AM   Undermined n 1/13/2017 10:00 AM   Tunneling n 1/13/2017 10:00 AM   Description beefy red wound bed 11/14/2016 10:00 AM       Wound 01/31/17 r leg superior (Active)   Pre Size Length 0.4 3/1/2017  9:00 AM   Pre Size Width 0.8 3/1/2017  9:00 AM   Pre Total Sq cm 0.32 3/1/2017  9:00 AM       Wound 10/11/16 Other wound type (comment) Leg (Active)       Assessment:  1. Venous hypertension, chronic, bilateral     2. Leg ulcer, right, limited to breakdown of skin     3. Peripheral polyneuropathy     4. Type 2 diabetes mellitus with complication, without long-term current use of insulin      5. Obesity (BMI 30-39.9)     6. Tobacco abuse         A new wound was identified today: no.    Plan:  1.   No debridement completed.    2.    Venous Insufficieny Ulceration, resolved.  Ulcer healing complicated by peripheral neuropathy.  She will need long term compression.  Written prescription for Compression stockings was provided.    3.   Tobacco abuse, continues to decrease the number that she smokes per week.    4.   Treatment:   ABD will be applied over the ulcer areas and a 2-layer wrap for compression.  She will make an appointment at [facility] for next week to have them removed and fitted for compression stockings.    5.   Patient will follow up with me in 2  for reevaluation.      Hortencia Markham, APRN, CNP,  [facility]  [phone]

## 2021-06-09 NOTE — PROGRESS NOTES
Mental Health Visit Note    Patient: Chela Love    : 1975 MRN: [id number]    2020    Start time: 1:13pm    Stop Time: 1:58pm    Session # 10    Session Type: Patient is presenting for an Individual session.    Chela Love is a 45 y.o. female is being seen today for    Chief Complaint   Patient presents with     MH Follow Up     video visit     Anxiety     anxiety with health concerns, housing instability     Depression     low mood with housing instability,      Addiction     in early remission in structured environment   .     Telemedicine Visit: The patient's condition can be safely assessed and treated via synchronous audio and visual telemedicine encounter.      Reason for Telemedicine Visit: Services only offered telehealth    Originating Site (Patient Location): Patient's home (nursing home)    Distant Site (Provider Location): [facility]    Consent:  The patient/guardian has verbally consented to: the potential risks and benefits of telemedicine (video visit) versus in person care; bill my insurance or make self-payment for services provided; and responsibility for payment of non-covered services.     Mode of Communication:  Video Conference via [employer]  As the provider I attest to compliance with applicable laws and regulations related to telemedicine.    Those present for this visit: patient, therapist    Follow up in regards to ongoing symptom management of depression, anxiety, stress    New symptoms or complaints: None    Functional Impairment:   Personal: 4  Family: 2  Social: 4  Work: 4    Clinical assessment of mental status:  Chela Love presented on time:  She was oriented x3, open and cooperative, and dressed appropriately for this session and weather. Her memory was Normal cognitive functioning .  Her speech was  Dramatic and Loud.  Language was fluent.  Concentration and focus is Within normal. Psychosis is not noted or  "reported. She reports her mood is Tearful, Anxious and Depressed.  Affect is congruent with speech and is Labile, Irritable, Anxious and Depressed.  Fund of knowledge is adequate. Insight is adequate for therapy.    Suicidal/Homicidal Ideation present: Patient denies suicidal and homicidal ideations/means or plans.     Patient's impression of their current status:   \"I feel like crying all the time.\"  \"My ex is hanging around my old friends and it makes me feel lonely and miserable.\"  \"My sister says I need to get on something, and that makes me feels me feel even worse.\"    Patient processes distressed over feeling that she is taking advantage of her mother's generosity in purchasing small food items and toiletries for patient.        Therapist impression of patients current state: This 45-year-old white female with long history of substance abuse now in remission and structured environment of nursing home, presents for video telemedicine visit with complaints of ongoing significant depression and grief and loss issues.  She is having a more more difficult time with lack of peer support given nursing home stay with mostly seniors and many with dementia.  Multiple loss issues related to cancer treatment and disabling chemotherapy side effects, as well as former social group completely abandoning patient when she became ill.  Suspect mixed personality disorder traits including borderline, histrionic, and dependency.  She is now open to weekly therapy appointments and this will be accommodated as possible with writer's schedule.  Open and cooperative in session.    Processed negative cognitions, reinforced strengths, validated patient efforts and feelings.  Discussed value of increasing mental health support through group program.  Discussed [facility] for DBT program, provided phone number. Discussed options of full DBT program with counseling, DBT group, and 24/7 phone counseling, provided number " "for [facility]    Discussed whether sx are consistent with chronic severe depression or she has mood lability, patient is not sure. Discussed value of meeting with psychiatry for medication adjustment to discuss sx.    Conducted feeling identification exercise regarding accepting help from her mother.  Patient identifies \"guilty, like I'm using her, happy that someone cares, happy that I have something I enjoy, gratitude and insecurity that she knows how much I appreciate her\".  Discussed that her mother may find  mutual meaning in their relationship and that not accepting her mother's support may be a significant part of their relationship as long as patient can extend mutuality in some other way.  Patient states this reframe is helpful to her.    Assessment tools used today include: None today. She is still not on My Chart and struggles with concentration to be able to complete scales verbally.    Type of psychotherapeutic technique provided: Insight oriented, Client centered, Solution-focused and CBT    Progress toward short term goals:Mixed progress:  Patient states she tried to remind herself daily, about 5 of 7 days that she does not want to go back to her old drug using life and is living more consistently with her values.  She is independently showing up for multiple medical and psychotherapy appointments without relying on nursing home staff.  Still struggling with offering mutuality to social support system consisting of nursing home staff and family members.  Has not followed up with scheduling rule 25 for possible sobriety support or treatment.  Remains sober from drugs and alcohol.    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     Diagnosis:   1. Depression, major, recurrent, moderate (H)    2. Generalized anxiety disorder    3. Adjustment disorder with mixed anxiety and depressed mood    4. Polysubstance " dependence in early, early partial, sustained full, or sustained partial remission (H)     ** worsening    Plan and Follow-up: Patient will return for follow up in two weeks.  Patient will explore via telephone calls options of  online attendance to [facility] hospital program versus full DBT program at [facility].  She will call psychiatry clinic to see if she schedule med check. (Rebeca back in office.)  She will make efforts to get up and dressed daily to better normalize her days in preparation for more independent living situation.  She will reach out to her mother to offer emotional support.  She will remain sober from drugs and alcohol.     Plan to refer to Rule 25 in the hopes of referral to [facility] Care Coordination.    Discharge Criteria/Planning: Patient will continue with follow-up until therapy can be discontinued without return of signs and symptoms.    I have reviewed the note as documented above.  This accurately captures the substance of my conversation with the patient.    As the provider I attest to compliance with applicable laws and regulations related to telemedicine.  Performed and documented by Beulah Hartman [facility]  7/14/2020
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
## 2021-06-10 NOTE — PROGRESS NOTES
"Mental Health tele Visit Note    Patient: Chela Love    : 1975 MRN: [id number]    Date: 2020   Start time: 3:08pm   Stop Time: 3:58pm   Session # 12    \"The patient has been notified of the following:   \"We have found that certain health care needs can be provided without the need for a face to face visit.  This service lets us provide the care you need with a phone conversation.  I will have full access to your [place] medical record during this entire phone call.   I will be taking notes for your medical record. Since this is like an office visit, we will bill your insurance company for this service.  There are potential benefits and risks of telephone visits (e.g. limits to patient confidentiality) that differ from in-person visits.?  Confidentiality still applies for telephone services, and nobody will record the visit.  It is important to be in a quiet, private space that is free of distractions (including cell phone or other devices) during the visit.?? If during the course of the call I believe a telephone visit is not appropriate, you will not be charged for this service\"  Consent has been obtained for this service by care team member: Yes  Session Type: Patient is participating in a telemedicine phone visit.  She declines video visit, states that acute eye pain prevents connecting.  Chief Complaint   Patient presents with      Follow Up     Telephone visit     Anxiety     acute anxiety with acute eye pain     Depression     acute low mood with medical issues     Addiction     in remission       New symptoms or complaints: None reported    Functional Impairment:   Personal: 4  Family: 2  Work: 4  Social:4          ASSESSMENT: Current Emotional / Mental Status (status of significant symptoms):              Risk status (Self / Other harm or suicidal ideation)              Patient reports personal safety in structured setting.              Patient denies current or recent suicidal " "ideation or behaviors.              Patient denies current or recent homicidal ideation or behaviors.              Patient denies current or recent self injurious behavior or ideation.              Patient denies other safety concerns.              Patient denies change in risk factors.              Patient denies change in protective factors. State she would never hurt her family by committing suicide.              Recommended that patient call 911 or go to the local ED should there be a change in any of these risk factors.                Attitude:                                   Cooperative  but initially agitated.               Orientation:                             Oriented x3              Speech                          Rate / Production:       Pressured  Emotional                          Volume:                       Loud               Mood:                                      Anxious  Panicked Fearful Agitated              Thought Content:                    Clear               Thought Form:                        Coherent  Tangential               Insight:                                     Fair       Patient's impression of their current status:   \"I can't stand it.  I can't stand one more thing.\"  Patient does not answer text for video visit.  She answers telephone call sobbing and crying.  States she is in acute pain since eye injection treatments this morning (states treatment is for diabetic condition worsened by chemo). Cannot comfortably open eyes for video visit. Pain level 8 of 10.  Has been crying all day.  Cannot Has been yelled at by other residents to \"shut up\".  Has not obtained relief from tylenol that she has available to her under doctor's orders.  Is not aware of prn anxiety medication available under psychiatry.    Therapist impression of patients current state: Patient in acute crisis mode when contacted by writer.  While patient is definitely emotionally dysregulated, difficult to " determine how much of her distress is acute pain and how much is depression, anxiety, overwhelmed.  Stayed on line with patient while she pressed call button to call nurse.  Supported patient while she asked for eye doctor to be called to determine if she is in medical emergency.  Supported patient while she asked for prn for anxiety management.   Practiced diagphragmatic breathing with patient.  She was able to stop crying, noting that this physical practice helped her calm down.  She noted that pain level decreased from 8 to a 7 of 10 with this intervention. We discussed that this practice is available to her whenever she wants it 24/7 and with her eyes shut.  Patient able to laugh about this despite pain level, expresses relief and gratitude for writer's assistance.  Patient also completed psychiatry consultation this morning where medications were adjusted under her psychiatrist.    Type of psychotherapeutic technique provided: Client centered, Solution-focused and Mindfulness, body centered practice    Progress toward short term goals: Poor progress, Patient not able to reflect on homework follow-up due to acute distress.  Goals will be continued for next session.  Patient did complete consultation with psychiatry as discussed.  Remained sober from substance abuse.    Review of long term goals: Not done at today's visit.  Last review on 5/19/2020.  Noted today that treatment plan though reviewed with patient was not electronically signed and closed.  Plan elecronically closed today.     Diagnosis:  1. Depression, major, recurrent, moderate (H)    2. Adjustment disorder with mixed anxiety and depressed mood    3. Polysubstance dependence in early, early partial, sustained full, or sustained partial remission (H)        Plan and Follow up: Patient will utilize diaphragmatic breathing as practiced in session to calm herself down with acute stressors including pain, discouragement, interpersonal stress.  She will  remember to extend her exhale to inform her neurology that she is safe and okay.    Homework also continued from last session due to her acute distress today: She will make efforts to get up and dressed daily to better normalize her days in preparation for more independent living situation.  She will reach out to her mother to offer emotional support.  She will remain sober from drugs and alcohol.     While patient declined online attendance to more intensive treatment such as [facility] program versus full DBT program at [facility], these  will remain on table for discussion if no improvement to medication adjustment.  Plan to refer to Rule 25 in the hopes of referral to CD Care Coordination.    Discharge Criteria/Planning: Client has chronic symptoms and ongoing therapy for maintenance stability recommended.    I have reviewed the note as documented above.  This accurately captures the substance of my conversation with the patient.  As the provider I attest to compliance with applicable laws and regulations related to telemedicine.  Beulah Hartman, [facility] HospitalSW 8/12/2020

## 2021-06-10 NOTE — PROGRESS NOTES
________________________________________  Medications Phoned  to Pharmacy [] yes [x]no  Name of Pharmacist:  List Medications, including dose, quantity and instructions    Medications ordered this visit were e-scribed.  Verified by order class [x] yes  [] no  Effexor- mg; Remeron 30 mg; trazodone 100 mg   Medication changes or discontinuations were communicated to patient's pharmacy: [] yes  [x] no    Dictation completed at time of chart check: [x] yes  [] no    I have checked the documentation for today s encounters and the above information has been reviewed and completed.

## 2021-06-10 NOTE — PROGRESS NOTES
Outpatient Mental Health Treatment Plan    Name:  Chela Love  :  1975  MRN:  [id number]    Treatment Plan:  Updated Treatment Plan  2020  Intake/initial treatment plan date: 2020  Benefit and risks and alternatives have been discussed: Yes  Is this treatment appropriate with minimal intrusion/restrictions: Yes  Estimated duration of treatment:  Approximately 10+ sessions.  Anticipated frequency of services:  Every 2 weeks  Necessity for frequency: This frequency is needed to establish therapeutic goals and for continuity of care in order to monitor progress.  Necessity for treatment: To address cognitive, behavioral, and/or emotional barriers in order to work toward goals and to improve quality of life.    Session Type: Patient is presenting for an Individual session.  (video visit)    Plan:      ? Depression    Goal:  Decrease average depression level from 10 to 3.   Strategies:    ?[x] Decrease social isolation     [x] Increase involvement in meaningful activities     ?[x] Discuss sleep hygiene     ?[x] Explore thoughts and expectations about self and others     ?[x] Process grief (loss of significant person, independence, role,        etc.)     ?[x] Assess for suicide risk     ?[x] Implement physical activity routine (with physician approval)     [x] Consider introduction of bibliotherapy and/or videos     [x] Continue compliance with medical treatment plan (or explore         barriers)       ?  ?Degree to which this is a problem: 4  Degree to which goal is met: 0  Date of Review: 2020            ?   ? Anxiety    Goal:  Decrease average anxiety level from 9 to 3.   Strategies: ? [x]Learn and practice relaxation techniques and other coping        strategies (e.g., thought stopping, reframing, meditation)     ? [x] Increase involvement in meaningful activities     ? [x] Discuss sleep hygiene     ? [x] Explore thoughts and expectations about self and others     ? [x] Identify and  monitor triggers for panic/anxiety symptoms     ? [x] Implement physical activity routine (with physician approval)     ? [x] Consider introduction of bibliotherapy and/or videos     ? [x] Continue compliance with medical treatment plan (or explore          barriers)                                       []     Degree to which this is a problem: 4  Degree to which goal is met: 0  Date of Review: 8/26/2020     Adjustment to disability / changes    Goal:  Increase % acceptance from 10%  to 60%.   Strategies: ?[x]  Explore thoughts and expectations about self and others   [x] Explore emotional reactions to illness/injury     ?[x] Learn and practice relaxation techniques and other coping        strategies     [x] Implement physical activity routine (with physician approval)                [x] Increase involvement in meaningful activities                [x] Engage in values clarification and goal-setting     [x] Consider introduction of bibliotherapy and/or videos                [x] Explore barriers to cooperation with rehabilitation progra?m   Degree to which this is a problem: 4  Degree to which goal is met: 0  Date of Review: 8/26/2020    Substance use  Goal:  To remain free of substance abuse and to develop a sober support system.   Strategies: ? [x] Consider referral for chemical dependency evaluation     ? [x] Discuss barriers to participating in AA or other peer-facilitated           groups         [x] Address environmental factors which may interfere with                                                    sobriety     ? [x] Explore short-term versus long-term consequences of use     ? [x] Continue compliance with medical treatment plan (or explore          barriers)       ?  Degree to which this is a problem: 3  Degree to which goal is met: 0  Date of Review: 8/26/2020            Functional Impairment:  1=Not at all/Rarely  2=Some days  3=Most Days  4=Every Day    Personal : 4  Family : 2  Social : 4  "  Work/school : 4    Diagnosis:  Major Depression, Recurrent, Moderate  Adjustment Disorder with mixed emotional features  Polysubstance abuse, in early remission  Traits of Borderline Personality Disorder  Rule out Bipolar Disorder    WHODAS 2.0 12-item version: self-administered: 83%      Scores presented in qualifiers to represent level of disability.  SEVERE Problem (high, extreme, ...) 50-95%     H1= 31  H2= 16  H3= 31        Clinical assessments and measures completed:. TAVIA-7, PHQ-9 and CAGE-AID, [place] suicide assessment scale     Strengths:  Supportive family, in a stable living situation in nursing home (but wants to get out), is sober from drug abuse,   Limitations:  Anxiety, depression, \"Lettin people run over me\", acute medical stressors and physical limitations  Cultural Considerations: Patient describes growing up in family where support to one another, hard work were valued. Patient identifies as having a higher power.  Is not affiliated with any [employer]. Patient utilizes western model of health care.    Persons responsible for this plan: Patient and Provider            Psychotherapist Signature           Patient Signature:              Guardian Signature             Provider: Performed and documented by Beulah Hartman [facility]   Date:  8/26/2020    "

## 2021-06-10 NOTE — PROGRESS NOTES
Date of Service:5/24/2017    Chief Complaint:   Chief Complaint   Patient presents with     Wound Check       History:    Chela presents to clinic for reevaluation of her right leg ulcer.  She removed her 2-layer wrap yesterday because it was falling.  She left the dressing intact, but applied a double layer of Tubigrip.  She is not having any pain in her ulcers.     Current Outpatient Prescriptions   Medication Sig Dispense Refill     albuterol (PROVENTIL HFA;VENTOLIN HFA) 90 mcg/actuation inhaler 1-2 puffs every 4-6 hours, as needed. 1 Inhaler 1     gabapentin (NEURONTIN) 300 MG capsule Take 1 capsule (300 mg total) by mouth 3 (three) times a day. 90 capsule 3     LANTUS SOLOSTAR 100 unit/mL (3 mL) pen Inject 50 Units under the skin bedtime. 11.65 Type 2 with hyperglycemia 5 Box PRN     lidocaine (XYLOCAINE) 2 % jelly Apply topically as needed.       lisinopril (PRINIVIL,ZESTRIL) 40 MG tablet TAKE 1/2 TABLET(20 MG) BY MOUTH DAILY 30 tablet 6     metFORMIN (GLUMETZA) 1000 MG (MOD) 24 hr tablet Take 1 tablet (1,000 mg total) by mouth 2 (two) times a day with meals. 60 tablet 3     MICROLET LANCET   98     NOVOLOG FLEXPEN 100 unit/mL injection pen Check blood sugar three (3) times daily.  11.65 Type 2 with hyperglycemia  Flex Pen Needles - BD Ultra-fine Anat Pen Needles - NDC [address]-83 - dispense 1 case, refill PRN for 1 year  No supplies needed 5 Pre-filled Pen Syringe PRN     sertraline (ZOLOFT) 50 MG tablet Take 1 tablet (50 mg total) by mouth daily. 30 tablet 3     Current Facility-Administered Medications   Medication Dose Route Frequency Provider Last Rate Last Dose     lidocaine 2 % jelly (XYLOCAINE)   Topical PRN Hortencia Markham CNP   1 application at 05/24/17 0935       Allergies   Allergen Reactions     Venom-Honey Bee Hives     swelling     Other Allergy (See Comments) Rash     Cactus flower extract       Physical Exam:    Vitals:    05/24/17 0900   BP: 130/80   Pulse: 88   Resp: 18   Temp: 98   F (36.7  C)    There is no height or weight on file to calculate BMI.    General:  42 y.o. female in no apparent distress.    Psychiatric:  Alert and oriented x 3.  Cooperative.   Integumentary:  Skin is uniformly warm, dry and pink.  Lower extremity edema: slight  Ulcerations:  There is no corrine wound erythema, induration, maceration, denudement, fluctuance or warmth surrounding the ulcer(s).    Vasc Edema 3/1/2017 3/15/2017 4/28/2017 5/17/2017 5/24/2017   Right just above MTP 24 24 24.5 24 24   Right Ankle 27.5 27.1 26 27 27   Right Widest Calf 45 45 45.7 49 44.5   Left - just above MTP 24 24 24.8 24.5 24   Left Ankle 27.5 28 27 27 26   Left Widest Calf 45.6 46.4 45.5 45 43.5       Wound 01/31/17 r leg superior (Active)   Pre Size Length 1.7 5/24/2017  9:00 AM   Pre Size Width 1 5/24/2017  9:00 AM   Pre Total Sq cm 1.7 5/24/2017  9:00 AM   Description scab 4/28/2017  8:00 AM       Wound 04/28/17 left anterior leg (Active)   Pre Size Length 1.7 5/24/2017  9:00 AM   Pre Size Width 0.4 5/24/2017  9:00 AM   Pre Total Sq cm 1.7 5/24/2017  9:00 AM   Post Size Length 1 5/24/2017  9:00 AM   Post Size Width 0.3 5/24/2017  9:00 AM       Wound 10/11/16 Other wound type (comment) Leg (Active)       Assessment:  1. Leg ulcer, right, limited to breakdown of skin     2. Peripheral polyneuropathy     3. Acquired lymphedema of lower extremity     4. Tobacco abuse     5. Type 2 diabetes mellitus with complication, without long-term current use of insulin     6. Obesity (BMI 30-39.9)         A new wound was identified today: no.    Plan:  1.   Debridement of the right leg ulcer was recommended.  After consent was obtained and topical 2% Xylocaine was applied under clean conditions, and using a #15 blade,the devitalized dermal tissue was debrided for a total square centimeters of 3.4. Following debridement, there was a decrease in the nonviable tissue. The patient tolerated the procedure without any difficulty.     2.  Venous  Insufficieny Ulceration with healing complicated by obesity and diabetes.  Ulcer is stable.  No signs of infection.    3.  Diabetes and obesity.  She has an appointment with the dietician this next week.     4.   Treatment:   Will start Endoform and cover with a foam adhesive dressing  A 2-layer wrap will then be applied. Do to his ulcer healing slowly,  I will also check insurance coverage for Biovance.    5.   Patient will follow up with me in one week  for reevaluation.      Hortencia Markham, APRN, CNP,  On license of [facility]  [phone]
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
## 2021-06-10 NOTE — PROGRESS NOTES
Outpatient Followup Psychiatric Evaluation      Pertinent History: The patient presented for an initial intake to this clinic on December 23 of 2019.  The patient has a reported prior history of bipolar affective disorder, as well as a history of polysubstance abuse including methamphetamine abuse.  I also see she has had a diagnosis of depressive disorder, NOS.  In addition she has morbid obesity and diabetes mellitus type 2 and polycystic ovary syndrome.  She has steroid dependent asthma.  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  She has had a ZANDRA, SBO and omentectomy.  Early in December 2019 she was admitted to the hospital with an abscess in the context of fevers.  She had significant anxiety over her diagnoses and prognosis.  Review of the records shows that she had been on Effexor 225 mg a day but apparently non-compliant with that.  She is also recently been on Lexapro.  She has had trazodone for sleep.  She was referred to this clinic due to concerns of worsening mood in the context of the situational stressors.  The patient apparently had been followed here in the distant past by 1 of our therapist.  At that visit we did change her Effexor XR to 225 mg a day.  I have discontinued her Lexapro.  I have started her on Remeron 15 mg at night.  The patient was in a structured and safe setting to prevent relapse and begin chemotherapy.    I saw the patient in late January 2020.  The patient had just completed her first round of chemotherapy and had multiple stressors including a break-up with a boyfriend.  She described more emotional difficulties.  She was living in a community house.  She was tearful throughout the interview.  She reported being sober since Thanksgiving.  There was some question about her current medication regime and what she was receiving at her placement.  We did attempt to increase her Remeron at that visit but later through phone calls we did increase her  trazodone.  We continued her on Effexor.     I last saw the patient on March 2, 2020. At that time she reported her mood was improving and she had no urges to use chemicals. She continued with her chemotherapy at that time. We did increase her Remeron to 30 mg for that time.  The patient was admitted to the hospital on March 3 regarding a blood transfusion. She was admitted to the hospital again on April 30 with staff infection and bacteremia.  Review of the record shows that the patient continues to follow with Paige Hartman for individual therapy. Over the course of the last few weeks her Ativan has been discontinued and she's tapering on her narcotics. Her debt depression is improving but she continues to have a low mood according to her therapist notes due to situational stressors. At times she can be somewhat dramatic and loud at times tearful and depressed. Her plan is to move out of the nursing home and into a cabin in the woods with her brother and have support from her sister. She has been referred for a rule 25 to help with chemical dependency care coordination.    Current Symptoms:  The patient was interviewed today by phone. She told me she was doing relatively well but admitted that she often is depressed she states she is now in remission from her cancer and is pleased with that. She reports she still frustrated because she's living in a nursing home and dislikes it there. She states overall her mood is been sad but she describes herself as being safe and sober. She relies on her brother and sister quite a bit and believes she may move in with her brother in a cabin in two weeks and she is looking forward to that. She states her sister has been extremely supportive.     The patient denies having any desire to be dad or thoughts of suicide. She said no psychotic symptoms. She denies his been any change in cognition. She denies any side effects to the medications that she said no new health problems and  actually is been feeling physically better recently. She's working on building up her strength and walking more. We again discussed the importance of staying sober and maintain a good physical health and she states that she has not used it does  Not have any urges to use. She states she recently quit smoking and is pleased about that. She states she still has not decided whether she is going to enroll in a more formalized chemical dependency treatment program but states that her family is sober and will support her sobriety. She denied having any other questions or concerns and felt comfortable with the treatment plan.    Current Medications:  Current medications were reviewed.  Please see the chart for additional information.    Medication Compliance: yes    Side Effects to Medications:  no      Vitals:  Wt Readings from Last 3 Encounters:   01/20/20 201 lb (91.2 kg)   01/15/20 190 lb (86.2 kg)   12/24/19 (!) 232 lb (105.2 kg)     Temp Readings from Last 3 Encounters:   01/15/20 98.2  F (36.8  C)   12/24/19 97.2  F (36.2  C)   11/22/19 98.8  F (37.1  C) (Oral)     BP Readings from Last 3 Encounters:   03/02/20 97/50   01/20/20 (!) 158/104   01/15/20 90/60     Pulse Readings from Last 3 Encounters:   03/02/20 67   01/20/20 77   01/15/20 89       Problem List (Please see medical records):  Active Problems:    * No active hospital problems. *        Mental Status Exam:   Appearance:  Patient was interviewed via phone.  Behavior:  Patient was calm, comfortable and polite. No irritability and no lability.  Speech:  Able to dialogue appropriately. Not pressured or rambling. Answers were appropriate.  Mood/Affect:  She continues somewhat depressed but not anxious or labile. No irritability.  Thought Content: No psychosis is apparent.  No recent reported psychosis.  Patient denies such. Patient denies having any recent psychosis.  Suicidal or Homicidal Thoughts:  None apparent or reported.   Thought Process/Formulation:   Perhaps slightly slow but able to track and follow conversation well. Not disorganized. No evidence of any racing thoughts.  Associations: Continues to track relatively well with no significant deficits noted.No reports of any significant recent change.  Fund of Knowledge: Grossly intact. No reports of any recent change.  Attention/Concentration:  Able to track and follow. No racing thoughts. Not disorganized.  Insight: Appears adequate.   No reports of any recent change.  Judgement: Appears adequate.  No reports of any recent change.  Memory: Appears adequate.  No reported recent change.  Slow.   Motor Status:  Patient reports her strength is gradually improving. She denies having any new tremors.  Orientation: Grossly intact.  No recent change.      Diagnosis managed and treated at today's visit :    Mood disorder, NOS rule out major depressive disorder with anxious features, rule out mood disorder secondary to chemical use     Rule out bipolar affective disorder     Methamphetamine substance use disorder, severe      Plan:  Medication Adjustment:   I'm going to increase the patient's Effexor to 300 mg a day. The patient has been weaned off the narcotic.    Other:  The patient was interviewed via the phone. The visit lasted 23 minutes.   The patient will return to this clinic via phone visit in three months. the patient will continue with her individual therapist. We are considering more formalized chemical dependency treatment at this point the patient is considering moving back in with family who she describes as supportive and who will help her maintain sobriety.    Continue with the support of the clinic, reassurance, and redirection. Staff monitoring and ongoing assessments per team plan. Current psychotropic medication appears to represent the minimum effective dosage and appears medically necessary. We will continue to monitor and reassess. This team will utilize appropriate emergency services if necessary.  I will make myself available if concerns or problems arise.    Vinod Suárez MD

## 2021-06-10 NOTE — PROGRESS NOTES
Mental Health Visit Note    Patient: Chela Love    : 1975 MRN: [id number]    2020    Start time: 3:08pm    Stop Time: 3:53pm    Session # 13    Session Type: Patient is presenting for an Individual session.    Chela Love is a 45 y.o. female is being seen today for    Chief Complaint   Patient presents with     MH Follow Up     Depression     low mood with narcotic withdrawals,      Anxiety     anxiety with housing insecurity, drug withdrawal,      Addiction     early  remission   .     Telemedicine Visit: The patient's condition can be safely assessed and treated via synchronous audio and visual telemedicine encounter.      Reason for Telemedicine Visit: Services only offered telehealth    Originating Site (Patient Location): Patient's home (nursing home)    Distant Site (Provider Location): [facility]: [facility]    Consent:  The patient/guardian has verbally consented to: the potential risks and benefits of telemedicine (video visit) versus in person care; bill my insurance or make self-payment for services provided; and responsibility for payment of non-covered services.     Mode of Communication:  Video Conference via Doximity  As the provider I attest to compliance with applicable laws and regulations related to telemedicine.    Those present for this visit: patient, therapist    Follow up in regards to ongoing symptom management of depression, anxiety, stress    New symptoms or complaints: None    Functional Impairment:   Personal: 4  Family: 2  Social: 4  Work: 4    Clinical assessment of mental status:   Chela Love presented on time:  She was oriented x3, open and cooperative, and dressed appropriately for this session and weather. Her memory was Normal cognitive functioning .  Her speech was  Dramatic and Loud.  Language was fluent.  Concentration and focus is Within normal. Psychosis is not noted or reported. She reports her mood is Tearful,  "Anxious and Depressed.  Affect is congruent with speech and is Labile, Tearful, Anxious and Depressed.  Fund of knowledge is adequate. Insight is adequate for therapy.    Suicidal/Homicidal Ideation present: Patient denies suicidal and homicidal ideations/means or plans.     Patient's impression of their current status:   \"I'm like only half of a human being!\"  \"I'm tired of it!  It makes me so mad!\"  \"Maybe I just need to walk out of here.\"  Patient reports mood as depressed, anxious, down.    Therapist impression of patients current state: This 45-year-old  female with long history of substance abuse, dysfunctional relationships, significant physical disabilities following recent intensive treatment for cancer presents with acute complaints of anger, distress, depression, anxiety in the context of situational stressors. She loudly and tearfully processes distress about not being able to go home with family today due to difficult placement arrangements. She was actually packed to go today.  Living in the nursing home were most everyone is twice her age has been exceedingly difficult for patient.  States she had \"terrible\" withdrawals from getting off narcotics this past week, some mood liability may be attributable.   Processed negative cognitions, reinforced strengths, validated patient efforts and feelings. Offered empathic listening and reflections and questions intended to evoke change talk, explored needs and resources, and provided emotional support. Discussed the challenge of accepting \"what is\". Discussed pros and cons of moving out premature to her community services being set up.  Patient able to identify that she would need her medications \"and that I would want to be living under a bridge\".  She also is able to identify her desire not to stress her family with worry about her.    Assessment tools not used today.      Type of psychotherapeutic technique provided: Insight oriented, Client " centered, Solution-focused, CBT and Motivational interviewing     Progress toward short term goals:Mixed progress:  Patient reports having practiced diaphragmatic breathing as practiced in session to calm herself 2 times with acute stressors.  Reassures writer that she is not yelling and crying with others as much as she does in session.  Reports mixed efforts and getting up and dressed to better normalize her days in preparation for more independent living.  Reached out to her mother for emotional support.  Remains medication compliant and sober from drugs and alcohol in a supervised setting.    Review of long term goals: Not done at today's visit and Date of last review 5/19/2020 .     Diagnosis:   1. Generalized anxiety disorder    2. Depression, major, recurrent, moderate (H)    3. Adjustment disorder with mixed anxiety and depressed mood    4. Polysubstance dependence in early, early partial, sustained full, or sustained partial remission (H)     no change    Plan and Follow-up: Homework discontinued from last session due to patient's need for regulating emotions and promote self-care and independence: Patient will utilize diaphragmatic breathing as practiced in session to calm herself down with acute stressors including pain, discouragement, interpersonal stress.  She will remember to extend her exhale to inform her neurology that she is safe and okay.  She will make efforts to get up and dressed daily to better normalize her days in preparation for more independent living situation.  She will reach out to her mother to offer emotional support.  She will remain sober from drugs and alcohol.     While patient declined online attendance to more intensive treatment such as [facility] program versus full DBT program at [facility], these  will remain on table for discussion if no improvement to medication adjustment.  Plan to refer to Rule 25 in the hopes of referral to [facility]. Consider  referral to full DBT program.    Discharge Criteria/Planning: Patient will continue with follow-up until therapy can be discontinued without return of signs and symptoms.    I have reviewed the note as documented above.  This accurately captures the substance of my conversation with the patient.    As the provider I attest to compliance with applicable laws and regulations related to telemedicine.  Performed and documented by HAIDER Ahumada  8/26/2020

## 2021-06-10 NOTE — PROGRESS NOTES
Date of Service:5/17/2017    Chief Complaint:   Chief Complaint   Patient presents with     Wound Check       History:    Chela presents to clinic for reevaluation of her lower leg ulcer.  She failed her week follow up and not been evaluated in the past month.  At her last visit, a 2-layer wrap was applied, but she found it to be to tight so she took it off after a few days.  Currently, she is using neosporin on her ulcer and covering it with gauze.  She wears her compression stockings daily, but reports that she has not worn them in the past couple of days. She denies any pain in the ulcer.  She also indicated that her diabetes is controlled, but plans on going on a diet where she does not eat any thing because she heard that it is helpful for diabetes.    Current Outpatient Prescriptions   Medication Sig Dispense Refill     albuterol (PROVENTIL HFA;VENTOLIN HFA) 90 mcg/actuation inhaler 1-2 puffs every 4-6 hours, as needed. 1 Inhaler 1     gabapentin (NEURONTIN) 300 MG capsule Take 1 capsule (300 mg total) by mouth 3 (three) times a day. 90 capsule 3     LANTUS SOLOSTAR 100 unit/mL (3 mL) pen Inject 50 Units under the skin bedtime. 11.65 Type 2 with hyperglycemia 5 Box PRN     lidocaine (XYLOCAINE) 2 % jelly Apply topically as needed.       lisinopril (PRINIVIL,ZESTRIL) 40 MG tablet TAKE 1/2 TABLET(20 MG) BY MOUTH DAILY 30 tablet 6     metFORMIN (GLUMETZA) 1000 MG (MOD) 24 hr tablet Take 1 tablet (1,000 mg total) by mouth 2 (two) times a day with meals. 60 tablet 3     MICROLET LANCET   98     NOVOLOG FLEXPEN 100 unit/mL injection pen Check blood sugar three (3) times daily.  11.65 Type 2 with hyperglycemia  Flex Pen Needles - BD Ultra-fine Anat Pen Needles - NDC [address]-35 - dispense 1 case, refill PRN for 1 year  No supplies needed 5 Pre-filled Pen Syringe PRN     sertraline (ZOLOFT) 50 MG tablet Take 1 tablet (50 mg total) by mouth daily. 30 tablet 3     Current Facility-Administered Medications    Medication Dose Route Frequency Provider Last Rate Last Dose     lidocaine 2 % jelly (XYLOCAINE)   Topical PRN Hortencia Blanco MARIQuintin, CNP   1 application at 05/17/17 0839       Allergies   Allergen Reactions     Venom-Honey Bee Hives     swelling     Other Allergy (See Comments) Rash     Cactus flower extract       Physical Exam:    Vitals:    05/17/17 0700   BP: 124/84   Pulse: 80   Resp: 18   Temp: 97.8  F (36.6  C)    There is no height or weight on file to calculate BMI.    General:  42 y.o. female in no apparent distress.    Psychiatric:  Alert and oriented x 3.  Cooperative.   Integumentary:  Skin is uniformly warm, dry and pink.  Lower extremity edema: minimal  Ulcerations:  There is no corrine wound erythema, induration, maceration, denudement, fluctuance or warmth surrounding the ulcer(s).      Wound 01/31/17 r leg superior (Active)   Pre Size Length 1.7 5/17/2017  8:00 AM   Pre Size Width 1 5/17/2017  8:00 AM   Pre Total Sq cm 1.7 5/17/2017  8:00 AM   Description scab 4/28/2017  8:00 AM       Wound 04/28/17 left anterior leg (Active)   Pre Size Length 1 5/17/2017  8:00 AM   Pre Size Width 0.3 5/17/2017  8:00 AM   Pre Total Sq cm 0.3 5/17/2017  8:00 AM       Wound 10/11/16 Other wound type (comment) Leg (Active)       Assessment:  1. Leg ulcer, right, limited to breakdown of skin     2. Acquired lymphedema of lower extremity     3. Obesity (BMI 30-39.9)     4. Peripheral polyneuropathy     5. Tobacco abuse     6. Type 2 diabetes mellitus with complication, without long-term current use of insulin  Amb Referral to Bariatric Dietician   7. Obesity  Amb Referral to Bariatric Dietician       A new wound was identified today: no.    Plan:  1.   Debridement of the right leg ulcer was recommended.  After consent was obtained and topical 2% Xylocaine was applied under clean conditions, and using a #15 blade,the devitalized dermal tissue was debrided for a total square centimeters of 0.3. Following debridement, there  was a decrease in the nonviable tissue. The patient tolerated the procedure without any difficulty.     2.    Venous Insufficieny Ulceration, larger in size.  Suspect the increase is related to lack of proper care.  Will treat topically with an antimicrobial.     3.  Diabetes, reports that it is controlled.  Referred to dietician to discuss diet options    4.   Treatment:   Silvercel will be applied and covered with an ABD.  A 2-layer wrap lite will then be applied, which will not be as tight for her.    5.   Patient will follow up with me in one week  for reevaluation.      Hortencia Markham, APRN, CNP,  [facility]  [phone]

## 2021-06-11 NOTE — TELEPHONE ENCOUNTER
The patient should probably be getting this prescription from the doctor that ordered that medication and is monitoring the rehabilitation.

## 2021-06-11 NOTE — PROGRESS NOTES
________________________________________  Medications Phoned  to Pharmacy [] yes [x]no  Name of Pharmacist:  List Medications, including dose, quantity and instructions    Medications ordered this visit were e-scribed.  Verified by order class [x] yes  [] no   Buspar, Neurontin 600 mg, Atarax, Remeron, Trazodon, and Effexor XR  Medication changes or discontinuations were communicated to patient's pharmacy: [] yes  [x] no  Pt has been using multiple pharmacy.  See parallel phone encounter  Dictation completed at time of chart check: [x] yes  [] no    I have checked the documentation for today s encounters and the above information has been reviewed and completed.      Spoke to [facility], they have no record for this pt since February.  AVS mailed to pt. F/U appt scheduled.

## 2021-06-11 NOTE — TELEPHONE ENCOUNTER
Patient sister, Ann Marie, left VM regarding medication and pharmacy not filling- Gabapentin 400mg.    Please call to advise:  [phone]    Thanks!

## 2021-06-11 NOTE — PROGRESS NOTES
"Mental Health Visit Note    Patient: Chela Love    : 1975 MRN: [id number]    2020    Start time: 3:14pm    Stop Time: 3:59pm    Session # 15    Session Type: Patient is presenting for an Individual session.    Chela Love is a 45 y.o. female is being seen today for    Chief Complaint   Patient presents with      Follow Up     Video visit     Anxiety     Anxiety in context of medication difficulties     Depression     Mild low mood with medical concerns, \"sick of everything\"     Addiction     In early remission from polysubstance abuse   .     Telemedicine Visit: The patient's condition can be safely assessed and treated via synchronous audio and visual telemedicine encounter.      Reason for Telemedicine Visit: Services only offered telehealth    Originating Site (Patient Location): Patient's home (nursing home)    Distant Site (Provider Location): [facility]: [facility]    Consent:  The patient/guardian has verbally consented to: the potential risks and benefits of telemedicine (video visit) versus in person care; bill my insurance or make self-payment for services provided; and responsibility for payment of non-covered services.     Mode of Communication:  Video Conference via Doximity    As the provider I attest to compliance with applicable laws and regulations related to telemedicine.    Those present for this visit: patient, therapist    Follow up in regards to ongoing symptom management of depression, anxiety, stress, addiction    New symptoms or complaints: Worsened anxiety with concerns about medications, mood lability    Functional Impairment:   Personal: 3  Family: 2  Social: 4  Work: 4    Clinical assessment of mental status:   Chela Love presented on time:  She was oriented x3, open and cooperative, and dressed appropriately for this session and weather. Her memory was Normal cognitive functioning .  Her speech was  Within normal.  Language " "was fluent.  Concentration and focus is Brief. Psychosis is not noted or reported. She reports her mood is Labile, Tearful, Anxious and Depressed.  Affect is congruent with speech and is Labile, Tearful, Anxious and Depressed.  Fund of knowledge is adequate. Insight is adequate for therapy.    Suicidal/Homicidal Ideation present: Patient denies suicidal and homicidal ideations/means or plans.     Patient's impression of their current status:   \"I don't know what's wrong with me, but not having my psychiatric medicine isn't going to help.\"  Patient complains mood is tearful, labile, erratic.  She has had yelling episodes with family members who are caring for her.  Psychiatric meds not available, had appt with [facility] today to try to straighten that out.    Therapist impression of patients current state: This 45-year-old  female with long history of substance abuse, dysfunctional relationships, significant physical disabilities following recent intensive treatment for cancer presents to telemedicine psychotherapy visit with complaints of anxiety, depression, labile mood.  Symptoms do seem consistent with diagnosis of bipolar 2 versus borderline personality disorder or both.  Utilized time in session to process patient's stress around multiple issues.  Practiced diaphragmatic breathing and emotional grounding.  Processed negative cognitions, reinforced strengths, validated patient efforts and feelings.    Assessment tools used today: none today.     Type of psychotherapeutic technique provided: Client centered, Solution-focused, CBT and Motivational interviewing     Progress toward short term goals:Mixed progress:  Patient reports utilizing diaphragmatic breathing as practiced in session \"just a couple times\".  Utilize validation that she is safe and okay x2.  She made efforts to get up and dressed daily to better normalize her days in preparation for independent living.  Was able to reach out to her mother " to offer emotional support.  She continues to suffer from pain, discouragement, labile mood but remains sober from drugs and alcohol.    Review of long term goals: Not done at today's visit and Date of last review 8/26/20 .     Diagnosis:   1. Bipolar II disorder, moderate, depressed, with anxious distress (H)    2. Adjustment disorder with mixed anxiety and depressed mood    3. Borderline personality disorder (H)    4. Polysubstance dependence in early, early partial, sustained full, or sustained partial remission (H)    Worsened    Plan and Follow-up: Patient agrees to call and register for DBT treatment today/tomorrow.  IOP discussed, patient understands that she can make use of this option.  Patient will utilize self soothing strategies of diaphragmatic breathing and sensory grounding as practiced in session to calm herself with acute stressors including pain, mood swings, interpersonal distress.  She will continue efforts to get up and dressed daily to better normalize her days in preparation for independent living.  She will remain sober from drugs and alcohol.     Discharge Criteria/Planning: Patient will continue with follow-up until therapy can be discontinued without return of signs and symptoms.    I have reviewed the note as documented above.  This accurately captures the substance of my conversation with the patient.    As the provider I attest to compliance with applicable laws and regulations related to telemedicine.  Performed and documented by HAIDER Ahumada  9/9/2020

## 2021-06-11 NOTE — PROGRESS NOTES
Date of Service:6/22/2017    Chief Complaint:   Chief Complaint   Patient presents with     Wound Check       History:    Chela presents to clinic for reevaluation of her leg ulcer.  She left the 2-layer wrap on until yesterday.  She reports that she stopped smoking.  The pain is variable in her ulcer.    Current Outpatient Prescriptions   Medication Sig Dispense Refill     albuterol (PROVENTIL HFA;VENTOLIN HFA) 90 mcg/actuation inhaler 1-2 puffs every 4-6 hours, as needed. 1 Inhaler 1     gabapentin (NEURONTIN) 300 MG capsule Take 1 capsule (300 mg total) by mouth 3 (three) times a day. 90 capsule 3     LANTUS SOLOSTAR 100 unit/mL (3 mL) pen Inject 50 Units under the skin bedtime. 11.65 Type 2 with hyperglycemia 5 Box PRN     lidocaine (XYLOCAINE) 2 % jelly Apply topically as needed.       lisinopril (PRINIVIL,ZESTRIL) 40 MG tablet TAKE 1/2 TABLET(20 MG) BY MOUTH DAILY 30 tablet 6     metFORMIN (GLUMETZA) 1000 MG (MOD) 24 hr tablet Take 1 tablet (1,000 mg total) by mouth 2 (two) times a day with meals. 60 tablet 3     MICROLET LANCET   98     NOVOLOG FLEXPEN 100 unit/mL injection pen Check blood sugar three (3) times daily.  11.65 Type 2 with hyperglycemia  Flex Pen Needles - BD Ultra-fine Anat Pen Needles - NDC [address]-90 - dispense 1 case, refill PRN for 1 year  No supplies needed 5 Pre-filled Pen Syringe PRN     sertraline (ZOLOFT) 50 MG tablet Take 1 tablet (50 mg total) by mouth daily. 30 tablet 3     Current Facility-Administered Medications   Medication Dose Route Frequency Provider Last Rate Last Dose     lidocaine 2 % jelly (XYLOCAINE)   Topical PRN Hortencia Markham, CNP   1 application at 06/15/17 0805       Allergies   Allergen Reactions     Venom-Honey Bee Hives     swelling     Other Allergy (See Comments) Rash     Cactus flower extract       Physical Exam:    Vitals:    06/22/17 0843   BP: 118/86   Pulse: 80   Resp: 16   Temp: 97.6  F (36.4  C)    There is no height or weight on file to  calculate BMI.    General:  42 y.o. female in no apparent distress.    Psychiatric:  Alert and oriented x 3.  Cooperative.   Integumentary:  Skin is uniformly warm, dry and pink.  Lower extremity edema: none  Ulcerations:  There is no corrine wound erythema, induration, maceration, denudement, fluctuance or warmth surrounding the ulcer(s).      Wound 01/31/17 r leg superior (Active)   Pre Size Length 1.9 6/22/2017  8:00 AM   Pre Size Width 0.6 6/22/2017  8:00 AM   Pre Total Sq cm 1.14 6/22/2017  8:00 AM   Post Size Length 1.5 6/22/2017  8:00 AM   Post Size Width 0.7 6/22/2017  8:00 AM   Description scab 4/28/2017  8:00 AM       Wound 10/11/16 Other wound type (comment) Leg (Active)       Assessment:  1. Leg ulcer, right, limited to breakdown of skin     2. Peripheral polyneuropathy     3. Acquired lymphedema of lower extremity     4. Tobacco abuse     5. Type 2 diabetes mellitus with complication, without long-term current use of insulin     6. Obesity (BMI 30-39.9)         A new wound was identified today: no.    Plan:  1.   Debridement of the right leg ulcer was recommended.  After consent was obtained and topical 2% Xylocaine was applied under clean conditions, and using a #15 blade,the devitalized dermal tissue was debrided for a total square centimeters of 1.14. Following debridement, there was a decrease in the nonviable tissue. The patient tolerated the procedure without any difficulty.     2.    Right Venous Insufficieny Ulceration, no improvement.    3.   Treatment:   Will apply Tegaderm AG Mesh over the ulcer and cover it with a Mepilex border.  A 2-layer wrap will be applied for compression.   I will also check insurance coverage for Biovance because the previous coverage check was for a different ulcer that resolved.    4.   Patient will follow up with me in one week  for reevaluation.  I will apply Biovance if the ulcer fails to improve.      Hortencia Markham, APRN, CNP,  CWCN  [facility]  [place]  [phone]

## 2021-06-11 NOTE — TELEPHONE ENCOUNTER
According to the sister patient takes gabapentin 600 mg at HS and 400 mg two times a day.     Please review the note from 9-14-20.
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
## 2021-06-11 NOTE — PROGRESS NOTES
Date of Service:6/15/2017    Chief Complaint:   Chief Complaint   Patient presents with     Wound Check       History:    Chela presents to clinic for reevaluation of her right leg ulcer.  She removed the 2-layer wrap about 3 days ago because it was irritating to her.  She left the dressing on but applied a tubigrip instead.    Current Outpatient Prescriptions   Medication Sig Dispense Refill     albuterol (PROVENTIL HFA;VENTOLIN HFA) 90 mcg/actuation inhaler 1-2 puffs every 4-6 hours, as needed. 1 Inhaler 1     gabapentin (NEURONTIN) 300 MG capsule Take 1 capsule (300 mg total) by mouth 3 (three) times a day. 90 capsule 3     LANTUS SOLOSTAR 100 unit/mL (3 mL) pen Inject 50 Units under the skin bedtime. 11.65 Type 2 with hyperglycemia 5 Box PRN     lidocaine (XYLOCAINE) 2 % jelly Apply topically as needed.       lisinopril (PRINIVIL,ZESTRIL) 40 MG tablet TAKE 1/2 TABLET(20 MG) BY MOUTH DAILY 30 tablet 6     metFORMIN (GLUMETZA) 1000 MG (MOD) 24 hr tablet Take 1 tablet (1,000 mg total) by mouth 2 (two) times a day with meals. 60 tablet 3     MICROLET LANCET   98     NOVOLOG FLEXPEN 100 unit/mL injection pen Check blood sugar three (3) times daily.  11.65 Type 2 with hyperglycemia  Flex Pen Needles - BD Ultra-fine Anat Pen Needles - NDC [address]-35 - dispense 1 case, refill PRN for 1 year  No supplies needed 5 Pre-filled Pen Syringe PRN     sertraline (ZOLOFT) 50 MG tablet Take 1 tablet (50 mg total) by mouth daily. 30 tablet 3     Current Facility-Administered Medications   Medication Dose Route Frequency Provider Last Rate Last Dose     lidocaine 2 % jelly (XYLOCAINE)   Topical PRN Hortencia Markham, CNP   1 application at 06/15/17 0805       Allergies   Allergen Reactions     Venom-Honey Bee Hives     swelling     Other Allergy (See Comments) Rash     Cactus flower extract       Physical Exam:    Vitals:    06/15/17 0700   BP: 144/90   Pulse: 96   Resp: 18   Temp: 98  F (36.7  C)    There is no height or  weight on file to calculate BMI.    General:  42 y.o. female in no apparent distress.    Psychiatric:  Alert and oriented x 3.  Cooperative.   Integumentary:  Skin is uniformly warm, dry and pink.  Lower extremity edema: none  Ulcerations:  There is no corrine wound erythema, induration, maceration, denudement, fluctuance or warmth surrounding the ulcer(s).    Vasc Edema 3/15/2017 4/28/2017 5/17/2017 5/24/2017 6/15/2017   Right just above MTP 24 24.5 24 24 24   Right Ankle 27.1 26 27 27 25   Right Widest Calf 45 45.7 49 44.5 43   Left - just above MTP 24 24.8 24.5 24 24   Left Ankle 28 27 27 26 26   Left Widest Calf 46.4 45.5 45 43.5 43       Wound 01/31/17 r leg superior (Active)   Pre Size Length 0.9 6/15/2017  8:00 AM   Pre Size Width 0.4 6/15/2017  8:00 AM   Pre Total Sq cm 0.32 6/15/2017  8:00 AM   Description scab 4/28/2017  8:00 AM       Wound 10/11/16 Other wound type (comment) Leg (Active)         Assessment:  1. Leg ulcer, right, with fat layer exposed     2. Peripheral polyneuropathy     3. Acquired lymphedema of lower extremity     4. Tobacco abuse     5. Type 2 diabetes mellitus with complication, without long-term current use of insulin     6. Obesity (BMI 30-39.9)         A new wound was identified today: no.    Plan:  1.   Excisional Debridement of the right leg ulcer was recommended and after consent was obtained and topical 2% Xylocaine was applied, under clean conditions, using a #15 scalpel, the devitalized subcutaneous  tissue in the ulcer base and at the ulcer margins were sharply excised for a total sq. cm of 0.32    Following excision, there was minimal bleeding tissue, which was easily controlled and a decrease in the nonviable tissue.  The patient tolerated the procedure without difficulty.     2.  Right leg ulcer with healing complicated by Venous insufficiency.  Ulcer is improving.     3.   Treatment:   Will continue with Endoform and cover with an ABD.  A 2-layer wrap will be applied and  she was instructed to apply a double layer of tubigrip if she removes the 2-layer wrap.    4.   Patient will follow up with me in one week  for reevaluation.      TIA Nassar, CNP,  [facility]  [phone]

## 2021-06-11 NOTE — PATIENT INSTRUCTIONS - HE
Patient will have her psychotropic medications clarified. Please see the note above but apparently the patient has been off her Remeron and Effexor for about one week but this is a bit unclear. I would like to restart those medications. First however we will clarify to determine the best way to restart those medications. The patient will continue with her individual therapist. Patient will continue with sobriety. Once the patient's medical and placement issues have stabilized I would like more formalized chemical dependency assessment and possible treatment options explored. I will schedule the patient for a return to this clinic for a phone visit in four weeks.  The patient and family agreed to call before then with any questions or concerns.

## 2021-06-11 NOTE — PROGRESS NOTES
Date of Service:6/29/2017    Chief Complaint:   Chief Complaint   Patient presents with     Wound Check       History:    Chela presents to clinic for reevaluation of her leg ulcer.  She left the 2-layer wrap this past week.  She reports that she stopped smoking.  She is not having any pain in her ulcer.     Current Outpatient Prescriptions   Medication Sig Dispense Refill     albuterol (PROVENTIL HFA;VENTOLIN HFA) 90 mcg/actuation inhaler 1-2 puffs every 4-6 hours, as needed. 1 Inhaler 1     gabapentin (NEURONTIN) 300 MG capsule Take 1 capsule (300 mg total) by mouth 3 (three) times a day. 90 capsule 3     LANTUS SOLOSTAR 100 unit/mL (3 mL) pen Inject 50 Units under the skin bedtime. 11.65 Type 2 with hyperglycemia 5 Box PRN     lidocaine (XYLOCAINE) 2 % jelly Apply topically as needed.       lisinopril (PRINIVIL,ZESTRIL) 40 MG tablet TAKE 1/2 TABLET(20 MG) BY MOUTH DAILY 30 tablet 6     metFORMIN (GLUMETZA) 1000 MG (MOD) 24 hr tablet Take 1 tablet (1,000 mg total) by mouth 2 (two) times a day with meals. 60 tablet 3     MICROLET LANCET   98     NOVOLOG FLEXPEN 100 unit/mL injection pen Check blood sugar three (3) times daily.  11.65 Type 2 with hyperglycemia  Flex Pen Needles - BD Ultra-fine Anat Pen Needles - NDC [address]-68 - dispense 1 case, refill PRN for 1 year  No supplies needed 5 Pre-filled Pen Syringe PRN     sertraline (ZOLOFT) 50 MG tablet Take 1 tablet (50 mg total) by mouth daily. 30 tablet 3     Current Facility-Administered Medications   Medication Dose Route Frequency Provider Last Rate Last Dose     lidocaine 2 % jelly (XYLOCAINE)   Topical PRN Hortencia Markham, CNP   1 application at 06/29/17 0819       Allergies   Allergen Reactions     Venom-Honey Bee Hives     swelling     Other Allergy (See Comments) Rash     Cactus flower extract       Physical Exam:    Vitals:    06/29/17 0700   BP: 132/90   Pulse: 88   Resp: 16   Temp: 97.9  F (36.6  C)    There is no height or weight on file to  calculate BMI.    General:  42 y.o. female in no apparent distress.    Psychiatric:  Alert and oriented x 3.  Cooperative.   Integumentary:  Skin is uniformly warm, dry and pink.  Lower extremity edema: none  Ulcerations:  There is no corrine wound erythema, induration, maceration, denudement, fluctuance or warmth surrounding the ulcer(s).    Wound 01/31/17 r leg superior (Active)   Pre Size Length 1.3 6/29/2017  8:00 AM   Pre Size Width 0.5 6/29/2017  8:00 AM   Pre Total Sq cm 0.65 6/29/2017  8:00 AM   Post Size Length 1.2 6/29/2017  8:00 AM   Post Size Width 0.5 6/29/2017  8:00 AM   Description scab 4/28/2017  8:00 AM       Wound 10/11/16 Other wound type (comment) Leg (Active)       Assessment:  1. Leg ulcer, right, limited to breakdown of skin     2. Peripheral polyneuropathy     3. Acquired lymphedema of lower extremity     4. Tobacco abuse     5. Type 2 diabetes mellitus with complication, without long-term current use of insulin         A new wound was identified today: no.    Plan:  1.   Debridement of the right leg ulcer was recommended.  After consent was obtained and topical 2% Xylocaine was applied under clean conditions, and using a #15 blade,the devitalized dermal tissue was debrided for a total square centimeters of  0.65. Following debridement, there was a decrease in the nonviable tissue. The patient tolerated the procedure without any difficulty.     2.    Right Venous Insufficieny Ulceration,  Improvement noted.    3.   Treatment:   Will continue with Tegaderm AG Mesh over the ulcer and cover it with a Mepilex border.  A 2-layer wrap will be applied for compression.  Her ulcer has healed >30% in a month, therefore she does not qualify for Biovance application.     4.   Patient will follow up with me in one week  for reevaluation and application of Endoform.  She will see a nurse the following two weeks during my absence.         Hortencia Markham, APRN, CNP,  CWCN  [facility]  [place]  [phone]
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
## 2021-06-11 NOTE — TELEPHONE ENCOUNTER
Spoke to pt and her sister Leah Liu.  Clarified and updated pharmacy: [facility] in [place]. Phone: [phone]     As per sister's reports her current meds are:     Gabapentin 600 mg tab Q HS. ( shows last filled 8/17/20. 30/30. Prescribed by Sade Marie)  Gabapentin 400 mg cap two times a day  (  shows last filled 8/15/20. 60/30. Prescribed by Sade Marie)  Buspar 10 mg in am  Buspar 5 mg Q HS   Trazodone 100 mg Q HS.     Pt has been off of Effexor, Vistaril, and Remeron at list since 8/27/20  No longer takes Ativan    Sister is asking to resume Vistaril ( would like it scheduled for at least 2 weeks) ; Effexor, and Remeron.    Called [facility] in [place]. They only have a record of filling Buspar 5 mg Q day, they picked it up yesterday, Qty: 30 for 30 days. They have no records for other psych meds.

## 2021-06-12 NOTE — PROGRESS NOTES
Date of Service:8/24/2017    Chief Complaint:   Chief Complaint   Patient presents with     Wound Check       History:    Chela presents to clinic for reevaluation of her leg ulcers. Her history is significant for recurrent leg ulcers, venous insufficiency, peripheral neuropathy, lymphedema, tobacco abuse, diabetes, and obesity.      She is tolerating the 2-layer wraps that are being applied weekly in our clinic.  I last saw her a couple of weeks ago.  She left the 2-layer wrap on for the past week.  A couple of days ago, she noticed an open area on her left leg.  She is not sure how she got it, but thinks that she may have scratched herself.  She is not applying any dressing on the ulcer.  She wears Tubigrip or compression stockings on this leg for compression.    Current Outpatient Prescriptions   Medication Sig Dispense Refill     albuterol (PROVENTIL HFA;VENTOLIN HFA) 90 mcg/actuation inhaler 1-2 puffs every 4-6 hours, as needed. 1 Inhaler 1     gabapentin (NEURONTIN) 300 MG capsule Take 1 capsule (300 mg total) by mouth 3 (three) times a day. 90 capsule 3     LANTUS SOLOSTAR 100 unit/mL (3 mL) pen Inject 50 Units under the skin bedtime. 11.65 Type 2 with hyperglycemia 5 Box PRN     lidocaine (XYLOCAINE) 2 % jelly Apply topically as needed.       lisinopril (PRINIVIL,ZESTRIL) 40 MG tablet TAKE 1/2 TABLET(20 MG) BY MOUTH DAILY 30 tablet 6     metFORMIN (GLUMETZA) 1000 MG (MOD) 24 hr tablet Take 1 tablet (1,000 mg total) by mouth 2 (two) times a day with meals. 60 tablet 3     MICROLET LANCET   98     NOVOLOG FLEXPEN 100 unit/mL injection pen Check blood sugar three (3) times daily.  11.65 Type 2 with hyperglycemia  Flex Pen Needles - BD Ultra-fine Anat Pen Needles - NDC [address]-91 - dispense 1 case, refill PRN for 1 year  No supplies needed 5 Pre-filled Pen Syringe PRN     sertraline (ZOLOFT) 50 MG tablet TAKE 1 TABLET(50 MG) BY MOUTH DAILY 30 tablet 8     Current Facility-Administered Medications    Medication Dose Route Frequency Provider Last Rate Last Dose     lidocaine 2 % jelly (XYLOCAINE)   Topical PRN Hortencia Markham, CNP   1 application at 08/24/17 0850       Allergies   Allergen Reactions     Venom-Honey Bee Hives     swelling     Other Allergy (See Comments) Rash     Cactus flower extract       Physical Exam:    Vitals:    08/24/17 0700   BP: 142/82   Pulse: 72   Resp: 16   Temp: 98.2  F (36.8  C)    There is no height or weight on file to calculate BMI.    General:  42 y.o. female in no apparent distress.    Psychiatric:  Alert and oriented x 3.  Cooperative.   Integumentary:  Skin is uniformly warm, dry and pink.  Lower extremity edema: none  Ulcerations:  There is no corrine wound erythema, induration, maceration, denudement, fluctuance or warmth surrounding the ulcer(s).    Vasc Edema 6/29/2017 8/3/2017 8/11/2017 8/15/2017 8/24/2017   Right just above MTP 23.5 23 24.2 25 24   Right Ankle 25 26 28 26 25   Right Widest Calf 42 44.4 47.4 43 44   Right Thigh Up 10cm - - - - -   Left - just above MTP 24 24 24.7 25 24   Left Ankle 29 28 29 27.8 26   Left Widest Calf 45 45.5 46.2 44 43.4   Left Thigh Up 10cm - - - - -     See wound flow sheet for wound measurements      Assessment:    LABS:   Lab Results   Component Value Date    WBC 9.6 03/06/2017    HGB 12.6 03/06/2017    HCT 37.5 03/06/2017    MCV 89 03/06/2017     03/06/2017       Lab Results   Component Value Date    CREATININE 0.83 03/06/2017         Lab Results   Component Value Date    BUN 18 03/06/2017       No results found for: SEDRATE    No results found for: PREALBUMINESUFAST(LABPROT,LABALBU,albumin)@  Invalid input(s): LABALBU  No results found for: PREALBUMIN  Lab Results   Component Value Date    HGBA1C 13.1 (H) 03/06/2017       Results for orders placed or performed during the hospital encounter of 10/11/16   Comprehensive Metabolic Panel   Result Value Ref Range    Sodium 138 136 - 145 mmol/L    Potassium 4.1 3.5 - 5.0 mmol/L     Chloride 102 98 - 107 mmol/L    CO2 31 22 - 31 mmol/L    Anion Gap, Calculation 5 5 - 18 mmol/L    Glucose 335 (H) 70 - 125 mg/dL    BUN 10 8 - 22 mg/dL    Creatinine 0.76 0.60 - 1.10 mg/dL    GFR MDRD Af Amer >60 >60 mL/min/1.73m2    GFR MDRD Non Af Amer >60 >60 mL/min/1.73m2    Bilirubin, Total 0.7 0.0 - 1.0 mg/dL    Calcium 8.7 8.5 - 10.5 mg/dL    Protein, Total 6.0 6.0 - 8.0 g/dL    Albumin 2.6 (L) 3.5 - 5.0 g/dL    Alkaline Phosphatase 84 45 - 120 U/L    AST 11 0 - 40 U/L    ALT 20 0 - 45 U/L       No results found for: TSH      No results found for: BNP  No results found for: [id number]       1. Leg ulcer, right, limited to breakdown of skin     2. Peripheral polyneuropathy     3. Acquired lymphedema of lower extremity     4. Tobacco abuse     5. Type 2 diabetes mellitus with complication, without long-term current use of insulin     6. Obesity (BMI 30-39.9)     7. Leg ulcer, left, limited to breakdown of skin         A new wound was identified today: Yes, right leg    Plan:  1.   Debridement of the right and left leg ulcer was recommended.  After consent was obtained and topical 2% Xylocaine was applied under clean conditions, and using a #15 blade,the devitalized dermal tissue was debrided for a total square centimeters of 0.34. Following debridement, there was a decrease in the nonviable tissue. The patient tolerated the procedure without any difficulty.     2.    Right leg ulcer with healing complicated diabetes, Venous insufficiency and neuropathy.  Improving    3.   Venous insufficiency, swelling improved    4.   Lymphedema, stable    5.   Tobacco abuse, she smokes with her friends.  She does not purchase any cigarettes.    6.   Diabetes, managed by primary. Last A1c on 3/6/2017 13.1.     7.   Treatment:  Will apply mepilex border over her left and right leg ulcers.  This will remain on until next week.  Next week, she will change the dressings.  She will wear her compression stockings  everyday.    8.   Patient will follow up with me in 2 weeks  for reevaluation.      Hortencia Markham, APRN, CNP,  [facility] [place]  [phone]

## 2021-06-12 NOTE — PROGRESS NOTES
Date of Service:8/11/2017    Chief Complaint:   Chief Complaint   Patient presents with     Wound Check       History:    Chela presents to clinic for reevaluation of her leg ulcers. She is very concerned that she has an infection because her legs are hurting.  She describes it as an aching and tight feeling.  The drainage from the ulcer is unchanged.  She wears her compression inconsistently.  She reports that she is not smoking and her diabetes is under control.    Current Outpatient Prescriptions   Medication Sig Dispense Refill     albuterol (PROVENTIL HFA;VENTOLIN HFA) 90 mcg/actuation inhaler 1-2 puffs every 4-6 hours, as needed. 1 Inhaler 1     gabapentin (NEURONTIN) 300 MG capsule Take 1 capsule (300 mg total) by mouth 3 (three) times a day. 90 capsule 3     LANTUS SOLOSTAR 100 unit/mL (3 mL) pen Inject 50 Units under the skin bedtime. 11.65 Type 2 with hyperglycemia 5 Box PRN     lidocaine (XYLOCAINE) 2 % jelly Apply topically as needed.       lisinopril (PRINIVIL,ZESTRIL) 40 MG tablet TAKE 1/2 TABLET(20 MG) BY MOUTH DAILY 30 tablet 6     metFORMIN (GLUMETZA) 1000 MG (MOD) 24 hr tablet Take 1 tablet (1,000 mg total) by mouth 2 (two) times a day with meals. 60 tablet 3     MICROLET LANCET   98     NOVOLOG FLEXPEN 100 unit/mL injection pen Check blood sugar three (3) times daily.  11.65 Type 2 with hyperglycemia  Flex Pen Needles - BD Ultra-fine Anat Pen Needles - NDC [address]-59 - dispense 1 case, refill PRN for 1 year  No supplies needed 5 Pre-filled Pen Syringe PRN     sertraline (ZOLOFT) 50 MG tablet Take 1 tablet (50 mg total) by mouth daily. 30 tablet 3     Current Facility-Administered Medications   Medication Dose Route Frequency Provider Last Rate Last Dose     lidocaine 2 % jelly (XYLOCAINE)   Topical PRN Hortencia Markham, CNP   1 application at 08/03/17 0734       Allergies   Allergen Reactions     Venom-Honey Bee Hives     swelling     Other Allergy (See Comments) Rash     Cactus flower  extract       Physical Exam:    Vitals:    08/11/17 0803   BP: 140/88   Pulse: 76   Resp: 16   Temp: 98.2  F (36.8  C)    There is no height or weight on file to calculate BMI.    General:  42 y.o. female in no apparent distress.    Psychiatric:  Alert and oriented x 3.  Cooperative.   Integumentary:  Skin is uniformly warm, dry and pink.  Lower extremity edema: minimal  Ulcerations:  There is no corrine wound erythema, induration, maceration, denudement, fluctuance or warmth surrounding the ulcer(s).    Vasc Edema 5/24/2017 6/15/2017 6/29/2017 8/3/2017 8/11/2017   Right just above MTP 24 24 23.5 23 24.2   Right Ankle 27 25 25 26 28   Right Widest Calf 44.5 43 42 44.4 47.4   Right Thigh Up 10cm - - - - -   Left - just above MTP 24 24 24 24 24.7   Left Ankle 26 26 29 28 29   Left Widest Calf 43.5 43 45 45.5 46.2   Left Thigh Up 10cm - - - - -     See wound flow sheet for wound measurements      Assessment:    [place]  LABS:   Lab Results   Component Value Date    WBC 9.6 03/06/2017    HGB 12.6 03/06/2017    HCT 37.5 03/06/2017    MCV 89 03/06/2017     03/06/2017               Invalid input(s): EOSPC    Lab Results   Component Value Date    CREATININE 0.83 03/06/2017         Lab Results   Component Value Date    BUN 18 03/06/2017       No results found for: SEDRATE    No results found for: PREALBUMINESUFAST(LABPROT,LABALBU,albumin)@  Invalid input(s): LABALBU  No results found for: PREALBUMIN  Lab Results   Component Value Date    HGBA1C 13.1 (H) 03/06/2017       Results for orders placed or performed during the hospital encounter of 10/11/16   Comprehensive Metabolic Panel   Result Value Ref Range    Sodium 138 136 - 145 mmol/L    Potassium 4.1 3.5 - 5.0 mmol/L    Chloride 102 98 - 107 mmol/L    CO2 31 22 - 31 mmol/L    Anion Gap, Calculation 5 5 - 18 mmol/L    Glucose 335 (H) 70 - 125 mg/dL    BUN 10 8 - 22 mg/dL    Creatinine 0.76 0.60 - 1.10 mg/dL    GFR MDRD Af Amer >60 >60 mL/min/1.73m2    GFR MDRD Non Af Amer  >60 >60 mL/min/1.73m2    Bilirubin, Total 0.7 0.0 - 1.0 mg/dL    Calcium 8.7 8.5 - 10.5 mg/dL    Protein, Total 6.0 6.0 - 8.0 g/dL    Albumin 2.6 (L) 3.5 - 5.0 g/dL    Alkaline Phosphatase 84 45 - 120 U/L    AST 11 0 - 40 U/L    ALT 20 0 - 45 U/L       No results found for: TSH      No results found for: BNP  No results found for: [id number]       1. Leg ulcer, right, limited to breakdown of skin     2. Peripheral polyneuropathy     3. Acquired lymphedema of lower extremity     4. Tobacco abuse     5. Type 2 diabetes mellitus with complication, without long-term current use of insulin     6. Obesity (BMI 30-39.9)         A new wound was identified today: no.    Plan:  1.  No debridement    2.    Right leg ulcer with healing complicated diabetes, Venous insufficiency and neuropathy.  Improving    3.   Venous insufficiency, complaining increased swelling and pain.  However, she is not wearing her compression consistently.      4.   Tobacco abuse, she currently is not smoking.     5.   Diabetes, managed by primary     6.   Treatment:   Will apply adaptic over the open area and apply a 2-layer wrap to each leg to reduce the swelling.  Historically, this has been very effective for her.    7.   Patient will follow up with a nurse visit in 3-4 days and me in 2 weeks  for reevaluation.      Hortencia Markham, APRN, CNP,  [facility]  [phone]

## 2021-06-12 NOTE — PROGRESS NOTES
Date of Service:9/14/2017    Chief Complaint:   Chief Complaint   Patient presents with     Wound Check       History:    Chela presents to clinic for reevaluation of her leg ulcers. Her history is significant for recurrent leg ulcers, venous insufficiency, peripheral neuropathy, lymphedema, tobacco abuse, diabetes, and obesity.      She was last seen in clinic on 8/4/017.  She was instructed to leave the Mepilex on her ulcers for a week and wear her compression stockings.  She removed the dressings and was washing the ulcers with soap and water.  She was at her sisters and her compression stocking became snagged so she was not wearing any compression.  She did purchase new compression stockings at [facility], but is unaware of the compression level.   She denies any pain in her ulcers and there is no drainage.  She developed a new ulcer on her anterior right leg.    Current Outpatient Prescriptions   Medication Sig Dispense Refill     albuterol (PROVENTIL HFA;VENTOLIN HFA) 90 mcg/actuation inhaler 1-2 puffs every 4-6 hours, as needed. 1 Inhaler 1     gabapentin (NEURONTIN) 300 MG capsule Take 1 capsule (300 mg total) by mouth 3 (three) times a day. 90 capsule 3     LANTUS SOLOSTAR 100 unit/mL (3 mL) pen Inject 50 Units under the skin bedtime. 11.65 Type 2 with hyperglycemia 5 Box PRN     lidocaine (XYLOCAINE) 2 % jelly Apply topically as needed.       lisinopril (PRINIVIL,ZESTRIL) 40 MG tablet TAKE 1/2 TABLET(20 MG) BY MOUTH DAILY 30 tablet 6     metFORMIN (GLUMETZA) 1000 MG (MOD) 24 hr tablet Take 1 tablet (1,000 mg total) by mouth 2 (two) times a day with meals. 60 tablet 3     MICROLET LANCET   98     NOVOLOG FLEXPEN 100 unit/mL injection pen Check blood sugar three (3) times daily.  11.65 Type 2 with hyperglycemia  Flex Pen Needles - BD Ultra-fine Anat Pen Needles - NDC [address]-62 - dispense 1 case, refill PRN for 1 year  No supplies needed 5 Pre-filled Pen Syringe PRN     sertraline (ZOLOFT) 50 MG tablet  TAKE 1 TABLET(50 MG) BY MOUTH DAILY 30 tablet 8     Current Facility-Administered Medications   Medication Dose Route Frequency Provider Last Rate Last Dose     lidocaine 2 % jelly (XYLOCAINE)   Topical PRN Hortencia Blanco Shahrzad, CNP   1 application at 08/24/17 0850       Allergies   Allergen Reactions     Venom-Honey Bee Hives     swelling     Other Allergy (See Comments) Rash     Cactus flower extract       Physical Exam:    Vitals:    09/14/17 0700   BP: 122/64   Pulse: 68   Resp: 16   Temp: 97.3  F (36.3  C)    There is no height or weight on file to calculate BMI.    General:  42 y.o. female in no apparent distress.    Psychiatric:  Alert and oriented x 3.  Cooperative.   Integumentary:  Skin is uniformly warm, dry and pink.  Lower extremity edema: none  Ulcerations:  There is no corrine wound erythema, induration, maceration, denudement, fluctuance or warmth surrounding the ulcer(s).    Vasc Edema 8/3/2017 8/11/2017 8/15/2017 8/24/2017 9/14/2017   Right just above MTP 23 24.2 25 24 24   Right Ankle 26 28 26 25 27   Right Widest Calf 44.4 47.4 43 44 44   Right Thigh Up 10cm - - - - -   Left - just above MTP 24 24.7 25 24 24.5   Left Ankle 28 29 27.8 26 26   Left Widest Calf 45.5 46.2 44 43.4 46.2   Left Thigh Up 10cm - - - - -     See wound flow sheet for wound measurements      Assessment:    LABS:   Lab Results   Component Value Date    WBC 9.6 03/06/2017    HGB 12.6 03/06/2017    HCT 37.5 03/06/2017    MCV 89 03/06/2017     03/06/2017       Lab Results   Component Value Date    CREATININE 0.83 03/06/2017         Lab Results   Component Value Date    BUN 18 03/06/2017       No results found for: SEDRATE    No results found for: PREALBUMINESUFAST(LABPROT,LABALBU,albumin)@  Invalid input(s): LABALBU  No results found for: PREALBUMIN  Lab Results   Component Value Date    HGBA1C 13.1 (H) 03/06/2017       Results for orders placed or performed during the hospital encounter of 10/11/16   Comprehensive Metabolic  Panel   Result Value Ref Range    Sodium 138 136 - 145 mmol/L    Potassium 4.1 3.5 - 5.0 mmol/L    Chloride 102 98 - 107 mmol/L    CO2 31 22 - 31 mmol/L    Anion Gap, Calculation 5 5 - 18 mmol/L    Glucose 335 (H) 70 - 125 mg/dL    BUN 10 8 - 22 mg/dL    Creatinine 0.76 0.60 - 1.10 mg/dL    GFR MDRD Af Amer >60 >60 mL/min/1.73m2    GFR MDRD Non Af Amer >60 >60 mL/min/1.73m2    Bilirubin, Total 0.7 0.0 - 1.0 mg/dL    Calcium 8.7 8.5 - 10.5 mg/dL    Protein, Total 6.0 6.0 - 8.0 g/dL    Albumin 2.6 (L) 3.5 - 5.0 g/dL    Alkaline Phosphatase 84 45 - 120 U/L    AST 11 0 - 40 U/L    ALT 20 0 - 45 U/L       No results found for: TSH      No results found for: BNP  No results found for: [id number]       1. Leg ulcer, left, limited to breakdown of skin     2. Peripheral polyneuropathy     3. Leg ulcer, right, with fat layer exposed     4. Acquired lymphedema of lower extremity     5. Tobacco abuse     6. Type 2 diabetes mellitus with complication, without long-term current use of insulin     7. Obesity (BMI 30-39.9)         A new wound was identified today: Yes, right leg     Plan:  1.   Debridement of the right and left leg ulcer was recommended.  After consent was obtained and topical 2% Xylocaine was applied under clean conditions, and using a #15 blade,the devitalized dermal tissue was debrided for a total square centimeters of 0.55 Following debridement, there was a decrease in the nonviable tissue. The patient tolerated the procedure without any difficulty.     2.    Right leg ulcer with healing complicated diabetes, Venous insufficiency and neuropathy.  No improvement.  Suspect that she is not wearing adequate compression.      3.   Venous insufficiency, stable    4.   Lymphedema, right leg more swollen    5.   Tobacco abuse, she smokes with her friends.  She does not purchase any cigarettes.    6.   Diabetes, managed by primary. Last A1c on 3/6/2017 13.1.  Her blood sugars are stable.     7.   Treatment:  Will  apply Mepilex border over her left and right leg ulcers.  Will apply a 2-layer wrap to each leg.  At her next visit, I will consider prescribing compression stockings.    8.   Patient will follow up with me in 2 weeks  for reevaluation.      Hortencia Markham APRN, CNP,  [facility] [place]  [phone]
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
## 2021-06-12 NOTE — PROGRESS NOTES
Date of Service:8/3/2017    Chief Complaint:   Chief Complaint   Patient presents with     Wound Check       History:    Chela presents to clinic for reevaluation of her leg ulcers. She developed two new ulcers this past week from bumping her leg.  She had pain in it, which resolved with an Aleve.  She is not having drainage from it.  She has started smoking some again.  She is wearing her compression stockings.    Current Outpatient Prescriptions   Medication Sig Dispense Refill     albuterol (PROVENTIL HFA;VENTOLIN HFA) 90 mcg/actuation inhaler 1-2 puffs every 4-6 hours, as needed. 1 Inhaler 1     gabapentin (NEURONTIN) 300 MG capsule Take 1 capsule (300 mg total) by mouth 3 (three) times a day. 90 capsule 3     LANTUS SOLOSTAR 100 unit/mL (3 mL) pen Inject 50 Units under the skin bedtime. 11.65 Type 2 with hyperglycemia 5 Box PRN     lidocaine (XYLOCAINE) 2 % jelly Apply topically as needed.       lisinopril (PRINIVIL,ZESTRIL) 40 MG tablet TAKE 1/2 TABLET(20 MG) BY MOUTH DAILY 30 tablet 6     metFORMIN (GLUMETZA) 1000 MG (MOD) 24 hr tablet Take 1 tablet (1,000 mg total) by mouth 2 (two) times a day with meals. 60 tablet 3     MICROLET LANCET   98     NOVOLOG FLEXPEN 100 unit/mL injection pen Check blood sugar three (3) times daily.  11.65 Type 2 with hyperglycemia  Flex Pen Needles - BD Ultra-fine Anat Pen Needles - NDC [address]-23 - dispense 1 case, refill PRN for 1 year  No supplies needed 5 Pre-filled Pen Syringe PRN     sertraline (ZOLOFT) 50 MG tablet Take 1 tablet (50 mg total) by mouth daily. 30 tablet 3     Current Facility-Administered Medications   Medication Dose Route Frequency Provider Last Rate Last Dose     lidocaine 2 % jelly (XYLOCAINE)   Topical PRN Hortencia Markham CNP   1 application at 08/03/17 0734       Allergies   Allergen Reactions     Venom-Honey Bee Hives     swelling     Other Allergy (See Comments) Rash     Cactus flower extract       Physical Exam:    Vitals:    08/03/17 0700    BP: 124/88   Pulse: 80   Resp: 16   Temp: 98.2  F (36.8  C)    There is no height or weight on file to calculate BMI.    General:  42 y.o. female in no apparent distress.    Psychiatric:  Alert and oriented x 3.  Cooperative.   Integumentary:  Skin is uniformly warm, dry and pink.  Lower extremity edema: minimal  Ulcerations:  There is no corrine wound erythema, induration, maceration, denudement, fluctuance or warmth surrounding the ulcer(s).    Vasc Edema 5/17/2017 5/24/2017 6/15/2017 6/29/2017 8/3/2017   Right just above MTP 24 24 24 23.5 23   Right Ankle 27 27 25 25 26   Right Widest Calf 49 44.5 43 42 44.4   Right Thigh Up 10cm - - - - -   Left - just above MTP 24.5 24 24 24 24   Left Ankle 27 26 26 29 28   Left Widest Calf 45 43.5 43 45 45.5   Left Thigh Up 10cm - - - - -         Wound 01/31/17 r leg superior (Active)   Pre Size Length 0 8/3/2017  7:00 AM   Pre Size Width 0 8/3/2017  7:00 AM   Pre Total Sq cm 0 8/3/2017  7:00 AM   Post Size Length 0 8/3/2017  7:00 AM   Post Size Width 0 8/3/2017  7:00 AM   Description scab 4/28/2017  8:00 AM       Wound 10/11/16 Other wound type (comment) Leg (Active)       Assessment:    Histo  LABS:   Lab Results   Component Value Date    WBC 9.6 03/06/2017    HGB 12.6 03/06/2017    HCT 37.5 03/06/2017    MCV 89 03/06/2017     03/06/2017               Invalid input(s): EOSPC    Lab Results   Component Value Date    CREATININE 0.83 03/06/2017         Lab Results   Component Value Date    BUN 18 03/06/2017       No results found for: SEDRATE    No results found for: PREALBUMINESUFAST(LABPROT,LABALBU,albumin)@  Invalid input(s): LABALBU  No results found for: PREALBUMIN  Lab Results   Component Value Date    HGBA1C 13.1 (H) 03/06/2017       Results for orders placed or performed during the hospital encounter of 10/11/16   Comprehensive Metabolic Panel   Result Value Ref Range    Sodium 138 136 - 145 mmol/L    Potassium 4.1 3.5 - 5.0 mmol/L    Chloride 102 98 - 107 mmol/L     CO2 31 22 - 31 mmol/L    Anion Gap, Calculation 5 5 - 18 mmol/L    Glucose 335 (H) 70 - 125 mg/dL    BUN 10 8 - 22 mg/dL    Creatinine 0.76 0.60 - 1.10 mg/dL    GFR MDRD Af Amer >60 >60 mL/min/1.73m2    GFR MDRD Non Af Amer >60 >60 mL/min/1.73m2    Bilirubin, Total 0.7 0.0 - 1.0 mg/dL    Calcium 8.7 8.5 - 10.5 mg/dL    Protein, Total 6.0 6.0 - 8.0 g/dL    Albumin 2.6 (L) 3.5 - 5.0 g/dL    Alkaline Phosphatase 84 45 - 120 U/L    AST 11 0 - 40 U/L    ALT 20 0 - 45 U/L       No results found for: TSH      No results found for: BNP  No results found for: [id number]       1. Leg ulcer, right, with fat layer exposed     2. Peripheral polyneuropathy     3. Acquired lymphedema of lower extremity     4. Tobacco abuse     5. Type 2 diabetes mellitus with complication, without long-term current use of insulin     6. Obesity (BMI 30-39.9)     7. Anxiety         A new wound was identified today: yes,  it is located right anterior and medial right leg.    Plan:  1.   Excisional Debridement of the right leg ulcer was recommended and after consent was obtained and topical 2% Xylocaine was applied, under clean conditions, using a #15 scalpel, the devitalized subcutaneous  tissue in the ulcer base and at the ulcer margins were sharply excised for a total sq. cm of 1.1    Following excision, there was minimal bleeding tissue, which was easily controlled and a decrease in the nonviable tissue.  The patient tolerated the procedure without difficulty.     2.    Right leg ulcer with healing complicated diabetes, Venous insufficiency and neuropathy.       3.   Treatment:   Will start using silvercel and an ABD with a 2-layer wrap.    4.   Patient will follow up with me in one week  for reevaluation of her new wounds and dressing change.          Hortencia Markham, APRN, CNP,  CWCN  [facility]  [phone]

## 2021-06-13 NOTE — PROGRESS NOTES
Date of Service:9/28/2017    Chief Complaint:   Chief Complaint   Patient presents with     Wound Check       History:    Chela presents to clinic for reevaluation of her leg ulcers. Her history is significant for recurrent leg ulcers, venous insufficiency, peripheral neuropathy, lymphedema, tobacco abuse, diabetes, and obesity.      She was last seen in clinic on 8/4/017.  She was instructed to leave the Mepilex on her ulcers for a week and wear her compression stockings.  She removed the dressings and was washing the ulcers with soap and water.  She was at her sisters and her compression stocking became snagged so she was not wearing any compression.  She did purchase new compression stockings at [facility], but is unaware of the compression level.   She denies any pain in her ulcers and there is no drainage.  She developed a new ulcer on her anterior right leg.    Current Outpatient Prescriptions   Medication Sig Dispense Refill     albuterol (PROVENTIL HFA;VENTOLIN HFA) 90 mcg/actuation inhaler 1-2 puffs every 4-6 hours, as needed. 1 Inhaler 1     gabapentin (NEURONTIN) 300 MG capsule TAKE 1 CAPSULE(300 MG) BY MOUTH THREE TIMES DAILY 90 capsule 2     LANTUS SOLOSTAR 100 unit/mL (3 mL) pen Inject 50 Units under the skin bedtime. 11.65 Type 2 with hyperglycemia 5 Box PRN     lidocaine (XYLOCAINE) 2 % jelly Apply topically as needed.       lisinopril (PRINIVIL,ZESTRIL) 40 MG tablet TAKE 1/2 TABLET(20 MG) BY MOUTH DAILY 30 tablet 6     metFORMIN (GLUMETZA) 1000 MG (MOD) 24 hr tablet Take 1 tablet (1,000 mg total) by mouth 2 (two) times a day with meals. 60 tablet 3     MICROLET LANCET   98     NOVOLOG FLEXPEN 100 unit/mL injection pen Check blood sugar three (3) times daily.  11.65 Type 2 with hyperglycemia  Flex Pen Needles - BD Ultra-fine Anat Pen Needles - NDC [address]-15 - dispense 1 case, refill PRN for 1 year  No supplies needed 5 Pre-filled Pen Syringe PRN     sertraline (ZOLOFT) 50 MG tablet TAKE 1  TABLET(50 MG) BY MOUTH DAILY 30 tablet 8     Current Facility-Administered Medications   Medication Dose Route Frequency Provider Last Rate Last Dose     lidocaine 2 % jelly (XYLOCAINE)   Topical PRN Hortencia Blanco Shahrzad, CNP   1 application at 09/28/17 0827       Allergies   Allergen Reactions     Venom-Honey Bee Hives     swelling     Other Allergy (See Comments) Rash     Cactus flower extract       Physical Exam:    Vitals:    09/28/17 0700   BP: 134/78   Pulse: 68   Resp: 16   Temp: 97.7  F (36.5  C)    There is no height or weight on file to calculate BMI.    General:  42 y.o. female in no apparent distress.    Psychiatric:  Alert and oriented x 3.  Cooperative.   Integumentary:  Skin is uniformly warm, dry and pink.  Lower extremity edema: none  Ulcerations:  There is no corrine wound erythema, induration, maceration, denudement, fluctuance or warmth surrounding the ulcer(s).    Vasc Edema 8/11/2017 8/15/2017 8/24/2017 9/14/2017 9/28/2017   Right just above MTP 24.2 25 24 24 23   Right Ankle 28 26 25 27 27   Right Widest Calf 47.4 43 44 44 43   Right Thigh Up 10cm - - - - -   Left - just above MTP 24.7 25 24 24.5 23   Left Ankle 29 27.8 26 26 27   Left Widest Calf 46.2 44 43.4 46.2 44   Left Thigh Up 10cm - - - - -     See wound flow sheet for wound measurements      Assessment:    LABS:   Lab Results   Component Value Date    WBC 9.6 03/06/2017    HGB 12.6 03/06/2017    HCT 37.5 03/06/2017    MCV 89 03/06/2017     03/06/2017       Lab Results   Component Value Date    CREATININE 0.83 03/06/2017         Lab Results   Component Value Date    BUN 18 03/06/2017       No results found for: SEDRATE    No results found for: PREALBUMINESUFAST(LABPROT,LABALBU,albumin)@  Invalid input(s): LABALBU  No results found for: PREALBUMIN  Lab Results   Component Value Date    HGBA1C 13.1 (H) 03/06/2017       Results for orders placed or performed during the hospital encounter of 10/11/16   Comprehensive Metabolic Panel    Result Value Ref Range    Sodium 138 136 - 145 mmol/L    Potassium 4.1 3.5 - 5.0 mmol/L    Chloride 102 98 - 107 mmol/L    CO2 31 22 - 31 mmol/L    Anion Gap, Calculation 5 5 - 18 mmol/L    Glucose 335 (H) 70 - 125 mg/dL    BUN 10 8 - 22 mg/dL    Creatinine 0.76 0.60 - 1.10 mg/dL    GFR MDRD Af Amer >60 >60 mL/min/1.73m2    GFR MDRD Non Af Amer >60 >60 mL/min/1.73m2    Bilirubin, Total 0.7 0.0 - 1.0 mg/dL    Calcium 8.7 8.5 - 10.5 mg/dL    Protein, Total 6.0 6.0 - 8.0 g/dL    Albumin 2.6 (L) 3.5 - 5.0 g/dL    Alkaline Phosphatase 84 45 - 120 U/L    AST 11 0 - 40 U/L    ALT 20 0 - 45 U/L       No results found for: TSH      No results found for: BNP  No results found for: [id number]       1. Chronic venous insufficiency  Compression stockings   2. Leg ulcer, left, limited to breakdown of skin  Compression stockings   3. Tobacco abuse     4. Obesity (BMI 30-39.9)     5. Type 2 diabetes mellitus with complication, without long-term current use of insulin     6. Acquired lymphedema of lower extremity     7. Peripheral polyneuropathy     8. Leg ulcer, right, limited to breakdown of skin         A new wound was identified today: Yes, right leg     Plan:  1.   Debridement of the left and right leg ulcer was recommended.  After consent was obtained and topical 2% Xylocaine was applied under clean conditions, and using a #15 blade,the devitalized dermal tissue was debrided for a total square centimeters of 0.55 Following debridement, there was a decrease in the nonviable tissue. The patient tolerated the procedure without any difficulty.     2.   Right leg and left leg ulcer with healing complicated diabetes, Venous insufficiency and neuropathy.  Improved.      3.   Venous insufficiency, stable.  A prescription for compression stockings was written and she will start wearing them daily.     4.   Lymphedema, stable    5.   Tobacco abuse, she has not smoked in 9 days.    6.   Diabetes, managed by primary. Last A1c on  3/6/2017 13.1.       7.   Treatment:  Will apply Mepilex border over her left and right leg ulcers. A prescription for compression stockings was written and she will start wearing them daily.  She can remove the mepilex in one week and replace.  After that, she does not need to apply any dressing.    8.   Patient will follow up with me in 3 weeks  for reevaluation.      Hortencia Markham, APRN, CNP,  [facility]  [phone]

## 2021-06-14 NOTE — TELEPHONE ENCOUNTER
Thank you.  I would be happy to see the patient in clinic if we could assist them getting that set up and it would be great.

## 2021-06-14 NOTE — TELEPHONE ENCOUNTER
Chela reports that she did not receive the letter that  you  on 10/14/20. She no longer lives at that address. The letter was read to her.    Address and phone number updated in EHR    Chela will contact the [facility]. - [phone]  - on Mon    See Nurse Triage encounter on 1/9/2021 with ALEJANDRA Nuñez RN re: Medication Request      Jenna Nuñez RN  [facility] Nurse Advisor

## 2021-06-14 NOTE — TELEPHONE ENCOUNTER
Patient and sister calling - patient is having a hard time right now. Thinks her meds need to be regulated. States she is down and doesn't know what to do. Doesn't have a provider see  Dr. Suárez - behavioral health unit at [facility]. Reviewed AVS - in the past MD has given patient the phone # for  for adult mental health which is a 24/7 line. Gave patient/sister that # - they will call that #.    Kia Meehan RN, Triage Nurse Advisor    Reason for Disposition    [1] Depression AND [2] worsening (e.g.,sleeping poorly, less able to do activities of daily living)    Additional Information    Negative: Patient attempted suicide    Negative: Patient is threatening suicide now    Negative: Violent behavior, or threatening to physically hurt or kill someone    Negative: [1] Patient is very confused (disoriented, slurred speech) AND [2] no other adult (e.g., friend or family member) available    Negative: [1] Difficult to awaken or acting very confused (disoriented, slurred speech) AND [2] new onset    Negative: Sounds like a life-threatening emergency to the triager    Negative: Alcohol use, abuse or dependence, question or problem related to    Negative: Drug abuse or dependence, question or problem related to    Negative: Bipolar disorder (manic depression)    Negative: Depression during the postpartum period (< 1 year since delivery)    Negative: [1] Depression AND [2] unable to do any of normal activities (e.g., self care, school, work; in comparison to baseline).    Negative: Very strange or confused behavior    Negative: Patient sounds very sick or weak to the triager    Protocols used: DEPRESSION-A-AH

## 2021-06-16 NOTE — TELEPHONE ENCOUNTER
Date of Last Office Visit: 9/9/21  Date of Next Office Visit: 6/23/21  No shows since last visit: 10/7/20  Cancellations since last visit: 1/13/21 (error ?)    Medication requested: Venlafaxine 150 mg 24 hr cap Date last ordered: 9/9/20 Qty: 30 Refills: 3     Review of [place] ?: NA    Lapse in medication adherence greater than 5 days?: unknown, appears to be  If yes, call patient and gather details: pt reports she has been out for 2-3 days, she said she had left medication at a friends house, call transferred to Intake to schedule appt with provider    Medication refill request verified as identical to current order?: yes  Result of Last DAM, VPA, Li+ Level, CBC, or Carbamazepine Level (at or since last visit): N/A    []Medication refilled per  Medication Refill in Ambulatory Care  policy.  [x]Medication unable to be refilled by RN due to criteria not met as indicated below:    []Eligibility - not seen in the last year   []Supervision - no future appointment   [x]Compliance - no shows, cancellations or lapse in therapy   []Verification - order discrepancy   []Controlled medication   []Medication not included in policy   []90-day supply request   []Other

## 2021-06-16 NOTE — TELEPHONE ENCOUNTER
Telephone Encounter by Domi Maldonado at 5/2/2019  9:16 AM     Author: Domi Maldonado Service: -- Author Type: --    Filed: 5/2/2019  9:17 AM Encounter Date: 4/29/2019 Status: Signed    : Domi Maldonado       PRIOR AUTHORIZATION DENIED    Denial Rational: PATIENT NEEDS TO TRY/FAIL BASAGLAR.      Appeal Information:

## 2021-06-17 NOTE — TELEPHONE ENCOUNTER
Telephone Encounter by Jenna Nuñez RN at 1/9/2021  8:36 PM     Author: Jenna Nuñez RN Service: -- Author Type: Registered Nurse    Filed: 1/9/2021  9:51 PM Encounter Date: 1/9/2021 Status: Addendum    : Jenna Nuñez RN (Registered Nurse)    Related Notes: Original Note by Jenna Nuñez RN (Registered Nurse) filed at 1/9/2021  9:18 PM       Chela feels that her medications need to be updated/adjusted.    Her medications were refilled in Sep, 2020 by Dr. Suárez.  Chart review shows:  Vinod Suárez MD  to Macrina Irvin RN        12:00 PM  Note     The patient should probably be getting this prescription from the doctor that ordered that medication and is monitoring the rehabilitation.      September 14, 2020  Macrina Irvin RN  to Vinod Suárez MD ?  Mental Health Support Pool          12:53 PM  Please see note. Pharmacy requesting a new script for medication that the patient was started on in rehab.   Macrina Irvin RN         12:51 PM  Note     Pharmacist called to say that the patient was started on gabapentin 400mg twice daily along with the 600mg at bedtime while in rehab. Pharmacist is requesting a prescription for this.           She did not receive the letter that was sent on 10/14/20 by ENID Hartman. She no longer lives at that address. The letter was read to her.      Address and phone number updated in EHR  Chela will contact the [facility]. - [phone]  - on Mon    COVID 19 Nurse Triage Plan/Patient Instructions    Please be aware that novel coronavirus (COVID-19) may be circulating in the community. If you develop symptoms such as fever, cough, or SOB or if you have concerns about the presence of another infection including coronavirus (COVID-19), please contact your health care provider or visit [email or web address].     Disposition/Instructions    Virtual Visit with provider recommended. Reference Visit  Selection Guide. and In-Person Visit with provider recommended. Reference Visit Selection Guide.    Thank you for taking steps to prevent the spread of this virus.  o Limit your contact with others.  o Wear a simple mask to cover your cough.  o Wash your hands well and often.    Resources    [facility]: About COVID-19: [email or web address]    CDC: What to Do If You're Sick: www.cdc.gov/coronavirus/2019-ncov/about/steps-when-sick.html    CDC: Ending Home Isolation: www.cdc.gov/coronavirus/2019-ncov/hcp/disposition-in-home-patients.html     CDC: Caring for Someone: www.cdc.gov/coronavirus/2019-ncov/if-you-are-sick/care-for-someone.html     [facility]: Interim Guidance for Hospital Discharge to Home: www.health.[facility].[place]/diseases/coronavirus/hcp/hospdischarge.pdf    [facility] clinical trials (COVID-19 research studies): [email or web address]     Below are the COVID-19 hotlines at the [place] Department of Health ([facility]). Interpreters are available.   o For health questions: Call 334-824-8234 or 1-719.210.7489 (7 a.m. to 7 p.m.)  o For questions about schools and childcare: Call 716-982-1395 or 1-531.572.8263 (7 a.m. to 7 p.m.)     Jenna Nuñez RN  [facility] Nurse Advisor      Reason for Disposition  ? Caller has NON-URGENT medication question about med that PCP prescribed and triager unable to answer question    Protocols used: MEDICATION QUESTION CALL-A-

## 2021-06-17 NOTE — TELEPHONE ENCOUNTER
Telephone Encounter by Macrina Irvin RN at 9/14/2020 12:49 PM     Author: Macrina Irvin RN Service: -- Author Type: Registered Nurse    Filed: 9/14/2020 12:53 PM Encounter Date: 9/14/2020 Status: Signed    : Macrina Irvin RN (Registered Nurse)       Pharmacist called to say that the patient was started on gabapentin 400mg twice daily along with the 600mg at bedtime while in rehab. Pharmacist is requesting a prescription for this.

## 2021-06-19 NOTE — LETTER
Letter by Tiffanie Franks CHW at      Author: Tiffanie Franks CHW Service: -- Author Type: --    Filed:  Encounter Date: 7/29/2019 Status: (Other)         Dear Chela,                                                                         You were recently referred to the [facility]'s Clinic Care Coordination service.  This is a service offered through your Primary Care Clinic which can help you access resources, services in regard to your health and well-being goals. The clinic Community Health Worker has placed two calls to you to discuss the nature of this service and to offer enrollment.  If you are interested in learning more about Clinic Care Coordination, please call your Primary Care Clinic's Community Health Worker, Víctor, at [phone].                                                    Sincerely,                                                                              JOEL Her                                                                                          Clinic Care Coordination                                                  [facility]                                Phone: [phone]

## 2021-06-19 NOTE — LETTER
Letter by Bell Saavedra CNP at      Author: Bell Saavedra CNP Service: -- Author Type: --    Filed:  Encounter Date: 12/5/2019 Status: Signed         Patient: Chela Love   MR Number: [id number]   YOB: 1975   Date of Visit: 12/5/2019     Code Status:  FULL CODE  Visit Type: Problem Visit (abdominal incision dehiscence)     Facility:  [facility] [[id number]]        Facility Type: SNF (Skilled Nursing Facility, TCU)    History of Present Illness: Chela Love is a 44 y.o. female who has a past medical history for uncontrolled DM2, bipolar disorder,polysubstance abuse and HTN.  She was recently hospitalized at [facility] for a right adnexal mass found to be adenocarcinoma likely endometrioid (final path pending) and septic shock.  She underwent a XL, ZANDRA, BSO and tumor debulking.  Her hospitalization was complicated by poor pain tolerance in the picture of her polysubstance abuse, acute blood loss anemia, CINDY and intra-abdominal infection.  She did complete a course of antibiotics.  Unfortunately, a discharge summary was not sent with the patient upon admission and I don't quite know the complete hospital course and plan of care. There is mention in a progress note of wound separation but no assessment depiction.  Nursing alerted me that she was having increased pain and anxiety.  She arrived and was very weepy.  She did calm somewhat after nursing oriented her to the facility.  Upon nursing removing her abdominal dressing it was found that she had approximately a 4cm wound dehiscence of midline wound.  The patient was very anxious during exam and is unable to tell me if this opening is worse than she had at the hospital.  Pharmacy is saying that it will be yet a few more hours before we are to obtain a pain medications or something to help with her anxiety. The open area has serosang drainage but does not appear to have a s/s of infection.     Review of Systems ROS is  difficult to obtain due to patient's anxiety and pain.  She is crying and shaking during entire exam.          Physical Exam   Vital signs: /79, HR 96, resp 16, temp 98.1  GENERAL APPEARANCE: Well developed, well nourished, crying and shaking  HEENT: normocephalic, atraumatic  PERRL, sclerae anicteric, conjunctivae clear and moist, EOM intact  LUNGS: Lung sounds CTA, no adventitious sounds, respiratory effort normal.  ABD: Soft, nondistended.  Patient does cry when I barely touch her abdomen.  No rigidity noted  EXTREMITIES: No cyanosis, clubbing or edema.  NEURO: Alert and oriented x 3.  Face is symmetric.  SKIN: Midline abdominal incision that is approximately 15cm in length.  Proximal half of incision is well approximated with steristrips, no drainage or s/s of infection.  The proximal end of the second have of the incision is  approximately 4cm fascia and subcutaneous tissue is visible, serous drainage, no surrounding erythema or s/s of infection.   PSYCH: anxious, crying and shaking          Labs: .No results found for this or any previous visit (from the past 240 hour(s)).      Assessment:  1. Wound dehiscence     2. Adenocarcinoma (H)     3. Pelvic mass     4. Pain     5. Anxiety         Plan:     At this time, it is difficult to tell if this wound is more dehisced than it was at the hospital and given the fact that there is no dc summary to better guide my care and pain is poorly managed I am sending her back to the [facility] to be assessed and to assure she can be managed at a TCU.           Electronically signed by: Bell Saavedra CNP

## 2021-06-20 NOTE — LETTER
Letter by Bell Saavedra CNP at      Author: Bell Saavedra CNP Service: -- Author Type: --    Filed:  Encounter Date: 12/9/2019 Status: Signed         Patient: Chela Love   MR Number: [id number]   YOB: 1975   Date of Visit: 12/9/2019     Code Status:  FULL CODE  Visit Type: Follow Up (New admission after recent ER visit for wound dehiscence.)     Facility:  [facility] [[id number]]        Facility Type: SNF (Skilled Nursing Facility, TCU)    History of Present Illness: Chela Love is a 44 y.o. female who has a past medical history for uncontrolled DM2, bipolar disorder,polysubstance abuse and HTN.  She was recently hospitalized at [facility] for a right adnexal mass found to be adenocarcinoma likely endometrioid (final path pending) and septic shock.  She underwent a total hysterectomy and oophorectomy and tumor debulking.  Her hospitalization was complicated by poor pain tolerance in the picture of her polysubstance abuse, acute blood loss anemia, CINDY and intra-abdominal infection.  Urine tox positive for opiates, meth and THC. She did complete a course of antibiotics.  Unfortunately, a discharge summary was not sent with the patient upon admission and I don't quite know the complete hospital course and plan of care. She did have slight dehiscence of midline abdominal wound with two times a day wound care and sent out to TCU.  Upon admission to TCU she was severely anxious, in pain and I could not find documentation indicating the extent of the dehiscence and so she was sent to the ER.  She was evaluated, given IV pain medications and sent back to the TCU.     Today, she reports that her pain is gradually improving with the current pain medications.  She is on methadone 2.5 mg twice daily and Dilaudid every 3 hours as needed.  She reports she is having loose stools with current bowel regimen.  Upon exam, her abdominal dehiscence does appear slightly smaller.  The wound  base does appear dry even though there is small amounts of slough.  Surrounding tissue shows no erythem and drainage is serous.  She has an appointment scheduled to follow-up with gynecology oncology at the end of this week. She does endorse significant anxiety and depression is requesting medications frequently.  She also endorses difficulty sleeping and would like to have scheduled sleep medications.    She does endorse nausea with vomiting yesterday and states that she does not feel like eating much today.  Her other major complaint is heaviness of her lower extremities and she has significant +3 pitting edema.  There is no calf tenderness, redness or heat to her lower extremities bilaterally.  She is on subcu heparin 3 times daily.      Her vital signs are stable however later this afternoon nursing did find she had a temp of 104.1 with an elevated heart rate.    Review of Systems   Patient denies fever, chills, headache, lightheadedness, dizziness, rhinorrhea, cough, congestion, shortness of breath, chest pain, palpitations, constipation, change in appetite, dysuria, frequency, burning or pain with urination.  Other than stated in HPI all other review of systems is negative.         Physical Exam   Vital signs: /63, heart rate 83, respiratory 18, temp 97.3, 93% on room air.  GENERAL APPEARANCE: Well developed, well nourished, in no acute distress  HEENT: normocephalic, atraumatic  PERRL, sclerae anicteric, conjunctivae clear and moist, EOM intact  Neck: Supple with no lymphadenopathy, no thyromegaly, trachea midline  CARDS: RRR, S1-S2, no murmurs gallops or rubs appreciated.  LUNGS: Lung sounds CTA, no adventitious sounds, respiratory effort normal.  ABD: Soft, nondistended.  Positive bowel sounds in all 4 quadrants, midline incision.  EXTREMITIES: No cyanosis, clubbing or edema.  NEURO: Alert and oriented x 3.  Face is symmetric.  SKIN: Midline abdominal incision that is approximately 15cm in length.   Proximal half of incision is well approximated with steristrips, no drainage or s/s of infection.  The proximal end of the second have of the incision is  approximately 4 cm fascia and subcutaneous tissue is visible, serous drainage, no surrounding erythema or s/s of infection.   PSYCH: Anxious, flat affect          Labs: .No results found for this or any previous visit (from the past 240 hour(s)).      Assessment:  1. Adenocarcinoma (H)     2. Pain     3. Anxiety     4. Uncontrolled type 2 diabetes mellitus with hyperglycemia (H)     5. Mild intermittent asthma, unspecified whether complicated     6. Wound dehiscence     7. Acquired lymphedema of lower extremity     8. Moderate episode of recurrent major depressive disorder (H)         Plan:   Adenocarcinoma: Follow-up with gynecology oncology at the end of this week.  Continue heparin subcu 3 times daily    Pain: Pain is difficult to manage however patient reports this is now improving with scheduled methadone 2.5 mg, as needed Dilaudid did  patient that because she is on a large amount of opiates constipation is likely a side effect.  I counseled her that I would want her to have looser stools in order to easily have bowel movements due to risk of further dehiscence of abdominal wound.  She is agreeable to continue with current bowel regimen.    Anxiety and depression is a large factor affecting her comfort and pain.  I will schedule hydroxyzine 25 mg 3 times daily, start her on Paxil CR 12.5 mg at at bedtime, for sleep I will schedule trazodone 50 mg at at bedtime.  Nursing will alert me if she becomes too sleepy.    Diabetes: Blood sugars range from 100-2 63 with most of her blood sugars averaging 200s.  Continue with Lantus, AC NovoLog and sliding scale NovoLog.    Asthma: We will at this time continue PRN albuterol inhaler.    Wound dehiscence: Wound shows no infection, I will change her dressing changes to moist to dry twice daily in order to  improve approximation.  Did order an abdominal binder as so the Warren ties could be removed as they are significantly soiled.    Lymphedema of lower extremities: At this time nursing will apply Ace wraps daily as I do not believe she would tolerate lymphedema wraps.  If she does not have improvement with Ace wraps would consider lymphedema massage and wraps by therapy.  Could consider a small dose of diuretic however I would like to try nonmedication tactics first.    Of note: Patient was sent back to the emergency room due to elevated temperature and tachycardia as I do not feel we could quickly manage her symptoms and her risk for infection is elevated.    35 total minutes spent with 20 minutes spent face-to-face with patient and nursing in counseling and coordination of her above plan of care.                Electronically signed by: Bell Saavedra CNP

## 2021-06-20 NOTE — LETTER
Letter by Bell Saavedra CNP at      Author: Bell Saavedra CNP Service: -- Author Type: --    Filed:  Encounter Date: 12/16/2019 Status: Signed         Patient: Chela Love   MR Number: [id number]   YOB: 1975   Date of Visit: 12/16/2019     Code Status:  FULL CODE  Visit Type: Follow Up (Rehospitalization for stage IIIa endometrial cancer, pelvic abscess, wound separation and new diagnosis of C. difficile.)     Facility:  [facility] [[id number]]        Facility Type: SNF (Skilled Nursing Facility, TCU)    History of Present Illness: Chela Love is a 44 y.o. female who has a past medical history for uncontrolled DM2, bipolar disorder,polysubstance abuse and HTN.  She was recently hospitalized at [facility] for a right adnexal mass found to be adenocarcinoma likely endometrioid (final path pending) and septic shock.  She underwent a total hysterectomy and oophorectomy and tumor debulking.  Her hospitalization was complicated by poor pain tolerance in the picture of her polysubstance abuse, acute blood loss anemia, CINDY and intra-abdominal infection.  Urine tox positive for opiates, meth and THC. She did complete a course of antibiotics. She did have slight dehiscence of midline abdominal wound with two times a day wound care and sent out to TCU.  Upon admission to TCU she was severely anxious, in pain and was sent to the ER.  She was evaluated, given IV pain medications and sent back to the TCU.  On 12/9/2019, she spiked a temp of 104.1 and was sent to the ER for evaluation.  On CT, it showed a 5 cm enhanced pelvic fluid which was concern for abscess.  She was started on IV vancomycin x1 day then 2 days of Zosyn and switch to oral Augmentin.  During her stay she did have a hemoglobin of 6.5 and was given 1 unit of packed red blood cells with a discharge hemoglobin of 7.6.  Her discharge creatinine was 1.03.  She did result positive for C. difficile and was started on oral  vancomycin on 12/11.  Her mid abdomen dressing changes required Mepilex 3 times a week.  She was also switched from heparin to Lovenox through 12/25.  Augmentin is through 12/23.  And vancomycin is 12/20.  She has an appointment scheduled with Dr. Ordaz gynecology oncology on 12/13, with Dr. Price in endocrine on 12/16, with Dr. Maynard and palliative care for pain management on 12/18.    Today, she continues to be very anxious regarding her discomfort and her current status.  She complains of epigastric pain that radiates under her breast.  Her heart rate is within normal limits and she denies any shortness of breath.  Her blood pressures are within normal limits and so this does not appear to be cardiac related.  She was on famotidine prior to her recent hospitalizations however this was discontinued for unknown reason.  She also has oral Zofran for nausea.  She reports she is taking pain medications almost every 6 hours however feels she has to wait too long for the nurse to get this to her.  Her midline incision is well approximated on the proximal and.  I did not remove the dressing due to her anxiety today.  Her blood sugars appear to be better controlled ranging from .  She reports her appetite is fair to poor.  She does endorse cramping diarrhea.  She is concerned with her bowel sounds however she does have good bowel sounds in all 4 quadrants.    Past Medical History:   Diagnosis Date   ? Abnormal menses    ? Acute encephalopathy    ? Adnexal mass    ? CINDY (acute kidney injury) (H)    ? Anxiety    ? Asthma    ? Bilateral lower extremity edema    ? Cellulitis    ? Clostridium difficile infection    ? Depression    ? DM2 (diabetes mellitus, type 2) (H)    ? Endometrial adenocarcinoma (H)     stage IIIA grade 1    ? Essential hypertension    ? HLD (hyperlipidemia)    ? HTN (hypertension)    ? Hypokalemia    ? Insomnia    ? Lactic acidosis    ? Morbid obesity (H)    ? Obesity 10/19/2016   ? Ovarian mass     ? Peripheral polyneuropathy    ? Polysubstance abuse (H)    ? Septic shock (H)    ? Vaginal bleeding    ? Vasculopathy      Current Outpatient Medications on File Prior to Visit   Medication Sig Dispense Refill   ? famotidine (PEPCID) 20 MG tablet Take 20 mg by mouth daily.     ? Lactobacillus acidophilus 100 mg (1 billion cell) cap Take 1 capsule by mouth daily.     ? acetaminophen (TYLENOL) 325 MG tablet Take 650 mg by mouth every 6 (six) hours as needed for pain.      ? albuterol (PROAIR HFA;PROVENTIL HFA;VENTOLIN HFA) 90 mcg/actuation inhaler Inhale 2 puffs every 4 (four) hours as needed for wheezing.      ? amoxicillin-clavulanate (AUGMENTIN) 875-125 mg per tablet Take 1 tablet by mouth 2 (two) times a day.     ? calcium, as carbonate, (TUMS) 200 mg calcium (500 mg) chewable tablet Chew 2-3 tablets every 4 (four) hours as needed for heartburn.      ? diphenhydrAMINE (BENADRYL) 25 mg capsule Take 25-50 mg by mouth every 6 (six) hours as needed for itching.     ? enoxaparin ANTICOAGULANT (LOVENOX) 40 mg/0.4 mL syringe Inject 40 mg under the skin daily.     ? escitalopram oxalate (LEXAPRO) 10 MG tablet Take 10 mg by mouth daily.     ? gabapentin (NEURONTIN) 100 MG capsule Take 200 mg by mouth 2 (two) times a day.      ? hydroCHLOROthiazide (HYDRODIURIL) 25 MG tablet Take 25 mg by mouth daily.     ? HYDROmorphone (DILAUDID) 2 MG tablet Take 2-4 mg by mouth every 6 (six) hours as needed for pain.      ? hydroxychloroquine (PLAQUENIL) 200 mg tablet Take 200 mg by mouth 2 (two) times a day.     ? hydrOXYzine HCl (ATARAX) 25 MG tablet Take 25 mg by mouth every 6 (six) hours as needed.      ? insulin aspart U-100 (NOVOLOG) 100 unit/mL injection Inject 10 Units under the skin 3 (three) times a day with meals.      ? insulin glargine (LANTUS) 100 unit/mL vial Inject 27 Units under the skin daily.      ? methadone (DOLOPHINE) 5 MG tablet Take 2.5 mg by mouth every 12 (twelve) hours.      ? nystatin (MYCOSTATIN) ointment  Apply 1 application topically 2 (two) times a day.     ? ondansetron (ZOFRAN-ODT) 8 MG disintegrating tablet Take 8 mg by mouth every 8 (eight) hours as needed for nausea.     ? senna-docusate (SENNA-TIME S) 8.6-50 mg tablet Take 1 tablet by mouth daily.      ? traZODone (DESYREL) 50 MG tablet Take 50 mg by mouth at bedtime as needed.      ? vancomycin (FIRVANQ) 50 mg/mL oral solution Take 250 mg by mouth 4 (four) times a day.     ? venlafaxine (EFFEXOR) 75 MG tablet Take 225 mg by mouth 2 (two) times a day.     ? [DISCONTINUED] bisacodyl (DULCOLAX) 10 mg suppository Insert 10 mg into the rectum daily as needed.     ? [DISCONTINUED] bisacodyl (DULCOLAX) 5 mg EC tablet Take 5 mg by mouth daily as needed for constipation.     ? [DISCONTINUED] famotidine (PEPCID) 20 MG tablet Take 20 mg by mouth daily.     ? [DISCONTINUED] heparin sodium,porcine (HEPARIN, PORCINE,) 5,000 unit/mL injection Inject 2,500 Units under the skin every 8 (eight) hours.     ? [DISCONTINUED] insulin aspart U-100 (NOVOLOG) 100 unit/mL injection Inject under the skin 3 (three) times a day before meals. SSI:  140-169 give 1 units  170-199 give 2 units  200-229 give 3 units  230-259 give 4 units  260-289 give 5 units  290-319 give 6 units  320-349 give 7 units  BG> or = 350 give 8units    Bedtime correction:   200-229 give 1u  230-259 give 2u  260-289 give 3 u  290-319 give 4 u  390-349 give 5u  BS> or = 350 give 6 u     ? [DISCONTINUED] ondansetron (ZOFRAN) 4 MG tablet Take 4 mg by mouth every 6 (six) hours as needed for nausea.     ? [DISCONTINUED] PARoxetine (PAXIL-CR) 12.5 MG 24 hr tablet Take 12.5 mg by mouth every morning.     ? [DISCONTINUED] polyethylene glycol (MIRALAX) 17 gram packet Take 17 g by mouth daily.       No current facility-administered medications on file prior to visit.      Post Discharge Medication Reconciliation Status: discharge medications reconciled and changed, per note/orders (see AVS)        Review of Systems    Patient denies fever, chills, headache, lightheadedness, dizziness, rhinorrhea, cough, congestion, shortness of breath, chest pain, palpitations, constipation, change in appetite, dysuria, frequency, burning or pain with urination.  Other than stated in HPI all other review of systems is negative.         Physical Exam   Vital signs: /90, heart rate 76, respiratory 18, temp 98.2, 95% on room air.  GENERAL APPEARANCE: Well developed, well nourished, in no acute distress  HEENT: normocephalic, atraumatic  PERRL, sclerae anicteric, conjunctivae clear and moist, EOM intact  Neck: Supple with no lymphadenopathy, no thyromegaly, trachea midline  CARDS: RRR, S1-S2, no murmurs gallops or rubs appreciated.  LUNGS: Lung sounds CTA, no adventitious sounds, respiratory effort normal.  ABD: Soft, nondistended.  Positive bowel sounds in all 4 quadrants, midline incision.  EXTREMITIES: +1 pitting edema of her right lower extremity pedally, soft nonpitting edema of her left lower extremity pedally.  NEURO: Alert and oriented x 3.  Face is symmetric.  SKIN: Midline abdominal incision that is approximately 15cm in length.  Proximal half of incision is well approximatedno drainage or s/s of infection.   visible, serous drainage, no surrounding erythema or s/s of infection.  Did not view the dehisced area of her wound.  PSYCH: Anxious        Labs: .No results found for this or any previous visit (from the past 240 hour(s)).      Assessment:  1. Endometrial cancer, FIGO stage IIIA (H)     2. Pain     3. Anxiety     4. Uncontrolled type 2 diabetes mellitus with hyperglycemia (H)     5. Wound dehiscence     6. Pelvic abscess in female     7. Gastroesophageal reflux disease without esophagitis     8. Clostridium difficile diarrhea     9. Essential hypertension     10. Bilateral lower extremity edema         Plan:     Endometrial cancer: Follow-up with gynecology oncology.  Continue with Lovenox through 12/25 for  anticoagulation.    Pain: Continues to be an ongoing issue however it is likely exacerbated by her anxiety.  She reports that her pain is well managed currently with Dilaudid 2 to 4 mg every 6 hours as needed, as needed Tylenol, gabapentin 200 mg twice daily, and hydroxyzine.  I did request that nursing give her hydroxyzine more often as they have not been giving this.    Anxiety: We will continue with venlafaxine and Lexapro in hopes of getting a secondary effect of the hydroxyzine she is using for pain.    Diabetes: Blood sugars appear to be more controlled.  Change lispro to aspart 10 units AC 3 times daily and continue with glargine 27 units daily.     Wound dehiscence: Nursing to provide wound care 3 times weekly.  Continue with Augmentin through 12/23 for abscess.    C. difficile: Continues to have diarrhea continue vancomycin through 12/20.    Hypertension: Stable at this time continue hydrochlorothiazide 25 mg daily.    Bilateral lower extremity: Continues to have lower extremity however is less pitting.  She has +1 pitting edema of her right which is greater than her left which appears to be mostly pedal.  Continue with Ace wraps bilaterally daily.        35 total minutes spent with 20 minutes spent face-to-face with patient and nursing in counseling and coordination of the above plan of care.        Electronically signed by: Bell Saavedra CNP

## 2021-06-20 NOTE — LETTER
Letter by Bell Saavedra CNP at      Author: Bell Saavedra CNP Service: -- Author Type: --    Filed:  Encounter Date: 1/10/2020 Status: Signed         Patient: Chela Love   MR Number: [id number]   YOB: 1975   Date of Visit: 1/10/2020     Code Status:  FULL CODE  Visit Type: Follow Up (s/p chemo induction, n/v)     Facility:  [facility] [[id number]]        Facility Type: SNF (Skilled Nursing Facility, TCU)    History of Present Illness: Chela Love is a 44 y.o. female who has a past medical history for uncontrolled DM2, bipolar disorder,polysubstance abuse and HTN.  She was recently hospitalized at [facility] for a right adnexal mass found to be adenocarcinoma likely endometrioid (final path pending) and septic shock.  She underwent a total hysterectomy and oophorectomy and tumor debulking.  Her hospitalization was complicated by poor pain tolerance in the picture of her polysubstance abuse, acute blood loss anemia, CINDY and intra-abdominal infection.  Urine tox positive for opiates, meth and THC. She did complete a course of antibiotics. She did have slight dehiscence of midline abdominal wound with two times a day wound care and sent out to TCU.  Upon admission to TCU she was severely anxious, in pain and was sent to the ER.  She was evaluated, given IV pain medications and sent back to the TCU.  On 12/9/2019, she spiked a temp of 104.1 and was sent to the ER for evaluation.  On CT, it showed a 5 cm enhanced pelvic fluid which was concern for abscess.  She was started on IV vancomycin x1 day then 2 days of Zosyn and switch to oral Augmentin.  During her stay she did have a hemoglobin of 6.5 and was given 1 unit of packed red blood cells with a discharge hemoglobin of 7.6.  Her discharge creatinine was 1.03.  She did result positive for C. difficile and was started on oral vancomycin on 12/11.  Her mid abdomen dressing changes required Mepilex 3 times a week.  She was also  switched from heparin to Lovenox through 12/25.  Augmentin is through 12/23.  And vancomycin is 12/20.  She has an appointment scheduled with Dr. Ordaz gynecology oncology on 12/13, with Dr. Price in endocrine on 12/16, with Dr. Maynard and palliative care for pain management on 12/18.    She started chemotherapy yesterday 1/9.  Today nursing reports significant nausea and vomiting.  She was given Zofran with good relief.  She also has Compazine which was ordered by oncology yesterday.  She is afebrile and denies any abdominal pain.  She states she is able to get down water and coffee this morning.  Her abdominal pain is stable on the lower dose of Dilaudid.  I did observe her coming in from the patio after smoking a cigarette later in the day.  Her blood sugars were elevated last night in the 200s and 300s.  This is abnormal for her is most the time she has very good control her blood sugars.      Past Medical History:   Diagnosis Date   ? Abnormal menses    ? Acute encephalopathy    ? Adnexal mass    ? CINDY (acute kidney injury) (H)    ? Anxiety    ? Asthma    ? Bilateral lower extremity edema    ? Cellulitis    ? Clostridium difficile infection    ? Depression    ? DM2 (diabetes mellitus, type 2) (H)    ? Endometrial adenocarcinoma (H)     stage IIIA grade 1    ? Essential hypertension    ? HLD (hyperlipidemia)    ? HTN (hypertension)    ? Hypokalemia    ? Insomnia    ? Lactic acidosis    ? Morbid obesity (H)    ? Obesity 10/19/2016   ? Ovarian mass    ? Peripheral polyneuropathy    ? Polysubstance abuse (H)    ? Septic shock (H)    ? Vaginal bleeding    ? Vasculopathy      Current Outpatient Medications on File Prior to Visit   Medication Sig Dispense Refill   ? prochlorperazine (COMPAZINE) 10 MG tablet Take 10 mg by mouth every 6 (six) hours as needed for nausea.     ? acetaminophen (TYLENOL) 325 MG tablet Take 650 mg by mouth every 6 (six) hours as needed for pain.      ? albuterol (PROAIR HFA;PROVENTIL  HFA;VENTOLIN HFA) 90 mcg/actuation inhaler Inhale 2 puffs every 4 (four) hours as needed for wheezing.      ? calcium, as carbonate, (TUMS) 200 mg calcium (500 mg) chewable tablet Chew 2-3 tablets every 4 (four) hours as needed for heartburn.      ? famotidine (PEPCID) 20 MG tablet Take 20 mg by mouth daily.     ? gabapentin (NEURONTIN) 100 MG capsule Take 200 mg by mouth 2 (two) times a day.      ? hydroCHLOROthiazide (HYDRODIURIL) 25 MG tablet Take 25 mg by mouth daily.     ? HYDROmorphone (DILAUDID) 2 MG tablet Take by mouth every 6 (six) hours as needed for pain.      ? hydrOXYzine HCl (ATARAX) 25 MG tablet Take 25 mg by mouth every 6 (six) hours as needed.      ? insulin aspart U-100 (NOVOLOG) 100 unit/mL injection Inject 7 Units under the skin 3 (three) times a day with meals.      ? insulin glargine (LANTUS) 100 unit/mL vial Inject 30 Units under the skin daily.      ? methadone (DOLOPHINE) 5 MG tablet Take 2.5 mg by mouth every 12 (twelve) hours.      ? mirtazapine (REMERON) 15 MG tablet Take 1 tablet (15 mg total) by mouth at bedtime. 30 tablet 3   ? nystatin (MYCOSTATIN) ointment Apply 1 application topically 2 (two) times a day.     ? ondansetron (ZOFRAN-ODT) 8 MG disintegrating tablet Take 8 mg by mouth every 8 (eight) hours as needed for nausea.     ? senna-docusate (SENNA-TIME S) 8.6-50 mg tablet Take 1 tablet by mouth daily.      ? traZODone (DESYREL) 50 MG tablet Take 50 mg by mouth at bedtime as needed.      ? venlafaxine (EFFEXOR) 75 MG tablet Take 225 mg by mouth daily.       No current facility-administered medications on file prior to visit.        Review of Systems   Patient denies fever, chills, headache, lightheadedness, dizziness, rhinorrhea, cough, congestion, shortness of breath, chest pain, palpitations, constipation, change in appetite, dysuria, frequency, burning or pain with urination.  Other than stated in HPI all other review of systems is negative.         Physical Exam   Vital  signs: /73, heart rate 94, respiratory 16, temp 97.9.  GENERAL APPEARANCE: Well developed, well nourished, in no acute distress  HEENT: normocephalic, atraumatic  PERRL, sclerae anicteric, conjunctivae clear and moist, EOM intact  CARDS: RRR, S1-S2, no murmurs gallops or rubs appreciated.  LUNGS: Lung sounds CTA, no adventitious sounds, respiratory effort normal.  ABD: Soft, nondistended, nontender positive bowel sounds in all 4 quadrants  EXTREMITIES: Trace lower extremity edema bilaterally  NEURO: Alert and oriented x 3.  Face is symmetric.  SKIN: open abdominal wound not observed today  PSYCH: euthymic        Labs: .No results found for this or any previous visit (from the past 240 hour(s)).      Assessment:  1. Chemotherapy induced nausea and vomiting     2. Uncontrolled type 2 diabetes mellitus with hyperglycemia (H)     3. Pain     4. Endometrial cancer, FIGO stage IIIA (H)         Plan:    Nausea and vomiting: Did  patient on being sure to drink plenty of fluids even if she was unable to eat.  May need to consider an oral supplement in the future if we are unable to get this under control.  Continue PRN Zofran and Compazine.  Discussed with nurse to alternate if possible.  She was also started on lorazepam for nausea/vomiting and anxiety.    Next chemotherapy not until next Monday.    Diabetes: Yesterday's blood sugars were elevated I am going to consider those incidental and continue to monitor.  Continue with current glargine and AC insulin.    Pain: Pain is well controlled weaning off Dilaudid for next week.  Continue gabapentin.  Continue methadone.  Plan to wean from methadone in 2 weeks.      Electronically signed by Bell Saavedra CNP

## 2021-06-20 NOTE — LETTER
Letter by Bell Saavedra CNP at      Author: Bell Saavedra CNP Service: -- Author Type: --    Filed:  Encounter Date: 12/30/2019 Status: Signed         Patient: Chela Love   MR Number: [id number]   YOB: 1975   Date of Visit: 12/30/2019     Code Status:  FULL CODE  Visit Type: Follow Up (abdominal pain, DM2)     Facility:  [facility] [[id number]]        Facility Type: SNF (Skilled Nursing Facility, TCU)    History of Present Illness: Chela Love is a 44 y.o. female who has a past medical history for uncontrolled DM2, bipolar disorder,polysubstance abuse and HTN.  She was recently hospitalized at [facility] for a right adnexal mass found to be adenocarcinoma likely endometrioid (final path pending) and septic shock.  She underwent a total hysterectomy and oophorectomy and tumor debulking.  Her hospitalization was complicated by poor pain tolerance in the picture of her polysubstance abuse, acute blood loss anemia, CINDY and intra-abdominal infection.  Urine tox positive for opiates, meth and THC. She did complete a course of antibiotics. She did have slight dehiscence of midline abdominal wound with two times a day wound care and sent out to TCU.  Upon admission to TCU she was severely anxious, in pain and was sent to the ER.  She was evaluated, given IV pain medications and sent back to the TCU.  On 12/9/2019, she spiked a temp of 104.1 and was sent to the ER for evaluation.  On CT, it showed a 5 cm enhanced pelvic fluid which was concern for abscess.  She was started on IV vancomycin x1 day then 2 days of Zosyn and switch to oral Augmentin.  During her stay she did have a hemoglobin of 6.5 and was given 1 unit of packed red blood cells with a discharge hemoglobin of 7.6.  Her discharge creatinine was 1.03.  She did result positive for C. difficile and was started on oral vancomycin on 12/11.  Her mid abdomen dressing changes required Mepilex 3 times a week.  She was also  switched from heparin to Lovenox through 12/25.  Augmentin is through 12/23.  And vancomycin is 12/20.  She has an appointment scheduled with Dr. Ordaz gynecology oncology on 12/13, with Dr. Price in endocrine on 12/16, with Dr. Maynard and palliative care for pain management on 12/18.    Today, she appears calmer than she has the past 2 weeks.  She states that she is planning to get her port in tomorrow and start chemotherapy next week.  Her incision is almost completely closed with approximately 2 cm x 1 cm open area with red granulation and approximately 0.5 cm depth.  There is no surrounding signs or symptoms of infection around her abdominal incision.  She reports that she has some ongoing twinges of pain and nausea which resolved after taking a pain medication.  She continues on scheduled methadone twice daily and as needed Dilaudid.  Her Dilaudid has now tapered down to 2 mg every 6 hours as needed.  He also continues with gabapentin twice daily and states that she has had some significant twinges of pain in her groin going down her legs.  She does states she has burning numbness of her feet at times.  She was seen by psychiatry last week and there were changes noted in his note to her psych medications however these orders were not transferred to the nursing home and she has not been taking her medications as directed per her psychiatrist.  He had recommended changing her Effexor to XR at 225 mg, deseeding Lexapro and starting on mirtazapine.  She reports her mood does feel a little better than it has in the past couple weeks.  Her fasting blood sugars continue to be approximately 160s to 180s.  Her blood sugars all range from 160s to 260s.    Past Medical History:   Diagnosis Date   ? Abnormal menses    ? Acute encephalopathy    ? Adnexal mass    ? CINDY (acute kidney injury) (H)    ? Anxiety    ? Asthma    ? Bilateral lower extremity edema    ? Cellulitis    ? Clostridium difficile infection    ?  Depression    ? DM2 (diabetes mellitus, type 2) (H)    ? Endometrial adenocarcinoma (H)     stage IIIA grade 1    ? Essential hypertension    ? HLD (hyperlipidemia)    ? HTN (hypertension)    ? Hypokalemia    ? Insomnia    ? Lactic acidosis    ? Morbid obesity (H)    ? Obesity 10/19/2016   ? Ovarian mass    ? Peripheral polyneuropathy    ? Polysubstance abuse (H)    ? Septic shock (H)    ? Vaginal bleeding    ? Vasculopathy      Current Outpatient Medications on File Prior to Visit   Medication Sig Dispense Refill   ? acetaminophen (TYLENOL) 325 MG tablet Take 650 mg by mouth every 6 (six) hours as needed for pain.      ? albuterol (PROAIR HFA;PROVENTIL HFA;VENTOLIN HFA) 90 mcg/actuation inhaler Inhale 2 puffs every 4 (four) hours as needed for wheezing.      ? calcium, as carbonate, (TUMS) 200 mg calcium (500 mg) chewable tablet Chew 2-3 tablets every 4 (four) hours as needed for heartburn.      ? famotidine (PEPCID) 20 MG tablet Take 20 mg by mouth daily.     ? gabapentin (NEURONTIN) 100 MG capsule Take 200 mg by mouth 2 (two) times a day.      ? hydroCHLOROthiazide (HYDRODIURIL) 25 MG tablet Take 25 mg by mouth daily.     ? HYDROmorphone (DILAUDID) 2 MG tablet Take by mouth every 6 (six) hours as needed for pain.      ? hydrOXYzine HCl (ATARAX) 25 MG tablet Take 25 mg by mouth every 6 (six) hours as needed.      ? insulin aspart U-100 (NOVOLOG) 100 unit/mL injection Inject 5 Units under the skin 3 (three) times a day with meals.      ? insulin glargine (LANTUS) 100 unit/mL vial Inject 30 Units under the skin daily.      ? methadone (DOLOPHINE) 5 MG tablet Take 2.5 mg by mouth every 12 (twelve) hours.      ? mirtazapine (REMERON) 15 MG tablet Take 1 tablet (15 mg total) by mouth at bedtime. 30 tablet 3   ? nystatin (MYCOSTATIN) ointment Apply 1 application topically 2 (two) times a day.     ? ondansetron (ZOFRAN-ODT) 8 MG disintegrating tablet Take 8 mg by mouth every 8 (eight) hours as needed for nausea.     ?  senna-docusate (SENNA-TIME S) 8.6-50 mg tablet Take 1 tablet by mouth daily.      ? traZODone (DESYREL) 50 MG tablet Take 50 mg by mouth at bedtime as needed.      ? venlafaxine (EFFEXOR) 75 MG tablet Take 225 mg by mouth daily.     ? [DISCONTINUED] diphenhydrAMINE (BENADRYL) 25 mg capsule Take 25-50 mg by mouth every 6 (six) hours as needed for itching.     ? [DISCONTINUED] escitalopram oxalate (LEXAPRO) 10 MG tablet Take 10 mg by mouth daily.     ? [DISCONTINUED] hydroxychloroquine (PLAQUENIL) 200 mg tablet Take 200 mg by mouth 2 (two) times a day.     ? [DISCONTINUED] Lactobacillus acidophilus 100 mg (1 billion cell) cap Take 1 capsule by mouth daily.       No current facility-administered medications on file prior to visit.        Review of Systems   Patient denies fever, chills, headache, lightheadedness, dizziness, rhinorrhea, cough, congestion, shortness of breath, chest pain, palpitations, constipation, change in appetite, dysuria, frequency, burning or pain with urination.  Other than stated in HPI all other review of systems is negative.         Physical Exam   Vital signs: /73, heart rate 74, respiratory 16, temp 97.0.  GENERAL APPEARANCE: Well developed, well nourished, in no acute distress  HEENT: normocephalic, atraumatic  PERRL, sclerae anicteric, conjunctivae clear and moist, EOM intact  NCARDS: RRR, S1-S2, no murmurs gallops or rubs appreciated.  LUNGS: Lung sounds CTA, no adventitious sounds, respiratory effort normal.  ABD: Soft, nondistended, nontender positive bowel sounds in all 4 quadrants, midline incision.  EXTREMITIES: Trace lower extremity edema bilaterally  NEURO: Alert and oriented x 3.  Face is symmetric.  SKIN: Most for midline abdominal incision is well approximated without surrounding signs and symptoms of infection.  She has a small opening of 2 cm x 1 cm with a 0.5 cm depth on the distal end of her incision that has good granulation tissue without significant  drainage.  PSYCH: Euthymic        Labs: .No results found for this or any previous visit (from the past 240 hour(s)).      Assessment:  1. Uncontrolled type 2 diabetes mellitus with hyperglycemia (H)     2. Pain     3. Moderate episode of recurrent major depressive disorder (H)     4. Wound dehiscence     5. Clostridium difficile diarrhea     6. Essential hypertension         Plan:     Uncontrolled type 2 diabetes: Blood sugars are improving will increase glargine to 30 units at at bedtime continue NovoLog 5 units 3 times daily before meals and sliding scale insulin.  Likely will be able to increase the AC and wean her off of sliding scale soon.    Pain: Improving Dilaudid has changed to 2 mg every 6 hours will extend that to next week.  Continue with methadone 2.5 mg twice daily and will continue gabapentin.  Would likely like to wean off gabapentin as needed in the next 2 weeks.  Did discuss this with the patient and she does become anxious during conversation about her pain management.  We will continue to work on this with her.    Depression: We will implement recommendations from psychiatry by discontinuing Lexapro and changing her Effexor to XR and starting her on mirtazapine.  I did  her on the effects of the medication and the side effects.  She is agreeable.    Wound dehiscence: This is improving nursing to continue daily dressing change and have a cleanse cleaning.    C. difficile: Resolved vancomycin has been completed and she no longer has diarrhea will also discontinue lactobacillus and Benadryl.    Hypertension: Stable continue hydrochlorothiazide 25 mg daily likely could decrease this if her blood pressures continue to run SBP's in the 110s.      35 total minutes spent with 20 minutes spent face-to-face with patient counseling and coordination regarding her upcoming chemotherapy, plan for diabetes and depression.        Electronically signed by: Bell Saavedra CNP

## 2021-06-20 NOTE — LETTER
Letter by Rell Nickerson MD at      Author: Rell Nickerson MD Service: -- Author Type: --    Filed:  Encounter Date: 12/24/2019 Status: Signed         Patient: Chela Love   MR Number: [id number]   YOB: 1975   Date of Visit: 12/24/2019     [facility]      Facility:    [facility] [[id number]]  Code Status: FULL CODE      Chief Complaint/Reason for Visit:  Chief Complaint   Patient presents with   ? H & P       HPI:   Ms. Love is a 44-year-old female with a past medical history of recently diagnosed stage III endometrial cancer, recent C. difficile colitis, wound dehiscence, multifactorial anemia including acute blood loss and need for transfusion, uncontrolled type 2 diabetes, polysubstance/methamphetamine abuse, steroid-dependent asthma, depression/anxiety, hypertension who has been admitted to [facility] for care following her recent hospitalization.  I missed her last week as she was out on appointment.    Patient was hospitalized from December 9 through December 12 due to sepsis with fever to 104.  She was found to have a 5 cm fluid collection, presumed abscess which was not amenable to IR drainage due to location.  Thus she was treated with vancomycin plus Zosyn and discharged on Augmentin for 14 days, just completed on December 23.  She was diagnosed with C. difficile on December 10 and placed on p.o. Vanco, she has also completed that course as of December 20.  Her hemoglobin was as low as 6.5 required 1 unit PRBC.  She also had wound dehiscence with wound VAC in place and is followed by the wound care team.    Subjective/review of systems:  -I have reviewed her psychiatry/mental health notes from 12/23/2019.  Their pharmaceutical recommendations are as follows: Effexor changed to XR form to 25 mg daily which is a new dose.  Her Lexapro was discontinued in favor of Remeron 15 mg at bedtime.  Her trazodone was continued  "without change.  -Patient went to the cancer Center today.  I do not have their note but she reports that they are planning to place a port this Thursday with chemotherapy to start shortly thereafter.  She will be receiving this care through the [facility].  -Patient reports that she has a fluid collection/ascites and that she anticipates it being tapped this Thursday when they do the port work as well.  She says that she was told the abscess/5 cm fluid collection associated with her recent sepsis did resolve.  -Patient had another hospitalization from November 23 through December 5 where she presented with septic shock and the right pelvic mass was discovered and felt to be superinfected.  She was treated with Zosyn and Vanco.  Should the situation was complicated by encephalopathy and difficult pain control.  She was started on methadone gabapentin and Dilaudid that hospitalization.  She also suffered from acute urinary retention with acute renal failure with creatinine as high as 3.92.  She had wound dehiscence without hospitalization.  This was following exploratory lap to deal with the mass which proved to be the endometrial cancer in the right ovarian area.  She also had acute respiratory failure.  She also had a TTE which was negative.  -Blood sugars have come under nice control.  Her mealtime insulin has been cut by 50% from 10 units to 5 units with each meal.  She remains on glargine 27 units daily.  -Blood pressures are frequently mildly elevated.  She is not having severe elevations.  -Patient says she was recently in pain for 3 days.  She says it was unexplained and involve \"her whole body\".  She describes significant anxiety associated with it.  She did not use her hydroxyzine and I suggested that might be a good idea next time if she ever has it again which is her concern.  Patient reports that she is happy with the facility.  She is eating better now.  She has had some weight gain " but she says that the swelling in her legs has gotten much better during this period of weight gain.  She is having no vomiting.  She is having no diarrhea it seems that the C. difficile has resolved.  No fever sweats or chills.  Sleep is adequate.  She is tolerating her medications.  Remainder is negative with    Past Medical History:  Past Medical History:   Diagnosis Date   ? Abnormal menses    ? Acute encephalopathy    ? Adnexal mass    ? CINDY (acute kidney injury) (H)    ? Anxiety    ? Asthma    ? Bilateral lower extremity edema    ? Cellulitis    ? Clostridium difficile infection    ? Depression    ? DM2 (diabetes mellitus, type 2) (H)    ? Endometrial adenocarcinoma (H)     stage IIIA grade 1    ? Essential hypertension    ? HLD (hyperlipidemia)    ? HTN (hypertension)    ? Hypokalemia    ? Insomnia    ? Lactic acidosis    ? Morbid obesity (H)    ? Obesity 10/19/2016   ? Ovarian mass    ? Peripheral polyneuropathy    ? Polysubstance abuse (H)    ? Septic shock (H)    ? Vaginal bleeding    ? Vasculopathy            Surgical History:  Past Surgical History:   Procedure Laterality Date   ? TOTAL ABDOMINAL HYSTERECTOMY W/ BILATERAL SALPINGOOPHORECTOMY  11/27/2019    Procedure: Exploratory Laparotomy , TOTAL ABDOMINAL HYSTERECTOMY, Bilateral SALPINGoophorectomy, Omentectomy, Abdominal Washout and Tumor DEBULKING; Surgeon: Rosana Mckeon MD; Location: [facility] OR     ? WISDOM TOOTH EXTRACTION         Family History:   Family History   Problem Relation Age of Onset   ? Diabetes Mother    ? Heart disease Mother    ? Fibromyalgia Mother    ? Arthritis Mother    ? Depression Father    ? Drug abuse Father    ? No Medical Problems Sister    ? Diabetes Brother    ? Diabetes Brother        Social History:  family history includes Arthritis in her mother; Depression in her father; Diabetes in her brother, brother, and mother; Drug abuse in her father; Fibromyalgia in her mother; Heart disease in her mother; No Medical  Problems in her sister.  Apparently the patient's father was diagnosed with bipolar affective disorder but both patient and her mother denies any other mental health or chemical dependency history in the family.     As above  Social History     Socioeconomic History   ? Marital status: Single     Spouse name: None   ? Number of children: None   ? Years of education: None   ? Highest education level: None   Occupational History   ? None   Social Needs   ? Financial resource strain: None   ? Food insecurity:     Worry: None     Inability: None   ? Transportation needs:     Medical: None     Non-medical: None   Tobacco Use   ? Smoking status: Former Smoker     Packs/day: 0.50     Types: Cigarettes   ? Smokeless tobacco: Former User   Substance and Sexual Activity   ? Alcohol use: No   ? Drug use: Not Currently     Types: Marijuana, Methamphetamines   ? Sexual activity: Yes   Lifestyle   ? Physical activity:     Days per week: None     Minutes per session: None   ? Stress: None   Relationships   ? Social connections:     Talks on phone: None     Gets together: None     Attends Yazidi service: None     Active member of club or organization: None     Attends meetings of clubs or organizations: None     Relationship status: None   ? Intimate partner violence:     Fear of current or ex partner: None     Emotionally abused: None     Physically abused: None     Forced sexual activity: None   Other Topics Concern   ? None   Social History Narrative    Lives by herself.          Review of Systems    Vitals:    12/24/19 2142   BP: 135/83   Pulse: 82   Resp: 20   Temp: 97.2  F (36.2  C)   SpO2: 95%   Weight: (!) 232 lb (105.2 kg)       Physical Exam  Patient is just back from her cancer center appointment.  She says she is emotional and worried about chemotherapy and the port placement which is appropriate.  She is pleasant oriented x3 normally conversant and appreciative of her care.  Neuropsych atraumatic sclera clear  nonicteric EOMI oropharynx is clear  Neck is supple without mass heart is regular in the 70s S1-S2 without murmur gallop or rub her lungs are clear she was raising oral smoke or wheezing her abdomen shows wound VAC in place with dark gauze obscuring the wound itself.  There is no evidence of cellulitis.  She is nontender.  Bowel sounds within normal limits.  No organomegaly or mass  Medication List:  Current Outpatient Medications   Medication Sig   ? acetaminophen (TYLENOL) 325 MG tablet Take 650 mg by mouth every 6 (six) hours as needed for pain.    ? albuterol (PROAIR HFA;PROVENTIL HFA;VENTOLIN HFA) 90 mcg/actuation inhaler Inhale 2 puffs every 4 (four) hours as needed for wheezing.    ? calcium, as carbonate, (TUMS) 200 mg calcium (500 mg) chewable tablet Chew 2-3 tablets every 4 (four) hours as needed for heartburn.    ? diphenhydrAMINE (BENADRYL) 25 mg capsule Take 25-50 mg by mouth every 6 (six) hours as needed for itching.   ? enoxaparin ANTICOAGULANT (LOVENOX) 40 mg/0.4 mL syringe Inject 40 mg under the skin daily.   ? escitalopram oxalate (LEXAPRO) 10 MG tablet Take 10 mg by mouth daily.   ? famotidine (PEPCID) 20 MG tablet Take 20 mg by mouth daily.   ? gabapentin (NEURONTIN) 100 MG capsule Take 200 mg by mouth 2 (two) times a day.    ? hydroCHLOROthiazide (HYDRODIURIL) 25 MG tablet Take 25 mg by mouth daily.   ? HYDROmorphone (DILAUDID) 2 MG tablet Take 2-4 mg by mouth every 6 (six) hours as needed for pain.    ? hydrOXYzine HCl (ATARAX) 25 MG tablet Take 25 mg by mouth every 6 (six) hours as needed.    ? insulin aspart U-100 (NOVOLOG) 100 unit/mL injection Inject 5 Units under the skin 3 (three) times a day with meals.    ? insulin glargine (LANTUS) 100 unit/mL vial Inject 27 Units under the skin daily.    ? Lactobacillus acidophilus 100 mg (1 billion cell) cap Take 1 capsule by mouth daily.   ? methadone (DOLOPHINE) 5 MG tablet Take 2.5 mg by mouth every 12 (twelve) hours.    ? nystatin (MYCOSTATIN)  ointment Apply 1 application topically 2 (two) times a day.   ? ondansetron (ZOFRAN-ODT) 8 MG disintegrating tablet Take 8 mg by mouth every 8 (eight) hours as needed for nausea.   ? senna-docusate (SENNA-TIME S) 8.6-50 mg tablet Take 1 tablet by mouth daily.    ? traZODone (DESYREL) 50 MG tablet Take 50 mg by mouth at bedtime as needed.    ? venlafaxine (EFFEXOR) 75 MG tablet Take 225 mg by mouth daily.   ? hydroxychloroquine (PLAQUENIL) 200 mg tablet Take 200 mg by mouth 2 (two) times a day.   ? mirtazapine (REMERON) 15 MG tablet Take 1 tablet (15 mg total) by mouth at bedtime.       Labs:  Results for JOSIAH GARCIA (MRN [id number]) as of 12/24/2019 22:07   Ref. Range 12/17/2019 04:55   Sodium Latest Ref Range: 136 - 145 mmol/L 143   Potassium Latest Ref Range: 3.5 - 5.0 mmol/L 3.7   Chloride Latest Ref Range: 98 - 107 mmol/L 102   CO2 Latest Ref Range: 22 - 31 mmol/L 33 (H)   Anion Gap, Calculation Latest Ref Range: 5 - 18 mmol/L 8   BUN Latest Ref Range: 8 - 22 mg/dL 14   Creatinine Latest Ref Range: 0.60 - 1.10 mg/dL 0.88   GFR MDRD Af Amer Latest Ref Range: >60 mL/min/1.73m2 >60   GFR MDRD Non Af Amer Latest Ref Range: >60 mL/min/1.73m2 >60   Calcium Latest Ref Range: 8.5 - 10.5 mg/dL 8.8   Glucose Latest Ref Range: 70 - 125 mg/dL 83   WBC Latest Ref Range: 4.0 - 11.0 thou/uL 5.1   RBC Latest Ref Range: 3.80 - 5.40 mill/uL 2.83 (L)   Hemoglobin Latest Ref Range: 12.0 - 16.0 g/dL 8.4 (L)   Hematocrit Latest Ref Range: 35.0 - 47.0 % 26.5 (L)   MCV Latest Ref Range: 80 - 100 fL 94   MCH Latest Ref Range: 27.0 - 34.0 pg 29.7   MCHC Latest Ref Range: 32.0 - 36.0 g/dL 31.7 (L)   RDW Latest Ref Range: 11.0 - 14.5 % 15.9 (H)   Platelets Latest Ref Range: 140 - 440 thou/uL 234   MPV Latest Ref Range: 8.5 - 12.5 fL 9.2   Neutrophils % Latest Ref Range: 50 - 70 % 45 (L)   Lymphocytes % Latest Ref Range: 20 - 40 % 39   Monocytes % Latest Ref Range: 2 - 10 % 10   Eosinophils % Latest Ref Range: 0 - 6 % 5   Basophils %  Latest Ref Range: 0 - 2 % 0   Neutrophils Absolute Latest Ref Range: 2.0 - 7.7 thou/uL 2.3   Lymphocytes Absolute Latest Ref Range: 0.8 - 4.4 thou/uL 2.0   Monocytes Absolute Latest Ref Range: 0.0 - 0.9 thou/uL 0.5   Eosinophils Absolute Latest Ref Range: 0.0 - 0.4 thou/uL 0.2   Basophils Absolute Latest Ref Range: 0.0 - 0.2 thou/uL 0.0       Assessment:    ICD-10-CM    1. Obesity (BMI 30-39.9) E66.9    2. Uncontrolled type 2 diabetes mellitus with hyperglycemia (H) E11.65    3. ELSIE (obstructive sleep apnea) G47.33    4. Essential hypertension I10    5. Mild intermittent asthma, unspecified whether complicated J45.20    6. Tobacco abuse Z72.0      Assessment/plan    1.  Stage III grade 1 endometrial CA       Status post exploratory lap       Secondary abscess formation resulting in rehospitalization        Wound dehiscence with wound VAC in place          Patient has stabilized after her second hospitalization.  She is doing relatively well here working with therapy and feeling stronger.  Her appetite is better.  Her blood sugar control is much better.  She is contemplating the next steps in her treatment with some anxiety, which is quite appropriate   -Follow-up Thursday with cancer center, probable port placement.  Questionable abdominal fluid evaluation, I am not sure exactly what that entails as I have not seen their office notes from today    2.  Mood disorder     See 1223 psychiatry note.  Diagnosis is unclear, she has had bipolar diagnosis in the past and they are not making that diagnosis as of this time.   -Medication changes as noted above in my review    3.  Insulin-dependent type 2 diabetes     Historically poorly controlled with A1c's in the 15s.  Her control now is quite good.  In fact she has had a recent decrease in her short acting insulin dose at mealtimes to avoid hypoglycemia.    4.  Acute on chronic anemia     On iron replacement    5.  Substance abuse, methamphetamine drug of  choice     Chemical dependency evaluation and treatment will be appropriate when her overall health condition allows    6.  Deconditioning     -Continue work with therapy    7.  Hypertension     Control has not been ideal lately but it is also acceptable for the time being.  I have elected to make no additional medication changes considering those that have been made recently.  We will continue to monitor and adjust in the future if need be.  She is going to be going under new procedures and chemotherapy which could put her at risk for hypotension and low volume status.    8.  C. difficile colitis    Appears to be resolved.  Avoid antibiotics unless absolutely necessary     9.  Chronic opiate dependent pain     Complicated by anxiety.  I do not want to add additional medications to her mix.  We discussed nonpharmaceutical techniques for dealing with anxiety pain.  I also encouraged her to try the hydroxyzine which she already has access to which could help anxiety.        Electronically signed by: Rell Nickerson MD

## 2021-06-20 NOTE — LETTER
Letter by Landy Burnett CNP at      Author: Landy Burnett CNP Service: -- Author Type: --    Filed:  Encounter Date: 1/14/2020 Status: Signed         Patient: Chela oLve   MR Number: [id number]   YOB: 1975   Date of Visit: 1/14/2020     Code Status:  FULL CODE  Visit Type: Problem Visit (Lower extremity swelling)     Facility:  [facility] [[id number]]        Facility Type: SNF (Skilled Nursing Facility, TCU)    History of Present Illness: Chela Love is a 44 y.o. female.   Per chart review TCU course, thus far:  who has a past medical history for uncontrolled DM2, bipolar disorder,polysubstance abuse and HTN.  She was recently hospitalized at [facility] for a right adnexal mass found to be adenocarcinoma likely endometrioid (final path pending) and septic shock.  She underwent a total hysterectomy and oophorectomy and tumor debulking.  Her hospitalization was complicated by poor pain tolerance in the picture of her polysubstance abuse, acute blood loss anemia, CINDY and intra-abdominal infection.  Urine tox positive for opiates, meth and THC. She did complete a course of antibiotics. She did have slight dehiscence of midline abdominal wound with two times a day wound care and sent out to TCU.  Upon admission to TCU she was severely anxious, in pain and was sent to the ER.  She was evaluated, given IV pain medications and sent back to the TCU.  On 12/9/2019, she spiked a temp of 104.1 and was sent to the ER for evaluation.  On CT, it showed a 5 cm enhanced pelvic fluid which was concern for abscess.  She was started on IV vancomycin x1 day then 2 days of Zosyn and switch to oral Augmentin.  During her stay she did have a hemoglobin of 6.5 and was given 1 unit of packed red blood cells with a discharge hemoglobin of 7.6.  Her discharge creatinine was 1.03.  She did result positive for C. difficile and was started on oral vancomycin on 12/11.  Her mid abdomen  "dressing changes required Mepilex 3 times a week.  She was also switched from heparin to Lovenox through 12/25.  Augmentin is through 12/23.  And vancomycin is 12/20.  She has an appointment scheduled with Dr. Ordaz gynecology oncology on 12/13, with Dr. Price in endocrine on 12/16, with Dr. Maynard and palliative care for pain management on 12/18.\"    Today: Patient was seen per nursing request to evaluate lower extremities for edema and requests for Ace wraps.  Lower extremities with venous stasis changes and trace edema.  She reports that they do feel a lot better with Ace wraps.  She also asks me to evaluate her feet she is very scared of diabetic neuropathy causing her to lose her leg or foot.  I reminded her that good blood sugar control and monitoring feet every night would help prevent this.  I see no open sores currently.  She then suegues into her anxiety regarding potential for discharge later this week.  She is reportedly going to discharge home on her own and she lives in in her own home with a dog.  She still needs some assistance with ambulation.  I did encourage her to speak with social work and I also spoke with nursing about this.  She did report some nights that everything to be last week for her new ovarian cancer, she says she is feeling little better today.        Past Medical History:   Diagnosis Date   ? Abnormal menses    ? Acute encephalopathy    ? Adnexal mass    ? CINDY (acute kidney injury) (H)    ? Anxiety    ? Asthma    ? Bilateral lower extremity edema    ? Cellulitis    ? Clostridium difficile infection    ? Depression    ? DM2 (diabetes mellitus, type 2) (H)    ? Endometrial adenocarcinoma (H)     stage IIIA grade 1    ? Essential hypertension    ? HLD (hyperlipidemia)    ? HTN (hypertension)    ? Hypokalemia    ? Insomnia    ? Lactic acidosis    ? Morbid obesity (H)    ? Obesity 10/19/2016   ? Ovarian mass    ? Peripheral polyneuropathy    ? Polysubstance abuse (H)    ? Septic " shock (H)    ? Vaginal bleeding    ? Vasculopathy      Current Outpatient Medications on File Prior to Visit   Medication Sig Dispense Refill   ? acetaminophen (TYLENOL) 325 MG tablet Take 650 mg by mouth every 6 (six) hours as needed for pain.      ? albuterol (PROAIR HFA;PROVENTIL HFA;VENTOLIN HFA) 90 mcg/actuation inhaler Inhale 2 puffs every 4 (four) hours as needed for wheezing.      ? calcium, as carbonate, (TUMS) 200 mg calcium (500 mg) chewable tablet Chew 2-3 tablets every 4 (four) hours as needed for heartburn.      ? famotidine (PEPCID) 20 MG tablet Take 20 mg by mouth daily.     ? gabapentin (NEURONTIN) 100 MG capsule Take 200 mg by mouth 2 (two) times a day.      ? hydroCHLOROthiazide (HYDRODIURIL) 25 MG tablet Take 25 mg by mouth daily.     ? HYDROmorphone (DILAUDID) 2 MG tablet Take by mouth every 6 (six) hours as needed for pain.      ? hydrOXYzine HCl (ATARAX) 25 MG tablet Take 25 mg by mouth every 6 (six) hours as needed.      ? insulin aspart U-100 (NOVOLOG) 100 unit/mL injection Inject 7 Units under the skin 3 (three) times a day with meals.      ? insulin glargine (LANTUS) 100 unit/mL vial Inject 30 Units under the skin daily.      ? LORazepam (ATIVAN) 0.5 MG tablet Take 1 tablet (0.5 mg total) by mouth every 4 (four) hours as needed for anxiety. 1 tablet 0   ? methadone (DOLOPHINE) 5 MG tablet Take 2.5 mg by mouth every 12 (twelve) hours.      ? mirtazapine (REMERON) 15 MG tablet Take 1 tablet (15 mg total) by mouth at bedtime. 30 tablet 3   ? nystatin (MYCOSTATIN) ointment Apply 1 application topically 2 (two) times a day.     ? ondansetron (ZOFRAN-ODT) 8 MG disintegrating tablet Take 8 mg by mouth every 8 (eight) hours as needed for nausea.     ? prochlorperazine (COMPAZINE) 10 MG tablet Take 10 mg by mouth every 6 (six) hours as needed for nausea.     ? senna-docusate (SENNA-TIME S) 8.6-50 mg tablet Take 1 tablet by mouth daily.      ? traZODone (DESYREL) 50 MG tablet Take 50 mg by mouth at  bedtime as needed.      ? venlafaxine (EFFEXOR) 75 MG tablet Take 225 mg by mouth daily.       No current facility-administered medications on file prior to visit.        Review of Systems   Patient denies fever, chills, headache, lightheadedness, dizziness, rhinorrhea, cough, congestion, shortness of breath, chest pain, palpitations, constipation, change in appetite, dysuria, frequency, burning or pain with urination.  Other than stated in HPI all other review of systems is negative.         Physical Exam   BP 90/60   Pulse 89   Temp 98.2  F (36.8  C)   Wt 190 lb (86.2 kg)   SpO2 96%   BMI 30.67 kg/m         GENERAL APPEARANCE: Well developed, well nourished, in no acute distress  HEENT: normocephalic, atraumatic  PERRL, sclerae anicteric, conjunctivae clear and moist, EOM intact  CARDS: RRR, S1-S2, no murmurs gallops or rubs appreciated.  LUNGS: Lung sounds CTA, no adventitious sounds, respiratory effort normal.  ABD: Soft, nondistended, nontender positive bowel sounds in all 4 quadrants  EXTREMITIES: Trace lower extremity edema bilaterally, venous stasis changes.  NEURO: Alert and oriented x 3.  Face is symmetric.  SKIN: open abdominal wound; covered with Mepilex dressing today.  PSYCH: Anxious regarding potential for discharge later this week.        Labs: .No results found for this or any previous visit (from the past 240 hour(s)).      Assessment:  1. Acquired lymphedema of lower extremity     2. Peripheral polyneuropathy     3. Uncontrolled type 2 diabetes mellitus with hyperglycemia (H)         Plan:    -Okay for Ace wraps to lower extremities, counseled regarding diabetes and polyneuropathy to examine feet nightly before bed.  -Encourage discussion with , nursing prior to discharge home to ensure everything is in place for her discharge for her to competently succeed at her home.     Electronically signed by Emma Burnett CNP.

## 2021-06-20 NOTE — LETTER
Letter by Bell Saavedra CNP at      Author: Bell Saavedra CNP Service: -- Author Type: --    Filed:  Encounter Date: 1/28/2020 Status: (Other)         Patient: Chela Love   MR Number: [id number]   YOB: 1975   Date of Visit: 1/28/2020     Code Status:  FULL CODE  Visit Type: Follow Up (pain, depression, sleep and wound)     Facility:  [facility] [[id number]]        Facility Type: SNF (Skilled Nursing Facility, TCU)    History of Present Illness: Chela Love is a 44 y.o. female who has a past medical history for uncontrolled DM2, bipolar disorder,polysubstance abuse and HTN.  She was recently hospitalized at [facility] for a right adnexal mass found to be adenocarcinoma likely endometrioid (final path pending) and septic shock.  She underwent a total hysterectomy and oophorectomy and tumor debulking.  Her hospitalization was complicated by poor pain tolerance in the picture of her polysubstance abuse, acute blood loss anemia, CINDY and intra-abdominal infection.  Urine tox positive for opiates, meth and THC. She did complete a course of antibiotics. She did have slight dehiscence of midline abdominal wound with two times a day wound care and sent out to TCU.  Upon admission to TCU she was severely anxious, in pain and was sent to the ER.  She was evaluated, given IV pain medications and sent back to the TCU.  On 12/9/2019, she spiked a temp of 104.1 and was sent to the ER for evaluation.  On CT, it showed a 5 cm enhanced pelvic fluid which was concern for abscess.  She was started on IV vancomycin x1 day then 2 days of Zosyn and switch to oral Augmentin.  During her stay she did have a hemoglobin of 6.5 and was given 1 unit of packed red blood cells with a discharge hemoglobin of 7.6.  Her discharge creatinine was 1.03.  She did result positive for C. difficile and was started on oral vancomycin on 12/11.  Her mid abdomen dressing changes required Mepilex 3 times a week.   She was also switched from heparin to Lovenox through 12/25.  Augmentin is through 12/23.  And vancomycin is 12/20.  She has an appointment with Dr. Ordaz gynecology oncology on 12/13, with Dr. Price in endocrine on 12/16, with Dr. Maynard and palliative care for pain management on 12/18.    Today, I see her in follow-up after increasing her Remeron last week.  I did discuss with her her medications and explained that she is on all the antidepressants and sleep medications that her psychiatrist has ordered.  I explained that I had increased her Remeron per his request last week.  She is very weepy during my visit voicing anxiety and lack of sleep recently.  She states she is nervous regarding her upcoming chemotherapy appointment and does not know what to expect on Thursday.  She states she is significantly fatigued and tired due to not sleeping at night related to her anxiety.  Her midline abdominal wound appears moist with good granulation tissue in the base.  She reports her pain is stable and she has not felt any side effects of weaning off of her methadone.    Past Medical History:   Diagnosis Date   ? Abnormal menses    ? Acute encephalopathy    ? Adnexal mass    ? CINDY (acute kidney injury) (H)    ? Anxiety    ? Asthma    ? Bilateral lower extremity edema    ? Cellulitis    ? Clostridium difficile infection    ? Depression    ? DM2 (diabetes mellitus, type 2) (H)    ? Endometrial adenocarcinoma (H)     stage IIIA grade 1    ? Essential hypertension    ? HLD (hyperlipidemia)    ? HTN (hypertension)    ? Hypokalemia    ? Insomnia    ? Lactic acidosis    ? Morbid obesity (H)    ? Obesity 10/19/2016   ? Ovarian mass    ? Peripheral polyneuropathy    ? Polysubstance abuse (H)    ? Septic shock (H)    ? Vaginal bleeding    ? Vasculopathy      Current Outpatient Medications on File Prior to Visit   Medication Sig Dispense Refill   ? hydrOXYzine HCl (ATARAX) 25 MG tablet Take 25 mg by mouth at bedtime.     ?  acetaminophen (TYLENOL) 325 MG tablet Take 650 mg by mouth every 6 (six) hours as needed for pain.      ? albuterol (PROAIR HFA;PROVENTIL HFA;VENTOLIN HFA) 90 mcg/actuation inhaler Inhale 2 puffs every 4 (four) hours as needed for wheezing.      ? calcium, as carbonate, (TUMS) 200 mg calcium (500 mg) chewable tablet Chew 2-3 tablets every 4 (four) hours as needed for heartburn.      ? famotidine (PEPCID) 20 MG tablet Take 20 mg by mouth daily.     ? gabapentin (NEURONTIN) 100 MG capsule Take 200 mg by mouth 2 (two) times a day.      ? hydroCHLOROthiazide (HYDRODIURIL) 25 MG tablet Take 25 mg by mouth daily.     ? hydrOXYzine HCl (ATARAX) 25 MG tablet Take 25 mg by mouth every 6 (six) hours as needed.      ? insulin aspart U-100 (NOVOLOG) 100 unit/mL injection Inject 7 Units under the skin 3 (three) times a day with meals.      ? insulin glargine (LANTUS) 100 unit/mL vial Inject 30 Units under the skin daily.      ? LORazepam (ATIVAN) 0.5 MG tablet Take 1 tablet (0.5 mg total) by mouth every 4 (four) hours as needed for anxiety. 1 tablet 0   ? mirtazapine (REMERON) 15 MG tablet Take 1 tablet (15 mg total) by mouth at bedtime. 30 tablet 3   ? nystatin (MYCOSTATIN) ointment Apply 1 application topically 2 (two) times a day.     ? ondansetron (ZOFRAN-ODT) 8 MG disintegrating tablet Take 8 mg by mouth every 8 (eight) hours as needed for nausea.     ? prochlorperazine (COMPAZINE) 10 MG tablet Take 10 mg by mouth every 6 (six) hours as needed for nausea.     ? senna-docusate (SENNA-TIME S) 8.6-50 mg tablet Take 1 tablet by mouth daily.      ? traZODone (DESYREL) 50 MG tablet Take 100 mg by mouth at bedtime as needed.      ? venlafaxine (EFFEXOR) 75 MG tablet Take 225 mg by mouth daily.       No current facility-administered medications on file prior to visit.        Review of Systems   Patient denies fever, chills, headache, lightheadedness, dizziness, rhinorrhea, cough, congestion, shortness of breath, chest pain,  palpitations, constipation, change in appetite, dysuria, frequency, burning or pain with urination.  Other than stated in HPI all other review of systems is negative.       Physical Exam   Vital signs: /52, heart rate 92, respiratory 20, temp 98.0  GENERAL APPEARANCE: Well developed, well nourished, in no acute distress  HEENT: normocephalic, atraumatic  PERRL, sclerae anicteric, conjunctivae clear and moist, EOM intact  CARDS: RRR, S1-S2, no murmurs gallops or rubs appreciated.  LUNGS: Lung sounds CTA, no adventitious sounds, respiratory effort normal.  ABD: Soft, nondistended, nontender positive bowel sounds in all 4 quadrants  EXTREMITIES: Trace lower extremity edema bilaterally with venous stasis skin changes  NEURO: Alert and oriented x 3.  Face is symmetric.  SKIN: 2 cm x 1 cm open area of midline abdomen incision with moist granular base.  No curling edges of wound or signs or symptoms of infection.  PSYCH: Weepy and anxious        Labs: .No results found for this or any previous visit (from the past 240 hour(s)).      Assessment:  1. Adjustment disorder with depressed mood     2. Anxiety     3. Adjustment insomnia     4. Endometrial cancer, FIGO stage IIIA (H)     5. Surgical wound dehiscence, sequela         Plan:      Adjustment disorder with depression and anxiety: Continue to follow-up with psychiatry which she will be seeing next Thursday.  Continue mirtazapine and venlafaxine.    Insomnia: We will increase her trazodone to 200 mg at at bedtime and add hydroxyzine 25 mg at at bedtime for her anxiety.  She does have PRN lorazepam however I would like to avoid that as much as possible.  I will continue to monitor and consider changes as needed.    Endometrial cancer: She will have chemotherapy on Thursday of this week and she has Zofran and Compazine for her nausea.    Surgical wound dehiscence: Her wound is healing well this week continue with hydrogel to base and cover with Mepilex  daily.    Electronically signed by Bell Saavedra CNP

## 2021-06-20 NOTE — LETTER
Letter by Bell Saavedra CNP at      Author: Bell Saavedra CNP Service: -- Author Type: --    Filed:  Encounter Date: 1/7/2020 Status: Signed         Patient: Chela Love   MR Number: [id number]   YOB: 1975   Date of Visit: 1/7/2020     Code Status:  FULL CODE  Visit Type: Follow Up (pain, anxiety, chemotherapy plan)     Facility:  [facility] [[id number]]        Facility Type: SNF (Skilled Nursing Facility, TCU)    History of Present Illness: Chela Love is a 44 y.o. female who has a past medical history for uncontrolled DM2, bipolar disorder,polysubstance abuse and HTN.  She was recently hospitalized at [facility] for a right adnexal mass found to be adenocarcinoma likely endometrioid (final path pending) and septic shock.  She underwent a total hysterectomy and oophorectomy and tumor debulking.  Her hospitalization was complicated by poor pain tolerance in the picture of her polysubstance abuse, acute blood loss anemia, CINDY and intra-abdominal infection.  Urine tox positive for opiates, meth and THC. She did complete a course of antibiotics. She did have slight dehiscence of midline abdominal wound with two times a day wound care and sent out to TCU.  Upon admission to TCU she was severely anxious, in pain and was sent to the ER.  She was evaluated, given IV pain medications and sent back to the TCU.  On 12/9/2019, she spiked a temp of 104.1 and was sent to the ER for evaluation.  On CT, it showed a 5 cm enhanced pelvic fluid which was concern for abscess.  She was started on IV vancomycin x1 day then 2 days of Zosyn and switch to oral Augmentin.  During her stay she did have a hemoglobin of 6.5 and was given 1 unit of packed red blood cells with a discharge hemoglobin of 7.6.  Her discharge creatinine was 1.03.  She did result positive for C. difficile and was started on oral vancomycin on 12/11.  Her mid abdomen dressing changes required Mepilex 3 times a week.  She  was also switched from heparin to Lovenox through 12/25.  Augmentin is through 12/23.  And vancomycin is 12/20.  She has an appointment scheduled with Dr. Ordaz gynecology oncology on 12/13, with Dr. Price in endocrine on 12/16, with Dr. Maynard and palliative care for pain management on 12/18.    Today, she reports that her pain is significantly less and she is using less Dilaudid.  Her abdominal wound is almost completely closed with an approximate 1 cm x 1 cm opening with full granulation to the base.  She verbalizes significant anxiety surrounding her upcoming chemotherapy induction on Thursday of this week.  She also states that now that her pain is more controlled she feels she has more time to think and causing her anxiety.  Her anxiety medications were switched last week per her psychiatrist.  Her fasting blood sugars in the a.m. are 1 60-1 90s and most of her other blood sugars are less than 200s.  She reports she feels she is eating better and and moving better.  She denies any issues with her bowels or bladder.    Past Medical History:   Diagnosis Date   ? Abnormal menses    ? Acute encephalopathy    ? Adnexal mass    ? CINDY (acute kidney injury) (H)    ? Anxiety    ? Asthma    ? Bilateral lower extremity edema    ? Cellulitis    ? Clostridium difficile infection    ? Depression    ? DM2 (diabetes mellitus, type 2) (H)    ? Endometrial adenocarcinoma (H)     stage IIIA grade 1    ? Essential hypertension    ? HLD (hyperlipidemia)    ? HTN (hypertension)    ? Hypokalemia    ? Insomnia    ? Lactic acidosis    ? Morbid obesity (H)    ? Obesity 10/19/2016   ? Ovarian mass    ? Peripheral polyneuropathy    ? Polysubstance abuse (H)    ? Septic shock (H)    ? Vaginal bleeding    ? Vasculopathy      Current Outpatient Medications on File Prior to Visit   Medication Sig Dispense Refill   ? acetaminophen (TYLENOL) 325 MG tablet Take 650 mg by mouth every 6 (six) hours as needed for pain.      ? albuterol (PROAIR  HFA;PROVENTIL HFA;VENTOLIN HFA) 90 mcg/actuation inhaler Inhale 2 puffs every 4 (four) hours as needed for wheezing.      ? calcium, as carbonate, (TUMS) 200 mg calcium (500 mg) chewable tablet Chew 2-3 tablets every 4 (four) hours as needed for heartburn.      ? famotidine (PEPCID) 20 MG tablet Take 20 mg by mouth daily.     ? gabapentin (NEURONTIN) 100 MG capsule Take 200 mg by mouth 2 (two) times a day.      ? hydroCHLOROthiazide (HYDRODIURIL) 25 MG tablet Take 25 mg by mouth daily.     ? HYDROmorphone (DILAUDID) 2 MG tablet Take by mouth every 6 (six) hours as needed for pain.      ? hydrOXYzine HCl (ATARAX) 25 MG tablet Take 25 mg by mouth every 6 (six) hours as needed.      ? insulin aspart U-100 (NOVOLOG) 100 unit/mL injection Inject 7 Units under the skin 3 (three) times a day with meals.      ? insulin glargine (LANTUS) 100 unit/mL vial Inject 30 Units under the skin daily.      ? methadone (DOLOPHINE) 5 MG tablet Take 2.5 mg by mouth every 12 (twelve) hours.      ? mirtazapine (REMERON) 15 MG tablet Take 1 tablet (15 mg total) by mouth at bedtime. 30 tablet 3   ? nystatin (MYCOSTATIN) ointment Apply 1 application topically 2 (two) times a day.     ? ondansetron (ZOFRAN-ODT) 8 MG disintegrating tablet Take 8 mg by mouth every 8 (eight) hours as needed for nausea.     ? senna-docusate (SENNA-TIME S) 8.6-50 mg tablet Take 1 tablet by mouth daily.      ? traZODone (DESYREL) 50 MG tablet Take 50 mg by mouth at bedtime as needed.      ? venlafaxine (EFFEXOR) 75 MG tablet Take 225 mg by mouth daily.       No current facility-administered medications on file prior to visit.        Review of Systems   Patient denies fever, chills, headache, lightheadedness, dizziness, rhinorrhea, cough, congestion, shortness of breath, chest pain, palpitations, constipation, change in appetite, dysuria, frequency, burning or pain with urination.  Other than stated in HPI all other review of systems is negative.         Physical  Exam   Vital signs: /77, heart rate 86, respiratory 16, height 96.6, 93% on room air.  GENERAL APPEARANCE: Well developed, well nourished, in no acute distress  HEENT: normocephalic, atraumatic  PERRL, sclerae anicteric, conjunctivae clear and moist, EOM intact  CARDS: RRR, S1-S2, no murmurs gallops or rubs appreciated.  LUNGS: Lung sounds CTA, no adventitious sounds, respiratory effort normal.  ABD: Soft, nondistended, nontender positive bowel sounds in all 4 quadrants, midline incision with most well approximated.  EXTREMITIES: Trace lower extremity edema bilaterally  NEURO: Alert and oriented x 3.  Face is symmetric.  SKIN: Open area of abdominal incision is approximately 1 cm x 1 cm with good red granulation tissue in the base.  Surrounding tissue is free of signs and symptoms of infection.  PSYCH: Weepy at times        Labs: .No results found for this or any previous visit (from the past 240 hour(s)).      Assessment:  1. Uncontrolled type 2 diabetes mellitus with hyperglycemia (H)     2. Wound dehiscence     3. Pain     4. Anxiety         Plan:     Diabetes: Blood sugars are improving will increase AC aspart to 7 units 3 times daily continue Lantus.    Wound dehiscence: Wound is improving and continues to heal.  Nursing to continue current wound care daily.    Pain: I did discuss patient's plan for weaning her off the Dilaudid over the next 2 weeks.  I will change her to Dilaudid 2 mg every 6 hours x7 days as needed and then down to 1 mg every 6 hours x7 days as needed and then discontinue.  We did discuss the methadone and the future plan to wean her off of that.  She is agreeable to that plan she also continues on gabapentin.    Anxiety: She has been seen by psychiatry for medication management.  I did have a long discussion with her about processing her anxious feelings.  She does articulate her feelings and processed foods well when she has someone to talk to.  Continue to follow-up with [facility] in-house  psychology to help her process her anxiety surrounding her current medical status.  I did reassure her that she continues to need TCU level of care and that she is incrementally improving.    35 total minutes spent with 25 minutes spent face-to-face with patient in counseling regarding her anxiety and normal recovery phase.  Also counseled her and coordinated for in-house psychology.    Electronically signed by Bell Saavedra CNP

## 2021-06-20 NOTE — LETTER
Letter by Bell Saavedra CNP at      Author: Bell Saavedra CNP Service: -- Author Type: --    Filed:  Encounter Date: 1/22/2020 Status: (Other)         Patient: Chela Love   MR Number: [id number]   YOB: 1975   Date of Visit: 1/22/2020     Code Status:  FULL CODE  Visit Type: Follow Up (pain management) and Problem Visit (abdominal wound)     Facility:  [facility] [[id number]]        Facility Type: SNF (Skilled Nursing Facility, TCU)    History of Present Illness: Chela Love is a 44 y.o. female who has a past medical history for uncontrolled DM2, bipolar disorder,polysubstance abuse and HTN.  She was recently hospitalized at [facility] for a right adnexal mass found to be adenocarcinoma likely endometrioid (final path pending) and septic shock.  She underwent a total hysterectomy and oophorectomy and tumor debulking.  Her hospitalization was complicated by poor pain tolerance in the picture of her polysubstance abuse, acute blood loss anemia, CINDY and intra-abdominal infection.  Urine tox positive for opiates, meth and THC. She did complete a course of antibiotics. She did have slight dehiscence of midline abdominal wound with two times a day wound care and sent out to TCU.  Upon admission to TCU she was severely anxious, in pain and was sent to the ER.  She was evaluated, given IV pain medications and sent back to the TCU.  On 12/9/2019, she spiked a temp of 104.1 and was sent to the ER for evaluation.  On CT, it showed a 5 cm enhanced pelvic fluid which was concern for abscess.  She was started on IV vancomycin x1 day then 2 days of Zosyn and switch to oral Augmentin.  During her stay she did have a hemoglobin of 6.5 and was given 1 unit of packed red blood cells with a discharge hemoglobin of 7.6.  Her discharge creatinine was 1.03.  She did result positive for C. difficile and was started on oral vancomycin on 12/11.  Her mid abdomen dressing changes required Mepilex  3 times a week.  She was also switched from heparin to Lovenox through 12/25.  Augmentin is through 12/23.  And vancomycin is 12/20.  She has an appointment with Dr. Ordaz gynecology oncology on 12/13, with Dr. Price in endocrine on 12/16, with Dr. Maynard and palliative care for pain management on 12/18.    Today, nursing request visit due to evaluation of her abdominal wound.  Upon evaluation her abdominal wound is very superficial however does appear to be a bit dry and pausing in healing.  Wound size is approximately 3 cm x 2 cm.  She has no signs and symptoms of infection.  She reports that her pain is well controlled and she has been off the Dilaudid for some time.  Reports her nausea is pretty much resolved and she does have a good appetite.  Planning for more chemo starting tomorrow.    She reports that her lower extremities did not have Ace wraps and she endorses heavy feeling of her legs.  She does have venous stasis changes to her lower extremities with trace edema.    Past Medical History:   Diagnosis Date   ? Abnormal menses    ? Acute encephalopathy    ? Adnexal mass    ? CINDY (acute kidney injury) (H)    ? Anxiety    ? Asthma    ? Bilateral lower extremity edema    ? Cellulitis    ? Clostridium difficile infection    ? Depression    ? DM2 (diabetes mellitus, type 2) (H)    ? Endometrial adenocarcinoma (H)     stage IIIA grade 1    ? Essential hypertension    ? HLD (hyperlipidemia)    ? HTN (hypertension)    ? Hypokalemia    ? Insomnia    ? Lactic acidosis    ? Morbid obesity (H)    ? Obesity 10/19/2016   ? Ovarian mass    ? Peripheral polyneuropathy    ? Polysubstance abuse (H)    ? Septic shock (H)    ? Vaginal bleeding    ? Vasculopathy      Current Outpatient Medications on File Prior to Visit   Medication Sig Dispense Refill   ? acetaminophen (TYLENOL) 325 MG tablet Take 650 mg by mouth every 6 (six) hours as needed for pain.      ? albuterol (PROAIR HFA;PROVENTIL HFA;VENTOLIN HFA) 90  mcg/actuation inhaler Inhale 2 puffs every 4 (four) hours as needed for wheezing.      ? calcium, as carbonate, (TUMS) 200 mg calcium (500 mg) chewable tablet Chew 2-3 tablets every 4 (four) hours as needed for heartburn.      ? famotidine (PEPCID) 20 MG tablet Take 20 mg by mouth daily.     ? gabapentin (NEURONTIN) 100 MG capsule Take 200 mg by mouth 2 (two) times a day.      ? hydroCHLOROthiazide (HYDRODIURIL) 25 MG tablet Take 25 mg by mouth daily.     ? hydrOXYzine HCl (ATARAX) 25 MG tablet Take 25 mg by mouth every 6 (six) hours as needed.      ? insulin aspart U-100 (NOVOLOG) 100 unit/mL injection Inject 7 Units under the skin 3 (three) times a day with meals.      ? insulin glargine (LANTUS) 100 unit/mL vial Inject 30 Units under the skin daily.      ? LORazepam (ATIVAN) 0.5 MG tablet Take 1 tablet (0.5 mg total) by mouth every 4 (four) hours as needed for anxiety. 1 tablet 0   ? mirtazapine (REMERON) 15 MG tablet Take 1 tablet (15 mg total) by mouth at bedtime. 30 tablet 3   ? nystatin (MYCOSTATIN) ointment Apply 1 application topically 2 (two) times a day.     ? ondansetron (ZOFRAN-ODT) 8 MG disintegrating tablet Take 8 mg by mouth every 8 (eight) hours as needed for nausea.     ? prochlorperazine (COMPAZINE) 10 MG tablet Take 10 mg by mouth every 6 (six) hours as needed for nausea.     ? senna-docusate (SENNA-TIME S) 8.6-50 mg tablet Take 1 tablet by mouth daily.      ? traZODone (DESYREL) 50 MG tablet Take 50 mg by mouth at bedtime as needed.      ? venlafaxine (EFFEXOR) 75 MG tablet Take 225 mg by mouth daily.     ? [DISCONTINUED] HYDROmorphone (DILAUDID) 2 MG tablet Take by mouth every 6 (six) hours as needed for pain.      ? [DISCONTINUED] methadone (DOLOPHINE) 5 MG tablet Take 0.5 tablets (2.5 mg total) by mouth every 12 (twelve) hours. 60 tablet 0     No current facility-administered medications on file prior to visit.        Review of Systems   Patient denies fever, chills, headache,  lightheadedness, dizziness, rhinorrhea, cough, congestion, shortness of breath, chest pain, palpitations, constipation, change in appetite, dysuria, frequency, burning or pain with urination.  Other than stated in HPI all other review of systems is negative.       Physical Exam   Vital signs: BP 92/54, heart rate 85, respiratory 18, temp 97.8, blood sugars  with most 160  GENERAL APPEARANCE: Well developed, well nourished, in no acute distress  HEENT: normocephalic, atraumatic  PERRL, sclerae anicteric, conjunctivae clear and moist, EOM intact  CARDS: RRR, S1-S2, no murmurs gallops or rubs appreciated.  LUNGS: Lung sounds CTA, no adventitious sounds, respiratory effort normal.  ABD: Soft, nondistended, nontender positive bowel sounds in all 4 quadrants  EXTREMITIES: Trace lower extremity edema bilaterally with venous stasis skin changes  NEURO: Alert and oriented x 3.  Face is symmetric.  SKIN: 3 cm x 2 cm open area of midline abdomen incision with red wound base appearing to be dry in nature.  No curling edges of wound or signs or symptoms of infection.  PSYCH: euthymic        Labs: .No results found for this or any previous visit (from the past 240 hour(s)).      Assessment:  1. Wound dehiscence     2. Venous stasis dermatitis of both lower extremities     3. Edema, unspecified type     4. Pain management         Plan:    Wound dehiscence: wound is looking a bit dry.  Will change dressing changes to hydrogel mesh two times a day.  Discussed with nursing and they will start this today.      Venous stasis: will dc ace wraps and add compression hose daily.  Counseled patient on the use and purpose of compression hose.  continue with gabapentin    Edema: This is well controlled believe this will be improved and heaviness of lower extremities will improve with compression.    Pain management: I did discuss pain management with the patient and she is completely weaned off of Dilaudid.  She states she has no pain  and is agreeable to wean off of methadone.  I will give her 2.5 mg of methadone daily x3 days and then 2.5 mg every other day and then discontinue.    I did review her recent psychiatric note with Dr. Suárez on 1/20 and there was some concern that she was not getting her medications as he had prescribed.  I did discuss this with her and explained that she is getting her medications as prescribed by Dr. Suárez.  She is grateful for this explanation.  He did recommend increasing Remeron to 30 mg daily and so I will do that at this time.        Electronically signed by Bell Saavedra CNP

## 2021-06-21 NOTE — LETTER
"Letter by Beulah Hartman LICSW at      Author: Beulah Hartman LICSW Service: -- Author Type: --    Filed:  Encounter Date: 10/14/2020 Status: (Other)         October 14, 2020        Chela Love  [address] [place] [address]     Dear Chela Love,    We have encountered many no-shows and no-call to reschedule appointments (10/14, 10/7, 9/30, 9/16, 7/28).  Because of this I am no longer able to care for you.    In order to continue to provide the best care, a psychotherapist must see you on a regular basis. Due to your recent and frequent no shows and failed attendance for scheduled appointments,  we feel that our clinic is not providing the best care. Therefore our recommendation is that you seek help from a psychotherapy clinic in the clinic in the community that fits well with your schedule.     Below are some referrals to other clinics within the community where you may choose to seek services. As discussed in session, I recommend you enroll in a \"full DBT program\" to meet your needs. I will need you to be enrolled in a DBT program in order to provide you with psychotherapy again  Find also crisis resource at [facility].     Clinics that offer DBT:     [facility]: [phone]  [address], [address]  [place] [address]    [facility]:  [phone]  [address]  [address]  [place] [address]    Crisis resource:  [facility]. - [phone]    We wish you well. Thank you for your time in regards to this matter.    Sincerely,    Beulah Hartman       "

## 2021-06-25 NOTE — PROGRESS NOTES
Progress Notes by Hortencia Markham CNP at 3/15/2017  9:40 AM     Author: Hortencia Markham CNP Service: -- Author Type: Nurse Practitioner    Filed: 3/15/2017 10:06 AM Encounter Date: 3/15/2017 Status: Signed    : Hrotencia Markham CNP (Nurse Practitioner)       Date of Service:3/15/2017    Chief Complaint:   Chief Complaint   Patient presents with   ? Follow-up     bilat LE edema       History:    Chela presents to clinic for reevaluation of her lower legs.  She failed her appointment with [facility] last week to obtain compression stockings.  She removed her 2-layer wrap and is wearing her tubigrips.  The tubigrip stockings make her legs feel better.    Current Outpatient Prescriptions   Medication Sig Dispense Refill   ? albuterol (PROVENTIL HFA;VENTOLIN HFA) 90 mcg/actuation inhaler 1-2 puffs every 4-6 hours, as needed. 1 Inhaler 1   ? gabapentin (NEURONTIN) 300 MG capsule Take 1 capsule (300 mg total) by mouth 3 (three) times a day. 90 capsule 3   ? LANTUS SOLOSTAR 100 unit/mL (3 mL) pen Inject 50 Units under the skin bedtime. 11.65 Type 2 with hyperglycemia 5 Box PRN   ? lidocaine (XYLOCAINE) 2 % jelly Apply topically as needed.     ? lisinopril (PRINIVIL,ZESTRIL) 40 MG tablet TAKE 1/2 TABLET(20 MG) BY MOUTH DAILY 30 tablet 6   ? metFORMIN (GLUMETZA) 1000 MG (MOD) 24 hr tablet Take 1 tablet (1,000 mg total) by mouth 2 (two) times a day with meals. 60 tablet 3   ? MICROLET LANCET   98   ? NOVOLOG FLEXPEN 100 unit/mL injection pen Check blood sugar three (3) times daily.  11.65 Type 2 with hyperglycemia  Flex Pen Needles - BD Ultra-fine Anat Pen Needles - NDC [address]-50 - dispense 1 case, refill PRN for 1 year  No supplies needed 5 Pre-filled Pen Syringe PRN   ? sertraline (ZOLOFT) 50 MG tablet Take 1 tablet (50 mg total) by mouth daily. 30 tablet 3     Current Facility-Administered Medications   Medication Dose Route Frequency Provider Last Rate Last Dose   ? lidocaine 2 % jelly (XYLOCAINE)    Topical PRN Hortencia Markham CNP   1 application at 03/01/17 0905       Allergies   Allergen Reactions   ? Venom-Honey Bee Hives     swelling   ? Other Allergy (See Comments) Rash     Cactus flower extract       Physical Exam:    Vitals:    03/15/17 0900   BP: 124/84   Pulse: 68   Resp: 18   Temp: 98.4  F (36.9  C)    There is no height or weight on file to calculate BMI.    General:  41 y.o. female in no apparent distress.    Psychiatric:  Alert and oriented x 3.  Cooperative.   Integumentary:  Skin is uniformly warm, dry and pink.  Lower extremity edema: slightly firm  Ulcerations:  none .     Vasc Edema 1/20/2017 1/31/2017 2/9/2017 3/1/2017 3/15/2017   Right just above MTP 24.8 25 23.3 24 24   Right Ankle 25.5 26 24.5 27.5 27.1   Right Widest Calf 43.6 43 42.5 45 45   Left - just above MTP 24.1 - - 24 24   Left Ankle 25 - - 27.5 28   Left Widest Calf 43.3 - - 45.6 46.4               Assessment:  1. Leg ulcer, right, limited to breakdown of skin     2. Peripheral polyneuropathy     3. Type 2 diabetes mellitus with complication, without long-term current use of insulin         A new wound was identified today: no.    Plan:  1.  Venous Insufficieny Ulceration, resolved.  She needs to obtain compression stockings.     2.   Treatment:   A 2-layer wrap will be applied to both legs to reduce the edema before she is measured for compression stockings.    3.   Patient will follow up with me in one month  for reevaluation.      Hortencia Marhkam, APRN, CNP,  [facility]  [phone]

## 2021-06-25 NOTE — PROGRESS NOTES
Progress Notes by Amanda Amato LPN at 8/15/2017  9:00 AM     Author: Amanda Amato LPN Service: -- Author Type: Licensed Nurse    Filed: 8/15/2017  9:59 AM Encounter Date: 8/15/2017 Status: Signed    : Amanda Amato LPN (Licensed Nurse)           Nurse Visit      Date of Service:8/15/2017    Chief Complaint: Patient presents to clinic for evaluation of their ulcer and and swelling    Dressing on Arrival Intact bilateral 2 layers      Allergies: Venom-honey bee and Other allergy (see comments)    Assessment:    Resp 18    General:  Patient presents to clinic in no apparent distress.  Psychiatric:  Alert and oriented x3.   Lower extremity:  edema is present.    Integumentary:  Skin is uniformly warm, dry and pink.    Wound size:   Wound 08/03/17 right medial leg (Active)   Pre Size Length 0.8 8/11/2017  8:00 AM   Pre Size Width 0.5 8/11/2017  8:00 AM   Pre Total Sq cm 0.4 8/11/2017  8:00 AM   Description scab 8/3/2017  7:00 AM       Wound 08/03/17 right anterior leg (Active)   Pre Size Length 0 8/11/2017  8:00 AM   Pre Size Width 0 8/11/2017  8:00 AM   Pre Size Depth 0 8/11/2017  8:00 AM   Pre Total Sq cm 0 8/11/2017  8:00 AM   Description scab 8/3/2017  7:00 AM       Wound 10/11/16 Other wound type (comment) Leg (Active)      Undermining is not present  The periwoundskin is intact with hemosiderin staining present. No s/s of infection present.     Plan:         1. Patient will Follow up as previously scheduled         2. Treatment provided:     Cleansed with: normal saline  Applied: adaptic  Secured with: Bilateral 2 layers

## 2021-06-25 NOTE — PROGRESS NOTES
Progress Notes by Hortencia Markham CNP at 4/28/2017  8:40 AM     Author: Hortencia Markham CNP Service: -- Author Type: Nurse Practitioner    Filed: 4/28/2017 11:13 AM Encounter Date: 4/28/2017 Status: Signed    : Hortencia Markham CNP (Nurse Practitioner)       Date of Service:4/28/2017    Chief Complaint:   Chief Complaint   Patient presents with   ? Wound Check       History:    Chela presents to clinic for evaluation of lower leg ulcers.  Her previous ulcers resolved.  However, in the past week she developed two new ulcers.  The ulcer on the right leg she believes started as a blister.  On the left leg, she hit her leg.  She is not applying anything to the ulcers.  She is wearing compression stockings that she purchased from [employer], but is not sure of the compression level.  She was not wearing any compression to the appointment and did not bring them with.  She will bring them to her next visit.    Current Outpatient Prescriptions   Medication Sig Dispense Refill   ? albuterol (PROVENTIL HFA;VENTOLIN HFA) 90 mcg/actuation inhaler 1-2 puffs every 4-6 hours, as needed. 1 Inhaler 1   ? gabapentin (NEURONTIN) 300 MG capsule Take 1 capsule (300 mg total) by mouth 3 (three) times a day. 90 capsule 3   ? LANTUS SOLOSTAR 100 unit/mL (3 mL) pen Inject 50 Units under the skin bedtime. 11.65 Type 2 with hyperglycemia 5 Box PRN   ? lidocaine (XYLOCAINE) 2 % jelly Apply topically as needed.     ? lisinopril (PRINIVIL,ZESTRIL) 40 MG tablet TAKE 1/2 TABLET(20 MG) BY MOUTH DAILY 30 tablet 6   ? metFORMIN (GLUMETZA) 1000 MG (MOD) 24 hr tablet Take 1 tablet (1,000 mg total) by mouth 2 (two) times a day with meals. 60 tablet 3   ? MICROLET LANCET   98   ? NOVOLOG FLEXPEN 100 unit/mL injection pen Check blood sugar three (3) times daily.  11.65 Type 2 with hyperglycemia  Flex Pen Needles - BD Ultra-fine Anat Pen Needles - NDC [address]-79 - dispense 1 case, refill PRN for 1 year  No supplies needed 5  Pre-filled Pen Syringe PRN   ? sertraline (ZOLOFT) 50 MG tablet Take 1 tablet (50 mg total) by mouth daily. 30 tablet 3     Current Facility-Administered Medications   Medication Dose Route Frequency Provider Last Rate Last Dose   ? lidocaine 2 % jelly (XYLOCAINE)   Topical PRN Hortencia Blanco MellissaAlanna, CNP   1 application at 04/28/17 0850       Allergies   Allergen Reactions   ? Venom-Honey Bee Hives     swelling   ? Other Allergy (See Comments) Rash     Cactus flower extract       Physical Exam:    Vitals:    04/28/17 0843   BP: 116/78   Pulse: 88   Resp: 18   Temp: 97  F (36.1  C)    There is no height or weight on file to calculate BMI.    General:  42 y.o. female in no apparent distress.    Psychiatric:  Alert and oriented x 3.  Cooperative.   Integumentary:  Skin is uniformly warm, dry and pink.  Lower extremity edema: minimal  Ulcerations:  There is no corrine wound erythema, induration, maceration, denudement, fluctuance or warmth surrounding the ulcer(s).    Vasc Edema 1/31/2017 2/9/2017 3/1/2017 3/15/2017 4/28/2017   Right just above MTP 25 23.3 24 24 24.5   Right Ankle 26 24.5 27.5 27.1 26   Right Widest Calf 43 42.5 45 45 45.7   Left - just above MTP - - 24 24 24.8   Left Ankle - - 27.5 28 27   Left Widest Calf - - 45.6 46.4 45.5       Wound 01/31/17 r leg superior (Active)   Pre Size Length 1.5 4/28/2017  8:00 AM   Pre Size Width 1 4/28/2017  8:00 AM   Pre Total Sq cm 1.5 4/28/2017  8:00 AM   Description scab 4/28/2017  8:00 AM       Wound 04/28/17 left anterior leg (Active)   Pre Size Length 0.7 4/28/2017  8:00 AM   Pre Size Width 0.7 4/28/2017  8:00 AM   Pre Total Sq cm 0.49 4/28/2017  8:00 AM       Wound 10/11/16 Other wound type (comment) Leg (Active)       Assessment:  1. Leg ulcer, right, limited to breakdown of skin  Change dressing   2. Type 2 diabetes mellitus with complication, without long-term current use of insulin     3. Obesity (BMI 30-39.9)     4. Acquired lymphedema of lower extremity     5.  Venous hypertension, chronic, bilateral     6. Peripheral polyneuropathy     7. Tobacco abuse     8. Leg ulcer, left, limited to breakdown of skin  Change dressing               A new wound was identified today: yes,  it is located bilateral legs.    Plan:  1.  Debridement of the right and left leg ulcer was recommended.  After consent was obtained and topical 2% Xylocaine was applied under clean conditions, and using a #15 blade,the devitalized dermal tissue was debrided for a total square centimeters of 1.99. Following debridement, there was a decrease in the nonviable tissue. The patient tolerated the procedure without any difficulty.     2.   Venous Insufficieny Ulceration, no signs of infection.      3.   Treatment:   To the ulcers, silvercel was applied to decrease the bacterial load and cover with an ABD.  A 2-layer wrap was applied to each leg.    4.  Tobacco abuse.  She is smoking more. Encouraged to quit.    4.   Patient will follow up with me in one week  for reevaluation.      Hortencia Markham, APRN, CNP,  CWCN  [facility]  [phone]

## 2021-06-28 NOTE — PROGRESS NOTES
Progress Notes by Dori Martin CMA at 3/2/2020  9:45 AM     Author: Dori Martin CMA Service: Psychiatry Author Type: Certified Medical Assistant    Filed: 3/2/2020 10:00 AM Encounter Date: 3/2/2020 Status: Signed    : Dori Martin CMA (Certified Medical Assistant)       Pt is here for psychiatric follow up and medication management.  Still having ongoing treatment for cancer.     Depression on/off really depends on how she is feeling at the time.  Denies SI/HI thoughts at this time.       [place] :

## 2021-06-28 NOTE — PROGRESS NOTES
Progress Notes by Dori Martin CMA at 12/23/2019  8:15 AM     Author: Dori Martin CMA Service: -- Author Type: Certified Medical Assistant    Filed: 12/23/2019  9:19 AM Encounter Date: 12/23/2019 Status: Signed    : Dori Martin CMA (Certified Medical Assistant)       Pt is here to establish care for her Anxiety  Just dx with Stage 3 Ovarian Cancer and currently being treated Sepsis.  Does have a addiction provider with is managing her Methadone.         [place] :

## 2021-06-28 NOTE — PROGRESS NOTES
Progress Notes by Dori Martin CMA at 1/20/2020  9:15 AM     Author: Dori Martin CMA Service: Psychiatry Author Type: Certified Medical Assistant    Filed: 1/20/2020  9:49 AM Encounter Date: 1/20/2020 Status: Signed    : Dori Martin CMA (Certified Medical Assistant)       Pt is here for psychiatric follow up and medication management.  Here today alone, mom dropped her off.    Pt has had 1 round of Chemo and has noticed some balance issues and has been more emotional.  Depression: 5/5 ; boyfriend broke up recently as well  Not sleeping at all    Still looking for a therapist to help, Sobriety in ongoing.     Denies SI/Hi thoughts at this time  [place] :

## 2021-06-29 NOTE — PROGRESS NOTES
Progress Notes by Dori Martin CMA at 8/12/2020  1:00 PM     Author: Dori Martin CMA Service: -- Author Type: Certified Medical Assistant    Filed: 8/12/2020  1:30 PM Encounter Date: 8/12/2020 Status: Signed    : Dori Martin CMA (Certified Medical Assistant)       This video/telephone visit will be conducted via a call between you and your physician/provider. We have found that certain health care needs can be provided without the need for an in-person physical exam. This service lets us provide the care you need with a video /telephone conversation. If a prescription is necessary we can send it directly to your pharmacy. If lab work is needed we can place an order for that and you can then stop by our lab to have the test done at a later time.   Just as we bill insurance for in-person visits, we also bill insurance for video/telephone visits. If you have questions about your insurance coverage, we recommend that you speak with your insurance company.   Patient has given verbal consent for video/Telephone visit? Yes   Patient would like telephone visit please call:  [phone]  Dori OCONNOR CMA    AVS: Mail   Per Pt she is in remission for Ovarian cancer.   Seeing Beulah Hartman for therapy.   Pt has detox of Ativan and currently tapering off pain medication.  Depression  4/5   Denies SI/HI thoughts at this time.   Patient verified allergies, medications and pharmacy via phone.  Patient states she is ready for visit.  [place]  was reviewed prior to seeing patient today. See below for embedded report.

## 2021-06-29 NOTE — PROGRESS NOTES
Progress Notes by Cristy Drummond LPN at 9/9/2020 12:00 PM     Author: Cristy Drummond LPN Service: -- Author Type: Licensed Nurse    Filed: 9/9/2020  1:00 PM Encounter Date: 9/9/2020 Status: Addendum    : Cristy Drummond LPN (Licensed Nurse)    Related Notes: Original Note by Cristy Drummond LPN (Licensed Nurse) filed at 9/9/2020 12:53 PM       This video/telephone visit will be conducted via a call between you and your physician/provider. We have found that certain health care needs can be provided without the need for an in-person physical exam. This service lets us provide the care you need with a video /telephone conversation. If a prescription is necessary we can send it directly to your pharmacy. If lab work is needed we can place an order for that and you can then stop by our lab to have the test done at a later time.   Just as we bill insurance for in-person visits, we also bill insurance for video/telephone visits. If you have questions about your insurance coverage, we recommend that you speak with your insurance company.   Patient has given verbal consent for video/Telephone visit? yes  Patient would like the video visit invitation sent by: Text to cell phone: NA or Send to email: NA  Please call [phone]  SEDA/LPN : AD,LPN  Patient verified allergies, medications and pharmacy via phone. . Patient states she  is ready for visit.

## 2021-07-14 PROBLEM — E11.8 TYPE 2 DIABETES MELLITUS WITH COMPLICATION, WITHOUT LONG-TERM CURRENT USE OF INSULIN (H): Chronic | Status: RESOLVED | Noted: 2017-08-03 | Resolved: 2019-05-02

## 2021-07-21 ENCOUNTER — VIRTUAL VISIT (OUTPATIENT)
Dept: BEHAVIORAL HEALTH | Facility: CLINIC | Age: 46
End: 2021-07-21
Payer: COMMERCIAL

## 2021-07-21 DIAGNOSIS — F31.81 BIPOLAR II DISORDER, MODERATE, DEPRESSED, WITH ANXIOUS DISTRESS (H): Primary | ICD-10-CM

## 2021-07-21 DIAGNOSIS — N83.8 OVARIAN MASS: ICD-10-CM

## 2021-07-21 PROCEDURE — 99214 OFFICE O/P EST MOD 30 MIN: CPT | Mod: TEL | Performed by: PSYCHIATRY & NEUROLOGY

## 2021-07-21 RX ORDER — HYDROXYZINE HYDROCHLORIDE 25 MG/1
25 TABLET, FILM COATED ORAL EVERY 6 HOURS PRN
Qty: 90 TABLET | Refills: 3 | Status: SHIPPED | OUTPATIENT
Start: 2021-07-21 | End: 2022-02-28

## 2021-07-21 RX ORDER — VENLAFAXINE HYDROCHLORIDE 150 MG/1
150 CAPSULE, EXTENDED RELEASE ORAL DAILY
Qty: 30 CAPSULE | Refills: 3 | Status: SHIPPED | OUTPATIENT
Start: 2021-07-21 | End: 2022-02-28

## 2021-07-21 RX ORDER — LANOLIN ALCOHOL/MO/W.PET/CERES
3 CREAM (GRAM) TOPICAL
Qty: 30 TABLET | Refills: 3 | Status: SHIPPED | OUTPATIENT
Start: 2021-07-21 | End: 2022-02-28

## 2021-07-21 RX ORDER — VENLAFAXINE HYDROCHLORIDE 150 MG/1
150 CAPSULE, EXTENDED RELEASE ORAL DAILY
COMMUNITY
Start: 2021-07-19 | End: 2021-07-21

## 2021-07-21 NOTE — PROGRESS NOTES
________________________________________  Medications Phoned  to Pharmacy [] yes []no  Name of Pharmacist:  List Medications, including dose, quantity and instructions    Medications ordered this visit were e-scribed.  Verified by order class [x] yes  [] no   Effexor-XR 15t0 mg; hydroxyzine 25 mg; melatonin 3 mg   Medication changes or discontinuations were communicated to patient's pharmacy: [] yes  [x] no    Dictation completed at time of chart check: [x] yes  [] no    I have checked the documentation for today s encounters and the above information has been reviewed and completed.

## 2021-07-21 NOTE — PROGRESS NOTES
This video/telephone visit will be conducted via a call between you and your physician/provider. We have found that certain health care needs can be provided without the need for an in-person physical exam. This service lets us provide the care you need with a video /telephone conversation. If a prescription is necessary we can send it directly to your pharmacy. If lab work is needed we can place an order for that and you can then stop by our lab to have the test done at a later time.    Just as we bill insurance for in-person visits, we also bill insurance for video/telephone visits. If you have questions about your insurance coverage, we recommend that you speak with your insurance company.    Patient has given verbal consent for Telephone visit? Yes   Patient would like telephone visit please call: [phone]  SEDA/SHABBIR OCONNOR CMA   Very depressed since moving to the [facility] to recover.   Pt is currently at [facility] for rehab for her recent foot surgery and recovery.   Pharmacy updated for while at [facility].    Patient verified allergies, medications and pharmacy via phone.  Patient states she is ready for visit.  MN  to be reviewed by provider.

## 2021-07-21 NOTE — PROGRESS NOTES
Outpatient Followup Psychiatric Evaluation      Pertinent History: The patient presented for an initial intake to this clinic on December 23 of 2019.  The patient has a reported prior history of bipolar affective disorder, as well as a history of polysubstance abuse including methamphetamine abuse.  I also see she has had a diagnosis of depressive disorder, NOS.  In addition she has morbid obesity and diabetes mellitus type 2 and polycystic ovary syndrome.  She has steroid dependent asthma.  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  She has had a ZANDRA, SBO and omentectomy.  Early in December 2019 she was admitted to the hospital with an abscess in the context of fevers.  She had significant anxiety over her diagnoses and prognosis.  Review of the records shows that she had been on Effexor 225 mg a day but apparently non-compliant with that.  She is also recently been on Lexapro.  She has had trazodone for sleep.  She was referred to this clinic due to concerns of worsening mood in the context of the situational stressors.  The patient apparently had been followed here in the distant past by 1 of our therapist.  At that visit we did change her Effexor XR to 225 mg a day.  I have discontinued her Lexapro.  I have started her on Remeron 15 mg at night.  The patient was in a structured and safe setting to prevent relapse and begin chemotherapy.    I saw the patient in late January 2020.  The patient had just completed her first round of chemotherapy and had multiple stressors including a break-up with a boyfriend.  She described more emotional difficulties.  She was living in a community house.  She was tearful throughout the interview.  She reported being sober since Thanksgiving.  There was some question about her current medication regime and what she was receiving at her placement.  We did attempt to increase her Remeron at that visit but later through phone calls we did increase her  trazodone.  We continued her on Effexor.     I last saw the patient on March 2, 2020. At that time she reported her mood was improving and she had no urges to use chemicals. She continued with her chemotherapy at that time. We did increase her Remeron to 30 mg for that time.  The patient was admitted to the hospital on March 3 regarding a blood transfusion. She was admitted to the hospital again on April 30 with staff infection and bacteremia.  Review of the record shows that the patient continues to follow with Paige Hartman for individual therapy. Over the course of the last few weeks her Ativan has been discontinued and she's tapering on her narcotics. Her debt depression is improving but she continues to have a low mood according to her therapist notes due to situational stressors. At times she can be somewhat dramatic and loud at times tearful and depressed. Her plan is to move out of the nursing home and into a cabin in the woods with her brother and have support from her sister. She has been referred for a rule 25 to help with chemical dependency care coordination.    I saw the patient for a virtual visit  August 12, 2020. At that time we increased her Effexor to 300 mg a day. She was being weaned off of narcotics. The patient was seen for a virtual visit in this clinic by her psychologist on September 2 of 2020. She was still struggling with some anxiety over future concerns but was doing fairly well. She was described as being an early remission from her methamphetamine abuse.    Current Symptoms: On my interview with the patient today she states that she has not made any follow-up appointments since I last saw her in September 2020.  She states she has been struggling with some medical issues including a massive infection requiring antibiotics as well as having some digits amputated.  During that process apparently she was taken off her BuSpar, gabapentin and trazodone.  She continues on her Effexor and  Vistaril.  She reports that she continues to have periodic depression but denies any desire to be dead or thoughts of suicide.  No hallucinations or delusions.  She denies that she has had any barbara.  She admits that she no longer sees her therapist and is willing to get set back up with that.  Importantly, she has remained sober for almost 2 years.  She continues to have some negative thinking but overall feels more optimistic.  She continues with variable concentration and focus.  She had moved to her own place but it only been there a short time when she got physically ill and now she is currently in a care facility or at least with some transitional care aspect that allows her to have IV antibiotics and close medical supervision.  She states she is being treated well and is comfortable where she is at.  She states she is in agreement with the treatment plan and will remain medication compliant.          Current Medications:  Current medications were reviewed.  Please see the chart for additional information.    Medication Compliance: yes    Side Effects to Medications:  no      Vitals:  Wt Readings from Last 3 Encounters:   01/20/20 201 lb (91.2 kg)   01/15/20 190 lb (86.2 kg)   12/24/19 (!) 232 lb (105.2 kg)     Temp Readings from Last 3 Encounters:   01/15/20 98.2  F (36.8  C)   12/24/19 97.2  F (36.2  C)   11/22/19 98.8  F (37.1  C) (Oral)     BP Readings from Last 3 Encounters:   03/02/20 97/50   01/20/20 (!) 158/104   01/15/20 90/60     Pulse Readings from Last 3 Encounters:   03/02/20 67   01/20/20 77   01/15/20 89       Problem List (Please see medical records):          Mental Status Exam:   Appearance:   The patient again was interviewed by the phone.  She was calm and cooperative.  Behavior:   She was calm polite and participated well.  No irritability or agitation.  Not guarded.  Not avoidant.  Speech: Not pressured or rambling.  Answers were appropriate.  Sentence structure was intact she was able to  dialogue.  Mood/Affect: Not obviously depressed or particularly anxious.  She was pleasant and calm.  No barbara or irritability.  Thought Content: No hallucinations or delusions  Suicidal or Homicidal Thoughts:  None apparent or reported.   Thought Process/Formulation: Fairly organized and calm today.  No pressured thinking or speech.  She was tracking well.  Not disorganized.  She seems improved from the last time I saw her.  Associations: Grossly adequate  Fund of Knowledge: Fair.  Adequate..  Attention/Concentration:   A bit more disorganized and perseverative. More anxious.  Insight: Some slight improvement.  Tracking better.  Judgement: Appears somewhat impaired but improved  Memory: Perhaps some improvement.  Motor Status: Patient denies having any tremors or asymmetries..  Orientation: Better oriented and tracking better.    Diagnosis managed and treated at today's visit :    Mood disorder, NOS rule out major depressive disorder with anxious features, rule out mood disorder secondary to chemical use     Rule out bipolar affective disorder     Methamphetamine substance use disorder, severe, Currently in partial remission      Plan:  Medication Adjustment:  I am going to continue with the current medication regime.  She has been taken off her BuSpar, gabapentin and trazodone.  She continues on hydroxyzine as well as Effexor.  I am going to add some low-dose melatonin as per her request to help with sleep.       Other: Chart review, patient interview and documentation was 32 minutes.    The patient will return for a visit in 4 to 6 weeks..    Continue with the support of the clinic, reassurance, and redirection. Staff monitoring and ongoing assessments per team plan. Current psychotropic medication appears to represent the minimum effective dosage and appears medically necessary. We will continue to monitor and reassess. This team will utilize appropriate emergency services if necessary. I will make myself  available if concerns or problems arise.    Vinod Suárez MD

## 2021-07-21 NOTE — PATIENT INSTRUCTIONS
The patient has been taken off her trazodone, BuSpar, gabapentin since the last visit.  She continues on hydroxyzine and Effexor and we will continue with those medications at the current dosage.  I have added some melatonin at 3 mg at at bedtime as a as needed.  The patient will return to this clinic in 4 to 6 weeks for medication check.  I did recommend and wrote an order that she restart individual therapy.

## 2021-10-11 ENCOUNTER — TELEPHONE (OUTPATIENT)
Dept: BEHAVIORAL HEALTH | Facility: CLINIC | Age: 46
End: 2021-10-11

## 2021-10-11 NOTE — TELEPHONE ENCOUNTER
Writer tried several times to reach Patient via telephone. Number in chart went to Voice mail each time.     Encouraged patient to call in and schedule but also to give us the best number to call back for appointment for check in and provider.       Provider was notified of the above.

## 2022-02-28 ENCOUNTER — VIRTUAL VISIT (OUTPATIENT)
Dept: BEHAVIORAL HEALTH | Facility: CLINIC | Age: 47
End: 2022-02-28
Payer: COMMERCIAL

## 2022-02-28 DIAGNOSIS — N83.8 OVARIAN MASS: ICD-10-CM

## 2022-02-28 DIAGNOSIS — F31.81 BIPOLAR II DISORDER, MODERATE, DEPRESSED, WITH ANXIOUS DISTRESS (H): ICD-10-CM

## 2022-02-28 PROCEDURE — 99214 OFFICE O/P EST MOD 30 MIN: CPT | Mod: TEL | Performed by: PSYCHIATRY & NEUROLOGY

## 2022-02-28 RX ORDER — HYDROXYZINE HYDROCHLORIDE 25 MG/1
25 TABLET, FILM COATED ORAL EVERY 6 HOURS PRN
Qty: 120 TABLET | Refills: 1 | Status: SHIPPED | OUTPATIENT
Start: 2022-02-28

## 2022-02-28 RX ORDER — VENLAFAXINE HYDROCHLORIDE 150 MG/1
150 CAPSULE, EXTENDED RELEASE ORAL DAILY
Qty: 90 CAPSULE | Refills: 1 | Status: SHIPPED | OUTPATIENT
Start: 2022-02-28

## 2022-02-28 NOTE — PROGRESS NOTES
This video/telephone visit will be conducted via a call between you and your physician/provider. We have found that certain health care needs can be provided without the need for an in-person physical exam. This service lets us provide the care you need with a video /telephone conversation. If a prescription is necessary we can send it directly to your pharmacy. If lab work is needed we can place an order for that and you can then stop by our lab to have the test done at a later time.    Just as we bill insurance for in-person visits, we also bill insurance for video/telephone visits. If you have questions about your insurance coverage, we recommend that you speak with your insurance company.    Patient has given verbal consent for video/Telephone visit? Yes  Patient would like telephone visit, please connect: [phone]  Reason for visit: Medication follow up  Patient verified allergies, medications and pharmacy via telephone verbally with writer.   Medication refill is pended for review and approval by provider.   Patient states she is ready for visit.  MN  to be reviewed by provider.   Dori Martin February 28, 2022 10:08 AM

## 2022-02-28 NOTE — PATIENT INSTRUCTIONS
Patient seems to be tolerating the current medications and doing fairly well although she is having some difficulty with sleep.  I have increased her melatonin to 5 mg at night.  I have also ordered for individual therapy.  The patient should return to this clinic in 3 to 4 months for medication check.  She agrees to call before then with any questions or concerns.  She will continue to follow-up with her medical team.

## 2022-02-28 NOTE — PROGRESS NOTES
Outpatient Followup Psychiatric Evaluation      Pertinent History: The patient presented for an initial intake to this clinic on December 23 of 2019.  The patient has a reported prior history of bipolar affective disorder, as well as a history of polysubstance abuse including methamphetamine abuse.  I also see she has had a diagnosis of depressive disorder, NOS.  In addition she has morbid obesity and diabetes mellitus type 2 and polycystic ovary syndrome.  She has steroid dependent asthma.  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  She has had a ZANDRA, SBO and omentectomy.  Early in December 2019 she was admitted to the hospital with an abscess in the context of fevers.  She had significant anxiety over her diagnoses and prognosis.  Review of the records shows that she had been on Effexor 225 mg a day but apparently non-compliant with that.  She is also recently been on Lexapro.  She has had trazodone for sleep.  She was referred to this clinic due to concerns of worsening mood in the context of the situational stressors.  The patient apparently had been followed here in the distant past by 1 of our therapist.  At that visit we did change her Effexor XR to 225 mg a day.  I have discontinued her Lexapro.  I have started her on Remeron 15 mg at night.  The patient was in a structured and safe setting to prevent relapse and begin chemotherapy.    I saw the patient in late January 2020.  The patient had just completed her first round of chemotherapy and had multiple stressors including a break-up with a boyfriend.  She described more emotional difficulties.  She was living in a community house.  She was tearful throughout the interview.  She reported being sober since Thanksgiving.  There was some question about her current medication regime and what she was receiving at her placement.  We did attempt to increase her Remeron at that visit but later through phone calls we did increase her  trazodone.  We continued her on Effexor.     I last saw the patient on March 2, 2020. At that time she reported her mood was improving and she had no urges to use chemicals. She continued with her chemotherapy at that time. We did increase her Remeron to 30 mg for that time.  The patient was admitted to the hospital on March 3 regarding a blood transfusion. She was admitted to the hospital again on April 30 with staff infection and bacteremia.  Review of the record shows that the patient continues to follow with Paige Hartman for individual therapy. Over the course of the last few weeks her Ativan has been discontinued and she's tapering on her narcotics. Her debt depression is improving but she continues to have a low mood according to her therapist notes due to situational stressors. At times she can be somewhat dramatic and loud at times tearful and depressed. Her plan is to move out of the nursing home and into a cabin in the woods with her brother and have support from her sister. She has been referred for a rule 25 to help with chemical dependency care coordination.    I saw the patient for a virtual visit  August 12, 2020. At that time we increased her Effexor to 300 mg a day. She was being weaned off of narcotics. The patient was seen for a virtual visit in this clinic by her psychologist on September 2 of 2020. She was still struggling with some anxiety over future concerns but was doing fairly well. She was described as being an early remission from her methamphetamine abuse.    I saw the patient in July 2021.  She was struggling with some medical issues including an infection and had some digits amputated.  She still was having some periodic depression but overall is more optimistic.  She was staying in a transitional care unit at that time.  We continued with her Effexor and Vistaril.  She had previously been taken off her BuSpar, gabapentin and trazodone.  We did add some low-dose melatonin for  sleep.    Current Symptoms:   The patient returns today, February 28, 2022 for a follow-up visit.  I do see that she did miss some follow-up appointments since our last visit.   On my interview with the patient today she states she is doing well and is remaining sober.  She has not used any substances in a year and a half.  She states her mood is been relatively good.  At times she is somewhat depressed and does have some irritability and anger and we talked about some techniques for that and I am going to set her up with an individual therapist and she is in agreement with that.  She denies having any significant depression.  No thoughts of wishing she was dead or thoughts of harming herself.  She denies having any hallucinations or delusions.  She reports she has had 3 of her friends die in the last year and has been difficult for her.  2 by ruptured aortic aneurysms, which was a coincidence.  She states she is remaining social but now has sober friends and feels very good about that.  She has not attended AA in about 6 months but states she will continue to stay sober.  She has become closer to her mom and they are spending time together and that is a good support for the patient.  She reports she has a new puppy and that has been helpful.  She continues with some sleep issues and we will further increase the melatonin up to 6 mg a day.  There is been no other new medical issues or concerns.  No new allergies.  No side effects to the medication.  She has no other questions or concerns and is appreciative about her current situation.          Current Medications:  Current medications were reviewed.  Please see the chart for additional information.    Medication Compliance: yes    Side Effects to Medications:  no      Vitals:  Wt Readings from Last 3 Encounters:   01/20/20 201 lb (91.2 kg)   01/15/20 190 lb (86.2 kg)   12/24/19 (!) 232 lb (105.2 kg)     Temp Readings from Last 3 Encounters:   01/15/20 98.2  F (36.8   C)   12/24/19 97.2  F (36.2  C)   11/22/19 98.8  F (37.1  C) (Oral)     BP Readings from Last 3 Encounters:   03/02/20 97/50   01/20/20 (!) 158/104   01/15/20 90/60     Pulse Readings from Last 3 Encounters:   03/02/20 67   01/20/20 77   01/15/20 89       Problem List (Please see medical records):          Mental Status Exam:   Appearance:   The patient again was interviewed by the phone.  She was calm and cooperative.  Behavior:   Pleasant and cooperative.  No irritability or agitation.  She participated well.  Speech: Sentence structure is intact.  Not pressured or rambling.  Answers were consistent and appropriate.  Mood/Affect: Fairly bright.  No lability or irritability.  No evidence of any barbara.  Not significantly depressed.  Thought Content: No hallucinations or delusions  Suicidal or Homicidal Thoughts:  None apparent or reported.   Thought Process/Formulation: Tracking well.  Not disorganized.  No racing thoughts.  Associations: Grossly adequate  Fund of Knowledge: Fair.  Adequate..  Attention/Concentration: Tracking fairly well today.  Concentration appears grossly intact.  Insight: Some slight improvement.  Tracking better.  Judgement: Appears somewhat impaired but improved  Memory: Perhaps some improvement.  Motor Status: Patient denies having any tremors or asymmetries..  Orientation: Patient appears oriented.    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  She states the as needed Vistaril is helpful but she is only rarely using that.  We will continue with that as well.  She will return to this clinic in 3 to 4 months for medication check.  I have ordered  cytology assessment and treatment as well.  The patient agrees to call with any questions or concerns.  She is able to contract for safety.      Other: Chart review, patient interview and documentation was 28 minutes.    The patient will return for a visit in 3 to 4 months.    Continue with the support of the clinic, reassurance, and redirection. Staff monitoring and ongoing assessments per team plan. Current psychotropic medication appears to represent the minimum effective dosage and appears medically necessary. We will continue to monitor and reassess. This team will utilize appropriate emergency services if necessary. I will make myself available if concerns or problems arise.    Vinod Suárez MD

## 2022-02-28 NOTE — PROGRESS NOTES
[facility] Post-Appointment Cart -check      Correct pharmacy verified with patient and updated in chart? [x] yes []no    Charge captured ? [] yes  [] no [x] n/a-virtual     Medications ordered this visit were e-scribed.  Verified by order class [x] yes  [] no    List Medications: hydroxyzine HCl 25 mg; melatonin 5 mg; Effexor- mg     Medication changes or discontinuations were communicated to patient's pharmacy: [] yes  [x] no    UA collected [] yes  [] no  [x] n/a-virtual     Outside referrals / labs, etc support staff to follow up: [] yes  [x] no    Future appointment was made: [] yes  [x] no  [] n/a  RTC 3 months  Dictation completed at time of chart check: [x] yes  [] no    I have checked the documentation for today s encounters and the above information has been reviewed and completed.      Dori Martin on February 28, 2022 at 11:43 AM

## 2022-04-04 NOTE — ED TRIAGE NOTES
Pt arrives via EMS from TCU  After having a hysterectomy and oophorectomy for ovarian cancer.  The incision to the lower abd has opened and the pt is having abd pain.   Pt was discharged from the hospital today.  1200 mg Tylenol given at TCU.   Routing request to Dr. Lyles to review/advise - needing forms filled out.     Thank you,   Homa Tipton RN, BSN  [facility]

## 2024-07-10 NOTE — BRIEF OP NOTE
[facility], [place]    Brief Operative Note    Pre-operative diagnosis: Pelvic mass  Post-operative diagnosis Suspected metastatic endometrial cancer    Procedure: Procedure(s):  Exploratory Laparotomy , TOTAL ABDOMINAL HYSTERECTOMY, Bilateral SALPINGoophorectomy, Omentectomy, Abdominal Washout  and Tumor DEBULKING    Surgeon: Surgeon(s) and Role:     * Rosana Mckeon MD - Primary     * Preeti Kruse MD - Fellow - Assisting     Anesthesia: General     IVF:    1100 ml crystalloid  Transfusions:   2U pRBC, 1 pack FFP  Estimated blood loss: 500 ml  Drains:   None    Specimens:   ID Type Source Tests Collected by Time Destination   A : right ovary  Tissue Other SURGICAL PATHOLOGY EXAM Rosana Mckeon MD 11/27/2019 10:20 AM    B : uterus, cervix, left ovary and fallopian tube Tissue Other SURGICAL PATHOLOGY EXAM Rosana Mckeon MD 11/27/2019 10:46 AM    C : omentum  Tissue Abdomen SURGICAL PATHOLOGY EXAM Rosana Mckeon MD 11/27/2019 11:23 AM      Complications: None.    Findings:  On entry, large amount of bloody purulent ascites.  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  Uterus on gross section showing endometrial involvement.  No obvious metastatic disease outside of the pelvis.  Omentum inflamed but without obvious nodules.  Liver, spleen, diaphragm surfaces were smooth.  Appendix inflamed but normal in appearance.  Filmy inflammatory adhesions between loops of small bowel and dilated large bowel gently lysed.  Abdomen copiously irrigated with 10L warm saline.         Collaborative Care (CoCM)  Progress Note    Type of Interaction: In Office    Start Time: 1p    End Time: 2p        Appointment: Scheduled    Reason for Visit:   Chief Complaint   Patient presents with    PTSD (Post-Traumatic Stress Disorder)          Interval History / Patient Symptoms: Patient reports she was at a holiday event and her daughter and grandkids won't speak to her. Patient reports she is not able to have a relationship with her grandkids because her daughter won't communicate with her. Patient reports she has lost respect for her spouse as he can't verbally apologize for cheating on her. Patient is frustrated by the make up gifts and trips to make up for his choices. Patient and BHM discuss mindfulness and personal values and patient will complete homework. Patient and BHM talked about communication styles. Patient and BHM processed what she is looking for and how she would like to proceed in her current situation. Patient wants happiness and to increase self worth and self esteem. Patient and BHM discussed self compassion and making choices to make herself happy and not others around her. Patient to set healthy boundaries with others so that she does not continue to be hurt by others.    Patient Health Questionnaire-9 Score: 11 (6/26/2024  2:00 PM)  PRASANNA-7 Total Score: 12 (6/26/2024  2:00 PM)        Interventions Provided: [place] Setting, Behavioral Activation, Communication Training, and Develop Coping Strategies      Progress Made: Mixed    Response to Intervention: Patient is open and engaging. Patient is working on how to find things that make her happy and living her life the way she wants to despite her current living situation                  Follow Up / Next Appointment:    7/24/2024@[facility]

## (undated) DEVICE — DRAPE LEGGINGS CLEAR 8430

## (undated) DEVICE — GLOVE PROTEXIS BLUE W/NEU-THERA 7.0  2D73EB70

## (undated) DEVICE — ESU GROUND PAD ADULT W/CORD E7507

## (undated) DEVICE — PREP TECHNI-CARE CHLOROXYLENOL 3% 4OZ BOTTLE C222-4ZWO

## (undated) DEVICE — SU VICRYL 0 CT-1 27" UND J260H

## (undated) DEVICE — SU PDS II 0 TP-1 60" Z991G

## (undated) DEVICE — LINEN TOWEL PACK X30 5481

## (undated) DEVICE — SPONGE LAP 18X18" X8435

## (undated) DEVICE — DRAPE MAYO STAND 23X54 8337

## (undated) DEVICE — SOL WATER IRRIG 1000ML BOTTLE 2F7114

## (undated) DEVICE — ADH SKIN CLOSURE PREMIERPRO EXOFIN 1.0ML 3470

## (undated) DEVICE — SU VICRYL 0 TIE 54" J608H

## (undated) DEVICE — PREP CHLORAPREP 26ML TINTED ORANGE  260815

## (undated) DEVICE — ESU LIGASURE IMPACT OPEN SEALER/DVDR CVD LG JAW LF4418

## (undated) DEVICE — TUBING IRRIG CYSTO/BLADDER SET 81" LF 2C4040

## (undated) DEVICE — SOL NACL 0.9% INJ 1000ML BAG 2B1324X

## (undated) DEVICE — ESU PENCIL W/SMOKE EVAC CVPLP2000

## (undated) DEVICE — SU VICRYL 0 CT-2 27" J334H

## (undated) DEVICE — SU VICRYL 3-0 SH 27" UND J416H

## (undated) DEVICE — Device

## (undated) DEVICE — GLOVE PROTEXIS POWDER FREE SMT 6.5  2D72PT65X

## (undated) RX ORDER — FENTANYL CITRATE 50 UG/ML
INJECTION, SOLUTION INTRAMUSCULAR; INTRAVENOUS
Status: DISPENSED
Start: 2019-11-27

## (undated) RX ORDER — LIDOCAINE HYDROCHLORIDE 20 MG/ML
INJECTION, SOLUTION EPIDURAL; INFILTRATION; INTRACAUDAL; PERINEURAL
Status: DISPENSED
Start: 2019-11-27

## (undated) RX ORDER — HYDROMORPHONE HCL/0.9% NACL/PF 0.2MG/0.2
SYRINGE (ML) INTRAVENOUS
Status: DISPENSED
Start: 2019-11-27

## (undated) RX ORDER — SODIUM CHLORIDE 9 MG/ML
INJECTION, SOLUTION INTRAVENOUS
Status: DISPENSED
Start: 2019-11-27

## (undated) RX ORDER — DEXAMETHASONE SODIUM PHOSPHATE 4 MG/ML
INJECTION, SOLUTION INTRA-ARTICULAR; INTRALESIONAL; INTRAMUSCULAR; INTRAVENOUS; SOFT TISSUE
Status: DISPENSED
Start: 2019-11-27

## (undated) RX ORDER — ONDANSETRON 2 MG/ML
INJECTION INTRAMUSCULAR; INTRAVENOUS
Status: DISPENSED
Start: 2019-11-27

## (undated) RX ORDER — SODIUM CHLORIDE, SODIUM LACTATE, POTASSIUM CHLORIDE, CALCIUM CHLORIDE 600; 310; 30; 20 MG/100ML; MG/100ML; MG/100ML; MG/100ML
INJECTION, SOLUTION INTRAVENOUS
Status: DISPENSED
Start: 2019-11-27

## (undated) RX ORDER — CALCIUM CHLORIDE 100 MG/ML
INJECTION INTRAVENOUS; INTRAVENTRICULAR
Status: DISPENSED
Start: 2019-11-27